# Patient Record
Sex: MALE | Race: WHITE | NOT HISPANIC OR LATINO | Employment: UNEMPLOYED | URBAN - METROPOLITAN AREA
[De-identification: names, ages, dates, MRNs, and addresses within clinical notes are randomized per-mention and may not be internally consistent; named-entity substitution may affect disease eponyms.]

---

## 2018-07-03 ENCOUNTER — OFFICE VISIT (OUTPATIENT)
Dept: FAMILY MEDICINE | Facility: CLINIC | Age: 58
End: 2018-07-03
Payer: COMMERCIAL

## 2018-07-03 VITALS
HEIGHT: 68 IN | DIASTOLIC BLOOD PRESSURE: 76 MMHG | SYSTOLIC BLOOD PRESSURE: 128 MMHG | BODY MASS INDEX: 25.31 KG/M2 | OXYGEN SATURATION: 100 % | RESPIRATION RATE: 20 BRPM | TEMPERATURE: 98.4 F | WEIGHT: 167 LBS | HEART RATE: 88 BPM

## 2018-07-03 DIAGNOSIS — Z00.01 ENCOUNTER FOR ROUTINE ADULT MEDICAL EXAM WITH ABNORMAL FINDINGS: Primary | ICD-10-CM

## 2018-07-03 DIAGNOSIS — E11.59 TYPE 2 DIABETES MELLITUS WITH OTHER CIRCULATORY COMPLICATION, WITH LONG-TERM CURRENT USE OF INSULIN (H): ICD-10-CM

## 2018-07-03 DIAGNOSIS — Z11.4 SCREENING FOR HIV (HUMAN IMMUNODEFICIENCY VIRUS): ICD-10-CM

## 2018-07-03 DIAGNOSIS — F33.2 SEVERE EPISODE OF RECURRENT MAJOR DEPRESSIVE DISORDER, WITHOUT PSYCHOTIC FEATURES (H): ICD-10-CM

## 2018-07-03 DIAGNOSIS — Z79.4 TYPE 2 DIABETES MELLITUS WITH OTHER CIRCULATORY COMPLICATION, WITH LONG-TERM CURRENT USE OF INSULIN (H): ICD-10-CM

## 2018-07-03 DIAGNOSIS — Z11.59 NEED FOR HEPATITIS C SCREENING TEST: ICD-10-CM

## 2018-07-03 DIAGNOSIS — R07.81 RIB PAIN ON LEFT SIDE: ICD-10-CM

## 2018-07-03 DIAGNOSIS — Z12.11 SCREEN FOR COLON CANCER: ICD-10-CM

## 2018-07-03 DIAGNOSIS — I25.10 CORONARY ARTERY DISEASE INVOLVING NATIVE CORONARY ARTERY OF NATIVE HEART WITHOUT ANGINA PECTORIS: ICD-10-CM

## 2018-07-03 DIAGNOSIS — Z13.89 SCREENING FOR DIABETIC PERIPHERAL NEUROPATHY: ICD-10-CM

## 2018-07-03 LAB
ALBUMIN SERPL-MCNC: 3.7 G/DL (ref 3.4–5)
ALP SERPL-CCNC: 89 U/L (ref 40–150)
ALT SERPL W P-5'-P-CCNC: 35 U/L (ref 0–70)
ANION GAP SERPL CALCULATED.3IONS-SCNC: 10 MMOL/L (ref 3–14)
AST SERPL W P-5'-P-CCNC: 14 U/L (ref 0–45)
BILIRUB DIRECT SERPL-MCNC: 0.1 MG/DL (ref 0–0.2)
BILIRUB SERPL-MCNC: 0.2 MG/DL (ref 0.2–1.3)
BUN SERPL-MCNC: 17 MG/DL (ref 7–30)
CALCIUM SERPL-MCNC: 9.4 MG/DL (ref 8.5–10.1)
CHLORIDE SERPL-SCNC: 105 MMOL/L (ref 94–109)
CHOLEST SERPL-MCNC: 146 MG/DL
CO2 SERPL-SCNC: 24 MMOL/L (ref 20–32)
CREAT SERPL-MCNC: 0.77 MG/DL (ref 0.66–1.25)
CREAT UR-MCNC: 95 MG/DL
GFR SERPL CREATININE-BSD FRML MDRD: >90 ML/MIN/1.7M2
GLUCOSE SERPL-MCNC: 139 MG/DL (ref 70–99)
HBA1C MFR BLD: 12.5 % (ref 0–5.6)
HDLC SERPL-MCNC: 35 MG/DL
LDLC SERPL CALC-MCNC: 82 MG/DL
MICROALBUMIN UR-MCNC: 45 MG/L
MICROALBUMIN/CREAT UR: 47.42 MG/G CR (ref 0–17)
NONHDLC SERPL-MCNC: 111 MG/DL
POTASSIUM SERPL-SCNC: 3.8 MMOL/L (ref 3.4–5.3)
PROT SERPL-MCNC: 7.5 G/DL (ref 6.8–8.8)
PSA SERPL-ACNC: 0.46 UG/L (ref 0–4)
SODIUM SERPL-SCNC: 139 MMOL/L (ref 133–144)
TRIGL SERPL-MCNC: 145 MG/DL
TSH SERPL DL<=0.005 MIU/L-ACNC: 0.66 MU/L (ref 0.4–4)

## 2018-07-03 PROCEDURE — 80048 BASIC METABOLIC PNL TOTAL CA: CPT | Performed by: FAMILY MEDICINE

## 2018-07-03 PROCEDURE — 99000 SPECIMEN HANDLING OFFICE-LAB: CPT | Performed by: FAMILY MEDICINE

## 2018-07-03 PROCEDURE — 83036 HEMOGLOBIN GLYCOSYLATED A1C: CPT | Performed by: FAMILY MEDICINE

## 2018-07-03 PROCEDURE — 84443 ASSAY THYROID STIM HORMONE: CPT | Performed by: FAMILY MEDICINE

## 2018-07-03 PROCEDURE — 86803 HEPATITIS C AB TEST: CPT | Performed by: FAMILY MEDICINE

## 2018-07-03 PROCEDURE — 80061 LIPID PANEL: CPT | Performed by: FAMILY MEDICINE

## 2018-07-03 PROCEDURE — 80076 HEPATIC FUNCTION PANEL: CPT | Performed by: FAMILY MEDICINE

## 2018-07-03 PROCEDURE — 82043 UR ALBUMIN QUANTITATIVE: CPT | Performed by: FAMILY MEDICINE

## 2018-07-03 PROCEDURE — 99386 PREV VISIT NEW AGE 40-64: CPT | Performed by: FAMILY MEDICINE

## 2018-07-03 PROCEDURE — G0103 PSA SCREENING: HCPCS | Performed by: FAMILY MEDICINE

## 2018-07-03 PROCEDURE — 99214 OFFICE O/P EST MOD 30 MIN: CPT | Mod: 25 | Performed by: FAMILY MEDICINE

## 2018-07-03 PROCEDURE — 87389 HIV-1 AG W/HIV-1&-2 AB AG IA: CPT | Performed by: FAMILY MEDICINE

## 2018-07-03 PROCEDURE — 36415 COLL VENOUS BLD VENIPUNCTURE: CPT | Performed by: FAMILY MEDICINE

## 2018-07-03 PROCEDURE — 99207 C FOOT EXAM  NO CHARGE: CPT | Mod: 25 | Performed by: FAMILY MEDICINE

## 2018-07-03 PROCEDURE — 80175 DRUG SCREEN QUAN LAMOTRIGINE: CPT | Mod: 90 | Performed by: FAMILY MEDICINE

## 2018-07-03 RX ORDER — QUETIAPINE FUMARATE 300 MG/1
300 TABLET, FILM COATED ORAL AT BEDTIME
Qty: 60 TABLET | Refills: 0 | Status: ON HOLD | OUTPATIENT
Start: 2018-07-03 | End: 2020-02-29

## 2018-07-03 RX ORDER — LAMOTRIGINE 100 MG/1
100 TABLET ORAL DAILY
COMMUNITY
End: 2018-07-03

## 2018-07-03 RX ORDER — CANDESARTAN CILEXETIL AND HYDROCHLOROTHIAZIDE 16; 12.5 MG/1; MG/1
TABLET ORAL
COMMUNITY
End: 2018-07-05

## 2018-07-03 RX ORDER — LAMOTRIGINE 100 MG/1
200 TABLET ORAL 2 TIMES DAILY
Qty: 180 TABLET | Refills: 0 | Status: ON HOLD | OUTPATIENT
Start: 2018-07-03 | End: 2020-02-29

## 2018-07-03 RX ORDER — CLOMIPRAMINE HYDROCHLORIDE 50 MG/1
50 CAPSULE ORAL 4 TIMES DAILY
Qty: 180 CAPSULE | Refills: 0 | Status: ON HOLD | OUTPATIENT
Start: 2018-07-03 | End: 2020-02-29

## 2018-07-03 RX ORDER — FLUOXETINE 40 MG/1
40 CAPSULE ORAL DAILY
Qty: 90 CAPSULE | Refills: 1 | Status: ON HOLD | OUTPATIENT
Start: 2018-07-03 | End: 2020-02-29

## 2018-07-03 RX ORDER — ISOSORBIDE DINITRATE 40 MG/1
40 TABLET ORAL 2 TIMES DAILY
Qty: 180 TABLET | Refills: 1 | COMMUNITY
Start: 2018-07-03 | End: 2018-07-05

## 2018-07-03 RX ORDER — BUSPIRONE HYDROCHLORIDE 10 MG/1
10 TABLET ORAL 3 TIMES DAILY
Qty: 90 TABLET | Refills: 1 | Status: SHIPPED | OUTPATIENT
Start: 2018-07-03 | End: 2020-02-29

## 2018-07-03 RX ORDER — NAPROXEN 500 MG/1
500 TABLET ORAL 2 TIMES DAILY WITH MEALS
Qty: 60 TABLET | Refills: 1 | Status: ON HOLD | OUTPATIENT
Start: 2018-07-03 | End: 2020-02-29

## 2018-07-03 RX ORDER — ROSUVASTATIN CALCIUM 20 MG/1
20 TABLET, COATED ORAL DAILY
COMMUNITY
End: 2018-07-05

## 2018-07-03 NOTE — PROGRESS NOTES
SUBJECTIVE:   CC: Huseyin Pettit is an 58 year old male who presents for preventative health visit.     Healthy Habits:    Do you get at least three servings of calcium containing foods daily (dairy, green leafy vegetables, etc.)? yes    Amount of exercise or daily activities, outside of work: n/a    Problems taking medications regularly No    Medication side effects: No    Have you had an eye exam in the past two years? no    Do you see a dentist twice per year? yes    Do you have sleep apnea, excessive snoring or daytime drowsiness?no           Today's PHQ-2 Score:   PHQ-2 ( 1999 Pf[employer]) 7/3/2018   Q1: Little interest or pleasure in doing things 0   Q2: Feeling down, depressed or hopeless 0   PHQ-2 Score 0       Abuse: Current or Past(Physical, Sexual or Emotional)- No  Do you feel safe in your environment - Yes    Social History   Substance Use Topics     Smoking status: Current Every Day Smoker     Packs/day: 0.50     Years: 25.00     Smokeless tobacco: Never Used      Comment: trying to adela     Alcohol use No      If you drink alcohol do you typically have >3 drinks per day or >7 drinks per week? Not Applicable                      Last PSA: No results found for: PSA    Reviewed orders with patient. Reviewed health maintenance and updated orders accordingly - Yes  Labs reviewed in EPIC  BP Readings from Last 3 Encounters:   07/03/18 128/76   02/18/15 108/70    Wt Readings from Last 3 Encounters:   07/03/18 167 lb (75.8 kg)   02/18/15 176 lb 1.6 oz (79.9 kg)                  Patient Active Problem List   Diagnosis     Hyperlipidemia LDL goal <100     Benign essential hypertension     Severe episode of recurrent major depressive disorder, without psychotic features (H)     Coronary artery disease involving native coronary artery of native heart without angina pectoris     Type 2 diabetes mellitus with other circulatory complication, with long-term current use of insulin (H)     Past Surgical History:   Procedure  Laterality Date     CHOLECYSTECTOMY       GALLBLADDER SURGERY  2011       Social History   Substance Use Topics     Smoking status: Current Every Day Smoker     Packs/day: 0.50     Years: 25.00     Smokeless tobacco: Never Used      Comment: trying to adela     Alcohol use No     History reviewed. No pertinent family history.      Current Outpatient Prescriptions   Medication Sig Dispense Refill     aspirin 81 MG tablet Take by mouth daily       busPIRone (BUSPAR) 10 MG tablet Take 1 tablet (10 mg) by mouth 3 times daily 90 tablet 1     busPIRone (BUSPAR) 10 MG tablet Take 1 tablet (10 mg) by mouth 4 times daily (Need PHQ9 for further refills) 120 tablet 0     candesartan cilexetil-HCTZ (ATACAND HCT) 16-12.5 MG TABS        clomiPRAMINE (ANAFRANIL) 50 MG capsule Take 1 capsule (50 mg) by mouth 4 times daily 180 capsule 0     clomiPRAMINE (ANAFRANIL) 50 MG capsule Take 1 capsule (50 mg) by mouth 5 times daily 450 capsule 0     clopidogrel (PLAVIX) 75 MG tablet Take 1 tablet (75 mg) by mouth daily 90 tablet 1     FLUoxetine (PROZAC) 20 MG capsule Take 20 mg by mouth daily       FLUoxetine (PROZAC) 40 MG capsule Take 1 capsule (40 mg) by mouth daily 90 capsule 1     insulin aspart prot & aspart (NOVOLOG MIX 70/30 PEN) injection 28 units in AM, and 38 units in PM 9 mL 3     isosorbide Dinitrate (ISORDIL) 40 MG TABS Take 1 tablet (40 mg) by mouth 2 times daily 180 tablet 1     lamoTRIgine (LAMICTAL) 100 MG tablet Take 2 tablets (200 mg) by mouth 2 times daily 180 tablet 0     metFORMIN (GLUCOPHAGE) 850 MG tablet Take 1 tablet (850 mg) by mouth 2 times daily (with meals) (Need A1c for further refills) 60 tablet 0     naproxen (NAPROSYN) 500 MG tablet Take 1 tablet (500 mg) by mouth 2 times daily (with meals) 60 tablet 1     QUEtiapine (SEROQUEL XR) 300 MG 24 hr tablet Take 1 tablet (300 mg) by mouth 2 times daily 180 tablet 0     QUEtiapine (SEROQUEL) 300 MG tablet Take 1 tablet (300 mg) by mouth At Bedtime 60 tablet 0      rosuvastatin (CRESTOR) 20 MG tablet Take 20 mg by mouth daily       [DISCONTINUED] ClomiPRAMINE HCl (ANAFRANIL PO) Take 500 mg by mouth 4 times daily       [DISCONTINUED] CLOPIDOGREL BISULFATE PO Take 75 mg by mouth 2 times daily       [DISCONTINUED] lamoTRIgine (LAMICTAL) 100 MG tablet Take 100 mg by mouth daily       [DISCONTINUED] metFORMIN (GLUCOPHAGE) 850 MG tablet Take 1 tablet (850 mg) by mouth 2 times daily (with meals) (Need A1c for further refills) 60 tablet 0     [DISCONTINUED] QUEtiapine Fumarate (SEROQUEL XR PO) Take 300 mg by mouth       No Known Allergies    Reviewed and updated as needed this visit by clinical staff  Tobacco  Allergies  Meds  Med Hx  Surg Hx  Fam Hx  Soc Hx        Reviewed and updated as needed this visit by Provider        Past Medical History:   Diagnosis Date     CAD (coronary artery disease) 2/18/2015    S/p 3 stents in 2011 In [place]     Coronary artery disease      Coronary artery disease involving native coronary artery of native heart without angina pectoris 7/3/2018    S/p 3 stents put in [place] in 2011.     Depressive disorder      Diabetes (H)      Hypertension      Severe episode of recurrent major depressive disorder, without psychotic features (H) 7/3/2018     Type 2 diabetes mellitus with other circulatory complication, with long-term current use of insulin (H) 7/3/2018      Past Surgical History:   Procedure Laterality Date     CHOLECYSTECTOMY       GALLBLADDER SURGERY  2011       BP Readings from Last 2 Encounters:   07/03/18 128/76   02/18/15 108/70     Hemoglobin A1C (%)   Date Value   07/03/2018 12.5 (H)     LDL Cholesterol Calculated (mg/dL)   Date Value   07/03/2018 82       Diabetes Management Resources  Vascular Disease Follow-up:  Coronary Artery Disease (CAD)      Chest pain or pressure, left side neck or arm pain: check below.    Shortness of breath/increased sweats/nausea with exertion: No    Pain in calves walking 1-2 blocks: No    Worsened or new  "symptoms since last visit: No    Nitroglycerin use: no    Daily aspirin use: No, pt is on plavix.      Amount of exercise or physical activity: none.    Problems taking medications regularly: No    Medication side effects: none    Diet: regular (no restrictions)      Chest Pain      Onset: 6 months ago.    Description (location/character/radiation/duration): pain and tenderness on the Lt side of the chest/rib area, sever.     Intensity:  severe    Accompanying signs and symptoms:        Shortness of breath: no        Sweating: no        Nausea/vomitting: no        Palpitations: no        Other (fevers/chills/cough/heartburn/lightheadedness): tenderness to touch, pain is constant, worse with movement.    History (similar episodes/previous evaluation): yes, multiple tests done in [place] that showed mostly fracture /healing of the ribs in that area (bone scan)    Precipitating or alleviating factors:       Worse with exertion: no        Worse with breathing: YES       Related to eating: no        Better with burping: no     Therapies tried and outcome: None         ROS:  CONSTITUTIONAL: NEGATIVE for fever, chills, change in weight  ENT: NEGATIVE for ear, mouth and throat problems  RESP: NEGATIVE for significant cough or SOB  CV: as above.  GI: NEGATIVE for nausea, abdominal pain, heartburn, or change in bowel habits  MUSCULOSKELETAL:rib pain on the Lt side.  NEURO: NEGATIVE for weakness, dizziness or paresthesias  PSYCHIATRIC: NEGATIVE for changes in mood or affect    OBJECTIVE:   /76 (BP Location: Right arm, Patient Position: Chair, Cuff Size: Adult Regular)  Pulse 88  Temp 98.4  F (36.9  C) (Oral)  Resp 20  Ht 5' 8\" (1.727 m)  Wt 167 lb (75.8 kg)  SpO2 100%  BMI 25.39 kg/m2  EXAM:  GENERAL: healthy, alert and no distress  EYES: Eyes grossly normal to inspection, PERRL and conjunctivae and sclerae normal  HENT: ear canals and TM's normal, nose and mouth without ulcers or lesions  NECK: no adenopathy, no " asymmetry, masses, or scars and thyroid normal to palpation  RESP: lungs clear to auscultation - no rales, rhonchi or wheezes  RESP: tenderness on the Lt low rib, sever.  CV: regular rate and rhythm, normal S1 S2, no S3 or S4, no murmur, click or rub, no peripheral edema and peripheral pulses strong  ABDOMEN: soft, nontender, no hepatosplenomegaly, no masses and bowel sounds normal  MS: no gross musculoskeletal defects noted, no edema  SKIN: no suspicious lesions or rashes  NEURO: Normal strength and tone, mentation intact and speech normal  PSYCH: mentation appears normal, affect normal/bright  Diabetic foot exam: normal DP and PT pulses, no trophic changes or ulcerative lesions and normal sensory exam    Diagnostic Test Results:  Results for orders placed or performed in visit on 07/03/18 (from the past 24 hour(s))   Lipid panel reflex to direct LDL Fasting   Result Value Ref Range    Cholesterol 146 <200 mg/dL    Triglycerides 145 <150 mg/dL    HDL Cholesterol 35 (L) >39 mg/dL    LDL Cholesterol Calculated 82 <100 mg/dL    Non HDL Cholesterol 111 <130 mg/dL   HEMOGLOBIN A1C   Result Value Ref Range    Hemoglobin A1C 12.5 (H) 0 - 5.6 %   TSH WITH FREE T4 REFLEX   Result Value Ref Range    TSH 0.66 0.40 - 4.00 mU/L   Basic metabolic panel   Result Value Ref Range    Sodium 139 133 - 144 mmol/L    Potassium 3.8 3.4 - 5.3 mmol/L    Chloride 105 94 - 109 mmol/L    Carbon Dioxide 24 20 - 32 mmol/L    Anion Gap 10 3 - 14 mmol/L    Glucose 139 (H) 70 - 99 mg/dL    Urea Nitrogen 17 7 - 30 mg/dL    Creatinine 0.77 0.66 - 1.25 mg/dL    GFR Estimate >90 >60 mL/min/1.7m2    GFR Estimate If Black >90 >60 mL/min/1.7m2    Calcium 9.4 8.5 - 10.1 mg/dL   Hepatic panel   Result Value Ref Range    Bilirubin Direct 0.1 0.0 - 0.2 mg/dL    Bilirubin Total 0.2 0.2 - 1.3 mg/dL    Albumin 3.7 3.4 - 5.0 g/dL    Protein Total 7.5 6.8 - 8.8 g/dL    Alkaline Phosphatase 89 40 - 150 U/L    ALT 35 0 - 70 U/L    AST 14 0 - 45 U/L   Albumin  Random Urine Quantitative with Creat Ratio   Result Value Ref Range    Creatinine Urine 95 mg/dL    Albumin Urine mg/L 45 mg/L    Albumin Urine mg/g Cr 47.42 (H) 0 - 17 mg/g Cr       ASSESSMENT/PLAN:   1. Encounter for routine adult medical exam with abnormal findings    - Hepatitis C Screen Reflex to HCV RNA Quant and Genotype  - Prostate spec antigen screen    2. Screen for colon cancer  Refer to colonoscopy.     3. Need for hepatitis C screening test  Hep C     4. Screening for HIV (human immunodeficiency virus)    - HIV Screening    5. Screening for diabetic peripheral neuropathy    - FOOT EXAM  NO CHARGE [11346.868]    6. Type 2 diabetes mellitus with other circulatory complication, with long-term current use of insulin (H)  Not controlled at all, at this time, will continue on medicine and return in 1 week to discuss all his treatment for DM.   - Lipid panel reflex to direct LDL Fasting  - HEMOGLOBIN A1C  - Albumin Random Urine Quantitative with Creat Ratio  - TSH WITH FREE T4 REFLEX  - GASTROENTEROLOGY ADULT REF PROCEDURE ONLY  - metFORMIN (GLUCOPHAGE) 850 MG tablet; Take 1 tablet (850 mg) by mouth 2 times daily (with meals) (Need A1c for further refills)  Dispense: 60 tablet; Refill: 0  - Basic metabolic panel  - insulin aspart prot & aspart (NOVOLOG MIX 70/30 PEN) injection; 28 units in AM, and 38 units in PM  Dispense: 9 mL; Refill: 3  - Hepatic panel  - Lamotrigine Level    7. Severe episode of recurrent major depressive disorder, without psychotic features (H)  Pt is on many high dose medications, but his symptoms are for the most part controlled.   Given the severity of the disesae, and high dose of medications, will refer to psychitraty  - lamoTRIgine (LAMICTAL) 100 MG tablet; Take 2 tablets (200 mg) by mouth 2 times daily  Dispense: 180 tablet; Refill: 0  - clomiPRAMINE (ANAFRANIL) 50 MG capsule; Take 1 capsule (50 mg) by mouth 4 times daily  Dispense: 180 capsule; Refill: 0  - busPIRone (BUSPAR) 10  "MG tablet; Take 1 tablet (10 mg) by mouth 3 times daily  Dispense: 90 tablet; Refill: 1  - FLUoxetine (PROZAC) 40 MG capsule; Take 1 capsule (40 mg) by mouth daily  Dispense: 90 capsule; Refill: 1  - QUEtiapine (SEROQUEL) 300 MG tablet; Take 1 tablet (300 mg) by mouth At Bedtime  Dispense: 60 tablet; Refill: 0  - MENTAL HEALTH REFERRAL  - Adult; Psychiatry and Medication Management; Psychiatry; Other: Not Listed - Enter Referral Details in Scheduling Comments Below; Patient call to schedule    8. Rib pain on left side  Unsure of etiology, I suspect trauma, (pt is unaware of any, but could have happened under the influenze of high dose medications ) as above.  Check below CT chest   - CT Chest w/o Contrast; Future  - naproxen (NAPROSYN) 500 MG tablet; Take 1 tablet (500 mg) by mouth 2 times daily (with meals)  Dispense: 60 tablet; Refill: 1    9. Coronary artery disease involving native coronary artery of native heart without angina pectoris  Continue on Plavix, and statin drugs.      COUNSELING:  Reviewed preventive health counseling, as reflected in patient instructions       Regular exercise       Healthy diet/nutrition    BP Readings from Last 1 Encounters:   07/03/18 128/76     Estimated body mass index is 25.39 kg/(m^2) as calculated from the following:    Height as of this encounter: 5' 8\" (1.727 m).    Weight as of this encounter: 167 lb (75.8 kg).           reports that he has been smoking.  He has a 12.50 pack-year smoking history. He has never used smokeless tobacco.      Counseling Resources:  ATP IV Guidelines  Pooled Cohorts Equation Calculator  FRAX Risk Assessment  ICSI Preventive Guidelines  Dietary Guidelines for Americans, 2010  USDA's MyPlate  ASA Prophylaxis  Lung CA Screening    Hunter Carlton MD  [facility]  "

## 2018-07-03 NOTE — LETTER
July 9, 2018      Huseyin Pettit  [address] [address]        Dear ,    We are writing to inform you of your test results.    Labs are showing very high blood sugar, please make sure you start on taking your medications regularly, and I will discuss the plan with you during your next visit.  The rest of your labs are showing slight protein in the urine, otherwise, they are all normal.    Resulted Orders   Lipid panel reflex to direct LDL Fasting   Result Value Ref Range    Cholesterol 146 <200 mg/dL    Triglycerides 145 <150 mg/dL      Comment:      Fasting specimen    HDL Cholesterol 35 (L) >39 mg/dL    LDL Cholesterol Calculated 82 <100 mg/dL      Comment:      Desirable:       <100 mg/dl    Non HDL Cholesterol 111 <130 mg/dL   Hepatitis C Screen Reflex to HCV RNA Quant and Genotype   Result Value Ref Range    Hepatitis C Antibody Nonreactive NR^Nonreactive      Comment:      Assay performance characteristics have not been established for newborns,   infants, and children     HIV Screening   Result Value Ref Range    HIV Antigen Antibody Combo Nonreactive NR^Nonreactive          Comment:      HIV-1 p24 Ag & HIV-1/HIV-2 Ab Not Detected   HEMOGLOBIN A1C   Result Value Ref Range    Hemoglobin A1C 12.5 (H) 0 - 5.6 %      Comment:      Normal <5.7% Prediabetes 5.7-6.4%  Diabetes 6.5% or higher - adopted from ADA   consensus guidelines.  Results confirmed by repeat test     Albumin Random Urine Quantitative with Creat Ratio   Result Value Ref Range    Creatinine Urine 95 mg/dL    Albumin Urine mg/L 45 mg/L    Albumin Urine mg/g Cr 47.42 (H) 0 - 17 mg/g Cr   TSH WITH FREE T4 REFLEX   Result Value Ref Range    TSH 0.66 0.40 - 4.00 mU/L   Basic metabolic panel   Result Value Ref Range    Sodium 139 133 - 144 mmol/L    Potassium 3.8 3.4 - 5.3 mmol/L    Chloride 105 94 - 109 mmol/L    Carbon Dioxide 24 20 - 32 mmol/L    Anion Gap 10 3 - 14 mmol/L    Glucose 139 (H) 70 - 99 mg/dL      Comment:       Fasting specimen    Urea Nitrogen 17 7 - 30 mg/dL    Creatinine 0.77 0.66 - 1.25 mg/dL    GFR Estimate >90 >60 mL/min/1.7m2      Comment:      Non  GFR Calc    GFR Estimate If Black >90 >60 mL/min/1.7m2      Comment:       GFR Calc    Calcium 9.4 8.5 - 10.1 mg/dL   Prostate spec antigen screen   Result Value Ref Range    PSA 0.46 0 - 4 ug/L      Comment:      Assay Method:  Chemiluminescence using Siemens Vista analyzer   Hepatic panel   Result Value Ref Range    Bilirubin Direct 0.1 0.0 - 0.2 mg/dL    Bilirubin Total 0.2 0.2 - 1.3 mg/dL    Albumin 3.7 3.4 - 5.0 g/dL    Protein Total 7.5 6.8 - 8.8 g/dL    Alkaline Phosphatase 89 40 - 150 U/L    ALT 35 0 - 70 U/L    AST 14 0 - 45 U/L   Lamotrigine Level   Result Value Ref Range    Lamotrigine Level 3.7 2.5 - 15.0 ug/mL      Comment:      (Note)  INTERPRETIVE INFORMATION:  Lamotrigine  Therapeutic Range:  2.5-15.0 ug/mL             Toxic:  Not well established  Pharmacokinetics varies widely, particularly with   co-medications and/or compromised renal function.  Adverse   effects may include dizziness, somnolence, nausea and   vomiting.  Performed by [employer],  [address] [place] [id number] [phone]  [email or web address], Maximiliano Rider MD, Lab. Director         If you have any questions or concerns, please call the clinic at the number listed above.       Sincerely,        Hunter Carlton MD/AL

## 2018-07-03 NOTE — MR AVS SNAPSHOT
After Visit Summary   7/3/2018    Huseyin Pettit    MRN: [id number]           Patient Information     Date Of Birth          1960        Visit Information        Provider Department      7/3/2018 8:00 AM Hunter Carlton MD [facility]        Today's Diagnoses     Encounter for routine adult medical exam with abnormal findings    -  1    Screen for colon cancer        Need for hepatitis C screening test        Screening for HIV (human immunodeficiency virus)        Screening for diabetic peripheral neuropathy        Type 2 diabetes mellitus with other circulatory complication, with long-term current use of insulin (H)        Severe episode of recurrent major depressive disorder, without psychotic features (H)        Rib pain on left side          Care Instructions      Preventive Health Recommendations  Male Ages 50 - 64    Yearly exam:             See your health care provider every year in order to  o   Review health changes.   o   Discuss preventive care.    o   Review your medicines if your doctor has prescribed any.     Have a cholesterol test every 5 years, or more frequently if you are at risk for high cholesterol/heart disease.     Have a diabetes test (fasting glucose) every three years. If you are at risk for diabetes, you should have this test more often.     Have a colonoscopy at age 50, or have a yearly FIT test (stool test). These exams will check for colon cancer.      Talk with your health care provider about whether or not a prostate cancer screening test (PSA) is right for you.    You should be tested each year for STDs (sexually transmitted diseases), if you re at risk.     Shots: Get a flu shot each year. Get a tetanus shot every 10 years.     Nutrition:    Eat at least 5 servings of fruits and vegetables daily.     Eat whole-grain bread, whole-wheat pasta and brown rice instead of white grains and rice.     Get adequate Calcium and Vitamin D.      Lifestyle    Exercise for at least 150 minutes a week (30 minutes a day, 5 days a week). This will help you control your weight and prevent disease.     Limit alcohol to one drink per day.     No smoking.     Wear sunscreen to prevent skin cancer.     See your dentist every six months for an exam and cleaning.     See your eye doctor every 1 to 2 years.            Follow-ups after your visit        Additional Services     GASTROENTEROLOGY ADULT REF PROCEDURE ONLY       Last Lab Result: Creatinine (mg/dL)       Date                     Value                 02/18/2015               0.74             ----------  There is no height or weight on file to calculate BMI.     Needed:  No  Language:  English    Patient will be contacted to schedule procedure.     Please be aware that coverage of these services is subject to the terms and limitations of your health insurance plan.  Call member services at your health plan with any benefit or coverage questions.  Any procedures must be performed at a [place] facility OR coordinated by your clinic's referral office.    Please bring the following with you to your appointment:    (1) Any X-Rays, CTs or MRIs which have been performed.  Contact the facility where they were done to arrange for  prior to your scheduled appointment.    (2) List of current medications   (3) This referral request   (4) Any documents/labs given to you for this referral            MENTAL HEALTH REFERRAL  - Adult; Psychiatry and Medication Management; Psychiatry; Other: Not Listed - Enter Referral Details in Scheduling Comments Below; Patient call to schedule       All scheduling is subject to the client's specific insurance plan & benefits, provider/location availability, and provider clinical specialities.  Please arrive 15 minutes early for your first appointment and bring your completed paperwork.    Please be aware that coverage of these services is subject to the terms and  "limitations of your health insurance plan.  Call member services at your health plan with any benefit or coverage questions.                            Follow-up notes from your care team     Return in about 1 week (around 7/10/2018).      Future tests that were ordered for you today     Open Future Orders        Priority Expected Expires Ordered    CT Chest w/o Contrast Routine  7/3/2019 7/3/2018            Who to contact     If you have questions or need follow up information about today's clinic visit or your schedule please contact [facility] directly at [phone].  Normal or non-critical lab and imaging results will be communicated to you by MyChart, letter or phone within 4 business days after the clinic has received the results. If you do not hear from us within 7 days, please contact the clinic through MyChart or phone. If you have a critical or abnormal lab result, we will notify you by phone as soon as possible.  Submit refill requests through [employer] or call your pharmacy and they will forward the refill request to us. Please allow 3 business days for your refill to be completed.          Additional Information About Your Visit        Care EveryWhere ID     This is your Care EveryWhere ID. This could be used by other organizations to access your [place] medical records  [id number]        Your Vitals Were     Pulse Temperature Respirations Height Pulse Oximetry BMI (Body Mass Index)    88 98.4  F (36.9  C) (Oral) 20 5' 8\" (1.727 m) 100% 25.39 kg/m2       Blood Pressure from Last 3 Encounters:   07/03/18 128/76   02/18/15 108/70    Weight from Last 3 Encounters:   07/03/18 167 lb (75.8 kg)   02/18/15 176 lb 1.6 oz (79.9 kg)              We Performed the Following     Albumin Random Urine Quantitative with Creat Ratio     Basic metabolic panel     DEPRESSION ACTION PLAN (DAP)     FOOT EXAM  NO CHARGE [42303.114]     GASTROENTEROLOGY ADULT REF PROCEDURE ONLY     HEMOGLOBIN A1C     " Hepatitis C Screen Reflex to HCV RNA Quant and Genotype     HIV Screening     Lipid panel reflex to direct LDL Fasting     MENTAL HEALTH REFERRAL  - Adult; Psychiatry and Medication Management; Psychiatry; Other: Not Listed - Enter Referral Details in Scheduling Comments Below; Patient call to schedule     Prostate spec antigen screen     TSH WITH FREE T4 REFLEX          Today's Medication Changes          These changes are accurate as of 7/3/18  8:49 AM.  If you have any questions, ask your nurse or doctor.               Start taking these medicines.        Dose/Directions    insulin aspart prot & aspart injection   Commonly known as:  NovoLOG MIX 70/30 PEN   Used for:  Type 2 diabetes mellitus with other circulatory complication, with long-term current use of insulin (H)   Started by:  Hunter Carlton MD        28 units in AM, and 38 units in PM   Quantity:  9 mL   Refills:  3       naproxen 500 MG tablet   Commonly known as:  NAPROSYN   Used for:  Rib pain on left side   Started by:  Hunter Carlton MD        Dose:  500 mg   Take 1 tablet (500 mg) by mouth 2 times daily (with meals)   Quantity:  60 tablet   Refills:  1         These medicines have changed or have updated prescriptions.        Dose/Directions    * busPIRone 10 MG tablet   Commonly known as:  BUSPAR   This may have changed:  Another medication with the same name was added. Make sure you understand how and when to take each.   Used for:  Depression with anxiety   Changed by:  Hunter Carlton MD        Dose:  10 mg   Take 1 tablet (10 mg) by mouth 4 times daily (Need PHQ9 for further refills)   Quantity:  120 tablet   Refills:  0       * busPIRone 10 MG tablet   Commonly known as:  BUSPAR   This may have changed:  You were already taking a medication with the same name, and this prescription was added. Make sure you understand how and when to take each.   Used for:  Severe episode of recurrent major depressive disorder, without psychotic features (H)   Changed  by:  Hunter Carlton MD        Dose:  10 mg   Take 1 tablet (10 mg) by mouth 3 times daily   Quantity:  90 tablet   Refills:  1       * clomiPRAMINE 50 MG capsule   Commonly known as:  ANAFRANIL   This may have changed:  Another medication with the same name was added. Make sure you understand how and when to take each.   Used for:  Depression with anxiety   Changed by:  Hunter Carlton MD        Dose:  50 mg   Take 1 capsule (50 mg) by mouth 5 times daily   Quantity:  450 capsule   Refills:  0       * clomiPRAMINE 50 MG capsule   Commonly known as:  ANAFRANIL   This may have changed:  You were already taking a medication with the same name, and this prescription was added. Make sure you understand how and when to take each.   Used for:  Severe episode of recurrent major depressive disorder, without psychotic features (H)   Changed by:  Hunter Carlton MD        Dose:  50 mg   Take 1 capsule (50 mg) by mouth 4 times daily   Quantity:  180 capsule   Refills:  0       lamoTRIgine 100 MG tablet   Commonly known as:  LAMICTAL   This may have changed:    - how much to take  - when to take this   Used for:  Severe episode of recurrent major depressive disorder, without psychotic features (H)   Changed by:  Hunter Carlton MD        Dose:  200 mg   Take 2 tablets (200 mg) by mouth 2 times daily   Quantity:  180 tablet   Refills:  0       * PROZAC 20 MG capsule   This may have changed:  Another medication with the same name was added. Make sure you understand how and when to take each.   Generic drug:  FLUoxetine   Changed by:  Hunter Carlton MD        Dose:  20 mg   Take 20 mg by mouth daily   Refills:  0       * FLUoxetine 40 MG capsule   Commonly known as:  PROzac   This may have changed:  You were already taking a medication with the same name, and this prescription was added. Make sure you understand how and when to take each.   Used for:  Severe episode of recurrent major depressive disorder, without psychotic features (H)   Changed by:   Hunter Carlton MD        Dose:  40 mg   Take 1 capsule (40 mg) by mouth daily   Quantity:  90 capsule   Refills:  1       * QUEtiapine 300 MG 24 hr tablet   Commonly known as:  SEROQUEL XR   This may have changed:  Another medication with the same name was added. Make sure you understand how and when to take each.   Used for:  Depression with anxiety   Changed by:  Hunter Carlton MD        Dose:  300 mg   Take 1 tablet (300 mg) by mouth 2 times daily   Quantity:  180 tablet   Refills:  0       * QUEtiapine 300 MG tablet   Commonly known as:  SEROquel   This may have changed:  You were already taking a medication with the same name, and this prescription was added. Make sure you understand how and when to take each.   Used for:  Severe episode of recurrent major depressive disorder, without psychotic features (H)   Changed by:  Hunter Carlton MD        Dose:  300 mg   Take 1 tablet (300 mg) by mouth At Bedtime   Quantity:  60 tablet   Refills:  0       * Notice:  This list has 8 medication(s) that are the same as other medications prescribed for you. Read the directions carefully, and ask your doctor or other care provider to review them with you.         Where to get your medicines      These medications were sent to [facility] [address]  [id number] [place] [address]     Phone:  [phone]     busPIRone 10 MG tablet    clomiPRAMINE 50 MG capsule    FLUoxetine 40 MG capsule    insulin aspart prot & aspart injection    lamoTRIgine 100 MG tablet    metFORMIN 850 MG tablet    naproxen 500 MG tablet    QUEtiapine 300 MG tablet                Primary Care Provider Office Phone # Fax #    Hunter Carlton [phone] [address] [facility] [address]        Equal Access to Services     JOANNE MUJICA : Connie Fonseca, tarik cooper, kurt rojas. So [facility] [phone].    ATENCIÓN:  Si habla diya, tiene a borrero disposición servicios gratuitos de asistencia lingüística. Keith wilson [phone].    We comply with applicable federal civil rights laws and [place] laws. We do not discriminate on the basis of race, color, national origin, age, disability, sex, sexual orientation, or gender identity.            Thank you!     Thank you for choosing [facility]  for your care. Our goal is always to provide you with excellent care. Hearing back from our patients is one way we can continue to improve our services. Please take a few minutes to complete the written survey that you may receive in the mail after your visit with us. Thank you!             Your Updated Medication List - Protect others around you: Learn how to safely use, store and throw away your medicines at www.disposemymeds.org.          This list is accurate as of 7/3/18  8:49 AM.  Always use your most recent med list.                   Brand Name Dispense Instructions for use Diagnosis    aspirin 81 MG tablet      Take by mouth daily        ATACAND HCT 16-12.5 MG Tabs   Generic drug:  candesartan cilexetil-HCTZ           * busPIRone 10 MG tablet    BUSPAR    120 tablet    Take 1 tablet (10 mg) by mouth 4 times daily (Need PHQ9 for further refills)    Depression with anxiety       * busPIRone 10 MG tablet    BUSPAR    90 tablet    Take 1 tablet (10 mg) by mouth 3 times daily    Severe episode of recurrent major depressive disorder, without psychotic features (H)       * clomiPRAMINE 50 MG capsule    ANAFRANIL    450 capsule    Take 1 capsule (50 mg) by mouth 5 times daily    Depression with anxiety       * clomiPRAMINE 50 MG capsule    ANAFRANIL    180 capsule    Take 1 capsule (50 mg) by mouth 4 times daily    Severe episode of recurrent major depressive disorder, without psychotic features (H)       clopidogrel 75 MG tablet    PLAVIX    90 tablet    Take 1 tablet (75 mg) by mouth daily    CAD (coronary artery disease)        CRESTOR 20 MG tablet   Generic drug:  rosuvastatin      Take 20 mg by mouth daily        insulin aspart prot & aspart injection    NovoLOG MIX 70/30 PEN    9 mL    28 units in AM, and 38 units in PM    Type 2 diabetes mellitus with other circulatory complication, with long-term current use of insulin (H)       isosorbide Dinitrate 40 MG Tabs    ISORDIL    180 tablet    Take 1 tablet (40 mg) by mouth 2 times daily        lamoTRIgine 100 MG tablet    LAMICTAL    180 tablet    Take 2 tablets (200 mg) by mouth 2 times daily    Severe episode of recurrent major depressive disorder, without psychotic features (H)       metFORMIN 850 MG tablet    GLUCOPHAGE    60 tablet    Take 1 tablet (850 mg) by mouth 2 times daily (with meals) (Need A1c for further refills)    Type 2 diabetes mellitus with other circulatory complication, with long-term current use of insulin (H)       naproxen 500 MG tablet    NAPROSYN    60 tablet    Take 1 tablet (500 mg) by mouth 2 times daily (with meals)    Rib pain on left side       * PROZAC 20 MG capsule   Generic drug:  FLUoxetine      Take 20 mg by mouth daily        * FLUoxetine 40 MG capsule    PROzac    90 capsule    Take 1 capsule (40 mg) by mouth daily    Severe episode of recurrent major depressive disorder, without psychotic features (H)       * QUEtiapine 300 MG 24 hr tablet    SEROQUEL XR    180 tablet    Take 1 tablet (300 mg) by mouth 2 times daily    Depression with anxiety       * QUEtiapine 300 MG tablet    SEROquel    60 tablet    Take 1 tablet (300 mg) by mouth At Bedtime    Severe episode of recurrent major depressive disorder, without psychotic features (H)       * Notice:  This list has 8 medication(s) that are the same as other medications prescribed for you. Read the directions carefully, and ask your doctor or other care provider to review them with you.

## 2018-07-04 LAB — LAMOTRIGINE SERPL-MCNC: 3.7 UG/ML (ref 2.5–15)

## 2018-07-05 ENCOUNTER — TELEPHONE (OUTPATIENT)
Dept: FAMILY MEDICINE | Facility: CLINIC | Age: 58
End: 2018-07-05

## 2018-07-05 DIAGNOSIS — I25.10 CORONARY ARTERY DISEASE INVOLVING NATIVE CORONARY ARTERY OF NATIVE HEART WITHOUT ANGINA PECTORIS: ICD-10-CM

## 2018-07-05 LAB
HCV AB SERPL QL IA: NONREACTIVE
HIV 1+2 AB+HIV1 P24 AG SERPL QL IA: NONREACTIVE

## 2018-07-05 NOTE — TELEPHONE ENCOUNTER
"Patient is out of these medications and needs refills on them. They are just \"reported\" or \"historical\" in his chart.    Isosorbide dinatrate 40mg  crestor 20mg  plavix 75mg  Candesartan-cilexatine 16-12.5      Please send refills if approved ASAP.    Thanks,  Judith Meng, [facility]  [phone]    "

## 2018-07-05 NOTE — TELEPHONE ENCOUNTER
Can we check with the patient and make sure these are the right dosages, then we can approve these meds.  Hunter Carlton MD  [facility]  [phone]

## 2018-07-06 ENCOUNTER — HOSPITAL ENCOUNTER (OUTPATIENT)
Dept: CT IMAGING | Facility: CLINIC | Age: 58
Discharge: HOME | End: 2018-07-06
Attending: FAMILY MEDICINE | Admitting: FAMILY MEDICINE
Payer: COMMERCIAL

## 2018-07-06 DIAGNOSIS — R07.81 RIB PAIN ON LEFT SIDE: ICD-10-CM

## 2018-07-06 PROCEDURE — 71250 CT THORAX DX C-: CPT

## 2018-07-09 ENCOUNTER — TELEPHONE (OUTPATIENT)
Dept: PEDIATRICS | Facility: CLINIC | Age: 58
End: 2018-07-09

## 2018-07-09 RX ORDER — ISOSORBIDE DINITRATE 40 MG/1
40 TABLET ORAL 2 TIMES DAILY
Qty: 180 TABLET | Refills: 3 | Status: SHIPPED | OUTPATIENT
Start: 2018-07-09 | End: 2020-03-10

## 2018-07-09 RX ORDER — CANDESARTAN CILEXETIL AND HYDROCHLOROTHIAZIDE 16; 12.5 MG/1; MG/1
1 TABLET ORAL DAILY
Qty: 90 TABLET | Refills: 1 | Status: ON HOLD | OUTPATIENT
Start: 2018-07-09 | End: 2020-02-29

## 2018-07-09 RX ORDER — ROSUVASTATIN CALCIUM 20 MG/1
20 TABLET, COATED ORAL DAILY
Qty: 90 TABLET | Refills: 3 | Status: SHIPPED | OUTPATIENT
Start: 2018-07-09 | End: 2020-03-10

## 2018-07-09 RX ORDER — CLOPIDOGREL BISULFATE 75 MG/1
75 TABLET ORAL DAILY
Qty: 90 TABLET | Refills: 3 | Status: SHIPPED | OUTPATIENT
Start: 2018-07-09 | End: 2020-03-10

## 2018-07-09 NOTE — TELEPHONE ENCOUNTER
"Pt calls.  Asks if he needs surgery for ribs fracture. Informed. No.  He said if there is no surgery for rib fx would doctor give me some kind of medication? Informed there is no medication to speed healing of ribs, time only.     Reviewed meds.  Confirmed.  T'd up.   He also needs Rx for \"CONCOR\" I do not find this in Epic.    He does not have Rx bottle to bring to tomorrow's appointment  Will discuss.   Naproxen makes him constipated, will discuss tomorrow  Wants to review PSA results, will discuss tomorrow.     Dr. Carlton, I had great difficulty understanding Huseyin. While he speaks English, he has difficulty with spelling of meds and understanding me when I am spelling   Is  indicated for future calls?    I was unable to complete a full sentence without his interruption.   Kyle Irby, RN    "

## 2018-07-09 NOTE — TELEPHONE ENCOUNTER
I signed them. But I don't know about Concor, he may have to bring the medicine with him.  Hunter Carlton MD  [facility]  [phone]  r

## 2018-07-09 NOTE — TELEPHONE ENCOUNTER
I speak Yor[place] same language he does, but maybe it is better to get  for other things like nursing and labs..  Hunter Carlton MD  [facility]  [phone]

## 2018-07-10 ENCOUNTER — OFFICE VISIT (OUTPATIENT)
Dept: FAMILY MEDICINE | Facility: CLINIC | Age: 58
End: 2018-07-10
Payer: COMMERCIAL

## 2018-07-10 VITALS
WEIGHT: 169 LBS | HEIGHT: 68 IN | HEART RATE: 103 BPM | BODY MASS INDEX: 25.61 KG/M2 | SYSTOLIC BLOOD PRESSURE: 131 MMHG | DIASTOLIC BLOOD PRESSURE: 78 MMHG | TEMPERATURE: 98 F | OXYGEN SATURATION: 100 % | RESPIRATION RATE: 20 BRPM

## 2018-07-10 DIAGNOSIS — Z79.4 TYPE 2 DIABETES MELLITUS WITH OTHER CIRCULATORY COMPLICATION, WITH LONG-TERM CURRENT USE OF INSULIN (H): ICD-10-CM

## 2018-07-10 DIAGNOSIS — E11.59 TYPE 2 DIABETES MELLITUS WITH OTHER CIRCULATORY COMPLICATION, WITH LONG-TERM CURRENT USE OF INSULIN (H): ICD-10-CM

## 2018-07-10 DIAGNOSIS — K64.4 EXTERNAL HEMORRHOIDS: Primary | ICD-10-CM

## 2018-07-10 DIAGNOSIS — S22.42XD CLOSED FRACTURE OF MULTIPLE RIBS OF LEFT SIDE WITH ROUTINE HEALING: ICD-10-CM

## 2018-07-10 PROCEDURE — 99214 OFFICE O/P EST MOD 30 MIN: CPT | Performed by: FAMILY MEDICINE

## 2018-07-10 NOTE — MR AVS SNAPSHOT
"              After Visit Summary   7/10/2018    Huseyin Pettit    MRN: [id number]           Patient Information     Date Of Birth          1960        Visit Information        Provider Department      7/10/2018 8:30 AM Hunter Carlton MD [facility]        Today's Diagnoses     External hemorrhoids    -  1    Closed fracture of multiple ribs of left side with routine healing        Type 2 diabetes mellitus with other circulatory complication, with long-term current use of insulin (H)           Follow-ups after your visit        Who to contact     If you have questions or need follow up information about today's clinic visit or your schedule please contact [facility] directly at [phone].  Normal or non-critical lab and imaging results will be communicated to you by MyChart, letter or phone within 4 business days after the clinic has received the results. If you do not hear from us within 7 days, please contact the clinic through MyChart or phone. If you have a critical or abnormal lab result, we will notify you by phone as soon as possible.  Submit refill requests through [employer] or call your pharmacy and they will forward the refill request to us. Please allow 3 business days for your refill to be completed.          Additional Information About Your Visit        Care EveryWhere ID     This is your Care EveryWhere ID. This could be used by other organizations to access your [facility] medical records  [id number]        Your Vitals Were     Pulse Temperature Respirations Height Pulse Oximetry BMI (Body Mass Index)    103 98  F (36.7  C) (Oral) 20 5' 8\" (1.727 m) 100% 25.7 kg/m2       Blood Pressure from Last 3 Encounters:   07/10/18 131/78   07/03/18 128/76   02/18/15 108/70    Weight from Last 3 Encounters:   07/10/18 169 lb (76.7 kg)   07/03/18 167 lb (75.8 kg)   02/18/15 176 lb 1.6 oz (79.9 kg)              Today, you had the following     No orders found for display       "   Today's Medication Changes          These changes are accurate as of 7/10/18 10:32 AM.  If you have any questions, ask your nurse or doctor.               Start taking these medicines.        Dose/Directions    hydrocortisone 2.5 % cream   Commonly known as:  ANUSOL-HC   Used for:  External hemorrhoids   Started by:  Hunter Carlton MD        Place rectally 2 times daily as needed for hemorrhoids   Quantity:  90 g   Refills:  3            Where to get your medicines      [place] [facility] [place] [address]  [address] [facility] [address]     Phone:  [phone]     hydrocortisone 2.5 % cream                Primary Care Provider Office Phone # Fax #    Hunter Carlton [phone] [address] [facility] [address]        Equal Access to Services     [facility] : Hadii aad ku hadashmariah Somaeve, waaxda luqadaha, qaybta kaalmada charlesyaanastasia, kurt woo . So [facility] [phone].    ATENCIÓN: Si habla español, tiene a borrero disposición servicios gratuitos de asistencia lingüística. [facility] [phone].    We comply with applicable federal civil rights laws and [place] laws. We do not discriminate on the basis of race, color, national origin, age, disability, sex, sexual orientation, or gender identity.            Thank you!     Thank you for choosing [facility]  for your care. Our goal is always to provide you with excellent care. Hearing back from our patients is one way we can continue to improve our services. Please take a few minutes to complete the written survey that you may receive in the mail after your visit with us. Thank you!             Your Updated Medication List - Protect others around you: Learn how to safely use, store and throw away your medicines at www.disposemymeds.org.          This list is accurate as of 7/10/18 10:32 AM.  Always use your most recent med list.                    Brand Name Dispense Instructions for use Diagnosis    aspirin 81 MG tablet      Take by mouth daily        busPIRone 10 MG tablet    BUSPAR    90 tablet    Take 1 tablet (10 mg) by mouth 3 times daily    Severe episode of recurrent major depressive disorder, without psychotic features (H)       candesartan cilexetil-HCTZ 16-12.5 MG Tabs    ATACAND HCT    90 tablet    Take 1 tablet by mouth daily    Coronary artery disease involving native coronary artery of native heart without angina pectoris       clomiPRAMINE 50 MG capsule    ANAFRANIL    180 capsule    Take 1 capsule (50 mg) by mouth 4 times daily    Severe episode of recurrent major depressive disorder, without psychotic features (H)       clopidogrel 75 MG tablet    PLAVIX    90 tablet    Take 1 tablet (75 mg) by mouth daily    Coronary artery disease involving native coronary artery of native heart without angina pectoris       FLUoxetine 40 MG capsule    PROzac    90 capsule    Take 1 capsule (40 mg) by mouth daily    Severe episode of recurrent major depressive disorder, without psychotic features (H)       hydrocortisone 2.5 % cream    ANUSOL-HC    90 g    Place rectally 2 times daily as needed for hemorrhoids    External hemorrhoids       insulin aspart prot & aspart injection    NovoLOG MIX 70/30 PEN    9 mL    28 units in AM, and 38 units in PM    Type 2 diabetes mellitus with other circulatory complication, with long-term current use of insulin (H)       isosorbide Dinitrate 40 MG Tabs    ISORDIL    180 tablet    Take 1 tablet (40 mg) by mouth 2 times daily    Coronary artery disease involving native coronary artery of native heart without angina pectoris       lamoTRIgine 100 MG tablet    LAMICTAL    180 tablet    Take 2 tablets (200 mg) by mouth 2 times daily    Severe episode of recurrent major depressive disorder, without psychotic features (H)       metFORMIN 850 MG tablet    GLUCOPHAGE    60 tablet    Take 1 tablet (850 mg) by  mouth 2 times daily (with meals) (Need A1c for further refills)    Type 2 diabetes mellitus with other circulatory complication, with long-term current use of insulin (H)       naproxen 500 MG tablet    NAPROSYN    60 tablet    Take 1 tablet (500 mg) by mouth 2 times daily (with meals)    Rib pain on left side       QUEtiapine 300 MG tablet    SEROquel    60 tablet    Take 1 tablet (300 mg) by mouth At Bedtime    Severe episode of recurrent major depressive disorder, without psychotic features (H)       rosuvastatin 20 MG tablet    CRESTOR    90 tablet    Take 1 tablet (20 mg) by mouth daily    Coronary artery disease involving native coronary artery of native heart without angina pectoris

## 2018-07-10 NOTE — PROGRESS NOTES
SUBJECTIVE:   Huseyin Pettit is a 58 year old male who presents to clinic today for the following health issues:      CHEST PAIN     Onset: this is a f/u appt    Description:   Location:  left side  Character: gnawing  Radiation: n/a  Duration: intermittent     Intensity: moderate    Progression of Symptoms:  improving    Accompanying Signs & Symptoms:  Shortness of breath: no  Sweating: no  Nausea/vomiting: no  Lightheadedness: no  Palpitations: no  Fever/Chills: no  Cough: YES  Heartburn: no    History:   Family history of heart disease no  Tobacco use: YES    Precipitating factors:   Worse with exertion: YES  Worse with deep breaths :  YES  Related to food: no    Alleviating factors:  Tried advil, naprosyn but it caused him side effects       Therapies Tried and outcome: naprosyn.      Diabetes Follow-up      Patient is checking blood sugars: not at all    Diabetic concerns: blood sugar frequently over 200     Symptoms of hypoglycemia (low blood sugar): none     Paresthesias (numbness or burning in feet) or sores: No     Date of last diabetic eye exam: done in [place].    BP Readings from Last 2 Encounters:   07/10/18 131/78   07/03/18 128/76     Hemoglobin A1C (%)   Date Value   07/03/2018 12.5 (H)     LDL Cholesterol Calculated (mg/dL)   Date Value   07/03/2018 82       Diabetes Management Resources    Problem list and histories reviewed & adjusted, as indicated.  Additional history: as documented    Patient Active Problem List   Diagnosis     Hyperlipidemia LDL goal <100     Benign essential hypertension     Severe episode of recurrent major depressive disorder, without psychotic features (H)     Coronary artery disease involving native coronary artery of native heart without angina pectoris     Type 2 diabetes mellitus with other circulatory complication, with long-term current use of insulin (H)     Closed fracture of multiple ribs of left side with routine healing     Past Surgical History:   Procedure  Laterality Date     CHOLECYSTECTOMY       GALLBLADDER SURGERY  2011       Social History   Substance Use Topics     Smoking status: Current Every Day Smoker     Packs/day: 0.50     Years: 25.00     Smokeless tobacco: Never Used      Comment: trying to adela     Alcohol use No     History reviewed. No pertinent family history.      Current Outpatient Prescriptions   Medication Sig Dispense Refill     aspirin 81 MG tablet Take by mouth daily       busPIRone (BUSPAR) 10 MG tablet Take 1 tablet (10 mg) by mouth 3 times daily 90 tablet 1     candesartan cilexetil-HCTZ (ATACAND HCT) 16-12.5 MG TABS Take 1 tablet by mouth daily 90 tablet 1     clomiPRAMINE (ANAFRANIL) 50 MG capsule Take 1 capsule (50 mg) by mouth 4 times daily 180 capsule 0     clopidogrel (PLAVIX) 75 MG tablet Take 1 tablet (75 mg) by mouth daily 90 tablet 3     FLUoxetine (PROZAC) 40 MG capsule Take 1 capsule (40 mg) by mouth daily 90 capsule 1     hydrocortisone (ANUSOL-HC) 2.5 % cream Place rectally 2 times daily as needed for hemorrhoids 90 g 3     insulin aspart prot & aspart (NOVOLOG MIX 70/30 PEN) injection 28 units in AM, and 38 units in PM 9 mL 3     isosorbide Dinitrate (ISORDIL) 40 MG TABS Take 1 tablet (40 mg) by mouth 2 times daily 180 tablet 3     lamoTRIgine (LAMICTAL) 100 MG tablet Take 2 tablets (200 mg) by mouth 2 times daily 180 tablet 0     metFORMIN (GLUCOPHAGE) 850 MG tablet Take 1 tablet (850 mg) by mouth 2 times daily (with meals) (Need A1c for further refills) 60 tablet 0     naproxen (NAPROSYN) 500 MG tablet Take 1 tablet (500 mg) by mouth 2 times daily (with meals) 60 tablet 1     QUEtiapine (SEROQUEL) 300 MG tablet Take 1 tablet (300 mg) by mouth At Bedtime 60 tablet 0     rosuvastatin (CRESTOR) 20 MG tablet Take 1 tablet (20 mg) by mouth daily 90 tablet 3     No Known Allergies    Reviewed and updated as needed this visit by clinical staff  Tobacco  Allergies  Meds  Med Hx  Surg Hx  Fam Hx  Soc Hx      Reviewed and updated  "as needed this visit by Provider         ROS:  CONSTITUTIONAL: NEGATIVE for fever, chills, change in weight  NEURO: NEGATIVE for weakness, dizziness or paresthesias    OBJECTIVE:     /78 (BP Location: Right arm, Patient Position: Chair, Cuff Size: Adult Regular)  Pulse 103  Temp 98  F (36.7  C) (Oral)  Resp 20  Ht 5' 8\" (1.727 m)  Wt 169 lb (76.7 kg)  SpO2 100%  BMI 25.7 kg/m2  Body mass index is 25.7 kg/(m^2).  GENERAL: healthy, alert and no distress  PSYCH: mentation appears normal and anxious    Diagnostic Test Results:  Results for orders placed or performed during the hospital encounter of 07/06/18   CT Chest w/o Contrast    Narrative    CT CHEST WITHOUT CONTRAST7/6/2018 2:11 PM    HISTORY: ; Rib pain on left side    TECHNIQUE: Axial images from thoracic inlet to diaphragm. Without IV  contrast Radiation dose for this scan was reduced using automated  exposure control, adjustment of the mA and/or kV according to patient  size, or iterative reconstruction technique.    COMPARISON: None.    FINDINGS:   CHEST: There are fractures involving the anterolateral left fifth  sixth seventh and eighth ribs which appear subacute but nondisplaced.  No evidence for pleural effusion. No pneumothorax. Mild discoid  atelectasis anterior bilateral lower lobes and mild opacity in the  lingula near the fractures which could represent evolving pulmonary  contusion or atelectasis.     Dense coronary artery calcification unless there has been coronary  stent or stents. No mediastinal, hilar or axillary adenopathy.  Cholecystectomy clips. A portion of the hepatic flexure and proximal  transverse colon is anterior to the liver.      Impression    IMPRESSION: Multiple subacute appearing left fifth-eighth rib  fractures with very mild opacity in the left midlung which could be  evolving pulmonary contusion or possibly atelectasis. Mild discoid  lower lobe atelectasis. No pneumothorax or pleural effusion.    KRISTY MCKINNEY, " MD       ASSESSMENT/PLAN:             1. Closed fracture of multiple ribs of left side with routine healing  Pt requested vicodin or tylenol #3, he was very insistent and begged many times for me to prescribe few pills, I strongly suspect drug seeking behavior.  I did not give any narcotics, recommended tylenol for his pain as needed.     2. External hemorrhoids  Pt is requesting a cream for his hemorrhoids.  - hydrocortisone (ANUSOL-HC) 2.5 % cream; Place rectally 2 times daily as needed for hemorrhoids  Dispense: 90 g; Refill: 3    3. Type 2 diabetes mellitus with other circulatory complication, with long-term current use of insulin (H)  Not controlled, pt has not been consistent in taking his insulin. Pt said that he will return back in 6 months as he is leaving to [place], [place] home country.          Hunter Carlton MD  [facility]

## 2018-07-11 ASSESSMENT — PATIENT HEALTH QUESTIONNAIRE - PHQ9: SUM OF ALL RESPONSES TO PHQ QUESTIONS 1-9: 4

## 2018-08-15 ENCOUNTER — TELEPHONE (OUTPATIENT)
Dept: FAMILY MEDICINE | Facility: CLINIC | Age: 58
End: 2018-08-15

## 2018-08-15 NOTE — TELEPHONE ENCOUNTER
Panel Management Review      Patient has the following on his problem list:     Diabetes    ASA: Passed    Last A1C  Lab Results   Component Value Date    A1C 12.5 07/03/2018     A1C tested: FAILED    Last LDL:    Lab Results   Component Value Date    CHOL 146 07/03/2018     Lab Results   Component Value Date    HDL 35 07/03/2018     Lab Results   Component Value Date    LDL 82 07/03/2018     Lab Results   Component Value Date    TRIG 145 07/03/2018     No results found for: CHOLHDLRATIO  Lab Results   Component Value Date    NHDL 111 07/03/2018       Is the patient on a Statin? YES             Is the patient on Aspirin? YES    Medications     HMG CoA Reductase Inhibitors    rosuvastatin (CRESTOR) 20 MG tablet    Salicylates    aspirin 81 MG tablet          Last three blood pressure readings:  BP Readings from Last 3 Encounters:   07/10/18 131/78   07/03/18 128/76   02/18/15 108/70       Date of last diabetes office visit: 7/10/2018     Tobacco History:     History   Smoking Status     Current Every Day Smoker     Packs/day: 0.50     Years: 25.00   Smokeless Tobacco     Never Used     Comment: trying to adela         Hypertension   Last three blood pressure readings:  BP Readings from Last 3 Encounters:   07/10/18 131/78   07/03/18 128/76   02/18/15 108/70     Blood pressure: Passed    HTN Guidelines:  Age 18-59 BP range:  Less than 140/90  Age 60-85 with Diabetes:  Less than 140/90  Age 60-85 without Diabetes:  less than 150/90      Composite cancer screening  Chart review shows that this patient is due/due soon for the following Colonoscopy  Summary:    Patient is due/failing the following:   COLONOSCOPY    Action needed:   Patient needs referral/order: colonoscopy    Type of outreach:    routed to panel pool for outreach    Questions for provider review:    None                                                                                                                                    Stephanie Buckner  CMA       Chart routed to Care Team .

## 2018-08-15 NOTE — LETTER
[address] [address]  [phone]  September 4, 2018    Huseyin Pettit  [address] [address]    Dear Huseyin,    I care about your health and have reviewed your health plan. I have reviewed your medical conditions, medication list, and lab results and am making recommendations based on this review, to better manage your health.    You are in particular need of attention regarding:  -Colon Cancer Screening    I am recommending that you:  -schedule a COLONOSCOPY to look for colon cancer (due every 10 years or 5 years in higher risk situations.)   Colon cancer is now the second leading cause of death in the [place] for both men and women and there are over 130,000 new cases and 50,000 deaths per year from colon cancer.  Colonoscopies can prevent 90-95% of these deaths.  Problem lesions can be removed before they ever become cancer.  This test is not only looking for cancer, but also getting rid of precancerious lesions.  If you do not wish to do a colonoscopy or cannot afford to do one, at this time, there is another option. It is called a FIT test or Fecal Immunochemical Occult Blood Test (take home stool sample kit).  It does not replace the colonoscopy for colorectal cancer screening, but it can detect hidden bleeding in the lower colon.  It does need to be repeated every year and if a positive result is obtained, you would be referred for a colonoscopy.  If you have completed either one of these tests at another facility, please have the records sent to our clinic so that we can best coordinate your care.        Please call us at [phone] (or use [employer]) to address the above recommendations.     Thank you for trusting [facility] and we appreciate the opportunity to serve you.  We look forward to supporting your healthcare needs in the future.    Healthy Regards,    Hunter Carlton MD

## 2018-09-04 NOTE — TELEPHONE ENCOUNTER
Type of outreach:  Phone, left message for patient to call back.  and Sent letter.     Alejandrina Matthews CMA on 9/4/2018 at 11:43 AM

## 2018-12-03 ENCOUNTER — TELEPHONE (OUTPATIENT)
Dept: FAMILY MEDICINE | Facility: CLINIC | Age: 58
End: 2018-12-03

## 2018-12-03 NOTE — LETTER
[address] [address]  [phone]  December 26, 2018    Huseyin Pettit  [address] [address]    Dear Huseyin,    I care about your health and have reviewed your health plan. I have reviewed your medical conditions, medication list, and lab results and am making recommendations based on this review, to better manage your health.    You are in particular need of attention regarding:  -Diabetes  -Colon Cancer Screening    I am recommending that you:  {recommendations:-schedule a FOLLOWUP OFFICE APPOINTMENT with me.  I will recheck your: A1c test.  -schedule a COLONOSCOPY to look for colon cancer (due every 10 years or 5 years in higher risk situations.)   Colon cancer is now the second leading cause of death in the [place] for both men and women and there are over 130,000 new cases and 50,000 deaths per year from colon cancer.  Colonoscopies can prevent 90-95% of these deaths.  Problem lesions can be removed before they ever become cancer.  This test is not only looking for cancer, but also getting rid of precancerious lesions.  If you do not wish to do a colonoscopy or cannot afford to do one, at this time, there is another option. It is called a FIT test or Fecal Immunochemical Occult Blood Test (take home stool sample kit).  It does not replace the colonoscopy for colorectal cancer screening, but it can detect hidden bleeding in the lower colon.  It does need to be repeated every year and if a positive result is obtained, you would be referred for a colonoscopy.  If you have completed either one of these tests at another facility, please have the records sent to our clinic so that we can best coordinate your care.    Here is a list of Health Maintenance topics that are due now or due soon:  Health Maintenance Due   Topic Date Due     EYE EXAM Q1 YEAR  02/18/1961     DTAP/TDAP/TD IMMUNIZATION (1 - Tdap)  02/18/1986     COLON CANCER SCREEN (SYSTEM ASSIGNED)  02/18/2011     ZOSTER IMMUNIZATION (1 of 2) 02/18/2011     ADVANCE DIRECTIVE PLANNING Q5 YRS  02/18/2016     INFLUENZA VACCINE (1) 09/01/2018     A1C Q6 MO  01/03/2019     PHQ-9 Q6 MONTHS  01/10/2019       Please call us at [phone] (or use [employer]) to address the above recommendations.     Thank you for trusting [facility] and we appreciate the opportunity to serve you.  We look forward to supporting your healthcare needs in the future.    Healthy Regards,    Hunter Carlton MD/naya

## 2018-12-03 NOTE — TELEPHONE ENCOUNTER
Panel Management Review      Patient has the following on his problem list:     Diabetes    ASA: Passed    Last A1C  Lab Results   Component Value Date    A1C 12.5 07/03/2018     A1C tested: FAILED    Last LDL:    Lab Results   Component Value Date    CHOL 146 07/03/2018     Lab Results   Component Value Date    HDL 35 07/03/2018     Lab Results   Component Value Date    LDL 82 07/03/2018     Lab Results   Component Value Date    TRIG 145 07/03/2018     No results found for: CHOLHDLRATIO  Lab Results   Component Value Date    NHDL 111 07/03/2018       Is the patient on a Statin? YES             Is the patient on Aspirin? YES    Medications     HMG CoA Reductase Inhibitors    rosuvastatin (CRESTOR) 20 MG tablet    Salicylates    aspirin 81 MG tablet          Last three blood pressure readings:  BP Readings from Last 3 Encounters:   07/10/18 131/78   07/03/18 128/76   02/18/15 108/70       Date of last diabetes office visit: 7/3/2018     Tobacco History:     History   Smoking Status     Current Every Day Smoker     Packs/day: 0.50     Years: 25.00   Smokeless Tobacco     Never Used     Comment: trying to adela         Hypertension   Last three blood pressure readings:  BP Readings from Last 3 Encounters:   07/10/18 131/78   07/03/18 128/76   02/18/15 108/70     Blood pressure: Passed    HTN Guidelines:  Age 18-59 BP range:  Less than 140/90  Age 60-85 with Diabetes:  Less than 140/90  Age 60-85 without Diabetes:  less than 150/90      Composite cancer screening  Chart review shows that this patient is due/due soon for the following Colonoscopy  Summary:    Patient is due/failing the following:   A1C and COLONOSCOPY    Action needed:   Patient needs office visit for diabetes around 1/3/2019 and schedule colonoscopy.    Type of outreach:    routed to panel pool for outreach    Questions for provider review:    None                                                                                                                                     Stephanie Buckner       Chart routed to Care Team .

## 2019-02-02 ENCOUNTER — TRANSFERRED RECORDS (OUTPATIENT)
Dept: HEALTH INFORMATION MANAGEMENT | Facility: CLINIC | Age: 59
End: 2019-02-02

## 2019-03-11 ENCOUNTER — TELEPHONE (OUTPATIENT)
Dept: FAMILY MEDICINE | Facility: CLINIC | Age: 59
End: 2019-03-11

## 2019-03-11 NOTE — LETTER
[facility]  [id number] [place] [id number]  [phone]  March 26, 2019    Huseyin Pettit  [address] [address]    Dear Huseyin,    I care about your health and have reviewed your health plan. I have reviewed your medical conditions, medication list, and lab results and am making recommendations based on this review, to better manage your health.    You are in particular need of attention regarding:  -Colon Cancer Screening  -A1C    I am recommending that you:  {recommendations:-schedule a COLONOSCOPY to look for colon cancer (due every 10 years or 5 years in higher risk situations.)   Colon cancer is now the second leading cause of death in the [place] for both men and women and there are over 130,000 new cases and 50,000 deaths per year from colon cancer.  Colonoscopies can prevent 90-95% of these deaths.  Problem lesions can be removed before they ever become cancer.  This test is not only looking for cancer, but also getting rid of precancerious lesions.  If you do not wish to do a colonoscopy or cannot afford to do one, at this time, there is another option. It is called a FIT test or Fecal Immunochemical Occult Blood Test (take home stool sample kit).  It does not replace the colonoscopy for colorectal cancer screening, but it can detect hidden bleeding in the lower colon.  It does need to be repeated every year and if a positive result is obtained, you would be referred for a colonoscopy.  If you have completed either one of these tests at another facility, please have the records sent to our clinic so that we can best coordinate your care.    Here is a list of Health Maintenance topics that are due now or due soon:  Health Maintenance Due   Topic Date Due     PREVENTIVE CARE VISIT  1960     EYE EXAM Q1 YEAR  02/18/1962     DTAP/TDAP/TD IMMUNIZATION (1 - Tdap) 02/18/1986     COLON CANCER SCREEN (SYSTEM ASSIGNED)   02/18/2011     ZOSTER IMMUNIZATION (1 of 2) 02/18/2011     ADVANCE DIRECTIVE PLANNING Q5 YRS  02/18/2016     INFLUENZA VACCINE (1) 09/01/2018     A1C Q6 MO  01/03/2019     PHQ-9 Q6 MONTHS  01/10/2019       Please call us at [phone] (or use [employer]) to address the above recommendations.     Thank you for trusting [facility] and we appreciate the opportunity to serve you.  We look forward to supporting your healthcare needs in the future.    Healthy Regards,    Hunter Carlton MD ss

## 2019-03-11 NOTE — TELEPHONE ENCOUNTER
Panel Management Review      Patient has the following on his problem list:   Diabetes    Date of last diabetes office visit: 7/10/2018     Tobacco History:     History   Smoking Status     Current Every Day Smoker     Packs/day: 0.50     Years: 25.00   Smokeless Tobacco     Never Used     Comment: trying to adela           Composite cancer screening  Chart review shows that this patient is due/due soon for the following Colonoscopy  Summary:    Patient is due/failing the following:   A1C and COLONOSCOPY    Action needed:   Patient needs office visit for diabetes and colon cancer screening.    Type of outreach:    routed to panel pool    Questions for provider review:    None                                                                                                                                    BOB Moore       Chart routed to Care Team .

## 2020-02-29 ENCOUNTER — APPOINTMENT (OUTPATIENT)
Dept: GENERAL RADIOLOGY | Facility: CLINIC | Age: 60
End: 2020-02-29
Attending: EMERGENCY MEDICINE
Payer: COMMERCIAL

## 2020-02-29 ENCOUNTER — APPOINTMENT (OUTPATIENT)
Dept: CARDIOLOGY | Facility: CLINIC | Age: 60
End: 2020-02-29
Attending: INTERNAL MEDICINE
Payer: COMMERCIAL

## 2020-02-29 ENCOUNTER — HOSPITAL ENCOUNTER (INPATIENT)
Facility: CLINIC | Age: 60
LOS: 1 days | Discharge: ANOTHER HOSPITAL | End: 2020-03-01
Attending: EMERGENCY MEDICINE | Admitting: INTERNAL MEDICINE
Payer: COMMERCIAL

## 2020-02-29 DIAGNOSIS — L08.9 TOE INFECTION: ICD-10-CM

## 2020-02-29 DIAGNOSIS — R07.9 CHEST PAIN, UNSPECIFIED TYPE: ICD-10-CM

## 2020-02-29 LAB
ALBUMIN UR-MCNC: NEGATIVE MG/DL
ANION GAP SERPL CALCULATED.3IONS-SCNC: 5 MMOL/L (ref 3–14)
APPEARANCE UR: CLEAR
BASOPHILS # BLD AUTO: 0.1 10E9/L (ref 0–0.2)
BASOPHILS NFR BLD AUTO: 0.6 %
BILIRUB UR QL STRIP: NEGATIVE
BUN SERPL-MCNC: 24 MG/DL (ref 7–30)
CALCIUM SERPL-MCNC: 9.1 MG/DL (ref 8.5–10.1)
CHLORIDE SERPL-SCNC: 104 MMOL/L (ref 94–109)
CO2 SERPL-SCNC: 26 MMOL/L (ref 20–32)
COLOR UR AUTO: NORMAL
CREAT SERPL-MCNC: 0.94 MG/DL (ref 0.66–1.25)
DIFFERENTIAL METHOD BLD: ABNORMAL
EOSINOPHIL # BLD AUTO: 0.2 10E9/L (ref 0–0.7)
EOSINOPHIL NFR BLD AUTO: 2.2 %
ERYTHROCYTE [DISTWIDTH] IN BLOOD BY AUTOMATED COUNT: 13.6 % (ref 10–15)
GFR SERPL CREATININE-BSD FRML MDRD: 89 ML/MIN/{1.73_M2}
GLUCOSE BLDC GLUCOMTR-MCNC: 203 MG/DL (ref 70–99)
GLUCOSE BLDC GLUCOMTR-MCNC: 209 MG/DL (ref 70–99)
GLUCOSE BLDC GLUCOMTR-MCNC: 249 MG/DL (ref 70–99)
GLUCOSE SERPL-MCNC: 244 MG/DL (ref 70–99)
GLUCOSE UR STRIP-MCNC: NEGATIVE MG/DL
HCT VFR BLD AUTO: 42.9 % (ref 40–53)
HGB BLD-MCNC: 13.4 G/DL (ref 13.3–17.7)
HGB UR QL STRIP: NEGATIVE
IMM GRANULOCYTES # BLD: 0.1 10E9/L (ref 0–0.4)
IMM GRANULOCYTES NFR BLD: 0.9 %
INTERPRETATION ECG - MUSE: NORMAL
KETONES BLD-SCNC: 0 MMOL/L (ref 0–0.6)
KETONES UR STRIP-MCNC: NEGATIVE MG/DL
LACTATE BLD-SCNC: 1.9 MMOL/L (ref 0.7–2)
LEUKOCYTE ESTERASE UR QL STRIP: NEGATIVE
LYMPHOCYTES # BLD AUTO: 4.1 10E9/L (ref 0.8–5.3)
LYMPHOCYTES NFR BLD AUTO: 36.8 %
MCH RBC QN AUTO: 29.5 PG (ref 26.5–33)
MCHC RBC AUTO-ENTMCNC: 31.2 G/DL (ref 31.5–36.5)
MCV RBC AUTO: 94 FL (ref 78–100)
MONOCYTES # BLD AUTO: 0.7 10E9/L (ref 0–1.3)
MONOCYTES NFR BLD AUTO: 6.3 %
NEUTROPHILS # BLD AUTO: 5.9 10E9/L (ref 1.6–8.3)
NEUTROPHILS NFR BLD AUTO: 53.2 %
NITRATE UR QL: NEGATIVE
NRBC # BLD AUTO: 0 10*3/UL
NRBC BLD AUTO-RTO: 0 /100
PH UR STRIP: 7 PH (ref 5–7)
PLATELET # BLD AUTO: 341 10E9/L (ref 150–450)
POTASSIUM SERPL-SCNC: 4.1 MMOL/L (ref 3.4–5.3)
RBC # BLD AUTO: 4.55 10E12/L (ref 4.4–5.9)
RBC #/AREA URNS AUTO: 2 /HPF (ref 0–2)
SODIUM SERPL-SCNC: 135 MMOL/L (ref 133–144)
SOURCE: NORMAL
SP GR UR STRIP: 1.01 (ref 1–1.03)
TROPONIN I SERPL-MCNC: <0.015 UG/L (ref 0–0.04)
TROPONIN I SERPL-MCNC: <0.015 UG/L (ref 0–0.04)
UROBILINOGEN UR STRIP-MCNC: NORMAL MG/DL (ref 0–2)
WBC # BLD AUTO: 11 10E9/L (ref 4–11)
WBC #/AREA URNS AUTO: 1 /HPF (ref 0–5)

## 2020-02-29 PROCEDURE — 87086 URINE CULTURE/COLONY COUNT: CPT | Performed by: EMERGENCY MEDICINE

## 2020-02-29 PROCEDURE — 80048 BASIC METABOLIC PNL TOTAL CA: CPT | Performed by: EMERGENCY MEDICINE

## 2020-02-29 PROCEDURE — 25000132 ZZH RX MED GY IP 250 OP 250 PS 637: Performed by: INTERNAL MEDICINE

## 2020-02-29 PROCEDURE — 71046 X-RAY EXAM CHEST 2 VIEWS: CPT

## 2020-02-29 PROCEDURE — 93005 ELECTROCARDIOGRAM TRACING: CPT

## 2020-02-29 PROCEDURE — 36415 COLL VENOUS BLD VENIPUNCTURE: CPT | Performed by: INTERNAL MEDICINE

## 2020-02-29 PROCEDURE — 84484 ASSAY OF TROPONIN QUANT: CPT | Performed by: EMERGENCY MEDICINE

## 2020-02-29 PROCEDURE — 82010 KETONE BODYS QUAN: CPT | Performed by: EMERGENCY MEDICINE

## 2020-02-29 PROCEDURE — 84484 ASSAY OF TROPONIN QUANT: CPT | Performed by: INTERNAL MEDICINE

## 2020-02-29 PROCEDURE — 85025 COMPLETE CBC W/AUTO DIFF WBC: CPT | Performed by: EMERGENCY MEDICINE

## 2020-02-29 PROCEDURE — 12000000 ZZH R&B MED SURG/OB

## 2020-02-29 PROCEDURE — 25000131 ZZH RX MED GY IP 250 OP 636 PS 637: Performed by: INTERNAL MEDICINE

## 2020-02-29 PROCEDURE — 81001 URINALYSIS AUTO W/SCOPE: CPT | Performed by: EMERGENCY MEDICINE

## 2020-02-29 PROCEDURE — 73630 X-RAY EXAM OF FOOT: CPT | Mod: LT

## 2020-02-29 PROCEDURE — 99223 1ST HOSP IP/OBS HIGH 75: CPT | Mod: AI | Performed by: INTERNAL MEDICINE

## 2020-02-29 PROCEDURE — 93306 TTE W/DOPPLER COMPLETE: CPT

## 2020-02-29 PROCEDURE — 25000132 ZZH RX MED GY IP 250 OP 250 PS 637: Performed by: EMERGENCY MEDICINE

## 2020-02-29 PROCEDURE — 73630 X-RAY EXAM OF FOOT: CPT | Mod: RT

## 2020-02-29 PROCEDURE — 94640 AIRWAY INHALATION TREATMENT: CPT

## 2020-02-29 PROCEDURE — 25000125 ZZHC RX 250: Performed by: EMERGENCY MEDICINE

## 2020-02-29 PROCEDURE — 25000128 H RX IP 250 OP 636: Performed by: EMERGENCY MEDICINE

## 2020-02-29 PROCEDURE — 83605 ASSAY OF LACTIC ACID: CPT | Performed by: EMERGENCY MEDICINE

## 2020-02-29 PROCEDURE — 93306 TTE W/DOPPLER COMPLETE: CPT | Mod: 26 | Performed by: INTERNAL MEDICINE

## 2020-02-29 PROCEDURE — 00000146 ZZHCL STATISTIC GLUCOSE BY METER IP

## 2020-02-29 PROCEDURE — 99285 EMERGENCY DEPT VISIT HI MDM: CPT | Mod: 25

## 2020-02-29 RX ORDER — POTASSIUM CHLORIDE 1.5 G/1.58G
20-40 POWDER, FOR SOLUTION ORAL
Status: DISCONTINUED | OUTPATIENT
Start: 2020-02-29 | End: 2020-03-01 | Stop reason: HOSPADM

## 2020-02-29 RX ORDER — AMLODIPINE BESYLATE 10 MG/1
10 TABLET ORAL DAILY
Status: DISCONTINUED | OUTPATIENT
Start: 2020-03-01 | End: 2020-03-01 | Stop reason: HOSPADM

## 2020-02-29 RX ORDER — BUSPIRONE HYDROCHLORIDE 10 MG/1
10 TABLET ORAL 4 TIMES DAILY
COMMUNITY
End: 2020-03-10

## 2020-02-29 RX ORDER — CLOMIPRAMINE HYDROCHLORIDE 50 MG/1
100 CAPSULE ORAL 2 TIMES DAILY
COMMUNITY
End: 2020-03-10

## 2020-02-29 RX ORDER — AMOXICILLIN 250 MG
2 CAPSULE ORAL 2 TIMES DAILY PRN
Status: DISCONTINUED | OUTPATIENT
Start: 2020-02-29 | End: 2020-03-01 | Stop reason: HOSPADM

## 2020-02-29 RX ORDER — AMLODIPINE AND VALSARTAN 10; 160 MG/1; MG/1
1 TABLET ORAL DAILY
Status: DISCONTINUED | OUTPATIENT
Start: 2020-03-01 | End: 2020-02-29

## 2020-02-29 RX ORDER — AMLODIPINE AND VALSARTAN 10; 160 MG/1; MG/1
1 TABLET ORAL DAILY
COMMUNITY
End: 2020-03-10

## 2020-02-29 RX ORDER — NITROGLYCERIN 0.4 MG/1
0.4 TABLET SUBLINGUAL EVERY 5 MIN PRN
Status: DISCONTINUED | OUTPATIENT
Start: 2020-02-29 | End: 2020-03-01 | Stop reason: HOSPADM

## 2020-02-29 RX ORDER — LIDOCAINE 40 MG/G
CREAM TOPICAL
Status: DISCONTINUED | OUTPATIENT
Start: 2020-02-29 | End: 2020-03-01 | Stop reason: HOSPADM

## 2020-02-29 RX ORDER — POTASSIUM CHLORIDE 29.8 MG/ML
20 INJECTION INTRAVENOUS
Status: DISCONTINUED | OUTPATIENT
Start: 2020-02-29 | End: 2020-03-01 | Stop reason: HOSPADM

## 2020-02-29 RX ORDER — LAMOTRIGINE 100 MG/1
200 TABLET ORAL 2 TIMES DAILY
Status: DISCONTINUED | OUTPATIENT
Start: 2020-02-29 | End: 2020-02-29

## 2020-02-29 RX ORDER — ROSUVASTATIN CALCIUM 20 MG/1
20 TABLET, COATED ORAL DAILY
Status: DISCONTINUED | OUTPATIENT
Start: 2020-02-29 | End: 2020-03-01 | Stop reason: HOSPADM

## 2020-02-29 RX ORDER — DIVALPROEX SODIUM 500 MG/1
500 TABLET, DELAYED RELEASE ORAL 3 TIMES DAILY
Status: ON HOLD | COMMUNITY
End: 2020-02-29

## 2020-02-29 RX ORDER — AMOXICILLIN 250 MG
1 CAPSULE ORAL 2 TIMES DAILY PRN
Status: DISCONTINUED | OUTPATIENT
Start: 2020-02-29 | End: 2020-03-01 | Stop reason: HOSPADM

## 2020-02-29 RX ORDER — IPRATROPIUM BROMIDE AND ALBUTEROL SULFATE 2.5; .5 MG/3ML; MG/3ML
3 SOLUTION RESPIRATORY (INHALATION)
Status: DISCONTINUED | OUTPATIENT
Start: 2020-02-29 | End: 2020-03-01 | Stop reason: HOSPADM

## 2020-02-29 RX ORDER — CLOPIDOGREL BISULFATE 75 MG/1
75 TABLET ORAL DAILY
Status: DISCONTINUED | OUTPATIENT
Start: 2020-03-01 | End: 2020-03-01

## 2020-02-29 RX ORDER — NICOTINE POLACRILEX 4 MG
15-30 LOZENGE BUCCAL
Status: DISCONTINUED | OUTPATIENT
Start: 2020-02-29 | End: 2020-03-01 | Stop reason: HOSPADM

## 2020-02-29 RX ORDER — HYDROCHLOROTHIAZIDE 12.5 MG/1
12.5 TABLET ORAL DAILY
Status: DISCONTINUED | OUTPATIENT
Start: 2020-03-01 | End: 2020-03-01 | Stop reason: HOSPADM

## 2020-02-29 RX ORDER — DEXTROSE MONOHYDRATE 25 G/50ML
25-50 INJECTION, SOLUTION INTRAVENOUS
Status: DISCONTINUED | OUTPATIENT
Start: 2020-02-29 | End: 2020-03-01 | Stop reason: HOSPADM

## 2020-02-29 RX ORDER — ASPIRIN 81 MG/1
81 TABLET ORAL DAILY
Status: DISCONTINUED | OUTPATIENT
Start: 2020-03-01 | End: 2020-03-01

## 2020-02-29 RX ORDER — ONDANSETRON 2 MG/ML
4 INJECTION INTRAMUSCULAR; INTRAVENOUS EVERY 6 HOURS PRN
Status: DISCONTINUED | OUTPATIENT
Start: 2020-02-29 | End: 2020-03-01 | Stop reason: HOSPADM

## 2020-02-29 RX ORDER — LORAZEPAM 2 MG/ML
INJECTION INTRAMUSCULAR
Status: DISPENSED
Start: 2020-02-29 | End: 2020-03-01

## 2020-02-29 RX ORDER — ISOSORBIDE DINITRATE 20 MG/1
40 TABLET ORAL 2 TIMES DAILY
Status: DISCONTINUED | OUTPATIENT
Start: 2020-02-29 | End: 2020-03-01

## 2020-02-29 RX ORDER — GLIMEPIRIDE 1 MG/1
2 TABLET ORAL
Status: DISCONTINUED | OUTPATIENT
Start: 2020-03-01 | End: 2020-03-01 | Stop reason: HOSPADM

## 2020-02-29 RX ORDER — BISOPROLOL FUMARATE 5 MG/1
5 TABLET, FILM COATED ORAL DAILY
COMMUNITY
End: 2020-03-10

## 2020-02-29 RX ORDER — ACETAMINOPHEN 325 MG/1
650 TABLET ORAL EVERY 4 HOURS PRN
Status: DISCONTINUED | OUTPATIENT
Start: 2020-02-29 | End: 2020-03-01 | Stop reason: HOSPADM

## 2020-02-29 RX ORDER — GLIMEPIRIDE 2 MG/1
2 TABLET ORAL
COMMUNITY
End: 2020-03-10

## 2020-02-29 RX ORDER — HYDROMORPHONE HYDROCHLORIDE 1 MG/ML
0.2 INJECTION, SOLUTION INTRAMUSCULAR; INTRAVENOUS; SUBCUTANEOUS ONCE
Status: COMPLETED | OUTPATIENT
Start: 2020-02-29 | End: 2020-02-29

## 2020-02-29 RX ORDER — POTASSIUM CHLORIDE 7.45 MG/ML
10 INJECTION INTRAVENOUS
Status: DISCONTINUED | OUTPATIENT
Start: 2020-02-29 | End: 2020-03-01 | Stop reason: HOSPADM

## 2020-02-29 RX ORDER — POTASSIUM CL/LIDO/0.9 % NACL 10MEQ/0.1L
10 INTRAVENOUS SOLUTION, PIGGYBACK (ML) INTRAVENOUS
Status: DISCONTINUED | OUTPATIENT
Start: 2020-02-29 | End: 2020-03-01 | Stop reason: HOSPADM

## 2020-02-29 RX ORDER — DIVALPROEX SODIUM 500 MG/1
1000 TABLET, DELAYED RELEASE ORAL 2 TIMES DAILY
COMMUNITY
End: 2020-03-10

## 2020-02-29 RX ORDER — NALOXONE HYDROCHLORIDE 0.4 MG/ML
.1-.4 INJECTION, SOLUTION INTRAMUSCULAR; INTRAVENOUS; SUBCUTANEOUS
Status: DISCONTINUED | OUTPATIENT
Start: 2020-02-29 | End: 2020-03-01 | Stop reason: HOSPADM

## 2020-02-29 RX ORDER — HYDROCHLOROTHIAZIDE 12.5 MG/1
12.5 TABLET ORAL DAILY
Status: ON HOLD | COMMUNITY
End: 2020-03-04

## 2020-02-29 RX ORDER — ONDANSETRON 4 MG/1
4 TABLET, ORALLY DISINTEGRATING ORAL EVERY 6 HOURS PRN
Status: DISCONTINUED | OUTPATIENT
Start: 2020-02-29 | End: 2020-03-01 | Stop reason: HOSPADM

## 2020-02-29 RX ORDER — HYDROMORPHONE HYDROCHLORIDE 1 MG/ML
0.2 INJECTION, SOLUTION INTRAMUSCULAR; INTRAVENOUS; SUBCUTANEOUS
Status: DISCONTINUED | OUTPATIENT
Start: 2020-02-29 | End: 2020-03-01 | Stop reason: HOSPADM

## 2020-02-29 RX ORDER — CANDESARTAN CILEXETIL AND HYDROCHLOROTHIAZIDE 16; 12.5 MG/1; MG/1
1 TABLET ORAL DAILY
Status: DISCONTINUED | OUTPATIENT
Start: 2020-02-29 | End: 2020-02-29

## 2020-02-29 RX ORDER — DIVALPROEX SODIUM 500 MG/1
1000 TABLET, DELAYED RELEASE ORAL 2 TIMES DAILY
Status: DISCONTINUED | OUTPATIENT
Start: 2020-02-29 | End: 2020-03-01 | Stop reason: HOSPADM

## 2020-02-29 RX ORDER — QUETIAPINE FUMARATE 200 MG/1
600 TABLET, FILM COATED ORAL AT BEDTIME
Status: DISCONTINUED | OUTPATIENT
Start: 2020-02-29 | End: 2020-03-01 | Stop reason: HOSPADM

## 2020-02-29 RX ORDER — BUSPIRONE HYDROCHLORIDE 10 MG/1
10 TABLET ORAL 4 TIMES DAILY
Status: DISCONTINUED | OUTPATIENT
Start: 2020-02-29 | End: 2020-03-01 | Stop reason: HOSPADM

## 2020-02-29 RX ORDER — OXYCODONE HYDROCHLORIDE 5 MG/1
5 TABLET ORAL
Status: DISCONTINUED | OUTPATIENT
Start: 2020-02-29 | End: 2020-03-01 | Stop reason: HOSPADM

## 2020-02-29 RX ORDER — POTASSIUM CHLORIDE 1500 MG/1
20-40 TABLET, EXTENDED RELEASE ORAL
Status: DISCONTINUED | OUTPATIENT
Start: 2020-02-29 | End: 2020-03-01 | Stop reason: HOSPADM

## 2020-02-29 RX ORDER — QUETIAPINE FUMARATE 300 MG/1
600 TABLET, FILM COATED ORAL AT BEDTIME
COMMUNITY
End: 2020-03-10

## 2020-02-29 RX ORDER — ASPIRIN 81 MG/1
243 TABLET, CHEWABLE ORAL ONCE
Status: COMPLETED | OUTPATIENT
Start: 2020-02-29 | End: 2020-02-29

## 2020-02-29 RX ORDER — VALSARTAN 160 MG/1
160 TABLET ORAL DAILY
Status: DISCONTINUED | OUTPATIENT
Start: 2020-03-01 | End: 2020-03-01 | Stop reason: HOSPADM

## 2020-02-29 RX ADMIN — ROSUVASTATIN CALCIUM 20 MG: 20 TABLET, FILM COATED ORAL at 17:06

## 2020-02-29 RX ADMIN — ASPIRIN 81 MG 243 MG: 81 TABLET ORAL at 10:33

## 2020-02-29 RX ADMIN — METFORMIN HYDROCHLORIDE 850 MG: 850 TABLET, FILM COATED ORAL at 18:30

## 2020-02-29 RX ADMIN — BUSPIRONE HYDROCHLORIDE 10 MG: 10 TABLET ORAL at 22:09

## 2020-02-29 RX ADMIN — OXYCODONE HYDROCHLORIDE 5 MG: 5 TABLET ORAL at 14:41

## 2020-02-29 RX ADMIN — INSULIN ASPART 3 UNITS: 100 INJECTION, SOLUTION INTRAVENOUS; SUBCUTANEOUS at 18:43

## 2020-02-29 RX ADMIN — OXYCODONE HYDROCHLORIDE 5 MG: 5 TABLET ORAL at 18:29

## 2020-02-29 RX ADMIN — DIVALPROEX SODIUM 1000 MG: 500 TABLET, DELAYED RELEASE ORAL at 23:07

## 2020-02-29 RX ADMIN — BUSPIRONE HYDROCHLORIDE 10 MG: 10 TABLET ORAL at 18:29

## 2020-02-29 RX ADMIN — IPRATROPIUM BROMIDE AND ALBUTEROL SULFATE 3 ML: .5; 3 SOLUTION RESPIRATORY (INHALATION) at 10:33

## 2020-02-29 RX ADMIN — HYDROMORPHONE HYDROCHLORIDE 0.2 MG: 1 INJECTION, SOLUTION INTRAMUSCULAR; INTRAVENOUS; SUBCUTANEOUS at 11:39

## 2020-02-29 RX ADMIN — ISOSORBIDE DINITRATE 40 MG: 20 TABLET ORAL at 22:09

## 2020-02-29 RX ADMIN — ACETAMINOPHEN 650 MG: 325 TABLET, FILM COATED ORAL at 18:29

## 2020-02-29 RX ADMIN — NITROGLYCERIN 0.4 MG: 0.4 TABLET SUBLINGUAL at 11:08

## 2020-02-29 RX ADMIN — QUETIAPINE FUMARATE 600 MG: 200 TABLET ORAL at 23:07

## 2020-02-29 ASSESSMENT — ACTIVITIES OF DAILY LIVING (ADL)
ADLS_ACUITY_SCORE: 12
ADLS_ACUITY_SCORE: 10

## 2020-02-29 NOTE — ED NOTES
Pt states toe pain improved after dilaudid, requesting water, MD advised and wanted pt to be NPO until podiatry assessed, this was relayed onto pt who verbalized understanding.

## 2020-02-29 NOTE — ED NOTES
Pt back from x-ray, still c/o chest pain, states that neb did help with SOB. Lung sounds much clearer, no expiratory wheezing. Pt requested to use restroom, able to walk independently, tolerated well.

## 2020-02-29 NOTE — PLAN OF CARE
Settled into room.  Denies chest pain.  Breathing undistressed. Rt toe painful.  No drainage.  Black and blistered.  Podiatry to consult  Tele SR

## 2020-02-29 NOTE — ED NOTES
[facility]  ED Nurse Handoff Report    Huseyin Pettit is a 60 year old male   ED Chief complaint: Chest Pain and Toe Pain  . ED Diagnosis:   Final diagnoses:   Chest pain, unspecified type   Toe infection     Allergies: No Known Allergies    Code Status: Full Code  Activity level - Baseline/Home:  Independent. Activity Level - Current:   Stand by Assist. Lift room needed: No. Bariatric: No   Needed: No   Isolation: No. Infection: Not Applicable.     Vital Signs:   Vitals:    02/29/20 1030 02/29/20 1045 02/29/20 1115 02/29/20 1130   BP:  111/70 97/62 99/63   Pulse:  78 82 80   Resp:       Temp:       TempSrc:       SpO2: 99% 100% 98% 97%       Cardiac Rhythm:  ,      Pain level:    Patient confused: No. Patient Falls Risk: No.   Elimination Status: Has voided   Patient Report - Initial Complaint: Pt presents with chest pain and toe pain. Focused Assessment: Pt presents with chest pain that has been ongoing intermittently for the past 2 weeks with associated SOB and dizziness. Pt also c/o left great toe pain that has been ongoing for the past few weeks as well. Pt has a hx of 3x stents 3 years ago. Pt SOB improved after neb, hx of a smoking. 1x sublingual nitroglycerin helped slightly with chest pain as well. Pt's biggest complaint is from his left great toe which appears necrotic and ulcerated, pt has a hx of diabetes, he attempted to get in with PCP but was unable to get in for a few weeks. Pt is afebrile and very pleasant.   Tests Performed: labs, x-ray. Abnormal Results:   Labs Ordered and Resulted from Time of ED Arrival Up to the Time of Departure from the ED   CBC WITH PLATELETS DIFFERENTIAL - Abnormal; Notable for the following components:       Result Value    MCHC 31.2 (*)     All other components within normal limits   BASIC METABOLIC PANEL - Abnormal; Notable for the following components:    Glucose 244 (*)     All other components within normal limits   TROPONIN I   ROUTINE UA WITH  MICROSCOPIC   LACTIC ACID WHOLE BLOOD   KETONE BETA-HYDROXYBUTYRATE QUANTITATIVE   URINE CULTURE AEROBIC BACTERIAL     XR Chest 2 Views   Preliminary Result   IMPRESSION: No acute cardiopulmonary disease.      XR Foot Right G/E 3 Views    (Results Pending)       Treatments provided: neb, ASA, nitroglycerin, dilaudid  Family Comments: Brother at bedside, supporting  OBS brochure/video discussed/provided to patient:  N/A  ED Medications:   Medications   ipratropium - albuterol 0.5 mg/2.5 mg/3 mL (DUONEB) neb solution 3 mL (3 mLs Nebulization Given 2/29/20 1033)   nitroGLYcerin (NITROSTAT) sublingual tablet 0.4 mg (0.4 mg Sublingual Given 2/29/20 1108)   aspirin (ASA) chewable tablet 243 mg (243 mg Oral Given 2/29/20 1033)   HYDROmorphone (PF) (DILAUDID) injection 0.2 mg (0.2 mg Intravenous Given 2/29/20 1139)     Drips infusing:  No  For the majority of the shift, the patient's behavior Green. Interventions performed were .    Sepsis treatment initiated: No       ED Nurse Name/Phone Number: Kim Veloz RN,   11:44 AM    RECEIVING UNIT ED HANDOFF REVIEW    Above ED Nurse Handoff Report was reviewed: Yes  Reviewed by: Stefanie Davila RN on February 29, 2020 at 12:20 PM

## 2020-02-29 NOTE — ED TRIAGE NOTES
Pt presents with chest pain with associated SOB that has been intermittent for the past week, pt also c/o right great toe pain which has been ongoing for a few weeks as well, attempted to call PCP but unable to get for a few weeks. Pt alert, oriented x3 aBCs intact    Past hx of 3x stents 3 years ago

## 2020-02-29 NOTE — PHARMACY-ADMISSION MEDICATION HISTORY
Admission medication history interview status for this patient is complete. See [facility] admission navigator for allergy information, prior to admission medications and immunization status.     Medication history interview source(s):Patient + family  Medication history resources (including written lists, pill bottles, clinic record): Patient's med list  Primary pharmacy:[facility]     Changes made to PTA medication list:  Added: bisoprolol, exforge, depakote, glimepiride, HCTZ  Deleted: hydrocortisone, lamictal,naproxen, candesartan-HCTZ  Changed: dosing on seroquel, prozac, clomipramine, buspar dosing     Actions taken by pharmacist (provider contacted, etc): photocopied med list     Additional medication history information: Patient is familiar with medications, but takes them differently from the photocopied med lists (does his QID dosing BID). This med list reflects how he actually takes it, and the photocopied med list is for reference. His dosing came to light after obtaining the first med list and asking more questions    Medication reconciliation/reorder completed by provider prior to medication history?  Yes     Do you take OTC medications (eg tylenol, ibuprofen, fish oil, eye/ear drops, etc)? Yes     For patients on insulin therapy: Y (Y/N)  Sliding scale Novolog: no  Do you have a baseline novolog pre-meal dose:  28  units with breakfast, 38 units with dinner   Do you eat three meals a day: no, he eats 1-2 meals/day, but mostly 1. Says he eats a lot of sweets  How many times do you check your blood glucose per day:  Once a month  How many episodes of hypoglycemia do you have per week: always high (checks it once a month)  How many missed doses do you have per week: He says that he's good about taking his medications and he never misses a dose, but on the second visit, the patient said he hasn't taken his insulin in 1 week.   Do you have a Continuous glucose monitor (CGM) : no  (remind pt that  not approved for hospital use)  Any specific barriers to therapy? The routine, patient said it was a lot of medications       Prior to Admission medications    Medication Sig Last Dose Taking? Auth Provider   amLODIPine-valsartan (EXFORGE)  MG tablet Take 1 tablet by mouth daily 2/29/2020 at am Yes Unknown, Entered By History   aspirin 81 MG tablet Take 81 mg by mouth daily  2/29/2020 at am Yes Reported, Patient   bisoprolol (ZEBETA) 5 MG tablet Take 5 mg by mouth daily 2/28/2020 at am Yes Unknown, Entered By History   busPIRone (BUSPAR) 10 MG tablet Take 10 mg by mouth 4 times daily 2/29/2020 at Unknown time Yes Unknown, Entered By History   clomiPRAMINE (ANAFRANIL) 50 MG capsule Take 100 mg by mouth 2 times daily 2/29/2020 at 1x Yes Unknown, Entered By History   clopidogrel (PLAVIX) 75 MG tablet Take 1 tablet (75 mg) by mouth daily 2/29/2020 at am Yes Hunter Carlton MD   divalproex sodium delayed-release (DEPAKOTE) 500 MG DR tablet Take 1,000 mg by mouth 2 times daily  2/29/2020 at am Yes Unknown, Entered By History   FLUoxetine (PROZAC) 20 MG capsule Take 20 mg by mouth daily 2/29/2020 at am Yes Unknown, Entered By History   glimepiride (AMARYL) 2 MG tablet Take 2 mg by mouth every morning (before breakfast) 2/26/2020 at Unknown time Yes Unknown, Entered By History   hydrochlorothiazide (HYDRODIURIL) 12.5 MG tablet Take 12.5 mg by mouth daily 2/29/2020 at am Yes Unknown, Entered By History   insulin aspart prot & aspart (NOVOLOG MIX 70/30 PEN) injection 28 units in AM, and 38 units in PM 2/22/2020 at am Yes Hunter Carlton MD   isosorbide Dinitrate (ISORDIL) 40 MG TABS Take 1 tablet (40 mg) by mouth 2 times daily 2/29/2020 at 1x Yes Hunter Carlton MD   metFORMIN (GLUCOPHAGE) 850 MG tablet Take 1 tablet (850 mg) by mouth 2 times daily (with meals) (Need A1c for further refills) 2/28/2020 at am Yes Hunter Carlton MD   QUEtiapine (SEROQUEL) 300 MG tablet Take 600 mg by mouth At Bedtime 2/25/2020 at pm Yes Unknown, Entered  By History   rosuvastatin (CRESTOR) 20 MG tablet Take 1 tablet (20 mg) by mouth daily 2/27/2020 at pm Yes Hunter Carlton MD

## 2020-02-29 NOTE — H&P
[facility]  Hospitalist Admission Note  Name: Huseyin Pettit    MRN: [id number]  YOB: 1960    Age: 60 year old  Date of admission: 2/29/2020  Primary care provider: Hunter Carlton            Assessment and Plan:   Huseyin Pettit is a 60 year old male with a history of insulin requiring diabetes mellitus, CAD, prior stenting, hypertension, depression, dyslipidemia, active smoker, noncompliance who presented with increasing first digit right foot pain and intermittent chest pain.     1.  Right foot pain, gangrenous appearing first digit  2.  Diabetic foot  3.  Insulin requiring diabetes mellitus uncontrolled, history of noncompliance with medications and dietary restrictions  4.  Noncompliance with routine, regular clinic follow-up  5.  Active smoker, smokes 1 pack/day  6.  CAD with prior stenting back in [place], reportedly still on Plavix  7.  Intermittent chest pain  8.  History longstanding depression  9.  Hypertension  10.  Dyslipidemia    Admit as inpatient.  Podiatry evaluation requested.  Foot x-ray done earlier with pending results.  Will await from podiatry service if MRI will be pursued.  No signs and symptoms, physical findings suggestive of cellulitis.  Reiterated to her patient importance of diabetes control, follow-up care and compliance with medications, dietary restrictions.  Resumption of patient's basal and prandial insulin coverage once reconciled  Cardiac telemetry monitoring for intermittent chest pain with known history of CAD, uncontrolled diabetes and hypertension  Serial troponin I levels will be trended.  EKG done in the emergency room was reassuring.  Continue on aspirin, statins.  Check echocardiogram.  If ruled out then further risk stratification can likely be pursued as outpatient as well.  Extensive smoking cessation counseling provided  Resumption of his antidepressants once reconciled.    Code status: Full code  Admit to inpatient  Prophylaxis:  Mechanical  Disposition: Hopeful for home discharge but likely will be needing at least 2 inpatient hospitalization days.          Chief Complaint:   Increasing toe pain, intermittent chest pain       Source of Information:   Patient with good reliability  Patient's family at bedside with fair to good reliability  Discussion with ED physician  Review of E chart records         History of Present Illness:   Huseyin Pettit is a 60 year old male, active smoker, known prior history of CAD with previous stenting back in [place] in 2011, reportedly still on Plavix, hypertension, insulin requiring diabetes mellitus, unfortunately with longstanding history of noncompliance, depression, who is been having issues with increasing toe pain on the right foot first digit at least for the past 2 weeks with worsening gangrenous appearance, pain, but denies any discharge no recent injury.  He endorses no accompanying fevers, chills, nausea, vomiting, abdominal pain, diarrhea nor bleeding tendencies.  However he also mention intermittent bouts of chest discomfort which she described as epigastric, midsternal in location with burning and occasional pressure-like symptoms with maximum intensity of 6-7 out of 10 and not accompanied by any nausea, vomiting and he does not think it is related to physical activity or exercises.    Unfortunately he is still an active smoker who smokes more than 1 pack/day,?  Issues with compliance given last insulin use was more than 2 weeks ago, he does not follow-up regularly with this clinic doctors here but gets his refills of his medication from a physician sibling that being sent here from the country of [place].    Upon initial evaluation in the emergency room he has stable hemodynamics, afebrile, no leukocytosis, obvious diabetic foot appearance with gangrenous appearing first digit right foot.  EKG was reassuring, negative troponin I levels given his longstanding history of CAD, uncontrolled diabetes,  worsening foot lesion his case was referred to us for further management care hence this hospitalization.              Past Medical History:     Past Medical History:   Diagnosis Date     CAD (coronary artery disease) 2/18/2015    S/p 3 stents in 2011 In [place]     Closed fracture of multiple ribs of left side with routine healing 7/10/2018     Coronary artery disease      Coronary artery disease involving native coronary artery of native heart without angina pectoris 7/3/2018    S/p 3 stents put in [place] in 2011.     Depressive disorder      Diabetes (H)      Hypertension      Severe episode of recurrent major depressive disorder, without psychotic features (H) 7/3/2018     Type 2 diabetes mellitus with other circulatory complication, with long-term current use of insulin (H) 7/3/2018             Past Surgical History:     Past Surgical History:   Procedure Laterality Date     CHOLECYSTECTOMY       GALLBLADDER SURGERY  2011             Social History:     Social History     Tobacco Use     Smoking status: Current Every Day Smoker     Packs/day: 0.50     Years: 25.00     Pack years: 12.50     Smokeless tobacco: Never Used     Tobacco comment: trying to adela   Substance Use Topics     Alcohol use: No             Family History:   Family history was fully reviewed and non-contributory in this case.         Allergies:   No Known Allergies          Medications:     Prior to Admission medications    Medication Sig Last Dose Taking? Auth Provider   aspirin 81 MG tablet Take by mouth daily   Reported, Patient   busPIRone (BUSPAR) 10 MG tablet Take 1 tablet (10 mg) by mouth 3 times daily   Hunter Carlton MD   candesartan cilexetil-HCTZ (ATACAND HCT) 16-12.5 MG TABS Take 1 tablet by mouth daily   Hunter Carlton MD   clomiPRAMINE (ANAFRANIL) 50 MG capsule Take 1 capsule (50 mg) by mouth 4 times daily   Hunter Carlton MD   clopidogrel (PLAVIX) 75 MG tablet Take 1 tablet (75 mg) by mouth daily   Hunter Carlton MD   FLUoxetine (PROZAC) 40  MG capsule Take 1 capsule (40 mg) by mouth daily   Hunter Carlton MD   hydrocortisone (ANUSOL-HC) 2.5 % cream Place rectally 2 times daily as needed for hemorrhoids   Hunter Carlton MD   insulin aspart prot & aspart (NOVOLOG MIX 70/30 PEN) injection 28 units in AM, and 38 units in PM   Hunter Carlton MD   isosorbide Dinitrate (ISORDIL) 40 MG TABS Take 1 tablet (40 mg) by mouth 2 times daily   Hunter Carlton MD   lamoTRIgine (LAMICTAL) 100 MG tablet Take 2 tablets (200 mg) by mouth 2 times daily   Hunter Carlton MD   metFORMIN (GLUCOPHAGE) 850 MG tablet Take 1 tablet (850 mg) by mouth 2 times daily (with meals) (Need A1c for further refills)   Hunter Carlton MD   naproxen (NAPROSYN) 500 MG tablet Take 1 tablet (500 mg) by mouth 2 times daily (with meals)   Hunter Carlton MD   QUEtiapine (SEROQUEL) 300 MG tablet Take 1 tablet (300 mg) by mouth At Bedtime   Hunter Carlton MD   rosuvastatin (CRESTOR) 20 MG tablet Take 1 tablet (20 mg) by mouth daily   Hunter Carlton MD             Review of Systems:   A Comprehensive greater than 10 system review of systems was carried out.  Pertinent positives and negatives are noted above.  Otherwise negative for contributory information.           Physical Exam:   Blood pressure 99/63, pulse 79, temperature 97.5  F (36.4  C), temperature source Oral, resp. rate 16, SpO2 97 %.  Wt Readings from Last 1 Encounters:   07/10/18 76.7 kg (169 lb)     Exam:  GENERAL: No apparent distress. Awake, alert, and fully oriented.  HEENT: Normocephalic, atraumatic. Extraocular movements intact.  CARDIOVASCULAR: Regular rate and rhythm without murmurs or rubs. No JVD  PULMONARY: Clear to auscultation, no wheezes, crackles  ABDOMINAL: Soft, non-tender, non-distended. Bowel sounds normoactive. No hepatosplenomegaly.  EXTREMITIES: No cyanosis or clubbing. No edema.  Gangrenous appearing first digit right toe, no discharge  NEUROLOGICAL: CN 2-12 grossly intact, awake and alert x3, spontaneous and coherent speech. no focal  neurological deficits.  DERMATOLOGICAL: No rash, ulcer, ecchymoses, jaundice.  Psych: not agitation, not combative, pleasant mood           Data:   EKG: Normal sinus rhythm    Imaging:  Recent Results (from the past 48 hour(s))   XR Chest 2 Views    Narrative    CHEST TWO VIEWS    2/29/2020 10:56 AM     HISTORY: Chest pain    COMPARISON: None.      Impression    IMPRESSION: No acute cardiopulmonary disease.    ENEDELIA MUSE MD   Pending results of foot x-ray    Labs:  No results for input(s): CULT in the last 168 hours.  No results for input(s): NTBNPI, NTBNP in the last 168 hours.  Recent Labs   Lab 02/29/20  1029   WBC 11.0   HGB 13.4   HCT 42.9   MCV 94        No results for input(s): SED, CRP in the last 168 hours.  Recent Labs   Lab 02/29/20  1029   *     No results for input(s): INR in the last 168 hours.  Recent Labs   Lab 02/29/20  1104   COLOR Light Yellow   APPEARANCE Clear   URINEGLC Negative   URINEBILI Negative   URINEKETONE Negative   SG 1.013   UBLD Negative   URINEPH 7.0   PROTEIN Negative   NITRITE Negative   LEUKEST Negative   RBCU 2   WBCU 1

## 2020-02-29 NOTE — ED PROVIDER NOTES
"  History     Chief Complaint:  Chest Pain and Toe Pain      HPI   Huseyin Pettit is a 60 year old male who is a daily smoker with a history of CAD s/p cardiac stents x3, hyperlipidemia, hypertension, and diabetes type 2 who presents to the emergency department for evaluation of chest pain and toe pain. The patient reports that for the last week he's had intermittent, central chest pain that occasionally radiates to his left arm, with accompanied shortness of breath, nausea, diaphoresis, and dizziness. Symptoms worsen with exertion, though this has been mildly present for the last several months. This morning, he had sharp, shooting, heavy, chest pressure/pain that was more severe than usual, about 8.5-9/10 at rest, with shortness of breath and nausea which prompted his evaluation. He took one baby Aspirin this morning. He states this feels similar to when he had his stents placed. Additionally, for the last two weeks he's had a worsening necrotic wound to his right great toe with accompanied numbness. He does not check his blood sugar regularly, and has not taken his diabetic medication \"properly\" for the last 10 days. No vomiting, weight change, leg pain, hx of blood clots, illicit drug or alcohol use.    Cardiac Risk Factors   Sex: Male   Tobacco: Positive  Hypertension: Positive  Diabetes: Positive  Hyperlipidemia: Positive  Family History: Negative    PE/DVT Risk Factors   Personal History: Negative  Recent Travel: Negative   Recent Surgery/Hospitalization: Negative  Tobacco: Positive  Family History: Negative  Hormone Use: Negative   Cancer: Negative  Trauma: Negative      Allergies:  No Known Drug Allergies    Medications:    Aspirin 81 mg  Buspar  Atacand   Anafranil  Plavix  Prozac  Hydrocortisone cream  Novolog  Isordil  Lamictal  Metformin  Naprosyn  Seroquel  Crestor    Past Medical History:    Depression  CAD  Diabetes type 2  Hyperlipidemia  Hypertension      Past Surgical History:  "   Cholecystectomy  Cardiac stents x3    Family History:    History reviewed. No pertinent family history.    Social History:  Tobacco Use: Daily, 0.5 ppd  Alcohol Use: No  PCP: Hunter Carlton  Marital Status:  Single      Review of Systems   All other systems reviewed and are negative.      Physical Exam     Patient Vitals for the past 24 hrs:   BP Temp Temp src Pulse Heart Rate Resp SpO2   02/29/20 1600 100/63 96  F (35.6  C) Oral -- 71 18 97 %   02/29/20 1244 107/65 95.6  F (35.3  C) Oral -- 68 20 97 %   02/29/20 1215 96/75 -- -- 74 73 -- 98 %   02/29/20 1200 96/66 -- -- 75 75 -- 99 %   02/29/20 1145 -- -- -- 79 76 -- 97 %   02/29/20 1130 99/63 -- -- 80 80 -- 97 %   02/29/20 1115 97/62 -- -- 82 81 -- 98 %   02/29/20 1045 111/70 -- -- 78 78 -- 100 %   02/29/20 1030 -- -- -- -- 78 -- 99 %   02/29/20 1015 128/82 -- -- -- 79 -- --   02/29/20 1009 128/82 97.5  F (36.4  C) Oral 71 71 16 100 %       Physical Exam  General: Resting on the bed.  Head: No obvious trauma to head.  Ears, Nose, Throat:  External ears normal.  Nose normal.    Eyes:  Conjunctivae clear.  Pupils are equal, round, and reactive.   Neck: Normal range of motion.  Neck supple.   CV: Regular rate and rhythm.  No murmurs.   2+ radial pulses   Respiratory: Effort normal and breath sounds normal.  No wheezing or crackles.   Gastrointestinal: Soft.  No distension. There is no tenderness.    Musculoskeletal: Non tender non edematous calves  Neuro: Alert. Moving all extremities appropriately.  Normal speech.    Skin: Skin is warm and dry.  Necrotic great toe on the left distal medial aspect of the toe, sensation diminished.      Emergency Department Course   ECG:  @ 1010  Indication: Chest pain  Vent. Rate 70 bpm. PA interval 178 ms. QRS duration 90 ms. QT/QTc 414/447 ms. P-R-T axis 44 24 43.   Normal sinus rhythm. Normal ECG.    Read @ 1010 by Dr. Shanks.    Imaging:  Echocardiogram Complete   Final Result      XR Chest 2 Views   Final Result   IMPRESSION: No  acute cardiopulmonary disease.      ENEDELIA MUSE MD      XR Foot Right G/E 3 Views    (Results Pending)       Radiographic findings were communicated with the patient who voiced understanding of the findings.    Laboratory:  CBC: WBC: 11.0, HGB: 13.4, PLT: 341  BMP: Glucose 244 (H), o/w WNL (Creatinine: 0.94)    UA: Clear, light yellow urine, otherwise WNL  Urine culture: In process    1029 Troponin I: <0.015    Ketone beta-hydroxybutyrate quantitative: 0.0    1046 Lactic acid whole blood: 1.9    Interventions:  1033 Albuterol-Ipratropium inhalation solution, 2.5 mg-0.5 mg/3 ml; 3 mL, inhalation  1033 Aspirin 243 mg tablet PO  1108 Nitrostat 0.4 mg sublingual  1139 Dilaudid 0.2 mg IV    Emergency Department Course:  1012 Nursing notes and vitals reviewed. I performed an exam of the patient as documented above.     EKG was done, interpretation as above.    IV inserted. Medicine administered as documented above. Blood drawn. This was sent to the lab for further testing, results above.    The patient provided a urine sample here in the emergency department. This was sent for laboratory testing, findings above.     The patient was sent for a chest XR and foot XR while in the emergency department, findings above.     1120 I rechecked the patient and discussed the results of his workup thus far.     1152  I consulted with Dr. Wayne of the hospitalist services. They are in agreement to accept the patient for admission.    Findings and plan explained to the Patient who consents to admission. Discussed the patient with Dr. Wayne, who will admit the patient to a cardiac tele bed for further monitoring, evaluation, and treatment.    Impression & Plan    Medical Decision Makin-year-old male with history of CAD, diabetes presents with toe pain and chest pain.  Vital signs reassuring.  Broad differential was pursued including but not limited to acute coronary syndrome, arrhythmia, electrolyte, metabolic, renal  dysfunction, DKA, severe sepsis or septic shock, gangrene, PE, dissection, etc.  Overall patient's well-appearing nontoxic.  CBC shows no leukocytosis or anemia.  BMP shows hyperglycemia with glucose of 244 but no evidence of DKA.  No other acute electrolyte metabolic or renal abnormalities noted.  Ketones negative.  Lactate normal, not suggestive of severe sepsis or septic shock.  Toe looks quite gangrenous although it appears to be dry gangrene.  There is a necrotic area on the distal medial aspect.  X-ray shows pending.  No obvious gas or fracture noted.  Patient does need wound care as this is likely a diabetic ulcer requiring wound debridement and operative management.  Chest x-ray shows no acute pneumonia, pneumothorax or effusion.  EKG shows sinus rhythm no evidence of acute ST-T wave change.  No evidence of ischemia.  Troponin is negative.  Patient's symptoms are concerning for ACS with exertional dyspnea that is turned into resting dyspnea.  With negative troponin, I still think the patient requires admission for ACS rule out and wound cares.  Discussed with hospitalist who graciously accepted.    Diagnosis:    ICD-10-CM    1. Chest pain, unspecified type R07.9 UA with Microscopic     Urine Culture     Troponin I     Glucose by meter     Glucose by meter     Glucose by meter     Glucose by meter   2. Toe infection L08.9        Disposition:  Admitted to Dr. Tone Cash Disclosure:  I, Andrew Neal, am serving as a scribe on 2/29/2020 at 10:12 AM to personally document services performed by Yuli Dunbar MD based on my observations and the provider's statements to me.     Andrew Neal  2/29/2020   [facility] EMERGENCY DEPARTMENT       Yuli Shanks MD  02/29/20 1800

## 2020-03-01 ENCOUNTER — APPOINTMENT (OUTPATIENT)
Dept: MRI IMAGING | Facility: CLINIC | Age: 60
End: 2020-03-01
Attending: PODIATRIST
Payer: COMMERCIAL

## 2020-03-01 ENCOUNTER — APPOINTMENT (OUTPATIENT)
Dept: ULTRASOUND IMAGING | Facility: CLINIC | Age: 60
End: 2020-03-01
Attending: PODIATRIST
Payer: COMMERCIAL

## 2020-03-01 ENCOUNTER — HOSPITAL ENCOUNTER (INPATIENT)
Facility: CLINIC | Age: 60
LOS: 3 days | Discharge: HOME | End: 2020-03-04
Attending: INTERNAL MEDICINE | Admitting: HOSPITALIST
Payer: COMMERCIAL

## 2020-03-01 VITALS
HEART RATE: 74 BPM | OXYGEN SATURATION: 95 % | TEMPERATURE: 95.9 F | SYSTOLIC BLOOD PRESSURE: 77 MMHG | HEIGHT: 70 IN | WEIGHT: 168 LBS | DIASTOLIC BLOOD PRESSURE: 47 MMHG | BODY MASS INDEX: 24.05 KG/M2 | RESPIRATION RATE: 18 BRPM

## 2020-03-01 DIAGNOSIS — M86.9 TOE OSTEOMYELITIS, RIGHT (H): ICD-10-CM

## 2020-03-01 DIAGNOSIS — I20.0 UNSTABLE ANGINA (H): Primary | ICD-10-CM

## 2020-03-01 DIAGNOSIS — G89.18 ACUTE POST-OPERATIVE PAIN: ICD-10-CM

## 2020-03-01 DIAGNOSIS — I20.0 UNSTABLE ANGINA (H): ICD-10-CM

## 2020-03-01 DIAGNOSIS — I25.10 CORONARY ARTERY DISEASE INVOLVING NATIVE CORONARY ARTERY OF NATIVE HEART WITHOUT ANGINA PECTORIS: ICD-10-CM

## 2020-03-01 LAB
BACTERIA SPEC CULT: NO GROWTH
CRP SERPL-MCNC: 8.2 MG/L (ref 0–8)
ERYTHROCYTE [SEDIMENTATION RATE] IN BLOOD BY WESTERGREN METHOD: 27 MM/H (ref 0–20)
GLUCOSE BLDC GLUCOMTR-MCNC: 116 MG/DL (ref 70–99)
GLUCOSE BLDC GLUCOMTR-MCNC: 190 MG/DL (ref 70–99)
GLUCOSE BLDC GLUCOMTR-MCNC: 202 MG/DL (ref 70–99)
HBA1C MFR BLD: 7.2 % (ref 0–5.6)
Lab: NORMAL
SPECIMEN SOURCE: NORMAL
TROPONIN I SERPL-MCNC: <0.015 UG/L (ref 0–0.04)

## 2020-03-01 PROCEDURE — 99223 1ST HOSP IP/OBS HIGH 75: CPT | Mod: AI | Performed by: HOSPITALIST

## 2020-03-01 PROCEDURE — 84484 ASSAY OF TROPONIN QUANT: CPT | Performed by: INTERNAL MEDICINE

## 2020-03-01 PROCEDURE — 36415 COLL VENOUS BLD VENIPUNCTURE: CPT | Performed by: INTERNAL MEDICINE

## 2020-03-01 PROCEDURE — 25500064 ZZH RX 255 OP 636: Performed by: INTERNAL MEDICINE

## 2020-03-01 PROCEDURE — 83036 HEMOGLOBIN GLYCOSYLATED A1C: CPT | Performed by: PODIATRIST

## 2020-03-01 PROCEDURE — 00000146 ZZHCL STATISTIC GLUCOSE BY METER IP

## 2020-03-01 PROCEDURE — A9585 GADOBUTROL INJECTION: HCPCS | Performed by: INTERNAL MEDICINE

## 2020-03-01 PROCEDURE — 25000132 ZZH RX MED GY IP 250 OP 250 PS 637: Performed by: INTERNAL MEDICINE

## 2020-03-01 PROCEDURE — 99253 IP/OBS CNSLTJ NEW/EST LOW 45: CPT | Performed by: PODIATRIST

## 2020-03-01 PROCEDURE — 25800030 ZZH RX IP 258 OP 636: Performed by: INTERNAL MEDICINE

## 2020-03-01 PROCEDURE — 93922 UPR/L XTREMITY ART 2 LEVELS: CPT

## 2020-03-01 PROCEDURE — 99222 1ST HOSP IP/OBS MODERATE 55: CPT | Mod: 25

## 2020-03-01 PROCEDURE — 84484 ASSAY OF TROPONIN QUANT: CPT

## 2020-03-01 PROCEDURE — 99239 HOSP IP/OBS DSCHRG MGMT >30: CPT | Performed by: INTERNAL MEDICINE

## 2020-03-01 PROCEDURE — 85652 RBC SED RATE AUTOMATED: CPT | Performed by: PODIATRIST

## 2020-03-01 PROCEDURE — 12000000 ZZH R&B MED SURG/OB

## 2020-03-01 PROCEDURE — 93005 ELECTROCARDIOGRAM TRACING: CPT

## 2020-03-01 PROCEDURE — 25000132 ZZH RX MED GY IP 250 OP 250 PS 637: Performed by: HOSPITALIST

## 2020-03-01 PROCEDURE — 36415 COLL VENOUS BLD VENIPUNCTURE: CPT | Performed by: PODIATRIST

## 2020-03-01 PROCEDURE — 25800030 ZZH RX IP 258 OP 636: Performed by: HOSPITALIST

## 2020-03-01 PROCEDURE — 86140 C-REACTIVE PROTEIN: CPT | Performed by: PODIATRIST

## 2020-03-01 PROCEDURE — 36415 COLL VENOUS BLD VENIPUNCTURE: CPT

## 2020-03-01 PROCEDURE — 73720 MRI LWR EXTREMITY W/O&W/DYE: CPT | Mod: RT

## 2020-03-01 PROCEDURE — 25000128 H RX IP 250 OP 636: Performed by: HOSPITALIST

## 2020-03-01 RX ORDER — ACETAMINOPHEN 325 MG/1
650 TABLET ORAL EVERY 4 HOURS PRN
Status: DISCONTINUED | OUTPATIENT
Start: 2020-03-01 | End: 2020-03-02

## 2020-03-01 RX ORDER — OXYCODONE AND ACETAMINOPHEN 5; 325 MG/1; MG/1
1-2 TABLET ORAL EVERY 4 HOURS PRN
Status: DISCONTINUED | OUTPATIENT
Start: 2020-03-01 | End: 2020-03-04

## 2020-03-01 RX ORDER — BISOPROLOL FUMARATE 5 MG/1
5 TABLET, FILM COATED ORAL DAILY
Status: DISCONTINUED | OUTPATIENT
Start: 2020-03-02 | End: 2020-03-04 | Stop reason: HOSPADM

## 2020-03-01 RX ORDER — AMLODIPINE BESYLATE 10 MG/1
10 TABLET ORAL DAILY
Status: DISCONTINUED | OUTPATIENT
Start: 2020-03-02 | End: 2020-03-03

## 2020-03-01 RX ORDER — CLOMIPRAMINE HYDROCHLORIDE 50 MG/1
100 CAPSULE ORAL 2 TIMES DAILY
Status: DISCONTINUED | OUTPATIENT
Start: 2020-03-01 | End: 2020-03-04 | Stop reason: HOSPADM

## 2020-03-01 RX ORDER — AMLODIPINE AND VALSARTAN 10; 160 MG/1; MG/1
1 TABLET ORAL DAILY
Status: DISCONTINUED | OUTPATIENT
Start: 2020-03-02 | End: 2020-03-01

## 2020-03-01 RX ORDER — NICOTINE POLACRILEX 4 MG
15-30 LOZENGE BUCCAL
Status: DISCONTINUED | OUTPATIENT
Start: 2020-03-01 | End: 2020-03-04 | Stop reason: HOSPADM

## 2020-03-01 RX ORDER — ONDANSETRON 2 MG/ML
4 INJECTION INTRAMUSCULAR; INTRAVENOUS EVERY 6 HOURS PRN
Status: DISCONTINUED | OUTPATIENT
Start: 2020-03-01 | End: 2020-03-04 | Stop reason: HOSPADM

## 2020-03-01 RX ORDER — GADOBUTROL 604.72 MG/ML
7.5 INJECTION INTRAVENOUS ONCE
Status: COMPLETED | OUTPATIENT
Start: 2020-03-01 | End: 2020-03-01

## 2020-03-01 RX ORDER — PROCHLORPERAZINE MALEATE 5 MG
10 TABLET ORAL EVERY 6 HOURS PRN
Status: DISCONTINUED | OUTPATIENT
Start: 2020-03-01 | End: 2020-03-04 | Stop reason: HOSPADM

## 2020-03-01 RX ORDER — ASPIRIN 81 MG/1
81 TABLET, CHEWABLE ORAL DAILY
Status: DISCONTINUED | OUTPATIENT
Start: 2020-03-02 | End: 2020-03-04 | Stop reason: HOSPADM

## 2020-03-01 RX ORDER — DIVALPROEX SODIUM 500 MG/1
1000 TABLET, DELAYED RELEASE ORAL 2 TIMES DAILY
Status: DISCONTINUED | OUTPATIENT
Start: 2020-03-01 | End: 2020-03-04 | Stop reason: HOSPADM

## 2020-03-01 RX ORDER — DEXTROSE MONOHYDRATE 25 G/50ML
25-50 INJECTION, SOLUTION INTRAVENOUS
Status: DISCONTINUED | OUTPATIENT
Start: 2020-03-01 | End: 2020-03-04 | Stop reason: HOSPADM

## 2020-03-01 RX ORDER — NALOXONE HYDROCHLORIDE 0.4 MG/ML
.1-.4 INJECTION, SOLUTION INTRAMUSCULAR; INTRAVENOUS; SUBCUTANEOUS
Status: DISCONTINUED | OUTPATIENT
Start: 2020-03-01 | End: 2020-03-04 | Stop reason: HOSPADM

## 2020-03-01 RX ORDER — LIDOCAINE 40 MG/G
CREAM TOPICAL
Status: DISCONTINUED | OUTPATIENT
Start: 2020-03-01 | End: 2020-03-04 | Stop reason: HOSPADM

## 2020-03-01 RX ORDER — ROSUVASTATIN CALCIUM 20 MG/1
20 TABLET, COATED ORAL DAILY
Status: DISCONTINUED | OUTPATIENT
Start: 2020-03-02 | End: 2020-03-04 | Stop reason: HOSPADM

## 2020-03-01 RX ORDER — AMOXICILLIN 250 MG
2 CAPSULE ORAL 2 TIMES DAILY PRN
Status: DISCONTINUED | OUTPATIENT
Start: 2020-03-01 | End: 2020-03-04 | Stop reason: HOSPADM

## 2020-03-01 RX ORDER — SODIUM CHLORIDE 9 MG/ML
INJECTION, SOLUTION INTRAVENOUS CONTINUOUS
Status: DISCONTINUED | OUTPATIENT
Start: 2020-03-02 | End: 2020-03-04

## 2020-03-01 RX ORDER — ACETAMINOPHEN 650 MG/1
650 SUPPOSITORY RECTAL EVERY 4 HOURS PRN
Status: DISCONTINUED | OUTPATIENT
Start: 2020-03-01 | End: 2020-03-04 | Stop reason: HOSPADM

## 2020-03-01 RX ORDER — NICOTINE 21 MG/24HR
1 PATCH, TRANSDERMAL 24 HOURS TRANSDERMAL DAILY
Status: DISCONTINUED | OUTPATIENT
Start: 2020-03-01 | End: 2020-03-04 | Stop reason: HOSPADM

## 2020-03-01 RX ORDER — QUETIAPINE FUMARATE 300 MG/1
600 TABLET, FILM COATED ORAL AT BEDTIME
Status: DISCONTINUED | OUTPATIENT
Start: 2020-03-01 | End: 2020-03-04 | Stop reason: HOSPADM

## 2020-03-01 RX ORDER — VALSARTAN 160 MG/1
160 TABLET ORAL DAILY
Status: DISCONTINUED | OUTPATIENT
Start: 2020-03-02 | End: 2020-03-04 | Stop reason: HOSPADM

## 2020-03-01 RX ORDER — PIPERACILLIN SODIUM, TAZOBACTAM SODIUM 3; .375 G/15ML; G/15ML
3.38 INJECTION, POWDER, LYOPHILIZED, FOR SOLUTION INTRAVENOUS EVERY 6 HOURS
Status: DISCONTINUED | OUTPATIENT
Start: 2020-03-01 | End: 2020-03-04 | Stop reason: HOSPADM

## 2020-03-01 RX ORDER — BUSPIRONE HYDROCHLORIDE 10 MG/1
10 TABLET ORAL 4 TIMES DAILY
Status: DISCONTINUED | OUTPATIENT
Start: 2020-03-01 | End: 2020-03-04 | Stop reason: HOSPADM

## 2020-03-01 RX ORDER — HYDROCHLOROTHIAZIDE 12.5 MG/1
12.5 TABLET ORAL DAILY
Status: DISCONTINUED | OUTPATIENT
Start: 2020-03-02 | End: 2020-03-03

## 2020-03-01 RX ORDER — LIDOCAINE 4 G/G
1 PATCH TOPICAL
Status: DISCONTINUED | OUTPATIENT
Start: 2020-03-01 | End: 2020-03-01 | Stop reason: HOSPADM

## 2020-03-01 RX ORDER — PROCHLORPERAZINE 25 MG
25 SUPPOSITORY, RECTAL RECTAL EVERY 12 HOURS PRN
Status: DISCONTINUED | OUTPATIENT
Start: 2020-03-01 | End: 2020-03-04 | Stop reason: HOSPADM

## 2020-03-01 RX ORDER — ONDANSETRON 4 MG/1
4 TABLET, ORALLY DISINTEGRATING ORAL EVERY 6 HOURS PRN
Status: DISCONTINUED | OUTPATIENT
Start: 2020-03-01 | End: 2020-03-04 | Stop reason: HOSPADM

## 2020-03-01 RX ORDER — AMOXICILLIN 250 MG
1 CAPSULE ORAL 2 TIMES DAILY PRN
Status: DISCONTINUED | OUTPATIENT
Start: 2020-03-01 | End: 2020-03-04 | Stop reason: HOSPADM

## 2020-03-01 RX ORDER — POLYETHYLENE GLYCOL 3350 17 G/17G
17 POWDER, FOR SOLUTION ORAL DAILY PRN
Status: DISCONTINUED | OUTPATIENT
Start: 2020-03-01 | End: 2020-03-04 | Stop reason: HOSPADM

## 2020-03-01 RX ORDER — BISACODYL 10 MG
10 SUPPOSITORY, RECTAL RECTAL DAILY PRN
Status: DISCONTINUED | OUTPATIENT
Start: 2020-03-01 | End: 2020-03-04 | Stop reason: HOSPADM

## 2020-03-01 RX ORDER — ISOSORBIDE DINITRATE 20 MG/1
40 TABLET ORAL 2 TIMES DAILY
Status: DISCONTINUED | OUTPATIENT
Start: 2020-03-01 | End: 2020-03-04 | Stop reason: HOSPADM

## 2020-03-01 RX ADMIN — AMLODIPINE BESYLATE 10 MG: 10 TABLET ORAL at 08:33

## 2020-03-01 RX ADMIN — GLIMEPIRIDE 2 MG: 1 TABLET ORAL at 08:30

## 2020-03-01 RX ADMIN — DIVALPROEX SODIUM 1000 MG: 500 TABLET, DELAYED RELEASE ORAL at 08:31

## 2020-03-01 RX ADMIN — ACETAMINOPHEN 650 MG: 325 TABLET, FILM COATED ORAL at 02:00

## 2020-03-01 RX ADMIN — SODIUM CHLORIDE: 9 INJECTION, SOLUTION INTRAVENOUS at 21:46

## 2020-03-01 RX ADMIN — OXYCODONE HYDROCHLORIDE 5 MG: 5 TABLET ORAL at 08:29

## 2020-03-01 RX ADMIN — FLUOXETINE 20 MG: 20 CAPSULE ORAL at 08:32

## 2020-03-01 RX ADMIN — Medication 12.5 MG: at 08:33

## 2020-03-01 RX ADMIN — PIPERACILLIN AND TAZOBACTAM 3.38 G: 3; .375 INJECTION, POWDER, FOR SOLUTION INTRAVENOUS at 15:38

## 2020-03-01 RX ADMIN — VALSARTAN 160 MG: 160 TABLET, FILM COATED ORAL at 08:33

## 2020-03-01 RX ADMIN — QUETIAPINE 600 MG: 300 TABLET, FILM COATED ORAL at 21:45

## 2020-03-01 RX ADMIN — BUSPIRONE HYDROCHLORIDE 10 MG: 10 TABLET ORAL at 21:45

## 2020-03-01 RX ADMIN — METFORMIN HYDROCHLORIDE 850 MG: 850 TABLET, FILM COATED ORAL at 08:31

## 2020-03-01 RX ADMIN — OXYCODONE HYDROCHLORIDE AND ACETAMINOPHEN 2 TABLET: 5; 325 TABLET ORAL at 22:04

## 2020-03-01 RX ADMIN — OXYCODONE HYDROCHLORIDE 5 MG: 5 TABLET ORAL at 02:00

## 2020-03-01 RX ADMIN — PIPERACILLIN AND TAZOBACTAM 3.38 G: 3; .375 INJECTION, POWDER, FOR SOLUTION INTRAVENOUS at 21:45

## 2020-03-01 RX ADMIN — ISOSORBIDE DINITRATE 40 MG: 20 TABLET ORAL at 08:31

## 2020-03-01 RX ADMIN — INSULIN ASPART 3 UNITS: 100 INJECTION, SOLUTION INTRAVENOUS; SUBCUTANEOUS at 09:58

## 2020-03-01 RX ADMIN — ONDANSETRON 4 MG: 2 INJECTION INTRAMUSCULAR; INTRAVENOUS at 16:10

## 2020-03-01 RX ADMIN — OXYCODONE HYDROCHLORIDE AND ACETAMINOPHEN 2 TABLET: 5; 325 TABLET ORAL at 15:21

## 2020-03-01 RX ADMIN — BUSPIRONE HYDROCHLORIDE 10 MG: 10 TABLET ORAL at 18:18

## 2020-03-01 RX ADMIN — ISOSORBIDE DINITRATE 40 MG: 20 TABLET ORAL at 21:45

## 2020-03-01 RX ADMIN — DIVALPROEX SODIUM 1000 MG: 500 TABLET, DELAYED RELEASE ORAL at 21:45

## 2020-03-01 RX ADMIN — NICOTINE 1 PATCH: 21 PATCH, EXTENDED RELEASE TRANSDERMAL at 16:05

## 2020-03-01 RX ADMIN — ROSUVASTATIN CALCIUM 20 MG: 20 TABLET, FILM COATED ORAL at 08:31

## 2020-03-01 RX ADMIN — SODIUM CHLORIDE 1000 ML: 9 INJECTION, SOLUTION INTRAVENOUS at 22:03

## 2020-03-01 RX ADMIN — GADOBUTROL 7.5 ML: 604.72 INJECTION INTRAVENOUS at 08:48

## 2020-03-01 RX ADMIN — CLOMIPRAMINE HYDROCHLORIDE 100 MG: 50 CAPSULE ORAL at 23:48

## 2020-03-01 RX ADMIN — BUSPIRONE HYDROCHLORIDE 10 MG: 10 TABLET ORAL at 08:31

## 2020-03-01 ASSESSMENT — ACTIVITIES OF DAILY LIVING (ADL)
ADLS_ACUITY_SCORE: 12
FALL_HISTORY_WITHIN_LAST_SIX_MONTHS: NO
COGNITION: 0 - NO COGNITION ISSUES REPORTED
TOILETING: 0-->INDEPENDENT
ADLS_ACUITY_SCORE: 12
DRESS: 0-->INDEPENDENT
BATHING: 0-->INDEPENDENT
PRIOR_FUNCTIONAL_LEVEL_COMMENT: INDEPENDENT
ADLS_ACUITY_SCORE: 12
AMBULATION: 0-->INDEPENDENT
ADLS_ACUITY_SCORE: 12
SWALLOWING: 0-->SWALLOWS FOODS/LIQUIDS WITHOUT DIFFICULTY
RETIRED_EATING: 0-->INDEPENDENT
ADLS_ACUITY_SCORE: 12
RETIRED_COMMUNICATION: 0-->UNDERSTANDS/COMMUNICATES WITHOUT DIFFICULTY
TRANSFERRING: 0-->INDEPENDENT
ADLS_ACUITY_SCORE: 12

## 2020-03-01 ASSESSMENT — MIFFLIN-ST. JEOR: SCORE: 1583.29

## 2020-03-01 NOTE — PLAN OF CARE
Discharged from [facility] and transported via /Replaced by [facility] to [facility] for vascular studies and treatment.  Tele dced for transfer.  SL left in place.  Family has belongings

## 2020-03-01 NOTE — PROGRESS NOTES
"This writer spoke with Dr. Wayne, order received to hold ASA 81m and plavix 75mg tablets pending possible surgery. No lidocaine patch placed secondary to patients states  \"I do not use at home and do not need\".   "

## 2020-03-01 NOTE — H&P
[facility]    History and Physical - Hospitalist Service       Date of Admission:  3/1/2020    Assessment & Plan   Huseyin Pettit is a 60 year old male admitted on 3/1/2020.  Past history of CAD s/p PCI, HTN, hyperlipidemia, DM II with neuropathy, tobacco dependence and medication non-compliance who presented to [facility] with right great toe gangrene and osteomyelitis, transferred to [facility] for Vascular Surgery evaluation prior to amputation.    Right great toe gangrene and osteomyelitis  Presents with 2 weeks progressive toe pain with gangrenous changes.  MRI right foot 3/1 consistent with osteomyelitis of distal 1st phalanx with questionable extension to base.  CRP and sed rate mildly elevated, afebrile without leukocytosis.  - start zosyn  - Podiatry, Vascular Surgery and ID consults  - prn tylenol and oxycodone  - NPO midnight    Chest pain with concern for unstable angina  CAD s/p PCI most recently in 2016  HTN  Reports undergoing PCI in [place] in 2011 and 2016 with total of 3 stents placed, details unknown.  Reporting 2 weeks increasing typical chest pain symptoms.  Outside EKG without ischemic changes, serial trop negative.  TTE 2/29 showing normal EF without WMA.    - telemetry  - fasting lipid panel  - cardiology consult  - hold on heparin at this time as currently asymptomatic  - continue PTA aspirin, bisoprolol, amlodipine, valsartan, hydrochlorothiazide, Imdur, rosuvastatin  - hold Plavix as may require surgical intervention    DM II on long term insulin  A1C 7.2%.  Managed on Novolog 70/30 with 28 units qAM and 38 units qPM, metformin, glimepiride.  - reduce Novolog to 19 units this evening and hold AM insulin as will be NPO  - prandial insulin 1 unit/10g carb, high dose ssi  - hold metformin and glimepiride    Depression  - continue PTA buspirone, fluoxetine, Depakote, clomipramine, Seroquel    Tobacco dependence  Smokes 1ppd.    - nicotine patch  - encourage cessation     Diet:  Moderate Consistent CHO Diet  NPO per Anesthesia Guidelines for Procedure/Surgery Except for: Meds, Ice Chips    DVT Prophylaxis: Pneumatic Compression Devices  Rangel Catheter: not present  Code Status: Full Code      Disposition Plan   Expected discharge: 4 - 7 days, recommended to prior living arrangement once work-up and any surgical intervention complete.  Entered: Dilip Rodriguez MD 03/01/2020, 2:33 PM     The patient's care was discussed with the Bedside Nurse, Patient and Patient's Family.    Dilip Rodriguez MD  [facility]    ______________________________________________________________________    Chief Complaint   Right great toe pain    History is obtained from the patient and chart review    History of Present Illness   Huseyin Pettit is a 60 year old male who presented to [facility] with 2 weeks progressive right great toe pain with gangrenous changes.  He was evaluated by Podiatry who recommended transfer to [facility] for Vascular Surgery evaluation prior to any amputation.  He reports no recent purulent drainage from the toe, denies fever/chills.  He does report experiencing intermittent sharp substernal chest pain over the past 2 months.  Episodes have been associated with diaphoresis, dyspnea, nausea and lightheadedness and have been increasing in frequency and severity.  He reports onset of symptoms with activity and improve with rest, however, has also recently had symptoms at rest.  He reports symptoms are similar to those he experienced prior to his previous stenting.  He has noted recent decrease in exertional capacity as well.  Denies any lower extremity edema or orthopnea.  All of his cardiac procedures were performed in [place] and he last saw a cardiologist about 1 year ago while visiting [place].  He reports he frequently misses medication doses and does not monitor his blood sugar on a daily basis.  Remainder of review of systems is negative.     Review of Systems    The 10 point  Review of Systems is negative other than noted in the HPI or here.     Past Medical History    CAD s/p PCI in  and 2016 while in [place]  HTN  Hyperlipidemia  DM II with neuropathy  Depression  Tobacco dependence    Past Surgical History   Cholecystectomy  PCI    Social History   Has a 40 pack-year history of tobacco abuse and currently smokes 1ppd.  Denies alcohol use.  Resides with his family.    Family History   Denies any significant family history.     Prior to Admission Medications   Prior to Admission Medications   Prescriptions Last Dose Informant Patient Reported? Taking?   FLUoxetine (PROZAC) 20 MG capsule   Yes Yes   Sig: Take 20 mg by mouth daily   QUEtiapine (SEROQUEL) 300 MG tablet   Yes Yes   Sig: Take 600 mg by mouth At Bedtime   amLODIPine-valsartan (EXFORGE)  MG tablet   Yes Yes   Sig: Take 1 tablet by mouth daily   aspirin 81 MG tablet   Yes Yes   Sig: Take 81 mg by mouth daily    bisoprolol (ZEBETA) 5 MG tablet   Yes Yes   Sig: Take 5 mg by mouth daily   busPIRone (BUSPAR) 10 MG tablet   Yes Yes   Sig: Take 10 mg by mouth 4 times daily   clomiPRAMINE (ANAFRANIL) 50 MG capsule   Yes Yes   Sig: Take 100 mg by mouth 2 times daily   clopidogrel (PLAVIX) 75 MG tablet   No Yes   Sig: Take 1 tablet (75 mg) by mouth daily   divalproex sodium delayed-release (DEPAKOTE) 500 MG DR tablet   Yes Yes   Sig: Take 1,000 mg by mouth 2 times daily    glimepiride (AMARYL) 2 MG tablet   Yes Yes   Sig: Take 2 mg by mouth every morning (before breakfast)   hydrochlorothiazide (HYDRODIURIL) 12.5 MG tablet   Yes Yes   Sig: Take 12.5 mg by mouth daily   insulin aspart prot & aspart (NOVOLOG MIX 70/30 PEN) injection   No Yes   Si units in AM, and 38 units in PM   isosorbide Dinitrate (ISORDIL) 40 MG TABS   No Yes   Sig: Take 1 tablet (40 mg) by mouth 2 times daily   metFORMIN (GLUCOPHAGE) 850 MG tablet   No Yes   Sig: Take 1 tablet (850 mg) by mouth 2 times daily (with meals) (Need A1c for further refills)    rosuvastatin (CRESTOR) 20 MG tablet   No Yes   Sig: Take 1 tablet (20 mg) by mouth daily      Facility-Administered Medications: None     Allergies   No Known Allergies    Physical Exam   Vital Signs: Temp: 98.1  F (36.7  C) Temp src: Oral BP: (!) 73/50   Heart Rate: 94 Resp: 18 SpO2: 95 % O2 Device: None (Room air)    Weight: 0 lbs 0 oz    General Appearance: well nourished male in NAD  Eyes: PERRL, sclera anicteric  HEENT: mucous membranes moist, no neck LAD  Respiratory: lungs CTAB, no wheezes or crackles, no tachypnea  Cardiovascular: RRR, normal s1/s2 without murmur  GI: abdomen soft, normal bowel sounds, nontender, nondistended  Lymph/Hematologic: no peripheral edema  Skin: right great toe gangrenous  Musculoskeletal: extremities warm  Neurologic: alert and appropriate, cranial nerves grossly intact  Psychiatric: normal affect    Data   Data reviewed today: I reviewed all medications, new labs and imaging results over the last 24 hours. I personally reviewed no images or EKG's today.    Recent Labs   Lab 03/01/20  0011 02/29/20  1739 02/29/20  1029   WBC  --   --  11.0   HGB  --   --  13.4   MCV  --   --  94   PLT  --   --  341   NA  --   --  135   POTASSIUM  --   --  4.1   CHLORIDE  --   --  104   CO2  --   --  26   BUN  --   --  24   CR  --   --  0.94   ANIONGAP  --   --  5   MYRON  --   --  9.1   GLC  --   --  244*   TROPI <0.015 <0.015 <0.015     Recent Results (from the past 24 hour(s))   US REYMUNDO Doppler No Exercise    Narrative    PRELIMINARY REPORT    US REYMUNDO NO EXERCISE, 1-2 LEVELS, BILAT   3/1/2020 2:16 AM     INDICATION: Foot wound. Nonhealing ulcer.    COMPARISON: None    FINDINGS: Blood flow to both feet with biphasic and monophasic waveforms. ABIs of 1.30 on the right and 1.26 on the left.    Final report to follow.    Preliminary Interpretation Dictated By: Dennis Navarro MD  Date: 3/1/2020   MR Foot Right w/o & w Contrast    Narrative    MR right foot without and with contrast 3/1/2020 9:45  AM    History: assess for bone infection great toe    Techniques: Multiplanar multisequence imaging of the right foot was  obtained before and after administration of intravenous contrast.    Comparison: Radiographs 2/29/2020    Findings:    Motion partially compromising assessment.    Bones    Extensive edema-like marrow signal intensity and associated  enhancement of the entire first distal phalanx. There is apparent soft  tissue defect at the tip of the great toe with underlying T1  hypointensity involving the first distal phalangeal tuft. Overall  findings most consistent with osteomyelitis with wider area of  reactive osteitis especially if clinically confirmed soft tissue  defect is present in this area. No associated drainable fluid  collection.    Mild T1 hypointensity extends all way down to the base of first distal  phalanx, possible early osteomyelitis extension.    There is also small focal area of subchondral edema at the dorsal  aspect of the first proximal phalangeal head without associated joint  effusion, presumably degenerative given lack of finding to suggest  associated septic arthritis extension.    Joints and periarticular soft tissue    Joint effusion: Physiologic amount of joint fluid are present.    Plantar plates: Intersesamoidal ligament and sesamoidal phalangeal  ligaments of the first metatarsophalangeal joints are grossly intact.  Plantar plates of the second through fifth toe at metatarsophalangeal  joints are grossly intact.    Intermetatarsal spaces: No interdigital neuroma. No intermetatarsal  bursitis.    Ligaments and Tendons    Lisfranc interosseous ligament: The visualized portion is intact.    Tendons: The visualized courses of flexor and extensor tendons are  intact.     Muscles    Muscle edema involving the abductor hallucis, nonspecific may be  related to muscle strain.     ANCILLARY FINDINGS    Diffuse soft tissue swelling, edema and enhancement of the great  toe,  consistent with cellulitis.    Areas of plantar soft tissue but effacement particularly at the fifth  metatarsophalangeal joint and fourth metatarsophalangeal joint, likely  pressure callus.      Impression    Impression:  Motion compromising assessment.  1. Assuming clinically confirmed open/deep soft tissue defect presence  at the tip of great toe, osteomyelitis of first distal phalangeal tuft  until proven otherwise with wider area of reactive osteitis involving  entire distal phalanx. Possible early osteomyelitis extension to the  base cannot be excluded.   2 Small focal area of subchondral edema at the dorsal aspect of the  first proximal phalangeal head without associated joint effusion,  presumably degenerative given lack of finding to suggest associated  septic arthritis extension.  3. Great toe cellulitis without drainable fluid collection.    LIOR MCCARTNEY

## 2020-03-01 NOTE — CONSULTS
PATIENT HISTORY:  Huseyin Pettit is a 60 year old male who was admitted for chest pain and right great toe pain. .      I was asked to see Huseyin Pettit  by  for right great toe ulcer.    Patient was seen at bedside. Notes that the toe started to become black a few weeks ago. Pain is 8/10. Denies specific injury. Is diabetic. No previous issues with his feet that he notes.     Review of Systems:  Patient denies fever, chills, rash, wound, stiffness, limping, numbness, weakness, heart burn, blood in stool, chest pain with activity, calf pain when walking, shortness of breath with activity, chronic cough, easy bleeding/bruising, swelling of ankles, excessive thirst, fatigue, depression, anxiety.  Patient admits to wound.     PAST MEDICAL HISTORY:   Past Medical History:   Diagnosis Date     CAD (coronary artery disease) 2/18/2015    S/p 3 stents in 2011 In [place]     Closed fracture of multiple ribs of left side with routine healing 7/10/2018     Coronary artery disease      Coronary artery disease involving native coronary artery of native heart without angina pectoris 7/3/2018    S/p 3 stents put in [place] in 2011.     Depressive disorder      Diabetes (H)      Hypertension      Severe episode of recurrent major depressive disorder, without psychotic features (H) 7/3/2018     Type 2 diabetes mellitus with other circulatory complication, with long-term current use of insulin (H) 7/3/2018        PAST SURGICAL HISTORY:   Past Surgical History:   Procedure Laterality Date     CHOLECYSTECTOMY       GALLBLADDER SURGERY  2011        MEDICATIONS:   Current Facility-Administered Medications:      acetaminophen (TYLENOL) tablet 650 mg, 650 mg, Oral, Q4H PRN, Sly Wayne MD, 650 mg at 03/01/20 0200     amLODIPine (NORVASC) tablet 10 mg, 10 mg, Oral, Daily **AND** valsartan (DIOVAN) tablet 160 mg, 160 mg, Oral, Daily, Sly Wayne MD     aspirin EC tablet 81 mg, 81 mg, Oral, Daily, Sly Wayne  MD Nestor     busPIRone (BUSPAR) tablet 10 mg, 10 mg, Oral, 4x Daily, Sly Wayne MD, 10 mg at 02/29/20 2209     clopidogrel (PLAVIX) tablet 75 mg, 75 mg, Oral, Daily, Sly Wayne MD     glucose gel 15-30 g, 15-30 g, Oral, Q15 Min PRN **OR** dextrose 50 % injection 25-50 mL, 25-50 mL, Intravenous, Q15 Min PRN **OR** glucagon injection 1 mg, 1 mg, Subcutaneous, Q15 Min PRN, Sly Wayne MD     divalproex sodium delayed-release (DEPAKOTE) DR tablet 1,000 mg, 1,000 mg, Oral, BID, Sly Wayne MD, 1,000 mg at 02/29/20 2307     FLUoxetine (PROzac) capsule 20 mg, 20 mg, Oral, Daily, Sly Wayne MD     FLUoxetine (PROzac) capsule 20 mg, 20 mg, Oral, Daily, Sly Wayne MD     glimepiride (AMARYL) tablet 2 mg, 2 mg, Oral, QAM AC, Sly Wayne MD     hydrochlorothiazide half-tab 12.5 mg, 12.5 mg, Oral, Daily, Sly Wayne MD     HYDROmorphone (PF) (DILAUDID) injection 0.2 mg, 0.2 mg, Intravenous, Q2H PRN, Sly Wayne MD     insulin aspart (NovoLOG) injection (RAPID ACTING), 1-10 Units, Subcutaneous, TID AC, Sly Wayne MD, 3 Units at 02/29/20 1843     insulin aspart (NovoLOG) injection (RAPID ACTING), 1-7 Units, Subcutaneous, At Bedtime, Sly Wayne MD, 2 Units at 02/29/20 2209     ipratropium - albuterol 0.5 mg/2.5 mg/3 mL (DUONEB) neb solution 3 mL, 3 mL, Nebulization, Q15 Min PRN, Yuli Shanks MD, 3 mL at 02/29/20 1033     isosorbide dinitrate (ISORDIL) tablet 40 mg, 40 mg, Oral, BID, Sly Wayne MD, 40 mg at 02/29/20 2209     lidocaine (LMX4) cream, , Topical, Q1H PRN, Sly Wayne MD     lidocaine 1 % 0.1-1 mL, 0.1-1 mL, Other, Q1H PRN, Sly Wayne MD     melatonin tablet 1 mg, 1 mg, Oral, At Bedtime PRN, Sly Wayne MD     metFORMIN (GLUCOPHAGE) tablet 850 mg, 850 mg, Oral, BID w/meals, Sly Wayne MD, 850 mg at 02/29/20 1830     naloxone (NARCAN) injection  0.1-0.4 mg, 0.1-0.4 mg, Intravenous, Q2 Min PRN, Sly Wayne MD     nitroGLYcerin (NITROSTAT) sublingual tablet 0.4 mg, 0.4 mg, Sublingual, Q5 Min PRN, Yuli Shanks MD, 0.4 mg at 02/29/20 1108     ondansetron (ZOFRAN-ODT) ODT tab 4 mg, 4 mg, Oral, Q6H PRN **OR** ondansetron (ZOFRAN) injection 4 mg, 4 mg, Intravenous, Q6H PRN, Sly Wayne MD     oxyCODONE (ROXICODONE) tablet 5 mg, 5 mg, Oral, Q3H PRN, Sly Wayne MD, 5 mg at 03/01/20 0200     potassium chloride (KLOR-CON) Packet 20-40 mEq, 20-40 mEq, Oral or Feeding Tube, Q2H PRN, Sly Wayne MD     potassium chloride 10 mEq in 100 mL intermittent infusion with 10 mg lidocaine, 10 mEq, Intravenous, Q1H PRN, Sly Wayne MD     potassium chloride 10 mEq in 100 mL sterile water intermittent infusion (premix), 10 mEq, Intravenous, Q1H PRN, Sly Wayne MD     potassium chloride 20 mEq in 50 mL intermittent infusion, 20 mEq, Intravenous, Q1H PRN, Sly Wayne MD     potassium chloride ER (KLOR-CON M) CR tablet 20-40 mEq, 20-40 mEq, Oral, Q2H PRN, Sly Wayne MD     QUEtiapine (SEROquel) tablet 600 mg, 600 mg, Oral, At Bedtime, Sly Wayne MD, 600 mg at 02/29/20 2307     rosuvastatin (CRESTOR) tablet 20 mg, 20 mg, Oral, Daily, Sly Wayne MD, 20 mg at 02/29/20 1706     senna-docusate (SENOKOT-S/PERICOLACE) 8.6-50 MG per tablet 1 tablet, 1 tablet, Oral, BID PRN **OR** senna-docusate (SENOKOT-S/PERICOLACE) 8.6-50 MG per tablet 2 tablet, 2 tablet, Oral, BID PRN, Sly Wayne MD     sodium chloride (PF) 0.9% PF flush 3 mL, 3 mL, Intracatheter, q1 min prn, Sly Wayne MD     sodium chloride (PF) 0.9% PF flush 3 mL, 3 mL, Intracatheter, Q8H, Sly Wayne MD, 3 mL at 03/01/20 0200     ALLERGIES:  No Known Allergies     SOCIAL HISTORY:   Social History     Socioeconomic History     Marital status: Single     Spouse name: Not on file     Number of  children: Not on file     Years of education: Not on file     Highest education level: Not on file   Occupational History     Not on file   Social Needs     Financial resource strain: Not on file     Food insecurity:     Worry: Not on file     Inability: Not on file     Transportation needs:     Medical: Not on file     Non-medical: Not on file   Tobacco Use     Smoking status: Current Every Day Smoker     Packs/day: 0.50     Years: 25.00     Pack years: 12.50     Smokeless tobacco: Never Used     Tobacco comment: trying to adela   Substance and Sexual Activity     Alcohol use: No     Drug use: No     Sexual activity: Never   Lifestyle     Physical activity:     Days per week: Not on file     Minutes per session: Not on file     Stress: Not on file   Relationships     Social connections:     Talks on phone: Not on file     Gets together: Not on file     Attends [employer] service: Not on file     Active member of club or organization: Not on file     Attends meetings of clubs or organizations: Not on file     Relationship status: Not on file     Intimate partner violence:     Fear of current or ex partner: Not on file     Emotionally abused: Not on file     Physically abused: Not on file     Forced sexual activity: Not on file   Other Topics Concern     Parent/sibling w/ CABG, MI or angioplasty before 65F 55M? Not Asked   Social History Narrative     Not on file        FAMILY HISTORY: No family history on file.     EXAM:Vitals: /61 (BP Location: Left arm)   Pulse 74   Temp 97.6  F (36.4  C) (Oral)   Resp 18   SpO2 96%   BMI= There is no height or weight on file to calculate BMI.    LABS:    WBC   Date Value Ref Range Status   02/29/2020 11.0 4.0 - 11.0 10e9/L Final     HA1C: pending    C-reactive protein: 8.5   ESR: pending    General appearance: Patient is alert and fully cooperative with history & exam.  No sign of distress is noted during the visit.      Psychiatric: Affect is pleasant & appropriate.   Patient appears motivated to improve health.       Respiratory: Breathing is regular & unlabored while sitting.      HEENT: Hearing is intact to spoken word.  Speech is clear.  No gross evidence of visual impairment that would impact ambulation.       Dermatologic: full thickness black dry eschar to the right great 2/3rd's of the toe.  No streaking redness, purulent drainage or acute signs of infection.     Vascular: PT pulses palpable bilaterally but DP pulses non palpable.  No significant edema or varicosities noted.  CFT's delayed to right foot.      Neurologic: Lower extremity sensation is intact to light touch.  No evidence of weakness or contracture in the lower extremities.  No evidence of neuropathy.       Musculoskeletal: Patient is ambulatory without assistive device or brace.  No gross ankle deformity noted.  No foot or ankle joint effusion is noted.      IMAGING: right foot xray - personally reviewed images - There may well be some ulceration of the great toe. On the lateral view, there appears to be dorsal soft tissue gas. No focal bone destruction.    REYMUNDO: Blood flow to both feet with biphasic and monophasic waveforms. ABIs of 1.30 on the right and 1.26 on the left.     ASSESSMENT: 60 yr old diabetic male with PAD, dry gangrene to right great toe.      PLAN:  Reviewed patient's chart in epic.  -Reviewed xrays and REYMUNDO's with patient.   -Discussed that given the dead tissue to the right great toe, he will eventually need an amputation.  I am concerned about his bloodflow and healing potential though given non palpable DP pulses.   -recommend transfer over to [facility] for vascular surgery workup/angiogram to assess healing potential.   -Once cleared, then we will proceed with surgery for toe amputation.   -Patient is in agreement with plan.   -spoke with Dr. Wayne about transfer to [facility].   -ordered lidocaine patches for pain to right foot.   -Patient requested cardiology consult and passed this  along to the nurse.   -Keep right foot and dressing dry at this time.   -will continue to follow.    Appreciate the consult!.    Cathy Brooks DPM, Podiatry/Foot and Ankle Surgery    7:46 AM

## 2020-03-01 NOTE — DISCHARGE SUMMARY
[facility]  Discharge Summary  Name: Huseyin Pettit    MRN: [id number]  YOB: 1960    Age: 60 year old  Date of Discharge:  3/1/2020  Date of Admission: 2/29/2020  Primary Care Provider: Hunter Carlton  Discharge Physician:  Sly Wayne MD  Discharging Service:  Hospitalist      Discharge Diagnosis:  Nonhealing ulcer, dry gangrene right first toe  Uncontrolled insulin requiring diabetes mellitus  History of noncompliance with follow-up care, medications and dietary restrictions  CAD with prior stenting back in the [place] in 2011 maintained on aspirin and Plavix since then  Hypertension  Active smoker smokes 1 pack/day at least  Dyslipidemia     Other Diagnosis:  Past Medical History:   Diagnosis Date     CAD (coronary artery disease) 2/18/2015    S/p 3 stents in 2011 In [place]     Closed fracture of multiple ribs of left side with routine healing 7/10/2018     Coronary artery disease      Coronary artery disease involving native coronary artery of native heart without angina pectoris 7/3/2018    S/p 3 stents put in [place] in 2011.     Depressive disorder      Diabetes (H)      Hypertension      Severe episode of recurrent major depressive disorder, without psychotic features (H) 7/3/2018     Type 2 diabetes mellitus with other circulatory complication, with long-term current use of insulin (H) 7/3/2018          Discharge Disposition:  Transfer to [facility] for vascular evaluation prior to possible amputation of the first digit with distal dry gangrene    Allergies:  No Known Allergies     Discharge Medications:   Current Discharge Medication List      CONTINUE these medications which have NOT CHANGED    Details   amLODIPine-valsartan (EXFORGE)  MG tablet Take 1 tablet by mouth daily      aspirin 81 MG tablet Take 81 mg by mouth daily       bisoprolol (ZEBETA) 5 MG tablet Take 5 mg by mouth daily      busPIRone (BUSPAR) 10 MG tablet Take 10 mg by  mouth 4 times daily      clomiPRAMINE (ANAFRANIL) 50 MG capsule Take 100 mg by mouth 2 times daily      clopidogrel (PLAVIX) 75 MG tablet Take 1 tablet (75 mg) by mouth daily  Qty: 90 tablet, Refills: 3    Associated Diagnoses: Coronary artery disease involving native coronary artery of native heart without angina pectoris      divalproex sodium delayed-release (DEPAKOTE) 500 MG DR tablet Take 1,000 mg by mouth 2 times daily       FLUoxetine (PROZAC) 20 MG capsule Take 20 mg by mouth daily      glimepiride (AMARYL) 2 MG tablet Take 2 mg by mouth every morning (before breakfast)      hydrochlorothiazide (HYDRODIURIL) 12.5 MG tablet Take 12.5 mg by mouth daily      insulin aspart prot & aspart (NOVOLOG MIX 70/30 PEN) injection 28 units in AM, and 38 units in PM  Qty: 9 mL, Refills: 3    Associated Diagnoses: Type 2 diabetes mellitus with other circulatory complication, with long-term current use of insulin (H)      isosorbide Dinitrate (ISORDIL) 40 MG TABS Take 1 tablet (40 mg) by mouth 2 times daily  Qty: 180 tablet, Refills: 3    Associated Diagnoses: Coronary artery disease involving native coronary artery of native heart without angina pectoris      metFORMIN (GLUCOPHAGE) 850 MG tablet Take 1 tablet (850 mg) by mouth 2 times daily (with meals) (Need A1c for further refills)  Qty: 60 tablet, Refills: 0    Associated Diagnoses: Type 2 diabetes mellitus with other circulatory complication, with long-term current use of insulin (H)      QUEtiapine (SEROQUEL) 300 MG tablet Take 600 mg by mouth At Bedtime      rosuvastatin (CRESTOR) 20 MG tablet Take 1 tablet (20 mg) by mouth daily  Qty: 90 tablet, Refills: 3    Associated Diagnoses: Coronary artery disease involving native coronary artery of native heart without angina pectoris              Condition on Discharge:  Discharge condition: Stable   Discharge vitals: Blood pressure 105/62, pulse 74, temperature 96.9  F (36.1  C), temperature source Oral, resp. rate 16,  "height 1.778 m (5' 10\"), SpO2 94 %.   Code status on discharge: Full Code     History of Present Illness:  See detailed admission note for full details.        Significant Physical Exam Findings Day of Discharge:  HEENT; Atraumatic, normocephalic, pinkish conjuctiva, pupils bilateral reactive   Skin: warm and moist, no rashes    Lungs: equal chest expansion, clear to auscultation, no wheezes, no stridor, no crackles,   Heart: normal rate, normal rhythm, no rubs or gallops.   Abdomen: normal bowel sounds, no tenderness, no peritoneal signs, no guarding  Extremities: no deformities, no edema, nonhealing ulcer, non-warm to touch, no discharge, dry gangrenous appearing first digit right foot  Neuro; follow commands, alert and oriented x3, spontaneous speech, coherent, moves all extremities spontaneously  Psych; no hallucination, euthymic mood, not agitated        Procedures other than Imaging:  None     Imaging:  Recent Results (from the past 48 hour(s))   XR Chest 2 Views    Narrative    CHEST TWO VIEWS    2020 10:56 AM     HISTORY: Chest pain    COMPARISON: None.      Impression    IMPRESSION: No acute cardiopulmonary disease.    ENEDELIA MUSE MD   XR Foot Right G/E 3 Views    Narrative    FOOT LEFT THREE OR MORE VIEWS   2020 10:56 AM     HISTORY: Necrotic foot. Worsening necrotic wound to his right great  toe with accompanied numbness.      Impression    IMPRESSION: There may well be some ulceration of the great toe. On the  lateral view, there appears to be dorsal soft tissue gas. No focal  bone destruction.    NEGRO RICE MD   Echocardiogram Complete    Narrative    [id number]  [id number]  [id number]  [id number]^ROCHELLE^AL^[facility]  Echocardiography Laboratory  [address] [place] [address]        Name: MORALES SHERMAN  MRN: [id number]  : 1960  Study Date: 2020 01:18 PM  Age: 60 yrs  Gender: Male  Patient Location: [facility]  Reason For Study: Chest " Pain  Ordering Physician: ISAIAH BYRD  Performed By: Mariola Lockhart RDCS     BSA: 1.9 m2  Height: 68 in  Weight: 169 lb  BP: 107/65 mmHg  _____________________________________________________________________________  __        Procedure  Complete Portable Echo Adult.  _____________________________________________________________________________  __        Interpretation Summary     No regional wall motion abnormalities noted.  The pericardium appears normal.  Normal size aorta  The left ventricle is normal in structure, function and size.  The visual ejection fraction is estimated at 55-60%.  The right ventricle is normal in structure, function and size.  Doppler interrogation does not demonstrate significant stenosis or  insufficieny involving cardiac valves.     No old studies avaiavblle for comparison.  _____________________________________________________________________________  __        Left Ventricle  The left ventricle is normal in structure, function and size. The visual  ejection fraction is estimated at 55-60%. Left ventricular diastolic function  is normal. No regional wall motion abnormalities noted. There is no thrombus  seen in the left ventricle.     Right Ventricle  The right ventricle is normal in structure, function and size. There is no  mass or thrombus in the right ventricle.     Atria  Normal left atrial size. Right atrial size is normal. There is no atrial shunt  seen. The left atrial appendage is not well visualized.        Mitral Valve  The mitral valve leaflets appear normal. There is no evidence of stenosis,  fluttering, or prolapse. There is no mitral regurgitation noted. There is no  mitral valve stenosis.     Tricuspid Valve  Normal tricuspid valve. The right ventricular systolic pressure is  approximated at 12.2 mmHg plus the right atrial pressure. Right ventricle  systolic pressure estimate normal. There is mild (1+) tricuspid regurgitation.  There is no tricuspid stenosis.      Aortic Valve  The aortic valve is trileaflet. No aortic regurgitation is present. No aortic  stenosis is present.     Pulmonic Valve  Normal pulmonic valve. There is no pulmonic valvular regurgitation. There is  no pulmonic valvular stenosis.     Vessels  Normal size aorta. Normal size ascending aorta. The inferior vena cava is  normal. The pulmonary artery is normal size.     Pericardium  The pericardium appears normal. There is no pleural effusion.        Rhythm  Sinus rhythm was noted.  _____________________________________________________________________________  __     MMode/2D Measurements & Calculations  IVSd: 1.3 cm  LVIDd: 4.3 cm  LVIDs: 2.7 cm  LVPWd: 1.1 cm  FS: 36.5 %  LV mass(C)d: 176.6 grams  LV mass(C)dI: 92.8 grams/m2  Ao root diam: 3.7 cm  LA dimension: 3.9 cm  asc Aorta Diam: 3.7 cm  LA/Ao: 1.1  LVOT diam: 2.3 cm  LVOT area: 4.3 cm2     EF(MOD-bp): 54.8 %  LA Volume (BP): 42.0 ml     LA Volume Index (BP): 22.1 ml/m2  RWT: 0.49        Doppler Measurements & Calculations  MV E max watson: 69.2 cm/sec  MV A max watson: 70.6 cm/sec  MV E/A: 0.98  MV dec time: 0.22 sec  Ao V2 max: 104.7 cm/sec  Ao max P.0 mmHg  TR max watson: 174.4 cm/sec  TR max P.2 mmHg  E/E' av.9  Lateral E/e': 7.6  Medial E/e': 8.1              _____________________________________________________________________________  __        Report approved by: Dr. Thaddeus Dunlap 2020 02:31 PM      US REYMUNDO Doppler No Exercise    Narrative    PRELIMINARY REPORT    US REYMUNDO NO EXERCISE, 1-2 LEVELS, BILAT   3/1/2020 2:16 AM     INDICATION: Foot wound. Nonhealing ulcer.    COMPARISON: None    FINDINGS: Blood flow to both feet with biphasic and monophasic waveforms. ABIs of 1.30 on the right and 1.26 on the left.    Final report to follow.    Preliminary Interpretation Dictated By: Dennis Navarro MD  Date: 3/1/2020      Pending MRI of the right foot    Consultations:  Consultation during this admission received from podiatry  service.     Recent Lab Results:  Recent Labs   Lab 02/29/20  1029   WBC 11.0   HGB 13.4   HCT 42.9   MCV 94        Recent Labs   Lab 02/29/20  1104   CULT PENDING     Recent Labs   Lab 02/29/20  1029      POTASSIUM 4.1   CHLORIDE 104   CO2 26   ANIONGAP 5   *   BUN 24   CR 0.94   GFRESTIMATED 89   GFRESTBLACK >90   MYRON 9.1     Recent Labs   Lab 03/01/20  0822 03/01/20  0157 02/29/20  2139 02/29/20  1727 02/29/20  1306 02/29/20  1029   GLC  --   --   --   --   --  244*   * 190* 249* 203* 209*  --      Recent Labs   Lab 02/29/20  1029   LACT 1.9     Recent Labs   Lab 03/01/20  0011 02/29/20  1739 02/29/20  1029   TROPI <0.015 <0.015 <0.015     Recent Labs   Lab 02/29/20  1104   COLOR Light Yellow   APPEARANCE Clear   URINEGLC Negative   URINEBILI Negative   URINEKETONE Negative   SG 1.013   UBLD Negative   URINEPH 7.0   PROTEIN Negative   NITRITE Negative   LEUKEST Negative   RBCU 2   WBCU 1          Pending Results:    Unresulted Labs Ordered in the Past 30 Days of this Admission     Date and Time Order Name Status Description    2/29/2020 1022 Urine Culture Preliminary            Discharge Instructions and Follow-Up:   Discharge diet: Orders Placed This Encounter      Moderate Consistent CHO Diet     Discharge activity: Activity as tolerated   Discharge follow-up:  Transfer to [facility]   Outpatient therapy: None    Other instructions: None      Hospital Course:  Continuing care today.  No significant reported events overnight.  Highly appreciate input from podiatry service with recommendations in place.  Contemplating for right first toe/digit amputation but needs to have a formal vascular surgery evaluation to assess regarding healing potential.  He was ruled out for ACS as earlier he mentioned about intermittent episodes of chest discomfort.  EKG was reassuring.  Serial troponin I were negative.  I held his aspirin and Plavix for now until gets to be seen by surgical  services.  Stable hemodynamics, remained afebrile, normal CBC, normal electrolytes and kidney function.  Plans for [facility] his peripheral vascular surgery evaluation.  Case was presented to hospitalist service and was graciously accepted for transfer.  Patient is amenable/agreeable for this diagnostics and planned intervention.     Total time spent in face to face contact with the patient and coordinating discharge was:  > 30 Minutes.

## 2020-03-01 NOTE — CONSULTS
[facility]    Cardiology Consultation     Date of Admission:  3/1/2020    Assessment & Plan   Huseyin Pettit is a 60 year old male who was admitted on 3/1/2020. I was asked to see the patient for Chest pain.    Chest pain  Coronary artery disease with history of PCI x3  Right great toe gangrene with osteomyelitis  HTN  DMII  Tobacco use    -Certainly the patient's chest pain is concerning, and he has many risk factors. It does sound like typical angina, though was not relieved with nitroglycerin and troponins have thus far been negative.  What makes the situation a little more difficult is his need for possible surgery for his gangrenous toe with osteomyelitis.  This will require coordination between us and vascular surgery.  Currently the patient's Plavix is being held because of need for surgery.  However if the patient needs to go for coronary angiogram and recieves stents he would need to be on dual antiplatelet surgery therapy uninterrupted.  -Because of his chest pain today, we will repeat troponins again ECG now. If Troponin elevation would give full dose asa and start heparin gtt. Otherwise can continue to monitor.   - Bps soft, continue Imudur and bisoprolol as tolerated.   -Keep Patient NPO after MN for possible cath tomorrow if ok with vascular surgery. Alternatively we could do a dobutamine echocardiogram for risk stratification first. Will ensure troponins today remain negative.        Julian Khan MD    Code Status    Full Code    Primary Care Physician   Hunter Carlton    History of Present Illness   Huseyin Pettit is a 60 year old male who presents with gangrene of the right great toe. He has also been found to have anginal symptoms and therefore cardiology was consulted.     The patient has a past medical history of CAD status post PCI in [place] in 2011 and 2016 with reported 3 stents placed, his anatomy is unknown.  He also has a past medical history of hypertension, type 2 diabetes,  depression and is an active smoker.    The patient reports that over the last 3 weeks he had had worsening of his chest pain syndrome.  He notes this is a substernal chest pain/pressure with some radiation to the right side is and is associated with shortness of breath.  It comes on with exertion and relieved with rest.  Of note he got nitroglycerin for it yesterday and it did not relieve the pain.  That same pain is now coming intermittently with no activity.  Troponins yesterday were negative.  The patient had an echocardiogram which showed no regional wall motion abnormalities, EF of 55 to 60%. ECG yesterday showed NSR without any ST changes.     Today he reports his typical chest pain. Of note it did come after eating, however this is not always the case. He is satting well on Room air. HR normal, BP has been soft currently 90s/60s.      Past Medical History   I have reviewed this patient's medical history and updated it with pertinent information if needed.   Past Medical History:   Diagnosis Date     CAD (coronary artery disease) 2/18/2015    S/p 3 stents in 2011 In [place]     Closed fracture of multiple ribs of left side with routine healing 7/10/2018     Coronary artery disease      Coronary artery disease involving native coronary artery of native heart without angina pectoris 7/3/2018    S/p 3 stents put in [place] in 2011.     Depressive disorder      Diabetes (H)      Hypertension      Severe episode of recurrent major depressive disorder, without psychotic features (H) 7/3/2018     Type 2 diabetes mellitus with other circulatory complication, with long-term current use of insulin (H) 7/3/2018       Past Surgical History   I have reviewed this patient's surgical history and updated it with pertinent information if needed.  Past Surgical History:   Procedure Laterality Date     CHOLECYSTECTOMY       GALLBLADDER SURGERY  2011       Prior to Admission Medications   Prior to Admission Medications    Prescriptions Last Dose Informant Patient Reported? Taking?   FLUoxetine (PROZAC) 20 MG capsule   Yes Yes   Sig: Take 20 mg by mouth daily   QUEtiapine (SEROQUEL) 300 MG tablet   Yes Yes   Sig: Take 600 mg by mouth At Bedtime   amLODIPine-valsartan (EXFORGE)  MG tablet   Yes Yes   Sig: Take 1 tablet by mouth daily   aspirin 81 MG tablet   Yes Yes   Sig: Take 81 mg by mouth daily    bisoprolol (ZEBETA) 5 MG tablet   Yes Yes   Sig: Take 5 mg by mouth daily   busPIRone (BUSPAR) 10 MG tablet   Yes Yes   Sig: Take 10 mg by mouth 4 times daily   clomiPRAMINE (ANAFRANIL) 50 MG capsule   Yes Yes   Sig: Take 100 mg by mouth 2 times daily   clopidogrel (PLAVIX) 75 MG tablet   No Yes   Sig: Take 1 tablet (75 mg) by mouth daily   divalproex sodium delayed-release (DEPAKOTE) 500 MG DR tablet   Yes Yes   Sig: Take 1,000 mg by mouth 2 times daily    glimepiride (AMARYL) 2 MG tablet   Yes Yes   Sig: Take 2 mg by mouth every morning (before breakfast)   hydrochlorothiazide (HYDRODIURIL) 12.5 MG tablet   Yes Yes   Sig: Take 12.5 mg by mouth daily   insulin aspart prot & aspart (NOVOLOG MIX 70/30 PEN) injection   No Yes   Si units in AM, and 38 units in PM   isosorbide Dinitrate (ISORDIL) 40 MG TABS   No Yes   Sig: Take 1 tablet (40 mg) by mouth 2 times daily   metFORMIN (GLUCOPHAGE) 850 MG tablet   No Yes   Sig: Take 1 tablet (850 mg) by mouth 2 times daily (with meals) (Need A1c for further refills)   rosuvastatin (CRESTOR) 20 MG tablet   No Yes   Sig: Take 1 tablet (20 mg) by mouth daily      Facility-Administered Medications: None     Allergies   No Known Allergies    Social History   I have reviewed this patient's social history and updated it with pertinent information if needed. Huseyin Pettit  reports that he has been smoking. He has a 12.50 pack-year smoking history. He has never used smokeless tobacco. He reports that he does not drink alcohol or use drugs.    Family History   I have reviewed this patient's family  history and updated it with pertinent information if needed.   No family history on file.    Review of Systems   The 10 point Review of Systems is negative other than noted in the HPI.    Physical Exam   Temp: 98.1  F (36.7  C) Temp src: Oral BP: (!) 86/56   Heart Rate: 94 Resp: 18 SpO2: 95 % O2 Device: None (Room air)    Vital Signs with Ranges  Temp:  [95.9  F (35.5  C)-99.2  F (37.3  C)] 98.1  F (36.7  C)  Heart Rate:  [71-98] 94  Resp:  [16-18] 18  BP: ()/(47-65) 86/56  SpO2:  [94 %-97 %] 95 %  0 lbs 0 oz    Constitutional: Awake, alert, cooperative, no apparent distress.  Eyes: Conjunctiva and pupils examined and normal.  HEENT: Moist mucous membranes, normal dentition.  Respiratory: Clear to auscultation bilaterally, no crackles or wheezing.  Cardiovascular: Regular rate and rhythm, normal S1 and S2, and no murmur noted.  GI: Soft, non-distended, non-tender, normal bowel sounds.  Lymph/Hematologic: No anterior cervical or supraclavicular adenopathy.  Skin: Ganganous right big toe, mild swelling around it. No significant edema.   Musculoskeletal: No joint swelling, erythema or tenderness.  Neurologic: Cranial nerves 2-12 intact, normal strength and sensation.  Psychiatric: Alert, oriented to person, place and time, no obvious anxiety or depression.     Data   Results for orders placed or performed during the hospital encounter of 02/29/20 (from the past 24 hour(s))   Glucose by meter   Result Value Ref Range    Glucose 203 (H) 70 - 99 mg/dL   Troponin I   Result Value Ref Range    Troponin I ES <0.015 0.000 - 0.045 ug/L   Glucose by meter   Result Value Ref Range    Glucose 249 (H) 70 - 99 mg/dL   Troponin I   Result Value Ref Range    Troponin I ES <0.015 0.000 - 0.045 ug/L   Glucose by meter   Result Value Ref Range    Glucose 190 (H) 70 - 99 mg/dL   US REYMUNDO Doppler No Exercise    Narrative    PRELIMINARY REPORT    US REYMUNDO NO EXERCISE, 1-2 LEVELS, BILAT   3/1/2020 2:16 AM     INDICATION: Foot wound.  Nonhealing ulcer.    COMPARISON: None    FINDINGS: Blood flow to both feet with biphasic and monophasic waveforms. ABIs of 1.30 on the right and 1.26 on the left.    Final report to follow.    Preliminary Interpretation Dictated By: Dennis Navarro MD  Date: 3/1/2020   Hemoglobin A1c   Result Value Ref Range    Hemoglobin A1C 7.2 (H) 0 - 5.6 %   Erythrocyte sedimentation rate auto   Result Value Ref Range    Sed Rate 27 (H) 0 - 20 mm/h   CRP inflammation   Result Value Ref Range    CRP Inflammation 8.2 (H) 0.0 - 8.0 mg/L   Glucose by meter   Result Value Ref Range    Glucose 202 (H) 70 - 99 mg/dL   MR Foot Right w/o & w Contrast    Narrative    MR right foot without and with contrast 3/1/2020 9:45 AM    History: assess for bone infection great toe    Techniques: Multiplanar multisequence imaging of the right foot was  obtained before and after administration of intravenous contrast.    Comparison: Radiographs 2/29/2020    Findings:    Motion partially compromising assessment.    Bones    Extensive edema-like marrow signal intensity and associated  enhancement of the entire first distal phalanx. There is apparent soft  tissue defect at the tip of the great toe with underlying T1  hypointensity involving the first distal phalangeal tuft. Overall  findings most consistent with osteomyelitis with wider area of  reactive osteitis especially if clinically confirmed soft tissue  defect is present in this area. No associated drainable fluid  collection.    Mild T1 hypointensity extends all way down to the base of first distal  phalanx, possible early osteomyelitis extension.    There is also small focal area of subchondral edema at the dorsal  aspect of the first proximal phalangeal head without associated joint  effusion, presumably degenerative given lack of finding to suggest  associated septic arthritis extension.    Joints and periarticular soft tissue    Joint effusion: Physiologic amount of joint fluid are  present.    Plantar plates: Intersesamoidal ligament and sesamoidal phalangeal  ligaments of the first metatarsophalangeal joints are grossly intact.  Plantar plates of the second through fifth toe at metatarsophalangeal  joints are grossly intact.    Intermetatarsal spaces: No interdigital neuroma. No intermetatarsal  bursitis.    Ligaments and Tendons    Lisfranc interosseous ligament: The visualized portion is intact.    Tendons: The visualized courses of flexor and extensor tendons are  intact.     Muscles    Muscle edema involving the abductor hallucis, nonspecific may be  related to muscle strain.     ANCILLARY FINDINGS    Diffuse soft tissue swelling, edema and enhancement of the great toe,  consistent with cellulitis.    Areas of plantar soft tissue but effacement particularly at the fifth  metatarsophalangeal joint and fourth metatarsophalangeal joint, likely  pressure callus.      Impression    Impression:  Motion compromising assessment.  1. Assuming clinically confirmed open/deep soft tissue defect presence  at the tip of great toe, osteomyelitis of first distal phalangeal tuft  until proven otherwise with wider area of reactive osteitis involving  entire distal phalanx. Possible early osteomyelitis extension to the  base cannot be excluded.   2 Small focal area of subchondral edema at the dorsal aspect of the  first proximal phalangeal head without associated joint effusion,  presumably degenerative given lack of finding to suggest associated  septic arthritis extension.  3. Great toe cellulitis without drainable fluid collection.    LIOR MCCARTNEY

## 2020-03-01 NOTE — PLAN OF CARE
A&OX4. LS expiratory wheezing. Oxygen 95% on room air. Mod carb diet. Up independent in room. Right great big toe to the  Right leg is necrotic and black, no drainage noted. Oxycodone and tylenol for pain control. Tele SR. NPO after Mid night. Plan is Podiatry consult. Possible surgical procedure to the right great big toe.   and 249. Continue POC.

## 2020-03-01 NOTE — PLAN OF CARE
Pt arrived via wheelchair from [facility]. Pt settled in , VSS, B/P soft. RA. Rates pain at a 5 on a 0/10 pain scale. Pt ordered lunch and family at bedside. Call light at hand.

## 2020-03-01 NOTE — PLAN OF CARE
A&Ox4, up ad aman  LDA: PIV SL  Vitals: stable, RA  Pain: R great toe, PRN oxy & tylenol    Tele: SR  Skin: R great toe black, N&T to feet  REYMUNDO doppler compelted  Plan: NPO for podiatry consult, MRI later this am  Will continue to monitor

## 2020-03-01 NOTE — PROGRESS NOTES
"This writer was notified by Donis ZELAYA pt's BP is 77/47. This writer assessed patient. Pt stated \"my blood pressure has been this low since they started me on my medication last year.\" Pt is aymptomatic.   "

## 2020-03-02 ENCOUNTER — APPOINTMENT (OUTPATIENT)
Dept: ULTRASOUND IMAGING | Facility: CLINIC | Age: 60
End: 2020-03-02
Attending: INTERNAL MEDICINE
Payer: COMMERCIAL

## 2020-03-02 PROBLEM — I20.0 UNSTABLE ANGINA (H): Status: ACTIVE | Noted: 2020-03-01

## 2020-03-02 LAB
ANION GAP SERPL CALCULATED.3IONS-SCNC: 5 MMOL/L (ref 3–14)
BUN SERPL-MCNC: 25 MG/DL (ref 7–30)
CALCIUM SERPL-MCNC: 8.5 MG/DL (ref 8.5–10.1)
CHLORIDE SERPL-SCNC: 113 MMOL/L (ref 94–109)
CHOLEST SERPL-MCNC: 123 MG/DL
CO2 SERPL-SCNC: 23 MMOL/L (ref 20–32)
CREAT SERPL-MCNC: 0.96 MG/DL (ref 0.66–1.25)
ERYTHROCYTE [DISTWIDTH] IN BLOOD BY AUTOMATED COUNT: 14.3 % (ref 10–15)
GFR SERPL CREATININE-BSD FRML MDRD: 86 ML/MIN/{1.73_M2}
GLUCOSE BLDC GLUCOMTR-MCNC: 104 MG/DL (ref 70–99)
GLUCOSE BLDC GLUCOMTR-MCNC: 126 MG/DL (ref 70–99)
GLUCOSE BLDC GLUCOMTR-MCNC: 164 MG/DL (ref 70–99)
GLUCOSE BLDC GLUCOMTR-MCNC: 197 MG/DL (ref 70–99)
GLUCOSE BLDC GLUCOMTR-MCNC: 223 MG/DL (ref 70–99)
GLUCOSE BLDC GLUCOMTR-MCNC: 67 MG/DL (ref 70–99)
GLUCOSE BLDC GLUCOMTR-MCNC: 99 MG/DL (ref 70–99)
GLUCOSE SERPL-MCNC: 144 MG/DL (ref 70–99)
HCT VFR BLD AUTO: 37.1 % (ref 40–53)
HDLC SERPL-MCNC: 25 MG/DL
HGB BLD-MCNC: 12 G/DL (ref 13.3–17.7)
LACTATE BLD-SCNC: 1.2 MMOL/L (ref 0.7–2)
LDLC SERPL CALC-MCNC: 58 MG/DL
MCH RBC QN AUTO: 29.3 PG (ref 26.5–33)
MCHC RBC AUTO-ENTMCNC: 32.3 G/DL (ref 31.5–36.5)
MCV RBC AUTO: 91 FL (ref 78–100)
NONHDLC SERPL-MCNC: 98 MG/DL
PLATELET # BLD AUTO: 249 10E9/L (ref 150–450)
POTASSIUM SERPL-SCNC: 4.3 MMOL/L (ref 3.4–5.3)
RBC # BLD AUTO: 4.09 10E12/L (ref 4.4–5.9)
SODIUM SERPL-SCNC: 141 MMOL/L (ref 133–144)
TRIGL SERPL-MCNC: 202 MG/DL
TROPONIN I SERPL-MCNC: <0.015 UG/L (ref 0–0.04)
WBC # BLD AUTO: 10.1 10E9/L (ref 4–11)

## 2020-03-02 PROCEDURE — 80061 LIPID PANEL: CPT | Performed by: HOSPITALIST

## 2020-03-02 PROCEDURE — 21000001 ZZH R&B HEART CARE

## 2020-03-02 PROCEDURE — 25000132 ZZH RX MED GY IP 250 OP 250 PS 637: Performed by: HOSPITALIST

## 2020-03-02 PROCEDURE — 25000125 ZZHC RX 250: Performed by: INTERNAL MEDICINE

## 2020-03-02 PROCEDURE — 99233 SBSQ HOSP IP/OBS HIGH 50: CPT | Mod: 25 | Performed by: INTERNAL MEDICINE

## 2020-03-02 PROCEDURE — 25000128 H RX IP 250 OP 636: Performed by: HOSPITALIST

## 2020-03-02 PROCEDURE — 99233 SBSQ HOSP IP/OBS HIGH 50: CPT | Performed by: HOSPITALIST

## 2020-03-02 PROCEDURE — 84484 ASSAY OF TROPONIN QUANT: CPT

## 2020-03-02 PROCEDURE — 99153 MOD SED SAME PHYS/QHP EA: CPT | Performed by: INTERNAL MEDICINE

## 2020-03-02 PROCEDURE — 25000128 H RX IP 250 OP 636: Performed by: INTERNAL MEDICINE

## 2020-03-02 PROCEDURE — 99152 MOD SED SAME PHYS/QHP 5/>YRS: CPT | Performed by: INTERNAL MEDICINE

## 2020-03-02 PROCEDURE — 80048 BASIC METABOLIC PNL TOTAL CA: CPT | Performed by: HOSPITALIST

## 2020-03-02 PROCEDURE — C1887 CATHETER, GUIDING: HCPCS | Performed by: INTERNAL MEDICINE

## 2020-03-02 PROCEDURE — 83605 ASSAY OF LACTIC ACID: CPT | Performed by: HOSPITALIST

## 2020-03-02 PROCEDURE — 93454 CORONARY ARTERY ANGIO S&I: CPT | Performed by: INTERNAL MEDICINE

## 2020-03-02 PROCEDURE — 93922 UPR/L XTREMITY ART 2 LEVELS: CPT

## 2020-03-02 PROCEDURE — C1769 GUIDE WIRE: HCPCS | Performed by: INTERNAL MEDICINE

## 2020-03-02 PROCEDURE — 99222 1ST HOSP IP/OBS MODERATE 55: CPT | Performed by: SURGERY

## 2020-03-02 PROCEDURE — 99232 SBSQ HOSP IP/OBS MODERATE 35: CPT | Mod: 57 | Performed by: PODIATRIST

## 2020-03-02 PROCEDURE — B2111ZZ FLUOROSCOPY OF MULTIPLE CORONARY ARTERIES USING LOW OSMOLAR CONTRAST: ICD-10-PCS | Performed by: INTERNAL MEDICINE

## 2020-03-02 PROCEDURE — 25800030 ZZH RX IP 258 OP 636: Performed by: HOSPITALIST

## 2020-03-02 PROCEDURE — 25000125 ZZHC RX 250: Performed by: HOSPITALIST

## 2020-03-02 PROCEDURE — 85027 COMPLETE CBC AUTOMATED: CPT | Performed by: HOSPITALIST

## 2020-03-02 PROCEDURE — 27210794 ZZH OR GENERAL SUPPLY STERILE: Performed by: INTERNAL MEDICINE

## 2020-03-02 PROCEDURE — 36415 COLL VENOUS BLD VENIPUNCTURE: CPT | Performed by: HOSPITALIST

## 2020-03-02 PROCEDURE — C1894 INTRO/SHEATH, NON-LASER: HCPCS | Performed by: INTERNAL MEDICINE

## 2020-03-02 PROCEDURE — 36415 COLL VENOUS BLD VENIPUNCTURE: CPT

## 2020-03-02 PROCEDURE — 00000146 ZZHCL STATISTIC GLUCOSE BY METER IP

## 2020-03-02 RX ORDER — DOBUTAMINE HYDROCHLORIDE 200 MG/100ML
2-20 INJECTION INTRAVENOUS CONTINUOUS PRN
Status: DISCONTINUED | OUTPATIENT
Start: 2020-03-02 | End: 2020-03-02 | Stop reason: HOSPADM

## 2020-03-02 RX ORDER — EPTIFIBATIDE 2 MG/ML
180 INJECTION, SOLUTION INTRAVENOUS EVERY 10 MIN PRN
Status: DISCONTINUED | OUTPATIENT
Start: 2020-03-02 | End: 2020-03-02 | Stop reason: HOSPADM

## 2020-03-02 RX ORDER — FENTANYL CITRATE 50 UG/ML
INJECTION, SOLUTION INTRAMUSCULAR; INTRAVENOUS
Status: DISCONTINUED | OUTPATIENT
Start: 2020-03-02 | End: 2020-03-02 | Stop reason: HOSPADM

## 2020-03-02 RX ORDER — NITROGLYCERIN 20 MG/100ML
.07-2 INJECTION INTRAVENOUS CONTINUOUS PRN
Status: DISCONTINUED | OUTPATIENT
Start: 2020-03-02 | End: 2020-03-02 | Stop reason: HOSPADM

## 2020-03-02 RX ORDER — EPTIFIBATIDE 2 MG/ML
2 INJECTION, SOLUTION INTRAVENOUS CONTINUOUS PRN
Status: DISCONTINUED | OUTPATIENT
Start: 2020-03-02 | End: 2020-03-02 | Stop reason: HOSPADM

## 2020-03-02 RX ORDER — DOPAMINE HYDROCHLORIDE 160 MG/100ML
2-20 INJECTION, SOLUTION INTRAVENOUS CONTINUOUS PRN
Status: DISCONTINUED | OUTPATIENT
Start: 2020-03-02 | End: 2020-03-02 | Stop reason: HOSPADM

## 2020-03-02 RX ORDER — NITROGLYCERIN 5 MG/ML
VIAL (ML) INTRAVENOUS
Status: DISCONTINUED | OUTPATIENT
Start: 2020-03-02 | End: 2020-03-02 | Stop reason: HOSPADM

## 2020-03-02 RX ORDER — SODIUM CHLORIDE 9 MG/ML
INJECTION, SOLUTION INTRAVENOUS CONTINUOUS
Status: DISCONTINUED | OUTPATIENT
Start: 2020-03-02 | End: 2020-03-02 | Stop reason: CLARIF

## 2020-03-02 RX ORDER — IOPAMIDOL 755 MG/ML
INJECTION, SOLUTION INTRAVASCULAR
Status: DISCONTINUED | OUTPATIENT
Start: 2020-03-02 | End: 2020-03-02 | Stop reason: HOSPADM

## 2020-03-02 RX ORDER — LORAZEPAM 0.5 MG/1
0.5 TABLET ORAL
Status: DISCONTINUED | OUTPATIENT
Start: 2020-03-02 | End: 2020-03-02

## 2020-03-02 RX ORDER — NALOXONE HYDROCHLORIDE 0.4 MG/ML
.1-.4 INJECTION, SOLUTION INTRAMUSCULAR; INTRAVENOUS; SUBCUTANEOUS
Status: DISCONTINUED | OUTPATIENT
Start: 2020-03-02 | End: 2020-03-02

## 2020-03-02 RX ORDER — ARGATROBAN 1 MG/ML
350 INJECTION, SOLUTION INTRAVENOUS
Status: DISCONTINUED | OUTPATIENT
Start: 2020-03-02 | End: 2020-03-02 | Stop reason: HOSPADM

## 2020-03-02 RX ORDER — LORAZEPAM 2 MG/ML
0.5 INJECTION INTRAMUSCULAR
Status: DISCONTINUED | OUTPATIENT
Start: 2020-03-02 | End: 2020-03-02

## 2020-03-02 RX ORDER — LIDOCAINE 40 MG/G
CREAM TOPICAL
Status: DISCONTINUED | OUTPATIENT
Start: 2020-03-02 | End: 2020-03-02

## 2020-03-02 RX ORDER — POTASSIUM CHLORIDE 1500 MG/1
20 TABLET, EXTENDED RELEASE ORAL
Status: DISCONTINUED | OUTPATIENT
Start: 2020-03-02 | End: 2020-03-02

## 2020-03-02 RX ORDER — ARGATROBAN 1 MG/ML
150 INJECTION, SOLUTION INTRAVENOUS
Status: DISCONTINUED | OUTPATIENT
Start: 2020-03-02 | End: 2020-03-02 | Stop reason: HOSPADM

## 2020-03-02 RX ORDER — VERAPAMIL HYDROCHLORIDE 2.5 MG/ML
INJECTION, SOLUTION INTRAVENOUS
Status: DISCONTINUED | OUTPATIENT
Start: 2020-03-02 | End: 2020-03-02 | Stop reason: HOSPADM

## 2020-03-02 RX ORDER — HEPARIN SODIUM 10000 [USP'U]/100ML
100-1000 INJECTION, SOLUTION INTRAVENOUS CONTINUOUS PRN
Status: DISCONTINUED | OUTPATIENT
Start: 2020-03-02 | End: 2020-03-02 | Stop reason: HOSPADM

## 2020-03-02 RX ORDER — FLUMAZENIL 0.1 MG/ML
0.2 INJECTION, SOLUTION INTRAVENOUS
Status: ACTIVE | OUTPATIENT
Start: 2020-03-02 | End: 2020-03-03

## 2020-03-02 RX ORDER — FENTANYL CITRATE 50 UG/ML
25-50 INJECTION, SOLUTION INTRAMUSCULAR; INTRAVENOUS
Status: ACTIVE | OUTPATIENT
Start: 2020-03-02 | End: 2020-03-03

## 2020-03-02 RX ORDER — HEPARIN SODIUM 1000 [USP'U]/ML
INJECTION, SOLUTION INTRAVENOUS; SUBCUTANEOUS
Status: DISCONTINUED | OUTPATIENT
Start: 2020-03-02 | End: 2020-03-02 | Stop reason: HOSPADM

## 2020-03-02 RX ORDER — ATROPINE SULFATE 0.1 MG/ML
0.5 INJECTION INTRAVENOUS EVERY 5 MIN PRN
Status: ACTIVE | OUTPATIENT
Start: 2020-03-02 | End: 2020-03-03

## 2020-03-02 RX ORDER — ACETAMINOPHEN 325 MG/1
650 TABLET ORAL EVERY 4 HOURS PRN
Status: DISCONTINUED | OUTPATIENT
Start: 2020-03-02 | End: 2020-03-03

## 2020-03-02 RX ORDER — NALOXONE HYDROCHLORIDE 0.4 MG/ML
.2-.4 INJECTION, SOLUTION INTRAMUSCULAR; INTRAVENOUS; SUBCUTANEOUS
Status: DISCONTINUED | OUTPATIENT
Start: 2020-03-02 | End: 2020-03-02

## 2020-03-02 RX ADMIN — CLOMIPRAMINE HYDROCHLORIDE 100 MG: 50 CAPSULE ORAL at 08:27

## 2020-03-02 RX ADMIN — ISOSORBIDE DINITRATE 40 MG: 20 TABLET ORAL at 08:26

## 2020-03-02 RX ADMIN — ISOSORBIDE DINITRATE 40 MG: 20 TABLET ORAL at 16:02

## 2020-03-02 RX ADMIN — PIPERACILLIN AND TAZOBACTAM 3.38 G: 3; .375 INJECTION, POWDER, FOR SOLUTION INTRAVENOUS at 09:38

## 2020-03-02 RX ADMIN — PIPERACILLIN AND TAZOBACTAM 3.38 G: 3; .375 INJECTION, POWDER, FOR SOLUTION INTRAVENOUS at 03:16

## 2020-03-02 RX ADMIN — DIVALPROEX SODIUM 1000 MG: 500 TABLET, DELAYED RELEASE ORAL at 20:21

## 2020-03-02 RX ADMIN — CLOMIPRAMINE HYDROCHLORIDE 100 MG: 50 CAPSULE ORAL at 20:21

## 2020-03-02 RX ADMIN — BUSPIRONE HYDROCHLORIDE 10 MG: 10 TABLET ORAL at 16:02

## 2020-03-02 RX ADMIN — FLUOXETINE 20 MG: 20 CAPSULE ORAL at 08:27

## 2020-03-02 RX ADMIN — OXYCODONE HYDROCHLORIDE AND ACETAMINOPHEN 2 TABLET: 5; 325 TABLET ORAL at 08:27

## 2020-03-02 RX ADMIN — DIVALPROEX SODIUM 1000 MG: 500 TABLET, DELAYED RELEASE ORAL at 08:26

## 2020-03-02 RX ADMIN — Medication 1 MG: at 03:24

## 2020-03-02 RX ADMIN — ASPIRIN 81 MG 81 MG: 81 TABLET ORAL at 08:27

## 2020-03-02 RX ADMIN — BUSPIRONE HYDROCHLORIDE 10 MG: 10 TABLET ORAL at 20:21

## 2020-03-02 RX ADMIN — PIPERACILLIN AND TAZOBACTAM 3.38 G: 3; .375 INJECTION, POWDER, FOR SOLUTION INTRAVENOUS at 16:01

## 2020-03-02 RX ADMIN — LIDOCAINE HYDROCHLORIDE 30 ML: 20 SOLUTION ORAL; TOPICAL at 17:22

## 2020-03-02 RX ADMIN — BUSPIRONE HYDROCHLORIDE 10 MG: 10 TABLET ORAL at 08:27

## 2020-03-02 RX ADMIN — PIPERACILLIN AND TAZOBACTAM 3.38 G: 3; .375 INJECTION, POWDER, FOR SOLUTION INTRAVENOUS at 21:48

## 2020-03-02 RX ADMIN — ROSUVASTATIN CALCIUM 20 MG: 20 TABLET, FILM COATED ORAL at 08:26

## 2020-03-02 RX ADMIN — NICOTINE 1 PATCH: 21 PATCH, EXTENDED RELEASE TRANSDERMAL at 08:26

## 2020-03-02 RX ADMIN — QUETIAPINE 600 MG: 300 TABLET, FILM COATED ORAL at 20:25

## 2020-03-02 RX ADMIN — SODIUM CHLORIDE: 9 INJECTION, SOLUTION INTRAVENOUS at 18:47

## 2020-03-02 RX ADMIN — Medication 1 MG: at 20:21

## 2020-03-02 ASSESSMENT — ACTIVITIES OF DAILY LIVING (ADL)
ADLS_ACUITY_SCORE: 12

## 2020-03-02 NOTE — CONSULTS
[facility]    Infectious Disease Consultation     Date of Admission:  3/1/2020  Date of Consult (When I saw the patient): 03/02/20    Assessment & Plan   Huseyin Pettit is a 60 year old male who was admitted on 3/1/2020.     Impression:  1. 60 y.o male with diabetes.   2. Neuropathy.   3. Tobacco dependence.   4. Admitted for [facility] for right great toe gangrene and osteo requiring vascular and podiatry work up.   5. Currently on zosyn.     Recommendations:   1. Continue on zosyn   2. Will follow up on the podiatry and vascular work up.       Jagruti Danielle MD    Reason for Consult   Reason for consult: I was asked to evaluate this patient for right great toe osteo     Primary Care Physician   Hunter Carlton    Chief Complaint   Right great toe wound     History is obtained from the patient and medical records    History of Present Illness   Huseyin Pettit is a 60 year old male with  CAD s/p PCI, HTN, hyperlipidemia, DM II with neuropathy, tobacco dependence and medication non-compliance who presented to [facility] with right great toe gangrene and osteomyelitis, transferred to [facility] evaluation prior to amputation.    Past Medical History   I have reviewed this patient's medical history and updated it with pertinent information if needed.   Past Medical History:   Diagnosis Date     CAD (coronary artery disease) 2/18/2015    S/p 3 stents in 2011 In [place]     Closed fracture of multiple ribs of left side with routine healing 7/10/2018     Coronary artery disease      Coronary artery disease involving native coronary artery of native heart without angina pectoris 7/3/2018    S/p 3 stents put in [place] in 2011.     Depressive disorder      Diabetes (H)      Hypertension      Severe episode of recurrent major depressive disorder, without psychotic features (H) 7/3/2018     Type 2 diabetes mellitus with other circulatory complication, with long-term current use of insulin (H)  7/3/2018       Past Surgical History   I have reviewed this patient's surgical history and updated it with pertinent information if needed.  Past Surgical History:   Procedure Laterality Date     CHOLECYSTECTOMY       GALLBLADDER SURGERY         Prior to Admission Medications   Prior to Admission Medications   Prescriptions Last Dose Informant Patient Reported? Taking?   FLUoxetine (PROZAC) 20 MG capsule   Yes Yes   Sig: Take 20 mg by mouth daily   QUEtiapine (SEROQUEL) 300 MG tablet   Yes Yes   Sig: Take 600 mg by mouth At Bedtime   amLODIPine-valsartan (EXFORGE)  MG tablet   Yes Yes   Sig: Take 1 tablet by mouth daily   aspirin 81 MG tablet   Yes Yes   Sig: Take 81 mg by mouth daily    bisoprolol (ZEBETA) 5 MG tablet   Yes Yes   Sig: Take 5 mg by mouth daily   busPIRone (BUSPAR) 10 MG tablet   Yes Yes   Sig: Take 10 mg by mouth 4 times daily   clomiPRAMINE (ANAFRANIL) 50 MG capsule   Yes Yes   Sig: Take 100 mg by mouth 2 times daily   clopidogrel (PLAVIX) 75 MG tablet   No Yes   Sig: Take 1 tablet (75 mg) by mouth daily   divalproex sodium delayed-release (DEPAKOTE) 500 MG DR tablet   Yes Yes   Sig: Take 1,000 mg by mouth 2 times daily    glimepiride (AMARYL) 2 MG tablet   Yes Yes   Sig: Take 2 mg by mouth every morning (before breakfast)   hydrochlorothiazide (HYDRODIURIL) 12.5 MG tablet   Yes Yes   Sig: Take 12.5 mg by mouth daily   insulin aspart prot & aspart (NOVOLOG MIX 70/30 PEN) injection   No Yes   Si units in AM, and 38 units in PM   isosorbide Dinitrate (ISORDIL) 40 MG TABS   No Yes   Sig: Take 1 tablet (40 mg) by mouth 2 times daily   metFORMIN (GLUCOPHAGE) 850 MG tablet   No Yes   Sig: Take 1 tablet (850 mg) by mouth 2 times daily (with meals) (Need A1c for further refills)   rosuvastatin (CRESTOR) 20 MG tablet   No Yes   Sig: Take 1 tablet (20 mg) by mouth daily      Facility-Administered Medications: None     Allergies   No Known Allergies    Immunization History     There is no  immunization history on file for this patient.    Social History   I have reviewed this patient's social history and updated it with pertinent information if needed. Huseyin Pettit  reports that he has been smoking. He has a 12.50 pack-year smoking history. He has never used smokeless tobacco. He reports that he does not drink alcohol or use drugs.    Family History   I have reviewed this patient's family history and updated it with pertinent information if needed.   No family history on file.    Review of Systems   The 10 point Review of Systems is negative other than noted in the HPI or here.     Physical Exam   Temp: 99.1  F (37.3  C) Temp src: Oral BP: 109/68 Pulse: 98 Heart Rate: 91 Resp: 16 SpO2: 90 % O2 Device: None (Room air)    Vital Signs with Ranges  Temp:  [95.9  F (35.5  C)-99.1  F (37.3  C)] 99.1  F (37.3  C)  Pulse:  [98] 98  Heart Rate:  [] 91  Resp:  [16-18] 16  BP: ()/(46-68) 109/68  SpO2:  [90 %-95 %] 90 %  171 lbs 15.34 oz  Body mass index is 24.67 kg/m .    GENERAL APPEARANCE:  alert and no distress  EYES: Eyes grossly normal to inspection, PERRL and conjunctivae and sclerae normal  HENT: ear canals and TM's normal and nose and mouth without ulcers or lesions  NECK: no adenopathy, no asymmetry, masses, or scars and thyroid normal to palpation  RESP: lungs clear to auscultation - no rales, rhonchi or wheezes  CV: regular rates and rhythm, normal S1 S2, no S3 or S4 and no murmur, click or rub  LYMPHATICS: normal ant/post cervical and supraclavicular nodes  ABDOMEN: soft, nontender, without hepatosplenomegaly or masses and bowel sounds normal  MS: right great toe dry gangrene   SKIN: no suspicious lesions or rashes      Data   Lab Results   Component Value Date    WBC 10.1 03/02/2020    HGB 12.0 (L) 03/02/2020    HCT 37.1 (L) 03/02/2020     03/02/2020     03/02/2020    POTASSIUM 4.3 03/02/2020    CHLORIDE 113 (H) 03/02/2020    CO2 23 03/02/2020    BUN 25 03/02/2020    CR 0.96  03/02/2020     (H) 03/02/2020    SED 27 (H) 03/01/2020    TROPI <0.015 03/02/2020    AST 14 07/03/2018    ALT 35 07/03/2018    ALKPHOS 89 07/03/2018    BILITOTAL 0.2 07/03/2018     Recent Labs   Lab 02/29/20  1104   CULT No growth     Recent Labs   Lab Test 02/29/20  1104   CULT No growth       Amount of time performed on this consult: 45 minutes. This includes face to face assessment and care coordination with the primary team.

## 2020-03-02 NOTE — CONSULTS
"VASCULAR SURGERY HOSPITAL PATIENT CONSULTATION NOTE  Consulted by:Dilip Rodriguez MD of the hospitalist service  Reason for consultation: right great toe osteomyelitis, assess for PAD prior to amputation    HPI:  Huseyin Pettit is a 60 year old year old male who has a PMH significant for CAD s/p PCI, HTN, hyperlipidemia, DM II with neuropathy, tobacco dependence and medication non-compliance who presented to [facility] with right great toe gangrene and osteomyelitis, transferred to [facility] for Vascular Surgery evaluation prior to amputation. Pt presented to [facility] 2/29/2020 with 2 weeks progressive right great toe pain with gangrenous changes. MRI right foot 3/1 consistent with osteomyelitis of distal 1st phalanx with questionable extension to base.  CRP and sed rate mildly elevated, afebrile without leukocytosis. Podiatry recommend that \"given the dead tissue to the right great toe, he will eventually need an amputation.\" Vascular surgery is consulted for lower extremity vascular optimization prior to podiatry's toe amputation.    Today, pt is found resting comfortably in bed. Pt confirms the above history. Pt reports that he has been having progressively worsening right great toe pain for weeks and he now finds it intolerable. Pt would like a toe amputation as soon as possible. Pt also reports 2 weeks increasing typical chest pain symptoms with worse chest pain in the last 2 days. The chest pain is intermittent, though more frequent lately. Pt rates his current chest pain 5/10 and says it's unchanged for the past 2 days. He usually takes Aspirin and Plavix daily, though his Plavix has been held. Pt has diabetes and says that he does not control it well. Pt reports that he doesn't check his blood sugars daily, but when he does check them, they're usually over 200.     Review Of Systems:   General: Denies F/C  Respiratory: Denies SOB  Cardio: Confirms CP as above  Gastrointestinal: Denies N/V  Genitourinary: Denies " recent change in urination  Musculoskeletal: See HPI  Neurologic: Denies HA  Psychiatric: Denies confusion  Hematology/immunology: no unexpected bruising  Eyes: no acute changes in vision  ENT: Denies trauma    PAST MEDICAL HISTORY:  Past Medical History:   Diagnosis Date     CAD (coronary artery disease) 2/18/2015    S/p 3 stents in 2011 In [place]     Closed fracture of multiple ribs of left side with routine healing 7/10/2018     Coronary artery disease      Coronary artery disease involving native coronary artery of native heart without angina pectoris 7/3/2018    S/p 3 stents put in [place] in 2011.     Depressive disorder      Diabetes (H)      Hypertension      Severe episode of recurrent major depressive disorder, without psychotic features (H) 7/3/2018     Type 2 diabetes mellitus with other circulatory complication, with long-term current use of insulin (H) 7/3/2018     PAST SURGICAL HISTORY:  Past Surgical History:   Procedure Laterality Date     CHOLECYSTECTOMY       GALLBLADDER SURGERY  2011     FAMILY HISTORY:  No family history on file.    SOCIAL HISTORY:   Social History     Tobacco Use     Smoking status: Current Every Day Smoker     Packs/day: 0.50     Years: 25.00     Pack years: 12.50     Smokeless tobacco: Never Used     Tobacco comment: trying to adela   Substance Use Topics     Alcohol use: No     HOME MEDICATIONS:  Prior to Admission medications    Medication Sig Start Date End Date Taking? Authorizing Provider   amLODIPine-valsartan (EXFORGE)  MG tablet Take 1 tablet by mouth daily   Yes Unknown, Entered By History   aspirin 81 MG tablet Take 81 mg by mouth daily    Yes Reported, Patient   bisoprolol (ZEBETA) 5 MG tablet Take 5 mg by mouth daily   Yes Unknown, Entered By History   busPIRone (BUSPAR) 10 MG tablet Take 10 mg by mouth 4 times daily   Yes Unknown, Entered By History   clomiPRAMINE (ANAFRANIL) 50 MG capsule Take 100 mg by mouth 2 times daily   Yes Unknown, Entered By History    clopidogrel (PLAVIX) 75 MG tablet Take 1 tablet (75 mg) by mouth daily 7/9/18  Yes Hunter Carlton MD   divalproex sodium delayed-release (DEPAKOTE) 500 MG DR tablet Take 1,000 mg by mouth 2 times daily    Yes Unknown, Entered By History   FLUoxetine (PROZAC) 20 MG capsule Take 20 mg by mouth daily   Yes Unknown, Entered By History   glimepiride (AMARYL) 2 MG tablet Take 2 mg by mouth every morning (before breakfast)   Yes Unknown, Entered By History   hydrochlorothiazide (HYDRODIURIL) 12.5 MG tablet Take 12.5 mg by mouth daily   Yes Unknown, Entered By History   insulin aspart prot & aspart (NOVOLOG MIX 70/30 PEN) injection 28 units in AM, and 38 units in PM 7/3/18  Yes Hunter Carlton MD   isosorbide Dinitrate (ISORDIL) 40 MG TABS Take 1 tablet (40 mg) by mouth 2 times daily 7/9/18  Yes Hunter Carlton MD   metFORMIN (GLUCOPHAGE) 850 MG tablet Take 1 tablet (850 mg) by mouth 2 times daily (with meals) (Need A1c for further refills) 7/3/18  Yes Hunter Carlton MD   QUEtiapine (SEROQUEL) 300 MG tablet Take 600 mg by mouth At Bedtime   Yes Unknown, Entered By History   rosuvastatin (CRESTOR) 20 MG tablet Take 1 tablet (20 mg) by mouth daily 7/9/18  Yes Hunter Carlton MD       VITAL SIGNS:  BP 93/52 (BP Location: Right arm)   Pulse 98   Temp 98.2  F (36.8  C) (Oral)   Resp 16   Wt 78 kg (171 lb 15.3 oz)   SpO2 93%   BMI 24.67 kg/m      Intake/Output Summary (Last 24 hours) at 3/2/2020 0738  Last data filed at 3/2/2020 0600  Gross per 24 hour   Intake 823.33 ml   Output --   Net 823.33 ml       Labs:  ROUTINE IP LABS (Last four results)  BMP  Recent Labs   Lab 02/29/20  1029      POTASSIUM 4.1   CHLORIDE 104   MYRON 9.1   CO2 26   BUN 24   CR 0.94   *     CBC  Recent Labs   Lab 02/29/20  1029   WBC 11.0   RBC 4.55   HGB 13.4   HCT 42.9   MCV 94   MCH 29.5   MCHC 31.2*   RDW 13.6        INRNo lab results found in last 7 days.    PHYSICAL EXAM:  Constitutional: healthy, alert, no acute distress and cooperative    Cardiovascular: RRR  Respiratory: breathing unlabored without secondary muscle use  Psychiatric: mentation appears normal and affect normal/bright  Neck: no asymmetry  GI/Abdomen:  abdomen soft, non-tender. No masses, no CVAT  MSK: able to move all extremities without new weakness or ataxia  Extremities: no open lesions, extremities warm and well perfused. Right great toe black and gangrenous from base of first toe, no foul smell or discharge  Hematology: no bruising on visible skin  Vascular: Bilateral femoral and popliteal pulses 2+. Left DP and PT pulses palpable, right PT palpable, right DP pulse intermittently palpable    Right toe:      Right toe:          IMAGING:  US REYMUNDO NO EXERCISE, 1-2 LEVELS, BILAT   3/1/2020 2:16 AM      INDICATION: Foot wound. Nonhealing ulcer.     COMPARISON: None     FINDINGS: Blood flow to both feet with biphasic and monophasic waveforms. ABIs of 1.30 on the right and 1.26 on the left.         Patient Active Problem List   Diagnosis     Hyperlipidemia LDL goal <100     Benign essential hypertension     Severe episode of recurrent major depressive disorder, without psychotic features (H)     Coronary artery disease involving native coronary artery of native heart without angina pectoris     Type 2 diabetes mellitus with other circulatory complication, with long-term current use of insulin (H)     Closed fracture of multiple ribs of left side with routine healing     Uncontrolled diabetes mellitus (H)     Toe osteomyelitis, right (H)       ASSESSMENT:   60 year old year old male who has a PMH significant for CAD s/p PCI, HTN, hyperlipidemia, DM II with neuropathy, tobacco dependence and medication non-compliance. Pt presents with new right toe gangrene and vascular surgery is consulted for lower extremity vascular optimization prior to podiatry's toe amputation.       PLAN:  -Continue cares per hospitalist, podiatry (note plan for right first toe amputation soon)  -Discussed  importance of good diabetic control for wound healing  -Discussed importance of tobacco cessation  -Preliminary ABIs reviewed, we are adding on toe pressures  -Further recommendations to follow pending results    Addendum: ABIs and toe pressures indicate lower extremity vascularity should be adequate for healing. No intervention recommended from vascular surgery. Continue cares per hospitalist, podiatry, and cardiology.  - Vascular surgery will sign off, please call if any questions      Discussed pt history, exam, assessment and plan with Dr. Rodriguez of the vascular surgery service, who is in agreement with the above.    Melia Tucker PA-C   Division of Vascular Surgery   Pager: [phone]    VASCULAR SURGERY ATTENDING STAFF NOTE:   I have seen and examined the patient myself.  I have reviewed the chart and I have reviewed the relevant imaging.  I agree with the documentation by our physician assistant, Ms. Tucker.  This is a very pleasant 60-year-old diabetic male with generalized atherosclerosis compounded by poor compliance and ongoing tobacco abuse comes in with gangrene of the right great toe.  Noninvasive imaging suggests that he had good perfusion to the forefoot and he should be able to heal a right great toe amputation.  At present I do not think that invasive imaging and intervention with an arteriogram would be of any benefit.  Good diabetes control and cessation of tobacco products is cornerstone to prevention of progression of disease to further critical limb threatening ischemia.    Jered Rodriguez M.D.

## 2020-03-02 NOTE — PROGRESS NOTES
Pt states chest pain after completing lunch tray, EKG completed NSR, Trops to be drawn at 1800 x 3 every 4 hours. Pt B/P continues to be soft. Right first toe gangrene, BENJI. Pt will be NPO after midnight for possible procedure in the a.m. Nicotine path applied to right arm. Call light at hand will continue to monitor.

## 2020-03-02 NOTE — CONSULTS
Podiatry / Foot and Ankle Surgery Progress Note    March 2, 2020    Subject: Patient was seen at bedside.  Notes he is doing well.     Objective:  Vitals: /72 (BP Location: Left arm)   Pulse 94   Temp 97.3  F (36.3  C) (Axillary)   Resp 14   Wt 78 kg (171 lb 15.3 oz)   SpO2 97%   BMI 24.67 kg/m    BMI= Body mass index is 24.67 kg/m .     WBC   Date Value Ref Range Status   03/02/2020 10.1 4.0 - 11.0 10e9/L Final     HA1C: 7.2 (3/2020)     C-reactive protein: 8.5   ESR: 27     General appearance: Patient is alert and fully cooperative with history & exam.  No sign of distress is noted during the visit.       Dermatologic: full thickness black dry eschar to the right great 2/3rd's of the toe.  No streaking redness, purulent drainage or acute signs of infection.      Vascular: PT pulses palpable bilaterally but DP pulses non palpable.  No significant edema or varicosities noted.  CFT's delayed to right foot.      Neurologic: Lower extremity sensation is intact to light touch.  No evidence of weakness or contracture in the lower extremities.  No evidence of neuropathy.       Musculoskeletal: Patient is ambulatory without assistive device or brace.  No gross ankle deformity noted.  No foot or ankle joint effusion is noted.       IMAGING: right foot xray - personally reviewed images - There may well be some ulceration of the great toe. On the lateral view, there appears to be dorsal soft tissue gas. No focal bone destruction.     REYMUNDO: Blood flow to both feet with biphasic and monophasic waveforms. ABIs of 1.30 on the right and 1.26 on the left    MRI right foot - Assuming clinically confirmed open/deep soft tissue defect presence at the tip of great toe, osteomyelitis of first distal phalangeal tuft  until proven otherwise with wider area of reactive osteitis involving  entire distal phalanx. Possible early osteomyelitis extension to the  base cannot be excluded.   2 Small focal area of subchondral edema at  the dorsal aspect of the  first proximal phalangeal head without associated joint effusion,  presumably degenerative given lack of finding to suggest associated  septic arthritis extension.  3. Great toe cellulitis without drainable fluid collection.    ASSESSMENT: 60 yr old diabetic male with PAD, dry gangrene to right great toe, osteomyelitis.      PLAN:  Reviewed patient's chart in epic.  -Reviewed xrays and REYMUNDO's with patient.   -Discussed that given the dead tissue to the right great toe, he will eventually need an amputation.  I am concerned about his bloodflow and healing potential though given non palpable DP pulses.   - Cleared by vascular for toe amputation.  -Keep right foot and dressing dry at this time.   -Recommend proceeding with right great toe amputation at this time.  -Patient in agreement with plan.   -surgery for right great toe amputation for tomorrow at 5pm with Dr. Barnes.   -NPO after 9am tomorrow. Orders placed.     Cathy Brooks DPM, Podiatry/Foot and Ankle Surgery  4:37 PM

## 2020-03-02 NOTE — PLAN OF CARE
A&O x4. VS hypotensive on RA. Tele NSR. CMS intact, R big toe black/green. Lungs diminished w/ wheezes. BS+, BM-, flatus+. NPO. Denies N/V. BGs 114/104. Voiding adequately. Percocet for pain. Up SBA. Went to cath lab then transferred to Heart center.

## 2020-03-02 NOTE — PROGRESS NOTES
[facility]    Medicine Progress Note - Hospitalist Service       Date of Admission:  3/1/2020  Assessment & Plan   Huseyin Pettit is a 60 year old male admitted on 3/1/2020.  Past history of CAD s/p PCI, HTN, hyperlipidemia, DM II with neuropathy, tobacco dependence and medication non-compliance who presented to [facility] with right great toe gangrene and osteomyelitis, transferred to [facility] for Vascular Surgery evaluation prior to amputation.    Right great toe gangrene and osteomyelitis  Presents with 2 weeks progressive toe pain with gangrenous changes.  MRI right foot 3/1 consistent with osteomyelitis of distal 1st phalanx with questionable extension to base.  CRP and sed rate mildly elevated, afebrile without leukocytosis.  - continue zosyn  - discussed with Vasc Surg, Podiatry and Cardiology; both Vasc and Pod are ok with dual antiplatelet therapy for any needed intervention so will proceed to angiogram today  - Podiatry and ID consults pending  - prn tylenol and oxycodone  ADDENDUM:  Vasc Surg recommends no intervention prior to amputation, signed off.    Chest pain with concern for unstable angina  CAD s/p PCI most recently in 2016  HTN  Reports undergoing PCI in [place] in 2011 and 2016 with total of 3 stents placed, details unknown.  Reporting 2 weeks increasing typical chest pain symptoms.  Outside EKG without ischemic changes, serial trop negative.  TTE 2/29 showing normal EF without WMA.  LDL 58.  - pressures soft overnight and received IVF bolus; he reports he chronically runs 80-90's systolic on his current regimen  - continue PTA aspirin, rosuvastatin, bisoprolol, Imdur, valsartan, hydrochlorothiazide, amlodipine  - hold Plavix; NPO for angiogram as above  ADDENDUM:  No intervention performed on angiogram.  Etiology of pain remains unclear, will trial GI cocktail.     DM II on long term insulin  A1C 7.2%.  Managed on Novolog 70/30 with 28 units qAM and 38 units qPM, metformin,  glimepiride.  - holding Novlog 70/30 insulin as NPO; will resume as appropriate once diet advanced  - prandial insulin 1 unit/10g carb, high dose ssi  - hold metformin and glimepiride  ADDENDUM:  Blood sugars in low 100's today as NPO overnight.  Will hold evening Novolog at this time and continue with prandial+sliding scale as may well be NPO midnight again for toe amputation tomorrow pending Podiatry recs.    Depression  - continue PTA buspirone, fluoxetine, Depakote, clomipramine, Seroquel    Tobacco dependence  Smokes 1ppd.    - nicotine patch  - encourage cessation     Diet: NPO per Anesthesia Guidelines for Procedure/Surgery Except for: Meds    DVT Prophylaxis: Pneumatic Compression Devices  Rangel Catheter: not present  Code Status: Full Code      Disposition Plan   Expected discharge: 4 - 7 days, recommended to prior living arrangement once work-up and any procedures complete.  Entered: Dilip Rodriguez MD 03/02/2020, 9:09 AM       The patient's care was discussed with the Bedside Nurse and Patient.    Dilip Rodriguez MD  Hospitalist Service  [facility]    ______________________________________________________________________    Interval History   Reports ongoing intermittent chest pains, no responsive to any analgesics, is open to trying GI cocktail.  No fever/chills.  No lightheadedness, reports his blood pressure chronically runs in 80-90's systolic at home.     Data reviewed today: I reviewed all medications, new labs and imaging results over the last 24 hours. I personally reviewed no images or EKG's today.    Physical Exam   Vital Signs: Temp: 99.1  F (37.3  C) Temp src: Oral BP: 109/68 Pulse: 98 Heart Rate: 91 Resp: 16 SpO2: 90 % O2 Device: None (Room air)    Weight: 171 lbs 15.34 oz  General Appearance: Well nourished male in NAD  Respiratory: lungs CTAB, no wheezes or crackles, no tachypnea  Cardiovascular: RRR, normal s1/s2 without murmur  GI: abdomen soft, normal bowel sounds  Skin:  right great toe gangrenous  Other: Alert and appropriate, cranial nerves grossly intact     Data   Recent Labs   Lab 03/02/20  0741 03/02/20  0037 03/01/20  2146 03/01/20  1742  02/29/20  1029   WBC 10.1  --   --   --   --  11.0   HGB 12.0*  --   --   --   --  13.4   MCV 91  --   --   --   --  94     --   --   --   --  341     --   --   --   --  135   POTASSIUM 4.3  --   --   --   --  4.1   CHLORIDE 113*  --   --   --   --  104   CO2 23  --   --   --   --  26   BUN 25  --   --   --   --  24   CR 0.96  --   --   --   --  0.94   ANIONGAP 5  --   --   --   --  5   MYRON 8.5  --   --   --   --  9.1   *  --   --   --   --  244*   TROPI  --  <0.015 <0.015 <0.015   < > <0.015    < > = values in this interval not displayed.

## 2020-03-02 NOTE — PROGRESS NOTES
Inpatient Cardiology Consultation Progress Note:    Huseyin Pettit MRN#: [id number]   YOB: 1960 Age: 60 year old     Date of Admission: 3/1/2020  Consult indication: chest pain         Assessment and Plan:     # Chest pain. Clinical presentation not classically consistent with angina, since he describes primarily a fatigue and dyspnea with exertion, though he does also describe a sharp chest pain. This chest pain is not relieved by NTG. Has known CAD s/p PCI x3 in [place] in 2011, osteomyelitis, current 1ppd smoker. He also states that this fatigue and chest pain have become more severe over the past few weeks.   # Right great toe gangrene with osteomyelitis   # HTN  # DM2  # Current smoking 1ppd    - TTE 2/29/2020 shows normal biventricular function   - continue ASA 81mg daily  - continue amldopine 10mg daily, valsartan 160mg daily   - continue hydrochlorothiazide 12.5mg daily   - continue isordil 40mg BID  - continue rosuvastatin 20mg daily   - discussed case with primary MD Dr. Rodriguez, he is in contact with Podiatry and Vascular surgery-- plans for surgical intervention will not be affected by DAPT, and so we will plan for coronary angiography +/- PCI    Thank you for allowing our team to participate in the care of Huseyin Pettit.  Please do not hesitate to page me with any questions or concerns.     Ervin Parrish MD  Cardiology  Pager:  [phone]  Text Page   March 2, 2020         Interval Events:     - no acute events overnight  - Pt denies any chest pain, chest discomfort, dyspnea  - no dizziness/lightheadedness  - no bleeding events  - interval labs and studies reviewed   - interval progress notes reviewed  - Pt updated on clinical course, plan for the day         Past Medical History:   I have reviewed this patient's past medical history  Past Medical History:   Diagnosis Date     CAD (coronary artery disease) 2/18/2015    S/p 3 stents in 2011 In [place]     Closed fracture of multiple ribs of  left side with routine healing 7/10/2018     Coronary artery disease      Coronary artery disease involving native coronary artery of native heart without angina pectoris 7/3/2018    S/p 3 stents put in [place] in 2011.     Depressive disorder      Diabetes (H)      Hypertension      Severe episode of recurrent major depressive disorder, without psychotic features (H) 7/3/2018     Type 2 diabetes mellitus with other circulatory complication, with long-term current use of insulin (H) 7/3/2018             Medications reviewed:   Prior to admission medications:  Prior to Admission medications    Medication Sig Start Date End Date Taking? Authorizing Provider   amLODIPine-valsartan (EXFORGE)  MG tablet Take 1 tablet by mouth daily   Yes Unknown, Entered By History   aspirin 81 MG tablet Take 81 mg by mouth daily    Yes Reported, Patient   bisoprolol (ZEBETA) 5 MG tablet Take 5 mg by mouth daily   Yes Unknown, Entered By History   busPIRone (BUSPAR) 10 MG tablet Take 10 mg by mouth 4 times daily   Yes Unknown, Entered By History   clomiPRAMINE (ANAFRANIL) 50 MG capsule Take 100 mg by mouth 2 times daily   Yes Unknown, Entered By History   clopidogrel (PLAVIX) 75 MG tablet Take 1 tablet (75 mg) by mouth daily 7/9/18  Yes Hunter Carlton MD   divalproex sodium delayed-release (DEPAKOTE) 500 MG DR tablet Take 1,000 mg by mouth 2 times daily    Yes Unknown, Entered By History   FLUoxetine (PROZAC) 20 MG capsule Take 20 mg by mouth daily   Yes Unknown, Entered By History   glimepiride (AMARYL) 2 MG tablet Take 2 mg by mouth every morning (before breakfast)   Yes Unknown, Entered By History   hydrochlorothiazide (HYDRODIURIL) 12.5 MG tablet Take 12.5 mg by mouth daily   Yes Unknown, Entered By History   insulin aspart prot & aspart (NOVOLOG MIX 70/30 PEN) injection 28 units in AM, and 38 units in PM 7/3/18  Yes Hunter Carlton MD   isosorbide Dinitrate (ISORDIL) 40 MG TABS Take 1 tablet (40 mg) by mouth 2 times daily 7/9/18  Yes  Brandt, Amer, MD   metFORMIN (GLUCOPHAGE) 850 MG tablet Take 1 tablet (850 mg) by mouth 2 times daily (with meals) (Need A1c for further refills) 7/3/18  Yes Hunter Carlton MD   QUEtiapine (SEROQUEL) 300 MG tablet Take 600 mg by mouth At Bedtime   Yes Unknown, Entered By History   rosuvastatin (CRESTOR) 20 MG tablet Take 1 tablet (20 mg) by mouth daily 7/9/18  Yes Hunter Carlton MD        Current medications:  Current Facility-Administered Medications Ordered in Epic   Medication Dose Route Frequency Last Rate Last Dose     acetaminophen (TYLENOL) Suppository 650 mg  650 mg Rectal Q4H PRN         acetaminophen (TYLENOL) tablet 650 mg  650 mg Oral Q4H PRN         amLODIPine (NORVASC) tablet 10 mg  10 mg Oral Daily        Or     valsartan (DIOVAN) tablet 160 mg  160 mg Oral Daily         aspirin (ASA) chewable tablet 81 mg  81 mg Oral Daily   81 mg at 03/02/20 0827     bisacodyl (DULCOLAX) Suppository 10 mg  10 mg Rectal Daily PRN         bisoprolol (ZEBETA) tablet 5 mg  5 mg Oral Daily         busPIRone (BUSPAR) tablet 10 mg  10 mg Oral 4x Daily   10 mg at 03/02/20 0827     clomiPRAMINE (ANAFRANIL) capsule 100 mg  100 mg Oral BID   100 mg at 03/02/20 0827     glucose gel 15-30 g  15-30 g Oral Q15 Min PRN        Or     dextrose 50 % injection 25-50 mL  25-50 mL Intravenous Q15 Min PRN        Or     glucagon injection 1 mg  1 mg Subcutaneous Q15 Min PRN         divalproex sodium delayed-release (DEPAKOTE) DR tablet 1,000 mg  1,000 mg Oral BID   1,000 mg at 03/02/20 0826     FLUoxetine (PROzac) capsule 20 mg  20 mg Oral Daily   20 mg at 03/02/20 0827     hydrochlorothiazide half-tab 12.5 mg  12.5 mg Oral Daily         insulin aspart (NovoLOG) injection (RAPID ACTING)   Subcutaneous TID w/meals         insulin aspart (NovoLOG) injection (RAPID ACTING)  1-10 Units Subcutaneous TID AC         insulin aspart (NovoLOG) injection (RAPID ACTING)  1-7 Units Subcutaneous At Bedtime         insulin aspart prot & aspart (NovoLOG MIX 70/30  PEN) injection 19 Units  19 Units Subcutaneous Daily with supper         isosorbide dinitrate (ISORDIL) tablet 40 mg  40 mg Oral BID   40 mg at 03/02/20 0826     lidocaine (LMX4) cream   Topical Q1H PRN         lidocaine 1 % 0.1-1 mL  0.1-1 mL Other Q1H PRN         melatonin tablet 1 mg  1 mg Oral At Bedtime PRN   1 mg at 03/02/20 0324     naloxone (NARCAN) injection 0.1-0.4 mg  0.1-0.4 mg Intravenous Q2 Min PRN         nicotine (NICODERM CQ) 21 MG/24HR 24 hr patch 1 patch  1 patch Transdermal Daily   1 patch at 03/02/20 0826     nicotine Patch in Place   Transdermal Q8H         ondansetron (ZOFRAN-ODT) ODT tab 4 mg  4 mg Oral Q6H PRN        Or     ondansetron (ZOFRAN) injection 4 mg  4 mg Intravenous Q6H PRN   4 mg at 03/01/20 1610     oxyCODONE-acetaminophen (PERCOCET) 5-325 MG per tablet 1-2 tablet  1-2 tablet Oral Q4H PRN   2 tablet at 03/02/20 0827     piperacillin-tazobactam (ZOSYN) 3.375 g vial to attach to  mL bag  3.375 g Intravenous Q6H 100 mL/hr at 03/02/20 0316 3.375 g at 03/02/20 0938     polyethylene glycol (MIRALAX) Packet 17 g  17 g Oral Daily PRN         prochlorperazine (COMPAZINE) injection 10 mg  10 mg Intravenous Q6H PRN        Or     prochlorperazine (COMPAZINE) tablet 10 mg  10 mg Oral Q6H PRN        Or     prochlorperazine (COMPAZINE) Suppository 25 mg  25 mg Rectal Q12H PRN         QUEtiapine (SEROquel) tablet 600 mg  600 mg Oral At Bedtime   600 mg at 03/01/20 2145     rosuvastatin (CRESTOR) tablet 20 mg  20 mg Oral Daily   20 mg at 03/02/20 0826     senna-docusate (SENOKOT-S/PERICOLACE) 8.6-50 MG per tablet 1 tablet  1 tablet Oral BID PRN        Or     senna-docusate (SENOKOT-S/PERICOLACE) 8.6-50 MG per tablet 2 tablet  2 tablet Oral BID PRN         sodium chloride (PF) 0.9% PF flush 3 mL  3 mL Intracatheter q1 min prn         sodium chloride (PF) 0.9% PF flush 3 mL  3 mL Intracatheter Q8H   3 mL at 03/01/20 1606     sodium chloride 0.9% infusion   Intravenous Continuous 100 mL/hr at  20       No current [facility]-ordered outpatient medications on file.             Physical Exam:   Vital signs were reviewed:  Temperatures:  Current - Temp: 99.1  F (37.3  C); Max - Temp  Av.9  F (36.6  C)  Min: 95.9  F (35.5  C)  Max: 99.1  F (37.3  C)  Respiration range: Resp  Av.1  Min: 16  Max: 18  Pulse range: Pulse  Av  Min: 98  Max: 98  Blood pressure range: Systolic (24hrs), Av , Min:73 , Max:109   ; Diastolic (24hrs), Av, Min:46, Max:68    Pulse oximetry range: SpO2  Av %  Min: 90 %  Max: 95 %    Intake/Output Summary (Last 24 hours) at 3/2/2020 0941  Last data filed at 3/2/2020 0600  Gross per 24 hour   Intake 823.33 ml   Output --   Net 823.33 ml     171 lbs 15.34 oz  Body mass index is 24.67 kg/m .   Body surface area is 1.96 meters squared.     Constitutional: appears stated age, in no apparent distress, appears to be well nourished  Eyes: sclera anicteric, conjunctiva normal, no lesions on eyelids or lashes  ENT: normocephalic, without obvious abnormality, atraumatic, external ears without lesions  Pulmonary: clear to auscultation bilaterally  Cardiovascular: JVP normal, regular rate, regular rhythm, normal S1 and S2, no murmur appreciated, no lower extremity edema  Gastrointestinal: abdominal exam benign, non-tender, no rigidity, no guarding  Neurologic: awake, alert, face symmetrical, moves all extremities  Skin: Right great toe with dry gangrene black eschar   Psychiatric: affect is normal, answers questions appropriately, oriented to self and place         Selected laboratory tests:   Laboratory test results personally reviewed:   CMP  Recent Labs   Lab 20  0741 20  1029    135   POTASSIUM 4.3 4.1   CHLORIDE 113* 104   CO2 23 26   ANIONGAP 5 5   * 244*   BUN 25 24   CR 0.96 0.94   GFRESTIMATED 86 89   GFRESTBLACK >90 >90   MYRON 8.5 9.1     CBC  Recent Labs   Lab 20  0741 20  1029   WBC 10.1 11.0   RBC 4.09* 4.55   HGB 12.0* 13.4    HCT 37.1* 42.9   MCV 91 94   MCH 29.3 29.5   MCHC 32.3 31.2*   RDW 14.3 13.6    341     INRNo lab results found in last 7 days.  Lab Results   Component Value Date    TROPI <0.015 2020    TROPI <0.015 2020    TROPI <0.015 2020     Recent Labs   Lab Test 20  0741 18  0906   CHOL 123 146   HDL 25* 35*   LDL 58 82   TRIG 202* 145     Lab Results   Component Value Date    A1C 7.2 2020    A1C 12.5 2018     TSH   Date Value Ref Range Status   2018 0.66 0.40 - 4.00 mU/L Final            Selected Imaging and Additional Data:   Additional data personally reviewed:  Recent Results (from the past 4320 hour(s))   Echocardiogram Complete    Narrative    [id number]  [id number]  [id number]  [id number]^ROCHELLE^AL^[facility]  Echocardiography Laboratory  [address] [place] [address]        Name: MORALES SHERMAN  MRN: [id number]  : 1960  Study Date: 2020 01:18 PM  Age: 60 yrs  Gender: Male  Patient Location: [facility]  Reason For Study: Chest Pain  Ordering Physician: ISAIAH BYRD  Performed By: Mariola Lockhart RDCS     BSA: 1.9 m2  Height: 68 in  Weight: 169 lb  BP: 107/65 mmHg  _____________________________________________________________________________  __        Procedure  Complete Portable Echo Adult.  _____________________________________________________________________________  __        Interpretation Summary     No regional wall motion abnormalities noted.  The pericardium appears normal.  Normal size aorta  The left ventricle is normal in structure, function and size.  The visual ejection fraction is estimated at 55-60%.  The right ventricle is normal in structure, function and size.  Doppler interrogation does not demonstrate significant stenosis or  insufficieny involving cardiac valves.     No old studies avaiavblle for comparison.  _____________________________________________________________________________  __         Left Ventricle  The left ventricle is normal in structure, function and size. The visual  ejection fraction is estimated at 55-60%. Left ventricular diastolic function  is normal. No regional wall motion abnormalities noted. There is no thrombus  seen in the left ventricle.     Right Ventricle  The right ventricle is normal in structure, function and size. There is no  mass or thrombus in the right ventricle.     Atria  Normal left atrial size. Right atrial size is normal. There is no atrial shunt  seen. The left atrial appendage is not well visualized.        Mitral Valve  The mitral valve leaflets appear normal. There is no evidence of stenosis,  fluttering, or prolapse. There is no mitral regurgitation noted. There is no  mitral valve stenosis.     Tricuspid Valve  Normal tricuspid valve. The right ventricular systolic pressure is  approximated at 12.2 mmHg plus the right atrial pressure. Right ventricle  systolic pressure estimate normal. There is mild (1+) tricuspid regurgitation.  There is no tricuspid stenosis.     Aortic Valve  The aortic valve is trileaflet. No aortic regurgitation is present. No aortic  stenosis is present.     Pulmonic Valve  Normal pulmonic valve. There is no pulmonic valvular regurgitation. There is  no pulmonic valvular stenosis.     Vessels  Normal size aorta. Normal size ascending aorta. The inferior vena cava is  normal. The pulmonary artery is normal size.     Pericardium  The pericardium appears normal. There is no pleural effusion.        Rhythm  Sinus rhythm was noted.  _____________________________________________________________________________  __     MMode/2D Measurements & Calculations  IVSd: 1.3 cm  LVIDd: 4.3 cm  LVIDs: 2.7 cm  LVPWd: 1.1 cm  FS: 36.5 %  LV mass(C)d: 176.6 grams  LV mass(C)dI: 92.8 grams/m2  Ao root diam: 3.7 cm  LA dimension: 3.9 cm  asc Aorta Diam: 3.7 cm  LA/Ao: 1.1  LVOT diam: 2.3 cm  LVOT area: 4.3 cm2     EF(MOD-bp): 54.8 %  LA Volume (BP): 42.0  ml     LA Volume Index (BP): 22.1 ml/m2  RWT: 0.49        Doppler Measurements & Calculations  MV E max watson: 69.2 cm/sec  MV A max watson: 70.6 cm/sec  MV E/A: 0.98  MV dec time: 0.22 sec  Ao V2 max: 104.7 cm/sec  Ao max P.0 mmHg  TR max watson: 174.4 cm/sec  TR max P.2 mmHg  E/E' av.9  Lateral E/e': 7.6  Medial E/e': 8.1              _____________________________________________________________________________  __        Report approved by: Dr. Thaddeus Dunlap 2020 02:31 PM

## 2020-03-02 NOTE — PLAN OF CARE
A/Ox4, pt awake the majority of the night. VSS on ra ex hypotensive & tachy. LS: expiratory wheezes. BS: active. Trops x3 q4h. Soft pressures.C/O chest pain, MD notified, bolus given. Tele: NSR. Right first toe gangrene, BENJI. NPO at 0500. CMS intact. B. Tolerating a mod cho diet. Pain controlled with Percocet. Nicotine patch in place. Plan is possible surgery in the am. Will continue to monitor.

## 2020-03-02 NOTE — PROGRESS NOTES
Notified by nursing about soft blood pressures, and off-and-on sharp chest pains, patient had chest pain since morning and all his troponins are negative, echo does not show any wall motion abnormalities, his chest pain appears to be atypical, he has an active infection considering that with the low blood pressure I would bolus him 1 L normal saline over 2 hours, requested nursing to notify in case he develops any shortness of breath.

## 2020-03-02 NOTE — PROCEDURES
[facility]    Procedure: *Cath without PCI  Date/Time: 3/2/2020 1:24 PM  Performed by: Thaddeus Sun MD  Authorized by: Thaddeus Sun MD     UNIVERSAL PROTOCOL   Site Marked: Yes  Prior Images Obtained and Reviewed:  Yes  Required items: Required blood products, implants, devices and special equipment available    Patient identity confirmed:  Verbally with patient  Patient was reevaluated immediately before administering moderate or deep sedation or anesthesia  Confirmation Checklist:  Patient's identity using two indicators  Time out: Immediately prior to the procedure a time out was called    Universal Protocol: the Joint Commission Universal Protocol was followed    Preparation: Patient was prepped and draped in usual sterile fashion           ANESTHESIA    Local Anesthetic: Lidocaine 1% without epinephrine      SEDATION    Patient Sedated: Yes    Sedation:  Diazepam and fentanyl  Vital signs: Vital signs monitored during sedation    PROCEDURE   Length of time physician/provider present for 1:1 monitoring during sedation: 20      Procedure  1) CAG  Approach RTR  Complications none  Findings  RCA dominant  Normal  LMCA normal  LAD proximal vessel stented, widely patent mild generalized atheromatous change no focal stenosis  CX widely patent stent, mild atheromatous changes.     Assess No indication for revascularization    Recommendation  Aggressive life style intervention  Guideline directed medical therapy including optimal blood pressure control, antiplatelets, high potencystatin    Corinne

## 2020-03-02 NOTE — CONSULTS
"BRIEF NUTRITION ASSESSMENT      REASON FOR ASSESSMENT:  Huseyin Pettit is a 60 year old male seen by Registered Dietitian for Admission Nutrition Risk Screen for new/uncontrolled diabetes    NUTRITION HISTORY:  Visited with pt this morning  \"I am so hungry!\"  Pt very concerned about toe surgery and asking when it will be today - wanting it done sooner rather than later  Notes that he doesn't follow a \"diet\" - \"I just watch what I eat\"  Avoids sweets  Eats pasta, but no rice or bread  Likes fruit, veggies, meat  Drinks water or juice    CURRENT DIET AND INTAKE:  Diet:  NPO              Pt was on Moderate CHO yesterday - javier po well    ANTHROPOMETRICS:  Height: 5'10\"  Weight:(3/2) 78 kg /  171 lbs 15.34 oz  Body mass index is 24.67 kg/m .   Weight Status: Normal BMI  IBW:  75.4 kg  %IBW: 103%  Weight History:   Wt Readings from Last 10 Encounters:   03/02/20 78 kg (171 lb 15.3 oz)   03/01/20 76.2 kg (168 lb)   07/10/18 76.7 kg (169 lb)   07/03/18 75.8 kg (167 lb)   02/18/15 79.9 kg (176 lb 1.6 oz)         LABS:  7/3/18: HgbA1C 12.5  3/1/20: HgbA1C 7.2    MALNUTRITION:  Visual Nutrition Focused Physical Assessment (NFPA) completed. Patient does not meet two of the following criteria necessary for diagnosing malnutrition.       NUTRITION INTERVENTION:  Nutrition Diagnosis:  No nutrition diagnosis at this time.    Implementation:  Nutrition Education ---> Reviewed Moderate CHO diet order.  Would recommend outpt DM management/counseling     FOLLOW UP/MONITORING:   Will re-evaluate in 7 - 10 days, or sooner, if re-consulted.          "

## 2020-03-02 NOTE — PRE-PROCEDURE
GENERAL PRE-PROCEDURE:   Procedure:  CAG , LHC, LV possible PCI  Date/Time:  3/2/2020 12:52 PM    Verbal consent obtained?: Yes    Written consent obtained?: Yes    Risks and benefits: Risks, benefits and alternatives were discussed    Consent given by:  Patient  Patient states understanding of procedure being performed: Yes    Patient's understanding of procedure matches consent: Yes    Procedure consent matches procedure scheduled: Yes    Expected level of sedation:  Moderate  Appropriately NPO:  Yes  ASA Class:  Class 3- Severe systemic disease, definite functional limitations  Mallampati  :  Grade 2- soft palate, base of uvula, tonsillar pillars, and portion of posterior pharyngeal wall visible  Lungs:  Lungs clear with good breath sounds bilaterally  Heart:  Normal heart sounds and rate  History & Physical reviewed:  History and physical reviewed and no updates needed  I have examined the patient, reviewed the history, medications and pre procedural tests. Atypical chest pain but known CAD sp old PCI and severe peripheral vascular disease.  I have explained to the patient the risks of death, MI, stroke, hematoma, possible urgent bypass surgery for failed PCI, use of stents, thienopyridine agents, possible peripheral vascular complications, arrhythmia, the use of FFR in clinical decision-making and alternative of medical therapy alone in regards to left heart catheterization, left ventriculography, coronary angiography, and possible percutaneous coronary intervention. The patient voiced understanding and wishes to proceed. The patient has a good right radial pulse, normal ulnar pulse and a normal Leroy's sign.

## 2020-03-03 ENCOUNTER — APPOINTMENT (OUTPATIENT)
Dept: GENERAL RADIOLOGY | Facility: CLINIC | Age: 60
End: 2020-03-03
Attending: PODIATRIST
Payer: COMMERCIAL

## 2020-03-03 ENCOUNTER — ANESTHESIA EVENT (OUTPATIENT)
Dept: SURGERY | Facility: CLINIC | Age: 60
End: 2020-03-03
Payer: COMMERCIAL

## 2020-03-03 ENCOUNTER — ANESTHESIA (OUTPATIENT)
Dept: SURGERY | Facility: CLINIC | Age: 60
End: 2020-03-03
Payer: COMMERCIAL

## 2020-03-03 LAB
ERYTHROCYTE [DISTWIDTH] IN BLOOD BY AUTOMATED COUNT: 13.8 % (ref 10–15)
GLUCOSE BLDC GLUCOMTR-MCNC: 114 MG/DL (ref 70–99)
GLUCOSE BLDC GLUCOMTR-MCNC: 115 MG/DL (ref 70–99)
GLUCOSE BLDC GLUCOMTR-MCNC: 164 MG/DL (ref 70–99)
GLUCOSE BLDC GLUCOMTR-MCNC: 165 MG/DL (ref 70–99)
GLUCOSE BLDC GLUCOMTR-MCNC: 171 MG/DL (ref 70–99)
GLUCOSE BLDC GLUCOMTR-MCNC: 213 MG/DL (ref 70–99)
GRAM STN SPEC: ABNORMAL
HCT VFR BLD AUTO: 38.9 % (ref 40–53)
HGB BLD-MCNC: 13 G/DL (ref 13.3–17.7)
INTERPRETATION ECG - MUSE: NORMAL
Lab: ABNORMAL
MCH RBC QN AUTO: 29.9 PG (ref 26.5–33)
MCHC RBC AUTO-ENTMCNC: 33.4 G/DL (ref 31.5–36.5)
MCV RBC AUTO: 89 FL (ref 78–100)
PLATELET # BLD AUTO: 245 10E9/L (ref 150–450)
RBC # BLD AUTO: 4.35 10E12/L (ref 4.4–5.9)
SPECIMEN SOURCE: ABNORMAL
WBC # BLD AUTO: 11.2 10E9/L (ref 4–11)

## 2020-03-03 PROCEDURE — 25800030 ZZH RX IP 258 OP 636: Performed by: PODIATRIST

## 2020-03-03 PROCEDURE — 87205 SMEAR GRAM STAIN: CPT | Performed by: PODIATRIST

## 2020-03-03 PROCEDURE — 71000012 ZZH RECOVERY PHASE 1 LEVEL 1 FIRST HR: Performed by: PODIATRIST

## 2020-03-03 PROCEDURE — 87077 CULTURE AEROBIC IDENTIFY: CPT | Performed by: PODIATRIST

## 2020-03-03 PROCEDURE — 36000052 ZZH SURGERY LEVEL 2 EA 15 ADDTL MIN: Performed by: PODIATRIST

## 2020-03-03 PROCEDURE — 37000009 ZZH ANESTHESIA TECHNICAL FEE, EACH ADDTL 15 MIN: Performed by: PODIATRIST

## 2020-03-03 PROCEDURE — 28825 PARTIAL AMPUTATION OF TOE: CPT | Mod: T5 | Performed by: PODIATRIST

## 2020-03-03 PROCEDURE — 25000132 ZZH RX MED GY IP 250 OP 250 PS 637: Performed by: HOSPITALIST

## 2020-03-03 PROCEDURE — 87070 CULTURE OTHR SPECIMN AEROBIC: CPT | Performed by: PODIATRIST

## 2020-03-03 PROCEDURE — 88300 SURGICAL PATH GROSS: CPT | Mod: 26 | Performed by: PODIATRIST

## 2020-03-03 PROCEDURE — 27210794 ZZH OR GENERAL SUPPLY STERILE: Performed by: PODIATRIST

## 2020-03-03 PROCEDURE — 25000125 ZZHC RX 250: Performed by: PODIATRIST

## 2020-03-03 PROCEDURE — 37000008 ZZH ANESTHESIA TECHNICAL FEE, 1ST 30 MIN: Performed by: PODIATRIST

## 2020-03-03 PROCEDURE — 25000132 ZZH RX MED GY IP 250 OP 250 PS 637: Performed by: INTERNAL MEDICINE

## 2020-03-03 PROCEDURE — 99232 SBSQ HOSP IP/OBS MODERATE 35: CPT | Performed by: HOSPITALIST

## 2020-03-03 PROCEDURE — 40000170 ZZH STATISTIC PRE-PROCEDURE ASSESSMENT II: Performed by: PODIATRIST

## 2020-03-03 PROCEDURE — 87186 SC STD MICRODIL/AGAR DIL: CPT | Performed by: PODIATRIST

## 2020-03-03 PROCEDURE — 88300 SURGICAL PATH GROSS: CPT | Performed by: PODIATRIST

## 2020-03-03 PROCEDURE — 87075 CULTR BACTERIA EXCEPT BLOOD: CPT | Performed by: PODIATRIST

## 2020-03-03 PROCEDURE — 36000054 ZZH SURGERY LEVEL 2 W FLUORO 1ST 30 MIN: Performed by: PODIATRIST

## 2020-03-03 PROCEDURE — 25000132 ZZH RX MED GY IP 250 OP 250 PS 637: Performed by: PODIATRIST

## 2020-03-03 PROCEDURE — 25800030 ZZH RX IP 258 OP 636: Performed by: NURSE ANESTHETIST, CERTIFIED REGISTERED

## 2020-03-03 PROCEDURE — 25800030 ZZH RX IP 258 OP 636: Performed by: HOSPITALIST

## 2020-03-03 PROCEDURE — 25000128 H RX IP 250 OP 636: Performed by: NURSE ANESTHETIST, CERTIFIED REGISTERED

## 2020-03-03 PROCEDURE — 25800030 ZZH RX IP 258 OP 636: Performed by: ANESTHESIOLOGY

## 2020-03-03 PROCEDURE — 25000131 ZZH RX MED GY IP 250 OP 636 PS 637: Performed by: HOSPITALIST

## 2020-03-03 PROCEDURE — 36415 COLL VENOUS BLD VENIPUNCTURE: CPT | Performed by: HOSPITALIST

## 2020-03-03 PROCEDURE — 27110028 ZZH OR GENERAL SUPPLY NON-STERILE: Performed by: PODIATRIST

## 2020-03-03 PROCEDURE — 85027 COMPLETE CBC AUTOMATED: CPT | Performed by: HOSPITALIST

## 2020-03-03 PROCEDURE — 12000000 ZZH R&B MED SURG/OB

## 2020-03-03 PROCEDURE — 25000128 H RX IP 250 OP 636: Performed by: HOSPITALIST

## 2020-03-03 PROCEDURE — 00000146 ZZHCL STATISTIC GLUCOSE BY METER IP

## 2020-03-03 PROCEDURE — 25000128 H RX IP 250 OP 636: Performed by: PODIATRIST

## 2020-03-03 PROCEDURE — 40000985 XR FOOT PORT RT 2 VW: Mod: RT

## 2020-03-03 PROCEDURE — 0Y6P0Z1 DETACHMENT AT RIGHT 1ST TOE, HIGH, OPEN APPROACH: ICD-10-PCS | Performed by: PODIATRIST

## 2020-03-03 RX ORDER — ACETAMINOPHEN 325 MG/1
975 TABLET ORAL EVERY 8 HOURS
Status: DISCONTINUED | OUTPATIENT
Start: 2020-03-03 | End: 2020-03-04 | Stop reason: HOSPADM

## 2020-03-03 RX ORDER — BUPIVACAINE HYDROCHLORIDE 5 MG/ML
INJECTION, SOLUTION EPIDURAL; INTRACAUDAL PRN
Status: DISCONTINUED | OUTPATIENT
Start: 2020-03-03 | End: 2020-03-03 | Stop reason: HOSPADM

## 2020-03-03 RX ORDER — HYDROMORPHONE HYDROCHLORIDE 1 MG/ML
.3-.5 INJECTION, SOLUTION INTRAMUSCULAR; INTRAVENOUS; SUBCUTANEOUS EVERY 5 MIN PRN
Status: CANCELLED | OUTPATIENT
Start: 2020-03-03

## 2020-03-03 RX ORDER — NALOXONE HYDROCHLORIDE 0.4 MG/ML
.1-.4 INJECTION, SOLUTION INTRAMUSCULAR; INTRAVENOUS; SUBCUTANEOUS
Status: CANCELLED | OUTPATIENT
Start: 2020-03-03 | End: 2020-03-04

## 2020-03-03 RX ORDER — SODIUM CHLORIDE, SODIUM LACTATE, POTASSIUM CHLORIDE, CALCIUM CHLORIDE 600; 310; 30; 20 MG/100ML; MG/100ML; MG/100ML; MG/100ML
INJECTION, SOLUTION INTRAVENOUS CONTINUOUS
Status: DISCONTINUED | OUTPATIENT
Start: 2020-03-03 | End: 2020-03-03 | Stop reason: HOSPADM

## 2020-03-03 RX ORDER — OXYCODONE HYDROCHLORIDE 5 MG/1
5-10 TABLET ORAL
Status: DISCONTINUED | OUTPATIENT
Start: 2020-03-03 | End: 2020-03-04 | Stop reason: HOSPADM

## 2020-03-03 RX ORDER — NALOXONE HYDROCHLORIDE 0.4 MG/ML
.1-.4 INJECTION, SOLUTION INTRAMUSCULAR; INTRAVENOUS; SUBCUTANEOUS
Status: CANCELLED | OUTPATIENT
Start: 2020-03-03

## 2020-03-03 RX ORDER — ONDANSETRON 4 MG/1
4 TABLET, ORALLY DISINTEGRATING ORAL EVERY 30 MIN PRN
Status: CANCELLED | OUTPATIENT
Start: 2020-03-03

## 2020-03-03 RX ORDER — SODIUM CHLORIDE, SODIUM LACTATE, POTASSIUM CHLORIDE, CALCIUM CHLORIDE 600; 310; 30; 20 MG/100ML; MG/100ML; MG/100ML; MG/100ML
INJECTION, SOLUTION INTRAVENOUS CONTINUOUS
Status: CANCELLED | OUTPATIENT
Start: 2020-03-03

## 2020-03-03 RX ORDER — ONDANSETRON 2 MG/ML
4 INJECTION INTRAMUSCULAR; INTRAVENOUS EVERY 30 MIN PRN
Status: CANCELLED | OUTPATIENT
Start: 2020-03-03

## 2020-03-03 RX ORDER — FENTANYL CITRATE 50 UG/ML
25-50 INJECTION, SOLUTION INTRAMUSCULAR; INTRAVENOUS
Status: CANCELLED | OUTPATIENT
Start: 2020-03-03

## 2020-03-03 RX ORDER — MAGNESIUM HYDROXIDE 1200 MG/15ML
LIQUID ORAL PRN
Status: DISCONTINUED | OUTPATIENT
Start: 2020-03-03 | End: 2020-03-03 | Stop reason: HOSPADM

## 2020-03-03 RX ORDER — FENTANYL CITRATE 50 UG/ML
INJECTION, SOLUTION INTRAMUSCULAR; INTRAVENOUS PRN
Status: DISCONTINUED | OUTPATIENT
Start: 2020-03-03 | End: 2020-03-03

## 2020-03-03 RX ORDER — PROPOFOL 10 MG/ML
INJECTION, EMULSION INTRAVENOUS CONTINUOUS PRN
Status: DISCONTINUED | OUTPATIENT
Start: 2020-03-03 | End: 2020-03-03

## 2020-03-03 RX ORDER — ACETAMINOPHEN 325 MG/1
650 TABLET ORAL EVERY 4 HOURS PRN
Status: DISCONTINUED | OUTPATIENT
Start: 2020-03-06 | End: 2020-03-04 | Stop reason: HOSPADM

## 2020-03-03 RX ORDER — ONDANSETRON 2 MG/ML
INJECTION INTRAMUSCULAR; INTRAVENOUS PRN
Status: DISCONTINUED | OUTPATIENT
Start: 2020-03-03 | End: 2020-03-03

## 2020-03-03 RX ORDER — LIDOCAINE 40 MG/G
CREAM TOPICAL
Status: CANCELLED | OUTPATIENT
Start: 2020-03-03

## 2020-03-03 RX ADMIN — BUSPIRONE HYDROCHLORIDE 10 MG: 10 TABLET ORAL at 22:23

## 2020-03-03 RX ADMIN — OXYCODONE HYDROCHLORIDE AND ACETAMINOPHEN 2 TABLET: 5; 325 TABLET ORAL at 17:28

## 2020-03-03 RX ADMIN — FENTANYL CITRATE 50 MCG: 50 INJECTION, SOLUTION INTRAMUSCULAR; INTRAVENOUS at 18:51

## 2020-03-03 RX ADMIN — INSULIN ASPART 3 UNITS: 100 INJECTION, SOLUTION INTRAVENOUS; SUBCUTANEOUS at 13:35

## 2020-03-03 RX ADMIN — VALSARTAN 160 MG: 160 TABLET, FILM COATED ORAL at 08:40

## 2020-03-03 RX ADMIN — BISOPROLOL FUMARATE 5 MG: 5 TABLET ORAL at 08:40

## 2020-03-03 RX ADMIN — SODIUM CHLORIDE: 9 INJECTION, SOLUTION INTRAVENOUS at 21:17

## 2020-03-03 RX ADMIN — FLUOXETINE 20 MG: 20 CAPSULE ORAL at 08:41

## 2020-03-03 RX ADMIN — ONDANSETRON 4 MG: 2 INJECTION INTRAMUSCULAR; INTRAVENOUS at 19:00

## 2020-03-03 RX ADMIN — ASPIRIN 81 MG 81 MG: 81 TABLET ORAL at 08:40

## 2020-03-03 RX ADMIN — PROPOFOL 200 MCG/KG/MIN: 10 INJECTION, EMULSION INTRAVENOUS at 18:51

## 2020-03-03 RX ADMIN — ACETAMINOPHEN 650 MG: 325 TABLET, FILM COATED ORAL at 04:04

## 2020-03-03 RX ADMIN — NICOTINE 1 PATCH: 21 PATCH, EXTENDED RELEASE TRANSDERMAL at 08:41

## 2020-03-03 RX ADMIN — OXYCODONE HYDROCHLORIDE AND ACETAMINOPHEN 2 TABLET: 5; 325 TABLET ORAL at 13:34

## 2020-03-03 RX ADMIN — PIPERACILLIN AND TAZOBACTAM 3.38 G: 3; .375 INJECTION, POWDER, FOR SOLUTION INTRAVENOUS at 15:42

## 2020-03-03 RX ADMIN — SODIUM CHLORIDE, POTASSIUM CHLORIDE, SODIUM LACTATE AND CALCIUM CHLORIDE: 600; 310; 30; 20 INJECTION, SOLUTION INTRAVENOUS at 18:49

## 2020-03-03 RX ADMIN — ISOSORBIDE DINITRATE 40 MG: 20 TABLET ORAL at 15:45

## 2020-03-03 RX ADMIN — PHENYLEPHRINE HYDROCHLORIDE 100 MCG: 10 INJECTION INTRAVENOUS at 19:19

## 2020-03-03 RX ADMIN — PIPERACILLIN AND TAZOBACTAM 3.38 G: 3; .375 INJECTION, POWDER, FOR SOLUTION INTRAVENOUS at 21:16

## 2020-03-03 RX ADMIN — MIDAZOLAM 2 MG: 1 INJECTION INTRAMUSCULAR; INTRAVENOUS at 18:49

## 2020-03-03 RX ADMIN — ISOSORBIDE DINITRATE 40 MG: 20 TABLET ORAL at 06:09

## 2020-03-03 RX ADMIN — ROSUVASTATIN CALCIUM 20 MG: 20 TABLET, FILM COATED ORAL at 08:40

## 2020-03-03 RX ADMIN — SODIUM CHLORIDE: 9 INJECTION, SOLUTION INTRAVENOUS at 17:29

## 2020-03-03 RX ADMIN — INSULIN ASPART 2 UNITS: 100 INJECTION, SOLUTION INTRAVENOUS; SUBCUTANEOUS at 10:13

## 2020-03-03 RX ADMIN — SODIUM CHLORIDE: 9 INJECTION, SOLUTION INTRAVENOUS at 06:10

## 2020-03-03 RX ADMIN — PIPERACILLIN AND TAZOBACTAM 3.38 G: 3; .375 INJECTION, POWDER, FOR SOLUTION INTRAVENOUS at 02:43

## 2020-03-03 RX ADMIN — OXYCODONE HYDROCHLORIDE AND ACETAMINOPHEN 2 TABLET: 5; 325 TABLET ORAL at 08:41

## 2020-03-03 RX ADMIN — DIVALPROEX SODIUM 1000 MG: 500 TABLET, DELAYED RELEASE ORAL at 22:23

## 2020-03-03 RX ADMIN — DIVALPROEX SODIUM 1000 MG: 500 TABLET, DELAYED RELEASE ORAL at 08:40

## 2020-03-03 RX ADMIN — CLOMIPRAMINE HYDROCHLORIDE 100 MG: 50 CAPSULE ORAL at 08:40

## 2020-03-03 RX ADMIN — BUSPIRONE HYDROCHLORIDE 10 MG: 10 TABLET ORAL at 13:42

## 2020-03-03 RX ADMIN — QUETIAPINE 600 MG: 300 TABLET, FILM COATED ORAL at 22:23

## 2020-03-03 RX ADMIN — BUSPIRONE HYDROCHLORIDE 10 MG: 10 TABLET ORAL at 08:41

## 2020-03-03 RX ADMIN — PIPERACILLIN AND TAZOBACTAM 3.38 G: 3; .375 INJECTION, POWDER, FOR SOLUTION INTRAVENOUS at 10:15

## 2020-03-03 ASSESSMENT — ACTIVITIES OF DAILY LIVING (ADL)
ADLS_ACUITY_SCORE: 12

## 2020-03-03 ASSESSMENT — COPD QUESTIONNAIRES: COPD: 0

## 2020-03-03 ASSESSMENT — LIFESTYLE VARIABLES: TOBACCO_USE: 1

## 2020-03-03 NOTE — PLAN OF CARE
Pt alert, oriented and able to initiate needs.  C/o headache that improved with use of tylenol.  No other complaints of discomfort. Vitals remain stable and pt is SR/ST.  Pt HR does increase increase to 120's with activity.  Otherwise, rate is 80's-110's.  Right necrotic toe open to air with no drainage noted.  Ambulates in room with SBA.  NS infusing as ordered.  Plan for right toe amputation today.

## 2020-03-03 NOTE — PROGRESS NOTES
[facility]    Infectious Disease Progress Note    Date of Service (when I saw the patient): 03/03/2020     Assessment & Plan   Huseyin Pettit is a 60 year old male who was admitted on 3/1/2020.     Impression:  1. 60 y.o male with diabetes.   2. Neuropathy.   3. Tobacco dependence.   4. Admitted for [facility] for right great toe gangrene and osteo requiring vascular and podiatry work up.   5. Currently on zosyn.      Recommendations:   1. Continue on zosyn   2. Noted plans for amputation today       Jagruti Danielle MD    Interval History   Afebrile   No new data in the micro yet   Noted plans for amputation today    Physical Exam   Temp: 98  F (36.7  C) Temp src: Oral BP: 96/61 Pulse: 92 Heart Rate: 110 Resp: 18 SpO2: 99 % O2 Device: None (Room air)    Vitals:    03/02/20 0600 03/03/20 0242   Weight: 78 kg (171 lb 15.3 oz) 78.3 kg (172 lb 9.6 oz)     Vital Signs with Ranges  Temp:  [97.3  F (36.3  C)-98.7  F (37.1  C)] 98  F (36.7  C)  Pulse:  [] 92  Heart Rate:  [] 110  Resp:  [12-20] 18  BP: ()/(60-80) 96/61  SpO2:  [97 %-99 %] 99 %    Constitutional: Awake, alert, cooperative, no apparent distress  Lungs: Clear to auscultation bilaterally, no crackles or wheezing  Cardiovascular: Regular rate and rhythm, normal S1 and S2, and no murmur noted  Abdomen: Normal bowel sounds, soft, non-distended, non-tender  Skin: No rashes, no cyanosis, no edema  Other:    Medications     sodium chloride 100 mL/hr at 03/03/20 0610       aspirin  81 mg Oral Daily     bisoprolol  5 mg Oral Daily     busPIRone  10 mg Oral 4x Daily     clomiPRAMINE  100 mg Oral BID     divalproex sodium delayed-release  1,000 mg Oral BID     FLUoxetine  20 mg Oral Daily     insulin aspart   Subcutaneous TID w/meals     insulin aspart  1-10 Units Subcutaneous TID AC     insulin aspart  1-7 Units Subcutaneous At Bedtime     isosorbide Dinitrate  40 mg Oral BID     nicotine  1 patch Transdermal Daily     nicotine    Transdermal Q8H     piperacillin-tazobactam  3.375 g Intravenous Q6H     QUEtiapine  600 mg Oral At Bedtime     rosuvastatin  20 mg Oral Daily     sodium chloride (PF)  3 mL Intracatheter Q8H     valsartan  160 mg Oral Daily       Data   All microbiology laboratory data reviewed.  Recent Labs   Lab Test 03/03/20  0558 03/02/20  0741 02/29/20  1029   WBC 11.2* 10.1 11.0   HGB 13.0* 12.0* 13.4   HCT 38.9* 37.1* 42.9   MCV 89 91 94    249 341     Recent Labs   Lab Test 03/02/20  0741 02/29/20  1029 07/03/18  0906   CR 0.96 0.94 0.77     Recent Labs   Lab Test 03/01/20  0710   SED 27*     Recent Labs   Lab Test 02/29/20  1104   CULT No growth       Attestation:  Total time on the floor involved in the patient's care: 35 minutes. Total time spent in counseling/care coordination: >50%

## 2020-03-03 NOTE — PLAN OF CARE
Pt here with toe amputation today. A&O. VSS. Tele afib rvr.   NPO for surgery- preop should be getting him around 3:30 or 4p.  Up indpt . pain meds given for toe.  Pt scoring green on the Aggression Stop Light Tool. Plan:  transfer to med floor after surgery.

## 2020-03-03 NOTE — PLAN OF CARE
VSS on RA. Tele- SR. Right great toe pain managed with PRN Percocet. NPO. Waiting to go down for surgery. Continue to monitor.

## 2020-03-03 NOTE — PROGRESS NOTES
SW  I: Consult acknowledged. Writer went to see patient, patient in surgery.   P: Will continue to follow.     ALEKS Fuller, [facility]  [phone]

## 2020-03-03 NOTE — PROGRESS NOTES
[facility]    Medicine Progress Note - Hospitalist Service       Date of Admission:  3/1/2020  Assessment & Plan   Huseyin Pettit is a 60 year old male admitted on 3/1/2020.  Past history of CAD s/p PCI, HTN, hyperlipidemia, DM II with neuropathy, tobacco dependence and medication non-compliance who presented to [facility] with right great toe gangrene and osteomyelitis, transferred to [facility] for Vascular Surgery evaluation prior to amputation.    Right great toe gangrene and osteomyelitis  Presents with 2 weeks progressive toe pain with gangrenous changes.  MRI right foot 3/1 consistent with osteomyelitis of distal 1st phalanx with questionable extension to base.  CRP and sed rate mildly elevated, afebrile without leukocytosis.  Vasc surg consulted, REYMUNDO and TBI's wnl, recommend no further evaluation and ok to proceed with surgery.  - continue zosyn, ID recs appreciated  - plan for toe amputation today per Podiatry  - prn tylenol and oxycodone    CAD s/p PCI most recently in 2016  HTN  Reports undergoing PCI in [place] in 2011 and 2016 with total of 3 stents placed, details unknown.  Reporting 2 weeks increasing typical chest pain symptoms.  Outside EKG without ischemic changes, serial trop negative.  TTE 2/29 showing normal EF without WMA.  LDL 58.  Coronary angiogram 3/2 showing mild generalized CAD with patent stents, no intervention performed.   - continue PTA aspirin, rosuvastatin, bisoprolol, Imdur, valsartan  - pressures soft this AM, will hold hydrochlorothiazide, amlodipine (he reports he chronically runs 80-90's systolic on his current regimen)  - holding Plavix prior to surgery, likely resume tomorrow if ok with Podiatry  - etiology of recent chest pain unclear; trial of GI cocktail yesterday but he is vague about effectiveness  - Cards recs appreciated    DM II on long term insulin  A1C 7.2%.  Managed on Novolog 70/30 with 28 units qAM and 38 units qPM, metformin, glimepiride.  - holding  Novlog 70/30 insulin as NPO; consider resumption tomorrow following surgery  - prandial insulin 1 unit/10g carb, high dose ssi  - hold metformin and glimepiride    Depression  - continue PTA buspirone, fluoxetine, Depakote, clomipramine, Seroquel    Tobacco dependence  Smokes 1ppd.    - nicotine patch  - encourage cessation     Diet: Moderate Consistent CHO Diet    DVT Prophylaxis: Pneumatic Compression Devices  Rangel Catheter: not present  Code Status: Full Code      Disposition Plan   Expected discharge: 2 - 3 days, recommended to prior living arrangement once toe amputation complete, cleared by Podiatry.  Entered: Dilip Rodriguez MD 03/03/2020, 7:59 AM       The patient's care was discussed with the Bedside Nurse and Patient.    Dilip Rodriguez MD  Hospitalist Service  [facility]    ______________________________________________________________________    Interval History   Reports increased toe pain this morning.  No further chest pain, no dyspnea, no fever/chills or other complaints.     Data reviewed today: I reviewed all medications, new labs and imaging results over the last 24 hours. I personally reviewed no images or EKG's today.    Physical Exam   Vital Signs: Temp: 98  F (36.7  C) Temp src: Oral BP: 96/61 Pulse: 92 Heart Rate: 110 Resp: 18 SpO2: 99 % O2 Device: None (Room air)    Weight: 172 lbs 9.6 oz     General Appearance: Well nourished male in NAD, sleeping in bed  Respiratory: lungs CTAB, no wheezes or crackles, no tachypnea  Cardiovascular: RRR, normal s1/s2 without murmur  GI: abdomen soft, normal bowel sounds, nontender, nondistended  Skin: right great toe gangrenous  Other: Sleeping, arouses appropriately to voice, cranial nerves grossly intact     Data   Recent Labs   Lab 03/03/20  0558 03/02/20  0741 03/02/20  0037 03/01/20  2146 03/01/20  1742  02/29/20  1029   WBC 11.2* 10.1  --   --   --   --  11.0   HGB 13.0* 12.0*  --   --   --   --  13.4   MCV 89 91  --   --   --   --  94     249  --   --   --   --  341   NA  --  141  --   --   --   --  135   POTASSIUM  --  4.3  --   --   --   --  4.1   CHLORIDE  --  113*  --   --   --   --  104   CO2  --  23  --   --   --   --  26   BUN  --  25  --   --   --   --  24   CR  --  0.96  --   --   --   --  0.94   ANIONGAP  --  5  --   --   --   --  5   MYRON  --  8.5  --   --   --   --  9.1   GLC  --  144*  --   --   --   --  244*   TROPI  --   --  <0.015 <0.015 <0.015   < > <0.015    < > = values in this interval not displayed.

## 2020-03-03 NOTE — PLAN OF CARE
Neuro:flat affect, difficult to interpret needs, vague complaints  CV/Rhythm:NCA today, R TR site, small hematoma post TR band removal under arm band wrap, resolved with manual pressure CDI  Resp/02:RA  GI/Diet:mod carb  :voiding  Skin/Incisions/Sites:R toe gangrene eschar  Pulses/CMS:+1 right pedal, cms intact  Edema:none  Activity/Falls Risk:fall risk, sba to br  Lines/Drains/IVs:piv, old right TR band site  Labs/BGM: and 223  Test/Procedures:OR 3/3 at 5pm, NPO at 9 aM 3/3  VS/Pain:stable, ST at times 105, c/o 1 episode of GI upset, Dr Rodriguez notified and gi cocktail administered, vague HA complaints, sleeping between cares, refused tylenol  DC Plan:post OR  Other:trop -x3, IV antibiotics, no intervention in cath lab 3/2

## 2020-03-04 ENCOUNTER — APPOINTMENT (OUTPATIENT)
Dept: PHYSICAL THERAPY | Facility: CLINIC | Age: 60
End: 2020-03-04
Attending: PODIATRIST
Payer: COMMERCIAL

## 2020-03-04 VITALS
WEIGHT: 167.8 LBS | BODY MASS INDEX: 24.08 KG/M2 | DIASTOLIC BLOOD PRESSURE: 64 MMHG | OXYGEN SATURATION: 99 % | TEMPERATURE: 97.3 F | HEART RATE: 90 BPM | RESPIRATION RATE: 16 BRPM | SYSTOLIC BLOOD PRESSURE: 113 MMHG

## 2020-03-04 LAB
ANION GAP SERPL CALCULATED.3IONS-SCNC: 4 MMOL/L (ref 3–14)
BUN SERPL-MCNC: 12 MG/DL (ref 7–30)
CALCIUM SERPL-MCNC: 8.7 MG/DL (ref 8.5–10.1)
CHLORIDE SERPL-SCNC: 110 MMOL/L (ref 94–109)
CO2 SERPL-SCNC: 25 MMOL/L (ref 20–32)
CREAT SERPL-MCNC: 0.78 MG/DL (ref 0.66–1.25)
ERYTHROCYTE [DISTWIDTH] IN BLOOD BY AUTOMATED COUNT: 14.1 % (ref 10–15)
GFR SERPL CREATININE-BSD FRML MDRD: >90 ML/MIN/{1.73_M2}
GLUCOSE BLDC GLUCOMTR-MCNC: 177 MG/DL (ref 70–99)
GLUCOSE BLDC GLUCOMTR-MCNC: 233 MG/DL (ref 70–99)
GLUCOSE BLDC GLUCOMTR-MCNC: 337 MG/DL (ref 70–99)
GLUCOSE SERPL-MCNC: 255 MG/DL (ref 70–99)
HCT VFR BLD AUTO: 35.9 % (ref 40–53)
HGB BLD-MCNC: 11.7 G/DL (ref 13.3–17.7)
MCH RBC QN AUTO: 29.5 PG (ref 26.5–33)
MCHC RBC AUTO-ENTMCNC: 32.6 G/DL (ref 31.5–36.5)
MCV RBC AUTO: 90 FL (ref 78–100)
PLATELET # BLD AUTO: 214 10E9/L (ref 150–450)
POTASSIUM SERPL-SCNC: 4.8 MMOL/L (ref 3.4–5.3)
RBC # BLD AUTO: 3.97 10E12/L (ref 4.4–5.9)
SODIUM SERPL-SCNC: 139 MMOL/L (ref 133–144)
WBC # BLD AUTO: 8.1 10E9/L (ref 4–11)

## 2020-03-04 PROCEDURE — 99238 HOSP IP/OBS DSCHRG MGMT 30/<: CPT | Performed by: INTERNAL MEDICINE

## 2020-03-04 PROCEDURE — 25000132 ZZH RX MED GY IP 250 OP 250 PS 637: Performed by: INTERNAL MEDICINE

## 2020-03-04 PROCEDURE — 25800030 ZZH RX IP 258 OP 636: Performed by: PODIATRIST

## 2020-03-04 PROCEDURE — 25000128 H RX IP 250 OP 636: Performed by: PODIATRIST

## 2020-03-04 PROCEDURE — 25000132 ZZH RX MED GY IP 250 OP 250 PS 637: Performed by: PODIATRIST

## 2020-03-04 PROCEDURE — 80048 BASIC METABOLIC PNL TOTAL CA: CPT | Performed by: PODIATRIST

## 2020-03-04 PROCEDURE — 97116 GAIT TRAINING THERAPY: CPT | Mod: GP

## 2020-03-04 PROCEDURE — 00000146 ZZHCL STATISTIC GLUCOSE BY METER IP

## 2020-03-04 PROCEDURE — 36415 COLL VENOUS BLD VENIPUNCTURE: CPT | Performed by: PODIATRIST

## 2020-03-04 PROCEDURE — 97161 PT EVAL LOW COMPLEX 20 MIN: CPT | Mod: GP

## 2020-03-04 PROCEDURE — 85027 COMPLETE CBC AUTOMATED: CPT | Performed by: PODIATRIST

## 2020-03-04 RX ORDER — FAMOTIDINE 10 MG
10 TABLET ORAL 2 TIMES DAILY
Status: DISCONTINUED | OUTPATIENT
Start: 2020-03-04 | End: 2020-03-04 | Stop reason: HOSPADM

## 2020-03-04 RX ORDER — HYDROCODONE BITARTRATE AND ACETAMINOPHEN 5; 325 MG/1; MG/1
1 TABLET ORAL EVERY 6 HOURS PRN
Qty: 18 TABLET | Refills: 0 | Status: SHIPPED | OUTPATIENT
Start: 2020-03-04 | End: 2020-03-10

## 2020-03-04 RX ADMIN — CLOMIPRAMINE HYDROCHLORIDE 100 MG: 50 CAPSULE ORAL at 08:37

## 2020-03-04 RX ADMIN — PIPERACILLIN AND TAZOBACTAM 3.38 G: 3; .375 INJECTION, POWDER, FOR SOLUTION INTRAVENOUS at 08:56

## 2020-03-04 RX ADMIN — OXYCODONE HYDROCHLORIDE 10 MG: 5 TABLET ORAL at 03:23

## 2020-03-04 RX ADMIN — FAMOTIDINE 10 MG: 10 TABLET, FILM COATED ORAL at 06:11

## 2020-03-04 RX ADMIN — DIVALPROEX SODIUM 1000 MG: 500 TABLET, DELAYED RELEASE ORAL at 08:39

## 2020-03-04 RX ADMIN — ISOSORBIDE DINITRATE 40 MG: 20 TABLET ORAL at 06:15

## 2020-03-04 RX ADMIN — INSULIN ASPART 4 UNITS: 100 INJECTION, SOLUTION INTRAVENOUS; SUBCUTANEOUS at 13:50

## 2020-03-04 RX ADMIN — ACETAMINOPHEN 975 MG: 325 TABLET, FILM COATED ORAL at 15:26

## 2020-03-04 RX ADMIN — PIPERACILLIN AND TAZOBACTAM 3.38 G: 3; .375 INJECTION, POWDER, FOR SOLUTION INTRAVENOUS at 03:23

## 2020-03-04 RX ADMIN — CLOMIPRAMINE HYDROCHLORIDE 100 MG: 50 CAPSULE ORAL at 00:31

## 2020-03-04 RX ADMIN — OXYCODONE HYDROCHLORIDE AND ACETAMINOPHEN 2 TABLET: 5; 325 TABLET ORAL at 06:14

## 2020-03-04 RX ADMIN — OXYCODONE HYDROCHLORIDE 10 MG: 5 TABLET ORAL at 08:55

## 2020-03-04 RX ADMIN — INSULIN ASPART 2 UNITS: 100 INJECTION, SOLUTION INTRAVENOUS; SUBCUTANEOUS at 08:44

## 2020-03-04 RX ADMIN — FLUOXETINE 20 MG: 20 CAPSULE ORAL at 08:40

## 2020-03-04 RX ADMIN — OXYCODONE HYDROCHLORIDE 10 MG: 5 TABLET ORAL at 13:13

## 2020-03-04 RX ADMIN — VALSARTAN 160 MG: 160 TABLET, FILM COATED ORAL at 08:37

## 2020-03-04 RX ADMIN — BUSPIRONE HYDROCHLORIDE 10 MG: 10 TABLET ORAL at 13:13

## 2020-03-04 RX ADMIN — ROSUVASTATIN CALCIUM 20 MG: 20 TABLET, FILM COATED ORAL at 08:40

## 2020-03-04 RX ADMIN — ISOSORBIDE DINITRATE 40 MG: 20 TABLET ORAL at 15:26

## 2020-03-04 RX ADMIN — NICOTINE 1 PATCH: 21 PATCH, EXTENDED RELEASE TRANSDERMAL at 08:33

## 2020-03-04 RX ADMIN — OXYCODONE HYDROCHLORIDE 10 MG: 5 TABLET ORAL at 00:30

## 2020-03-04 RX ADMIN — BUSPIRONE HYDROCHLORIDE 10 MG: 10 TABLET ORAL at 08:40

## 2020-03-04 RX ADMIN — ASPIRIN 81 MG 81 MG: 81 TABLET ORAL at 08:40

## 2020-03-04 RX ADMIN — BISOPROLOL FUMARATE 5 MG: 5 TABLET ORAL at 08:39

## 2020-03-04 RX ADMIN — SODIUM CHLORIDE: 9 INJECTION, SOLUTION INTRAVENOUS at 08:32

## 2020-03-04 ASSESSMENT — ACTIVITIES OF DAILY LIVING (ADL)
ADLS_ACUITY_SCORE: 13
ADLS_ACUITY_SCORE: 13
ADLS_ACUITY_SCORE: 12
ADLS_ACUITY_SCORE: 12
ADLS_ACUITY_SCORE: 13

## 2020-03-04 NOTE — PLAN OF CARE
Discharge    Patient discharged to home via self with self.     DATE & TIME: 03/04/2020 7-3 pm   Cognitive Concerns/ Orientation : A & O x4, pt very anxious and not always accepting of education.   BEHAVIOR & AGGRESSION TOOL COLOR: Yellow  CIWA SCORE: N/A  ABNL VS/O2: VSS on RA.  MOBILITY: IND w/ ortho shoe. PT recommending a walker at time of discharge, pt refused walker two times.   PAIN MANAGMENT: Oxycodone x2 and Scheduled tylenol.   DIET: Mod carb. Ate food from home for lunch.   BOWEL/BLADDER: No issues per pt, pt stated he had a BM yesterday.   ABNL LAB/BG: /233  DRAIN/DEVICES: PIV SL and removed per pt request.   TELEMETRY RHYTHM: NSR  SKIN: Dusky/pale. R foot dressing, CDI. Podiatry changed dressing today @ bedside.   TESTS/PROCEDURES: None scheduled.   D/C DAY/GOALS/PLACE: Today, awaiting discharge medications to arrive.   OTHER IMPORTANT INFO: Partial weight bearing to R heal w/ ortho shoe. Pt throughout the day became very anxious and adamant to discharge. Was not accepting explanation/edcation writer was attempting to explain. AVS discussed w/ pt two times, pt denies any questions.     Listed belongings gathered and returned to patient. Yes  Care Plan and Patient education resolved: Yes  Prescriptions if needed, hard copies sent with patient  NA  Home and hospital acquired medications returned to patient: NA  Medication Bin checked and emptied on discharge Yes  Follow up appointment made for patient: Yes, podiatry follow up made. Not PCP.

## 2020-03-04 NOTE — PLAN OF CARE
"Discharge Planner PT   Patient plan for discharge: Home today  Current status: PT orders received, eval & treat completed. Pt is 60 y.o. M POD #1 partial R great toe amputation. \"Partial weight bearing on heel in surgical shoe.\" Pt lives with brother in house with no steps to enter, 5 steps with B rails up to bedroom. Previously independent with all mobility.   Currently, pt received supine in bed, agreeable to PT. Edu on role of PT, POC, WB status & need to use AD in order to maintain partial WB on heel. Pt demos ind bed<>mobility, sit<>stand with S with cues for heel WB only. Ambulated 200' with FWW and S with cues for step-to pattern to avoid rolling through forefoot/toes& to use UE support on walker to decrease WB through heel. Pt verbalizes understanding & with good response to cues. Performed 1x4 steps with B rails & cues for sequencing. Pt supine in bed upon departure, LE elevated.    Barriers to return to prior living situation: none anticipated  Recommendations for discharge: Home with use of walker for mobility, assist to carry walker up/down stairs  Rationale for recommendations: Patient demos mobility appropriate for discharge home with use of walker for mobility to maintain WB status & assist to carry walker up/down stairs. PT goals met within session, no further IP PT needs.    Physical Therapy Discharge Summary    Reason for therapy discharge:    IP PT goals met     Progress towards therapy goal(s). See goals on Care Plan in [facility] electronic health record for goal details.  Goals met    Therapy recommendation(s):    Ambulation with walker with step-to pattern to maintain partial WB through heel only.            Entered by: Ligia Lafleur 03/04/2020 11:27 AM       "

## 2020-03-04 NOTE — BRIEF OP NOTE
[facility]    Brief Operative Note    Pre-operative diagnosis: Diabetic foot infection (H) [E11.628, L08.9]  Post-operative diagnosis sp partial R great toe amputation    Procedure: Procedure(s):  AMPUTATION RIGHT GREAT TOE.  Surgeon: Surgeon(s) and Role:     * Sergey Barnes DPM - Primary  Anesthesia: General   Estimated blood loss: Less than 10 ml  Drains: None  Specimens:   ID Type Source Tests Collected by Time Destination   1 : right great toe bone Bone Toe ANAEROBIC BACTERIAL CULTURE, GRAM STAIN, TISSUE CULTURE AEROBIC BACTERIAL Sergey Barnes DPM 3/3/2020  7:28 PM    A : right great toe  Tissue Toe SURGICAL PATHOLOGY EXAM Sergey Barnes DPM 3/3/2020  7:30 PM      Findings:   healthy bleeding tissue at base of wound without signs of infection at amp site..  Complications: None.  Implants: * No implants in log *     All bone infection resected, full closure, no further surgery planned.

## 2020-03-04 NOTE — ANESTHESIA POSTPROCEDURE EVALUATION
Patient: Huseyin Pettit    Procedure(s):  AMPUTATION RIGHT GREAT TOE.    Diagnosis:Diabetic foot infection (H) [E11.628, L08.9]  Diagnosis Additional Information: No value filed.    Anesthesia Type:  MAC    Note:  Anesthesia Post Evaluation    Patient location during evaluation: PACU  Patient participation: Able to fully participate in evaluation  Level of consciousness: awake and alert  Pain management: adequate  Airway patency: patent  Cardiovascular status: acceptable  Respiratory status: acceptable  Hydration status: acceptable  PONV: none     Anesthetic complications: None          Last vitals:  Vitals:    03/03/20 1345 03/03/20 1544 03/03/20 1939   BP: 133/72 122/72 96/74   Pulse:      Resp: 18 18 20   Temp:  36.9  C (98.5  F) 36.1  C (97  F)   SpO2: 97% 98% 98%         Electronically Signed By: Venu Morillo MD  March 3, 2020  8:13 PM

## 2020-03-04 NOTE — ANESTHESIA PREPROCEDURE EVALUATION
Anesthesia Pre-Procedure Evaluation    Patient: Huseyin Sherman   MRN: [id number] : 1960          Preoperative Diagnosis: Diabetic foot infection (H) [E11.628, L08.9]    Procedure(s):  AMPUTATION RIGHT GREAT TOE.    Past Medical History:   Diagnosis Date     CAD (coronary artery disease) 2015    S/p 3 stents in  In [place]     Closed fracture of multiple ribs of left side with routine healing 7/10/2018     Coronary artery disease      Coronary artery disease involving native coronary artery of native heart without angina pectoris 7/3/2018    S/p 3 stents put in [place] in .     Depressive disorder      Diabetes (H)      Hypertension      Severe episode of recurrent major depressive disorder, without psychotic features (H) 7/3/2018     Type 2 diabetes mellitus with other circulatory complication, with long-term current use of insulin (H) 7/3/2018     Past Surgical History:   Procedure Laterality Date     CHOLECYSTECTOMY       CV CORONARY ANGIOGRAM N/A 3/2/2020    Procedure: Coronary Angiogram;  Surgeon: Thaddeus Sun MD;  Location: [facility] CARDIAC CATH LAB     GALLBLADDER SURGERY         Anesthesia Evaluation     . Pt has had prior anesthetic. Type: General    No history of anesthetic complications          ROS/MED HX    ENT/Pulmonary:     (+)tobacco use, Current use , . .   (-) asthma, COPD, sleep apnea and recent URI   Neurologic:  - neg neurologic ROS     Cardiovascular: Comment:    Name: HUSEYIN SHERMAN  MRN: [id number]  : 1960  Study Date: 2020 01:18 PM  Age: 60 yrs  Gender: Male  Patient Location: [facility]  Reason For Study: Chest Pain  Ordering Physician: ISAIAH BYRD  Performed By: Mariola Lockhart RDCS     BSA: 1.9 m2  Height: 68 in  Weight: 169 lb  BP: 107/65 mmHg  _____________________________________________________________________________  __        Procedure  Complete Portable Echo  Adult.  _____________________________________________________________________________  __        Interpretation Summary     No regional wall motion abnormalities noted.  The pericardium appears normal.  Normal size aorta  The left ventricle is normal in structure, function and size.  The visual ejection fraction is estimated at 55-60%.  The right ventricle is normal in structure, function and size.  Doppler interrogation does not demonstrate significant stenosis or  insufficieny involving cardiac valves.     No old studies avaiavblle for comparison.  _____________________________________________________________________________  __        Left Ventricle  The left ventricle is normal in structure, function and size. The visual  ejection fraction is estimated at 55-60%. Left ventricular diastolic function  is normal. No regional wall motion abnormalities noted. There is no thrombus  seen in the left ventricle.     Right Ventricle  The right ventricle is normal in structure, function and size. There is no  mass or thrombus in the right ventricle.     Atria  Normal left atrial size. Right atrial size is normal. There is no atrial shunt  seen. The left atrial appendage is not well visualized.        Mitral Valve  The mitral valve leaflets appear normal. There is no evidence of stenosis,  fluttering, or prolapse. There is no mitral regurgitation noted. There is no  mitral valve stenosis.    (+) hypertension--CAD, angina--stent,2011  3 . : . . . :. .      (-) CHF   METS/Exercise Tolerance:     Hematologic:  - neg hematologic  ROS       Musculoskeletal:         GI/Hepatic:        (-) GERD and liver disease   Renal/Genitourinary:      (-) renal disease   Endo:     (+) type II DM .      Psychiatric:     (+) psychiatric history depression      Infectious Disease:         Malignancy:         Other:                          Physical Exam  Normal systems: cardiovascular and pulmonary    Airway   Mallampati: II  TM distance: >3  "FB  Neck ROM: full    Dental   (+) upper dentures and lower dentures    Cardiovascular       Pulmonary             Lab Results   Component Value Date    WBC 11.2 (H) 03/03/2020    HGB 13.0 (L) 03/03/2020    HCT 38.9 (L) 03/03/2020     03/03/2020    CRP 8.2 (H) 03/01/2020    SED 27 (H) 03/01/2020     03/02/2020    POTASSIUM 4.3 03/02/2020    CHLORIDE 113 (H) 03/02/2020    CO2 23 03/02/2020    BUN 25 03/02/2020    CR 0.96 03/02/2020     (H) 03/02/2020    MYRON 8.5 03/02/2020    ALBUMIN 3.7 07/03/2018    PROTTOTAL 7.5 07/03/2018    ALT 35 07/03/2018    AST 14 07/03/2018    ALKPHOS 89 07/03/2018    BILITOTAL 0.2 07/03/2018    LIPASE 306 02/18/2015    TSH 0.66 07/03/2018       Preop Vitals  BP Readings from Last 3 Encounters:   03/03/20 122/72   03/01/20 (!) 77/47   07/10/18 131/78    Pulse Readings from Last 3 Encounters:   03/02/20 92   02/29/20 74   07/10/18 103      Resp Readings from Last 3 Encounters:   03/03/20 18   03/01/20 18   07/10/18 20    SpO2 Readings from Last 3 Encounters:   03/03/20 98%   03/01/20 95%   07/10/18 100%      Temp Readings from Last 1 Encounters:   03/03/20 36.9  C (98.5  F) (Oral)    Ht Readings from Last 1 Encounters:   03/01/20 1.778 m (5' 10\")      Wt Readings from Last 1 Encounters:   03/03/20 78.3 kg (172 lb 9.6 oz)    Estimated body mass index is 24.77 kg/m  as calculated from the following:    Height as of an earlier encounter on 3/1/20: 1.778 m (5' 10\").    Weight as of this encounter: 78.3 kg (172 lb 9.6 oz).       Anesthesia Plan      History & Physical Review  History and physical reviewed and following examination; no interval change.    ASA Status:  2 .    NPO Status:  > 8 hours    Plan for MAC Reason for MAC:  Deep or markedly invasive procedure (G8)  PONV prophylaxis:  Ondansetron (or other 5HT-3)       Postoperative Care  Postoperative pain management:  Multi-modal analgesia and IV analgesics.      Consents  Anesthetic plan, risks, benefits and alternatives " discussed with:  Patient..                 Venu Morillo MD

## 2020-03-04 NOTE — PLAN OF CARE
DATE & TIME: 3/3/2020 [id number]                          Cognitive Concerns/ Orientation : A&Ox4   BEHAVIOR & AGGRESSION TOOL COLOR: Green  CIWA SCORE: n/a     ABNL VS/O2: VSS on RA  MOBILITY: SBA- partial weight bearing on R foot- steps on heel  PAIN MANAGMENT: denies pain- has PRN percocet available  DIET: Mod Carb  BOWEL/BLADDER: continent of B&B  ABNL LAB/BG:   DRAIN/DEVICES: PIV infusing NaCl @ 100mL/hr  TELEMETRY RHYTHM: NSR  SKIN: WDL ex wound from R great toe amputation this evening and bandage from R radial angio is CDI  TESTS/PROCEDURES: Toe amputation done today  D/C DAY/GOALS/PLACE: pending improvement  OTHER IMPORTANT INFO: Pt refused Capno in Post-Op. Had unstable angina yesterday in heart center- had a R radial angio- CDI.

## 2020-03-04 NOTE — OP NOTE
Procedure Date: 03/03/2020      SURGEON:  Sergey Barnes DPM      PREOPERATIVE DIAGNOSES:   1.  Diabetes mellitus with peripheral neuropathy.   2.  Peripheral arterial disease.   3.  Gangrene, right great toe with osteomyelitis of the distal phalanx.      POSTOPERATIVE DIAGNOSES:   1.  Diabetes mellitus with peripheral neuropathy.   2.  Peripheral arterial disease.   3.  Gangrene, right great toe with osteomyelitis of the distal phalanx.      PROCEDURE:  Partial right great toe amputation.      ANESTHESIA:  MAC with local.      HEMOSTASIS:  None.      ESTIMATED BLOOD LOSS:  Less than 10 mL      MATERIALS:  None.      INJECTABLES:  13 mL of 0.5% bupivacaine plain.      COMPLICATIONS:  None apparent.      INDICATIONS FOR PROCEDURE:  After obtaining written consent, the patient was transferred to the operating room and placed in the supine position on the operating room table.  IV sedation was initiated.  The foot was anesthetized with preoperative local.  It was then prepped and draped in normal aseptic fashion.  No tourniquet was utilized.  The patient, procedure and site were correctly identified by OR staff.      Attention was directed to the right great toe.  There was clear gangrene at the distal aspect of the toe.  A fishmouth incision was drawn out with apices medial and lateral and carried down to underlying bone at the level of the proximal phalanx.  The soft tissue was reflected off of the proximal phalanx in full thickness, and a through-and-through osteotomy was performed through the proximal phalanx at the distal aspect of the toe and handed off. Bone cultures were then obtained from the distal phalanx.  The patient had excellent bleeding tissue at the amputation site, without evidence of necrosis or deep infection.  Bleeding vessels were electrocauterized and hand-tied as necessary, and after adequate flushing of the surgical site, single layer closure was performed with 4-0 nylon with minimal  tension on the incision.      A dry sterile dressing was applied to the patient's right foot.  He appeared to tolerate the procedure and anesthesia well and was transferred to the PACU with vital signs stable and vascular status intact to the foot.  The patient will be readmitted to the floor for IV antibiotics and monitoring.         SEVERINO PALACIO DPM             D: 2020   T: 2020   MT: ELLYN      Name:     MORALES SHERMAN   MRN:      [id number]        Account:        [id number]   :      1960           Procedure Date: 2020      Document: [id number]

## 2020-03-04 NOTE — DISCHARGE SUMMARY
[facility]    Hospitalist Discharge Summary       Date of Admission:  3/1/2020  Date of Discharge:  3/4/2020  Discharging Provider: Dougie Pena MD      Discharge Diagnoses   Right great toe gangrene and osteomyelitis s/p resection 3/3/2020    Follow-ups Needed After Discharge   Follow-up Appointments     Follow-up and recommended labs and tests       Thank you for choosing [facility]!    Follow up with Dr Palacio at one of the clinic locations within 5-8   days of discharge.    DR. PALACIO'S CLINIC LOCATIONS:    MONDAY - [place]    [address] [address]   [phone]     TUESDAY - [place]  [address]   [place] [address] [phone]    THURSDAY AM - [place]   [address]   [place], [place] [address] [phone]       THURSDAY PM - [place]  [address]  [place] [address] [phone]         FRIDAY AM - [place]   [address] [place] [address] [phone]         Follow-up and recommended labs and tests       Follow up with primary care provider, Hunter Carlton, within 7 days for   hospital follow- up.  No follow up labs or test are needed.           Unresulted Labs Ordered in the Past 30 Days of this Admission     Date and Time Order Name Status Description    3/3/2020 1931 Surgical pathology exam In process     3/3/2020 1928 Anaerobic bacterial culture Preliminary     3/3/2020 1928 Bone Culture Aerobic Bacterial Preliminary       These results will be followed up by Podiatry clinic, infectious disease    Hospital Course   Huseyin Pettit is a 59 yo M with PMH of CAD s/p PCI, HTN, HLD, DM II with neuropathy, tobacco dependence and medication non-compliance who presented initially to [facility] with right great toe infection. MRI right foot 3/1 consistent with osteomyelitis of distal 1st phalanx with questionable extension to base. Transferred to [facility]  evaluation. CRP and sed rate mildly elevated, afebrile without leukocytosis. Vasc surg consulted, REYMUNDO and TBI's wnl, recommend no further evaluation and so patient underwent right great toe amputation on 3/3/2020. Patient on 3/4/2020 requested to discharge however cultures were growing GPCs and not ready to discharge. Patient requested to leave AMA, so instead he will be discharged with a prescription for Augmentin empirically, ID will follow up final cultures.     CAD s/p PCI most recently in 2016  HTN  Reports undergoing PCI in [place] in 2011 and 2016 with total of 3 stents placed, details unknown.  Reporting 2 weeks increasing typical chest pain symptoms.  Outside EKG without ischemic changes, serial trop negative.  TTE 2/29 showing normal EF without WMA.  LDL 58.  Coronary angiogram 3/2 showing mild generalized CAD with patent stents, no intervention performed.  - Follow up with PCP  - Hydrochlorothiazide held at discharge due to soft blood pressures     DM II on long term insulin  A1C 7.2%.  Managed on Novolog 70/30 with 28 units qAM and 38 units qPM, metformin, glimepiride.  - Resume home regimen at discharge    Tobacco dependence: Not interested in quitting. Smokes 1ppd.    Consultations This Hospital Stay   VASCULAR SURGERY IP CONSULT  PODIATRY IP CONSULT  CARDIOLOGY IP CONSULT  INFECTIOUS DISEASES IP CONSULT  PHARMACY IP CONSULT  PHARMACY IP CONSULT  SOCIAL WORK IP CONSULT  PHYSICAL THERAPY ADULT IP CONSULT  SMOKING CESSATION PROGRAM IP CONSULT    Code Status   Full Code    Time Spent on this Encounter   I, Dougie Pena, personally saw the patient today and spent approximately 25 minutes discharging this patient.       Dougie Pena MD  [facility]  ______________________________________________________________________    Physical Exam   Vital Signs: Temp: 97.3  F (36.3  C) Temp src: Oral BP: 113/64 Pulse: 90 Heart Rate: 90 Resp: 16 SpO2: 99 % O2 Device: None (Room air)    Weight:  167 lbs 12.8 oz    Constitutional: Male in NAD  Eyes: PERRL, nonicteric, normal ocular movements  HEENT: Normocephalic, atraumatic, oral mucosa moist  Respiratory: CTAB, no wheezing or crackles  Cardiovascular: RRR, normal S1/2, no m/r/g  GI: No organomegaly, normoactive bowel sounds, nontender, nondistended  Skin: No rashes  Musculoskeletal: Normal strength in UE and LE, moves all extremities  Neurologic: A&Ox3  Psychiatric: Anxious and mildly agitated, redirectable       Primary Care Physician   Hunter Carlton    Discharge Disposition   Discharged to home  Condition at discharge: Stable    Significant Results and Procedures   Most Recent 3 CBC's:  Recent Labs   Lab Test 03/04/20  0945 03/03/20  0558 03/02/20  0741   WBC 8.1 11.2* 10.1   HGB 11.7* 13.0* 12.0*   MCV 90 89 91    245 249     Most Recent 3 BMP's:  Recent Labs   Lab Test 03/04/20  0945 03/02/20  0741 02/29/20  1029    141 135   POTASSIUM 4.8 4.3 4.1   CHLORIDE 110* 113* 104   CO2 25 23 26   BUN 12 25 24   CR 0.78 0.96 0.94   ANIONGAP 4 5 5   MYRON 8.7 8.5 9.1   * 144* 244*     Most Recent 2 LFT's:  Recent Labs   Lab Test 07/03/18  0906 02/18/15  1123   AST 14 <3   ALT 35 14   ALKPHOS 89 81   BILITOTAL 0.2 0.2   ,   Results for orders placed or performed during the hospital encounter of 03/01/20   US Lower Extremity PPG    Narrative    ULTRASOUND LOWER EXTREMITY PPG 3/2/2020 11:50 AM    HISTORY:  60-year-old patient with peripheral arterial disease and  right great toe wound. REYMUNDO examination performed the day prior.  Request made for toe pressures.    FINDINGS: Toe pressure in the right second digit is 104 mmHg with  brachial pressure 109 mmHg for an index of 0.95. Absolute pressures in  the left first digit are 68 mmHg and in the second digit 97 mmHg.  Indices in the left first digit for toe-brachial index is 0.62 and  0.89 in the left second digit.    Digital waveforms demonstrate good amplitude and morphology in the  right second digit.  Overall amplitude and morphology are depressed in  the left first and second digits.      Impression    IMPRESSION:  Normal total-brachial index in the right second digit  with intact waveforms. Toe-brachial indices in the left first and  second digits are technically normal, though the first digit is  borderline. However, waveforms are mildly diminished.    ERWIN BOTELLO MD   XR Foot Port Right 2 Views    Narrative    EXAM: XR FOOT PORT RT 2 VW  LOCATION: [facility]  DATE/TIME: 3/3/2020 8:05 PM    INDICATION: Status post partial great toe amputation.  COMPARISON: 03/01/2020 MRI.      Impression    IMPRESSION: Partial amputation of the great toe at the level of the base of the proximal phalanx has been performed in the interval. Soft tissues are intact.   Cardiac Catheterization    Narrative    1.  There is mild generalized coronary atherosclerosis without significant   obstruction.  The stented segments in the proximal LAD and mid circumflex   remain widely patent with only minimal in-stent restenosis.  The left main   and dominant right coronary have no significant focal narrowing.       Discharge Orders      Follow-up and recommended labs and tests     Thank you for choosing [facility]!    Follow up with Dr Palacio at one of the clinic locations within 5-8 days of discharge.    DR. PALACIO'S CLINIC LOCATIONS:    MONDAY - [place]    [address] [place] [address] [phone]     TUESDAY - [place]  [address]   [place] [address] [phone]    THURSDAY AM - [place]   [address]   [place] [address] [phone]       THURSDAY PM - [facility]  [address]  [place] [address] [phone]         FRIDAY AM - [place]   [address] [place] [address] [phone]     Activity    1.  Perform the following activities every 2 hours x 5 minutes:  -ankle ROM/calf massaging bilateral lower  extremity.  If you are not comfortable moving the surgical ankle, you can wiggle the toes on that foot  -deep breathing/coughing exercises  -ambulation; keep in mind your weightbearing restrictions    2. Heel WB right lower extremity in surgical shoe    3.  Elevate surgical limb above hip level 23/24 hours per day for aggressive swelling control, pain control, and to help facilitate incision healing.  This is to be done until sutures are removed.  You can sleep with your leg flat    4.  Apply ice pack to surgical site and behind right knee every 2 hours x 20 minutes.  Do not apply ice pack directly to skin.     Wound care and dressings    Keep dressing clean, dry and intact until your first post-op appointment.  Call or follow up in clinic immediately with any concerns.     Reason for your hospital stay    You were hospitalized for a big toe infection, which was surgically removed on March 3, 2020.     Follow-up and recommended labs and tests     Follow up with primary care provider, Hunter Carlton, within 7 days for hospital follow- up.  No follow up labs or test are needed.     Activity    Your activity upon discharge: activity as tolerated     Discharge Instructions    Do not take your hydrochlorothiazide until directed by a doctor.     Full Code     Walker    DME Documentation:   Describe the reason for need to support medical necessity: Partial WB R LE through heel.     I, the undersigned, certify that the above prescribed supplies are medically necessary for this patient and is both reasonable and necessary in reference to accepted standards of medical and necessary in reference to accepted standards of medical practice in the treatment of this patient's condition and is not prescribed as a convenience.     Diet    Follow this diet upon discharge:      Moderate Consistent CHO Diet     Discharge Medications   Current Discharge Medication List      START taking these medications    Details   amoxicillin-clavulanate  (AUGMENTIN) 875-125 MG tablet Take 1 tablet by mouth 2 times daily for 7 days  Qty: 14 tablet, Refills: 0    Associated Diagnoses: Toe osteomyelitis, right (H)         CONTINUE these medications which have NOT CHANGED    Details   amLODIPine-valsartan (EXFORGE)  MG tablet Take 1 tablet by mouth daily      aspirin 81 MG tablet Take 81 mg by mouth daily       bisoprolol (ZEBETA) 5 MG tablet Take 5 mg by mouth daily      busPIRone (BUSPAR) 10 MG tablet Take 10 mg by mouth 4 times daily      clomiPRAMINE (ANAFRANIL) 50 MG capsule Take 100 mg by mouth 2 times daily      clopidogrel (PLAVIX) 75 MG tablet Take 1 tablet (75 mg) by mouth daily  Qty: 90 tablet, Refills: 3    Associated Diagnoses: Coronary artery disease involving native coronary artery of native heart without angina pectoris      divalproex sodium delayed-release (DEPAKOTE) 500 MG DR tablet Take 1,000 mg by mouth 2 times daily       FLUoxetine (PROZAC) 20 MG capsule Take 20 mg by mouth daily      glimepiride (AMARYL) 2 MG tablet Take 2 mg by mouth every morning (before breakfast)      insulin aspart prot & aspart (NOVOLOG MIX 70/30 PEN) injection 28 units in AM, and 38 units in PM  Qty: 9 mL, Refills: 3    Associated Diagnoses: Type 2 diabetes mellitus with other circulatory complication, with long-term current use of insulin (H)      isosorbide Dinitrate (ISORDIL) 40 MG TABS Take 1 tablet (40 mg) by mouth 2 times daily  Qty: 180 tablet, Refills: 3    Associated Diagnoses: Coronary artery disease involving native coronary artery of native heart without angina pectoris      metFORMIN (GLUCOPHAGE) 850 MG tablet Take 1 tablet (850 mg) by mouth 2 times daily (with meals) (Need A1c for further refills)  Qty: 60 tablet, Refills: 0    Associated Diagnoses: Type 2 diabetes mellitus with other circulatory complication, with long-term current use of insulin (H)      QUEtiapine (SEROQUEL) 300 MG tablet Take 600 mg by mouth At Bedtime      rosuvastatin (CRESTOR) 20  MG tablet Take 1 tablet (20 mg) by mouth daily  Qty: 90 tablet, Refills: 3    Associated Diagnoses: Coronary artery disease involving native coronary artery of native heart without angina pectoris         STOP taking these medications       hydrochlorothiazide (HYDRODIURIL) 12.5 MG tablet Comments:   Reason for Stopping:             Allergies   No Known Allergies

## 2020-03-04 NOTE — CONSULTS
Care Transition Initial Assessment - RN  Met with: Patient.  DATA   Principal Problem:    Unstable angina (H)  Active Problems:    Toe osteomyelitis, right (H)       Cognitive Status: alert and oriented.        Contact information and PCP information verified: Yes  Lives With: sibling(s)   Living Arrangements: house                 Insurance concerns: No Insurance issues identified  ASSESSMENT  Patient currently receives the following services:        Identified issues/concerns regarding health management:  Discussed discharge plans with patient. Patient stating he lives with his brother and family. They would be able to assist if needed as patient had toe amputation. Patient states he does not have any problem with ambulating. Refused the walker that was offered.  Patient has h/o ty2dm and managed  on Novolog 70/30 with 28 units qAM and 38 units qPM, metformin, glimepiride.Patient will resume his home regimen. States he is compliant with medications and diet. Patient is anxious an wanting to leave AMA  Follow up appointment completed.     PLAN  Financial costs for the patient include none .  Patient given options and choices for discharge yes .  Patient/family is agreeable to the plan?  Yes:   Patient anticipates discharging to home .        Patient anticipates needs for home equipment: Yes  Transportation/person available to transport on day of discharge  is  family and have been notified  Plan/Disposition: Home   Appointments:  Completed.    Care  (CTS) will continue to follow as needed.

## 2020-03-04 NOTE — PROGRESS NOTES
"[facility]    Huseyin is dressed and ready to discharge.  He asked for a pain \"shot\" before he goes and for Vicodin for post operative pain.     Exam:  B/P: 113/64, T: 97.3, P: 90, R: 16  Right foot:   Incision is well coapted   Suture intact   Light jamila-incisional erythema and some dry blood   No active bleeding.    Gram Stain Abnormal  03/03/2020  7:28    Moderate   Gram positive cocci   Previously reported as:   Gram positive cocci   CORRECTED ON:   03/03/20 @2344. .      Bone cultures pending    Assessment:  60 year old male with type 2 DM, peripheral neuropathy status post partial amputation of the right hallux for treatment of osteomyelitis of the distal phalanx.    POD #1; stable; all infection was likely surgical removed.    Plan:  Sterile re-dress of right foot.  I reviewed dressing cares, activity recommendations and clinic follow up with Huseyin.  Vicodin/ Norco is a reasonable request. Prescription provided.  Other foot relevant discharge orders placed by Dr. Barnes.     Podiatry will sign off.     Thaddeus Bynum DPM, FACFAS, MS    [facility]    "

## 2020-03-04 NOTE — ANESTHESIA CARE TRANSFER NOTE
Patient: Huseyin Pettit    Procedure(s):  AMPUTATION RIGHT GREAT TOE.    Diagnosis: Diabetic foot infection (H) [E11.628, L08.9]  Diagnosis Additional Information: No value filed.    Anesthesia Type:   MAC     Note:  Airway :Face Mask  Patient transferred to:PACU  Comments: At end of procedure, spontaneous respirations, patient alert to voice, able to follow commands. Oxygen via facemask at 8 liters per minute to PACU. Oxygen tubing connected to wall O2 in PACU, SpO2, NiBP, and EKG monitors and alarms on and functioning, Chiquita Hugger warmer connected to patient gown, report on patient's clinical status given to PACU RN, RN questions answered.  Handoff Report: Identifed the Patient, Identified the Reponsible Provider, Reviewed the pertinent medical history, Discussed the surgical course, Reviewed Intra-OP anesthesia mangement and issues during anesthesia, Set expectations for post-procedure period and Allowed opportunity for questions and acknowledgement of understanding      Vitals: (Last set prior to Anesthesia Care Transfer)    CRNA VITALS  3/3/2020 1906 - 3/3/2020 1941      3/3/2020             Resp Rate (set):  10                Electronically Signed By: MC Bradley CRNA  March 3, 2020  7:41 PM

## 2020-03-04 NOTE — PROGRESS NOTES
Pt transferred to  66 from PACU after R great toe amputation. PT A&Ox4, VSS on RA, ate complimentary meal. Pt states no pain and no nausea at this time. Partial weight bearing on R foot (heel only), used air mattress to transfer from cart to bed.

## 2020-03-04 NOTE — PLAN OF CARE
DATE & TIME: 3/3/20 - 03/04/20 Night shift                    Cognitive Concerns/ Orientation : A&Ox4   BEHAVIOR & AGGRESSION TOOL COLOR: Green  CIWA SCORE: n/a     ABNL VS/O2: VSS on RA  MOBILITY: SBA- partial weight bearing on R foot- steps on heel  PAIN MANAGMENT: Complains of moderate/severe pain of R toe, gave Oxycodone 10mg every 3 hours. Scheduled tylenol, refused. Offered ice, refused.   DIET: Mod Carb  BOWEL/BLADDER: continent of B&B  ABNL LAB/BG:   DRAIN/DEVICES: PIV infusing NS @ 100mL/hr  TELEMETRY RHYTHM: NSR  SKIN: WDL ex wound from R great toe amputation this evening and bandage from R radial angio is CDI  TESTS/PROCEDURES: Toe amputation done today  D/C DAY/GOALS/PLACE: Podiatry will come today to change dressing, patient PODx1.  OTHER IMPORTANT INFO: Had unstable angina previously in heart center- had a R radial angio- CDI. Complained of heartburn, MD ordered Pepcid.

## 2020-03-04 NOTE — PROGRESS NOTES
"   03/04/20 1107   Quick Adds   Type of Visit Initial PT Evaluation   Living Environment   Lives With sibling(s)   Living Arrangements house   Home Accessibility stairs within home   Number of Stairs, Within Home, Primary 5  (from living room to bedrooms)   Stair Railings, Within Home, Primary railings on both sides of stairs   Transportation Anticipated family or friend will provide   Living Environment Comment Bathroom includes walk in shower with seat, standard toilet   Self-Care   Usual Activity Tolerance good   Current Activity Tolerance moderate   Regular Exercise No   Equipment Currently Used at Home none   Functional Level Prior   Ambulation 0-->independent   Transferring 0-->independent   Toileting 0-->independent   Bathing 0-->independent   Fall history within last six months no   Which of the above functional risks had a recent onset or change? ambulation;transferring   General Information   Onset of Illness/Injury or Date of Surgery - Date 03/03/20  (admit 3/1, surgery 3/3)   Referring Physician Sergey Barnes DPM   Patient/Family Goals Statement to go home today   Pertinent History of Current Problem (include personal factors and/or comorbidities that impact the POC) Pt is 60 y.o. M POD #1 partial R great toe amputation.    Weight-Bearing Status - RLE partial weight-bearing (% in comments)  (\"Partial weight bearing on heel in surgical shoe.\" )   General Info Comments Activity: Ambulate with assist   Cognitive Status Examination   Orientation orientation to person, place and time   Level of Consciousness alert   Follows Commands and Answers Questions 100% of the time;able to follow multistep instructions   Personal Safety and Judgment intact   Pain Assessment   Patient Currently in Pain Yes, see Vital Sign flowsheet  (2-3/10 R foot)   Integumentary/Edema   Integumentary/Edema Comments R foot wrapped   Posture    Posture Forward head position;Protracted shoulders   Range of Motion (ROM)   ROM " "Comment ROM appears grossly WFL   Strength   Strength Comments Strength not formally assessed, appears WFL with bed mobility & transfers   Bed Mobility   Bed Mobility Comments IND supine<>sit   Transfer Skills   Transfer Comments Stood impulsively at EOB, demos tendency to bear weight through flat foot, steady   Gait   Gait Comments Pt states he has been ambulating without AD in room but there is no way to maintain partial WB restriction without AD; with PT, ambulated with FWW and S, occaisionally tending to roll through toes/ WB through forefoot/toes on R LE   Balance   Balance Comments Good balance with support of walker   Sensory Examination   Sensory Perception Comments reports baseline numbness/tingling B feet & hands/fingers   General Therapy Interventions   Planned Therapy Interventions gait training;transfer training;home program guidelines   Clinical Impression   Criteria for Skilled Therapeutic Intervention yes, treatment indicated   PT Diagnosis difficulty ambulating & maintaining WB precautions   Influenced by the following impairments WB restriction- partial WB through heel in surgical shoe   Functional limitations due to impairments decreased independence with transfers & ambulation & stairs   Clinical Presentation Stable/Uncomplicated   Clinical Presentation Rationale clinical judgement   Clinical Decision Making (Complexity) Low complexity   Therapy Frequency   (1 eval & treat)   Predicted Duration of Therapy Intervention (days/wks) 1 day   Anticipated Equipment Needs at Discharge front wheeled walker   Anticipated Discharge Disposition Home with Assist  (SBA for steps/assist to carry walker up/down steps)   Risk & Benefits of therapy have been explained Yes   Patient, Family & other staff in agreement with plan of care Yes   Clinical Impression Comments Anticipate pt will meet goals within session in order for discharge home with SBA for stairs.   [facility] AM-PAC TM \"6 Clicks\"   2016, " "Trustees of [facility], under license to [employer].  All rights reserved.   6 Clicks Short Forms Basic Mobility Inpatient Short Form   [facility] AM-PAC  \"6 Clicks\" V.2 Basic Mobility Inpatient Short Form   1. Turning from your back to your side while in a flat bed without using bedrails? 4 - None   2. Moving from lying on your back to sitting on the side of a flat bed without using bedrails? 4 - None   3. Moving to and from a bed to a chair (including a wheelchair)? 3 - A Little   4. Standing up from a chair using your arms (e.g., wheelchair, or bedside chair)? 3 - A Little   5. To walk in hospital room? 3 - A Little   6. Climbing 3-5 steps with a railing? 3 - A Little   Basic Mobility Raw Score (Score out of 24.Lower scores equate to lower levels of function) 20   Total Evaluation Time   Total Evaluation Time (Minutes) 8     "

## 2020-03-05 ENCOUNTER — TELEPHONE (OUTPATIENT)
Dept: FAMILY MEDICINE | Facility: CLINIC | Age: 60
End: 2020-03-05

## 2020-03-05 ENCOUNTER — PATIENT OUTREACH (OUTPATIENT)
Dept: CARE COORDINATION | Facility: CLINIC | Age: 60
End: 2020-03-05

## 2020-03-05 DIAGNOSIS — Z71.89 OTHER SPECIFIED COUNSELING: Primary | Chronic | ICD-10-CM

## 2020-03-05 LAB — COPATH REPORT: NORMAL

## 2020-03-05 ASSESSMENT — ACTIVITIES OF DAILY LIVING (ADL): DEPENDENT_IADLS:: TRANSPORTATION

## 2020-03-05 NOTE — PROGRESS NOTES
Clinic Care Coordination Contact  OUTREACH    Referral Information:  Referral Source: IP Report - 2 hospitalizations, 1 ED visits within the past 90 days, 52% risk of admission or ED visit score.     Primary Diagnosis: Other (include Comment box)    Chief Complaint   Patient presents with     Clinic Care Coordination - Initial     IP Report     Clinic Care Coordination - Post Hospital        Universal Utilization: Patient was hospitalized at [facility] from 2/29 and transferred to [facility] on 03/01 until 03/04 with a diagnosis of right great toe gangrene and osteomyelitis s/p resection.   Clinic Utilization  Difficulty keeping appointments:: No  Compliance Concerns: No  No-Show Concerns: No  Utilization    Last refreshed: 3/5/2020  8:37 AM:  Hospital Admissions 2           Last refreshed: 3/5/2020  8:37 AM:  ED Visits 0           Last refreshed: 3/5/2020  8:37 AM:  No Show Count (past year) 0              Current as of: 3/5/2020  8:37 AM              Clinical Concerns:  Current Medical Concerns:    Patient Active Problem List   Diagnosis     Hyperlipidemia LDL goal <100     Benign essential hypertension     Severe episode of recurrent major depressive disorder, without psychotic features (H)     Coronary artery disease involving native coronary artery of native heart without angina pectoris     Type 2 diabetes mellitus with other circulatory complication, with long-term current use of insulin (H)     Closed fracture of multiple ribs of left side with routine healing     Uncontrolled diabetes mellitus (H)     Toe osteomyelitis, right (H)     Unstable angina (H)     Patient's brother answers phone number for patient and reports patient is unavailable. He reports patient is doing great. Pain is improving. Looking at getting a handicap parking permit.   Current Behavioral Concerns: none noted.     Education Provided to patient: CC role, clinic after hours, urgent care, AVS, Upcoming  scheduled appointments reviewed.    Pain  Pain (GOAL):: Yes  Type: Acute (<3mo)  Location of chronic pain:: toe  Progression: Improving  Chronic pain severity:: 3  Alleviating Factors: Rest  Aggravating Factors: Activity  Health Maintenance Reviewed: Due/Overdue   Health Maintenance Topics with due status: Overdue       Topic Date Due    PREVENTIVE CARE VISIT 1961    ADVANCE CARE PLANNING 1961    EYE EXAM 1960    COLONOSCOPY 02/18/1971    DTAP/TDAP/TD IMMUNIZATION 02/18/1972    PNEUMOCOCCAL IMMUNIZATION 19-64 MEDIUM RISK 02/18/1980    ZOSTER IMMUNIZATION 02/18/2011    PHQ-9 01/10/2019    MICROALBUMIN 07/03/2019    DIABETIC FOOT EXAM 07/03/2019    INFLUENZA VACCINE 09/01/2019        Clinical Pathway: None    Medication Management:  Patient independently managing his own medications. Reports taking medications as prescribed. No concerns.     Functional Status:  Dependent ADLs:: Independent  Dependent IADLs:: Transportation  Bed or wheelchair confined:: No  Mobility Status: Independent  Fallen 2 or more times in the past year?: No  Any fall with injury in the past year?: No    Living Situation:  Current living arrangement:: I live in a private home with family  Type of residence:: [facility]    Lifestyle & Psychosocial Needs:        Diet:: Diabetic diet  Inadequate nutrition (GOAL):: No  Tube Feeding: No  Inadequate activity/exercise (GOAL):: No  Significant changes in sleep pattern (GOAL): No  Mental health DX:: Yes  Mental health DX how managed:: Medication  Mental health management concern (GOAL):: No  Informal Support system:: Family   Socioeconomic History     Marital status: Single     Spouse name: Not on file     Number of children: Not on file     Years of education: Not on file     Highest education level: Not on file     Tobacco Use     Smoking status: Current Every Day Smoker     Packs/day: 0.50     Years: 25.00     Pack years: 12.50     Smokeless tobacco: Never Used     Tobacco  comment: trying to adela   Substance and Sexual Activity     Alcohol use: No     Drug use: No     Sexual activity: Never      Resources and Interventions:  Current Resources:   Community Resources: None  Supplies used at home:: Wound Care Supplies, Diabetic Supplies  Equipment Currently Used at Home: glucometer  Advance Care Plan/Directive  Advanced Care Plans/Directives on file:: No  Advanced Care Plan/Directive Status: Declined Further Information    Patient/Caregiver understanding: Patient's brother verbalized understanding and denies any additional questions or concerns at this time. RNCC engaged in AIDET communications during encounter.     Future Appointments              In 4 days Sergey Barnes DPM [facility]    In 1 week Hunter Carlton MD [facility]          Plan: No further outreaches will be made at this time unless a new referral is made or a change in the pt's status occurs. Patient's brother was provided with this writer's contact information and encouraged to call with any questions or concerns.     Sonja Juan RN Care Coordinator  [facility]  Email: [email or web address]  Phone: [phone]

## 2020-03-06 NOTE — TELEPHONE ENCOUNTER
"ED / Discharge Outreach Protocol    3/1 - 3/4   You were hospitalized for a big toe infection, which was  surgically removed on March 3, 2020.  Discharged with Augmentin and Norco. Advised to stop hydrochlorothiazide until advised to restart by MD   F/u scheduled with podiatry on 3/9 and pcp appt 3-16     Patient Contact    Attempt # 1    Was call answered?  Yes.  \"May I please speak with <patient name>\"  Is patient available?   No. Left message with brother for patient to call nurse back.    Patient's brother stated that patient is doing well and his follow ups are scheduled. Brother has been called by podiatry and given specific number to return call if concerns arise    Estela Serrano Registered Nurse   [facility]     "
Please call patient for IP follow up.    Coty Velasquez    
See care coordination note.  Bethanie Werner RN    
[place]

## 2020-03-07 LAB
BACTERIA SPEC CULT: ABNORMAL
SPECIMEN SOURCE: ABNORMAL

## 2020-03-09 ENCOUNTER — OFFICE VISIT (OUTPATIENT)
Dept: PODIATRY | Facility: CLINIC | Age: 60
End: 2020-03-09
Payer: COMMERCIAL

## 2020-03-09 VITALS
DIASTOLIC BLOOD PRESSURE: 62 MMHG | WEIGHT: 167.8 LBS | BODY MASS INDEX: 24.02 KG/M2 | SYSTOLIC BLOOD PRESSURE: 100 MMHG | HEIGHT: 70 IN

## 2020-03-09 DIAGNOSIS — Z89.421 S/P AMPUTATION OF LESSER TOE, RIGHT (H): Primary | ICD-10-CM

## 2020-03-09 PROCEDURE — 99024 POSTOP FOLLOW-UP VISIT: CPT | Performed by: PODIATRIST

## 2020-03-09 RX ORDER — HYDROCODONE BITARTRATE AND ACETAMINOPHEN 5; 325 MG/1; MG/1
TABLET ORAL
Qty: 18 TABLET | Refills: 0 | Status: SHIPPED | OUTPATIENT
Start: 2020-03-09 | End: 2020-03-15

## 2020-03-09 ASSESSMENT — MIFFLIN-ST. JEOR: SCORE: 1582.39

## 2020-03-09 NOTE — PROGRESS NOTES
"Foot & Ankle Surgery  March 9, 2020    S:  Patient in today approx 6 days sp R hallux amp  For dry gangrene.  Pain levels improving but still elevated.  Requesting refill on antibiotic and pain med.  Was discharged on 1 week of Augmentin    /62   Ht 1.778 m (5' 10\")   Wt 76.1 kg (167 lb 12.8 oz)   BMI 24.08 kg/m        ROS - positive for CC.  Patient denies current nausea, vomiting, chills, fevers, belly pain, calf pain, chest pain or SOB.  Complete remainder of ROS is otherwise neg.    PE - sutures intact, skin margins well coapted without necrosis/gapping.  Minimal inflammation, no drainage, no cellulitis.  Skin shows no trophic, color or temperature changes otherwise.  No calf redness, swelling or pain noted otherwise.    A/P - 60 year old yo patient approx 6 days sp above procedure  -Rx for Norco  -foot bandage applied.  Ok to shower, but advised keeping bandage c/d/i.  -reviewed post-procedure instructions. Advised frequent ambulation but otherwise patient should be elevating/resting  -no indication for PO abx refill.      Follow up  -  1 week or sooner with acute issues    Body mass index is 24.08 kg/m .        Sergey Barnes DPM FACFAS FACFAOM  Podiatric Foot & Ankle Surgeon  [facility]  [phone]    "

## 2020-03-09 NOTE — LETTER
"    3/9/2020         RE: Huseyin Pettit  [address] [address]        Dear Colleague,    Thank you for referring your patient, Huseyin Pettit, to the [facility]. Please see a copy of my visit note below.    Foot & Ankle Surgery  March 9, 2020    S:  Patient in today approx 6 days sp R hallux amp  For dry gangrene.  Pain levels improving but still elevated.  Requesting refill on antibiotic and pain med.  Was discharged on 1 week of Augmentin    /62   Ht 1.778 m (5' 10\")   Wt 76.1 kg (167 lb 12.8 oz)   BMI 24.08 kg/m        ROS - positive for CC.  Patient denies current nausea, vomiting, chills, fevers, belly pain, calf pain, chest pain or SOB.  Complete remainder of ROS is otherwise neg.    PE - sutures intact, skin margins well coapted without necrosis/gapping.  Minimal inflammation, no drainage, no cellulitis.  Skin shows no trophic, color or temperature changes otherwise.  No calf redness, swelling or pain noted otherwise.    A/P - 60 year old yo patient approx 6 days sp above procedure  -Rx for Norco  -foot bandage applied.  Ok to shower, but advised keeping bandage c/d/i.  -reviewed post-procedure instructions. Advised frequent ambulation but otherwise patient should be elevating/resting  -no indication for PO abx refill.      Follow up  -  1 week or sooner with acute issues    Body mass index is 24.08 kg/m .        Sergey Barnes DPM FACFAS FACFAOM  Podiatric Foot & Ankle Surgeon  [facility]  [phone]      Again, thank you for allowing me to participate in the care of your patient.        Sincerely,        Sergey Barnes DPM, DPCADENCE    "

## 2020-03-10 ENCOUNTER — HOSPITAL ENCOUNTER (OUTPATIENT)
Facility: CLINIC | Age: 60
End: 2020-03-10
Attending: INTERNAL MEDICINE | Admitting: INTERNAL MEDICINE
Payer: COMMERCIAL

## 2020-03-10 ENCOUNTER — OFFICE VISIT (OUTPATIENT)
Dept: FAMILY MEDICINE | Facility: CLINIC | Age: 60
End: 2020-03-10
Payer: COMMERCIAL

## 2020-03-10 VITALS
TEMPERATURE: 97.5 F | WEIGHT: 170.8 LBS | SYSTOLIC BLOOD PRESSURE: 113 MMHG | HEIGHT: 70 IN | BODY MASS INDEX: 24.45 KG/M2 | OXYGEN SATURATION: 100 % | HEART RATE: 87 BPM | DIASTOLIC BLOOD PRESSURE: 75 MMHG | RESPIRATION RATE: 18 BRPM

## 2020-03-10 DIAGNOSIS — I10 BENIGN ESSENTIAL HYPERTENSION: ICD-10-CM

## 2020-03-10 DIAGNOSIS — D12.6 ADENOMATOUS POLYP OF COLON, UNSPECIFIED PART OF COLON: Primary | ICD-10-CM

## 2020-03-10 DIAGNOSIS — Z72.0 TOBACCO ABUSE: ICD-10-CM

## 2020-03-10 DIAGNOSIS — F41.1 GAD (GENERALIZED ANXIETY DISORDER): ICD-10-CM

## 2020-03-10 DIAGNOSIS — F33.2 SEVERE EPISODE OF RECURRENT MAJOR DEPRESSIVE DISORDER, WITHOUT PSYCHOTIC FEATURES (H): ICD-10-CM

## 2020-03-10 DIAGNOSIS — S98.111A AMPUTATION OF RIGHT GREAT TOE (H): ICD-10-CM

## 2020-03-10 DIAGNOSIS — Z79.4 TYPE 2 DIABETES MELLITUS WITH OTHER CIRCULATORY COMPLICATION, WITH LONG-TERM CURRENT USE OF INSULIN (H): ICD-10-CM

## 2020-03-10 DIAGNOSIS — E11.59 TYPE 2 DIABETES MELLITUS WITH OTHER CIRCULATORY COMPLICATION, WITH LONG-TERM CURRENT USE OF INSULIN (H): ICD-10-CM

## 2020-03-10 DIAGNOSIS — I25.10 CORONARY ARTERY DISEASE INVOLVING NATIVE CORONARY ARTERY OF NATIVE HEART WITHOUT ANGINA PECTORIS: ICD-10-CM

## 2020-03-10 DIAGNOSIS — Z87.891 PERSONAL HISTORY OF TOBACCO USE: ICD-10-CM

## 2020-03-10 PROBLEM — I20.0 UNSTABLE ANGINA (H): Status: RESOLVED | Noted: 2020-03-01 | Resolved: 2020-03-10

## 2020-03-10 LAB
BACTERIA SPEC CULT: NORMAL
CREAT UR-MCNC: 201 MG/DL
Lab: NORMAL
MICROALBUMIN UR-MCNC: 83 MG/L
MICROALBUMIN/CREAT UR: 41.24 MG/G CR (ref 0–17)
SPECIMEN SOURCE: NORMAL

## 2020-03-10 PROCEDURE — 90472 IMMUNIZATION ADMIN EACH ADD: CPT | Performed by: FAMILY MEDICINE

## 2020-03-10 PROCEDURE — 90471 IMMUNIZATION ADMIN: CPT | Performed by: FAMILY MEDICINE

## 2020-03-10 PROCEDURE — 90715 TDAP VACCINE 7 YRS/> IM: CPT | Performed by: FAMILY MEDICINE

## 2020-03-10 PROCEDURE — 90732 PPSV23 VACC 2 YRS+ SUBQ/IM: CPT | Performed by: FAMILY MEDICINE

## 2020-03-10 PROCEDURE — 99496 TRANSJ CARE MGMT HIGH F2F 7D: CPT | Mod: 25 | Performed by: FAMILY MEDICINE

## 2020-03-10 PROCEDURE — 99207 C FOOT EXAM  NO CHARGE: CPT | Mod: 25 | Performed by: FAMILY MEDICINE

## 2020-03-10 PROCEDURE — 82043 UR ALBUMIN QUANTITATIVE: CPT | Performed by: FAMILY MEDICINE

## 2020-03-10 RX ORDER — ROSUVASTATIN CALCIUM 20 MG/1
20 TABLET, COATED ORAL DAILY
Qty: 90 TABLET | Refills: 3 | Status: SHIPPED | OUTPATIENT
Start: 2020-03-10 | End: 2021-04-29

## 2020-03-10 RX ORDER — DIVALPROEX SODIUM 500 MG/1
1000 TABLET, DELAYED RELEASE ORAL 2 TIMES DAILY
Qty: 180 TABLET | Refills: 3 | Status: SHIPPED | OUTPATIENT
Start: 2020-03-10 | End: 2021-04-29

## 2020-03-10 RX ORDER — CLOMIPRAMINE HYDROCHLORIDE 50 MG/1
100 CAPSULE ORAL 2 TIMES DAILY
Qty: 180 CAPSULE | Refills: 3 | Status: ON HOLD | OUTPATIENT
Start: 2020-03-10 | End: 2021-03-18

## 2020-03-10 RX ORDER — AMLODIPINE AND VALSARTAN 10; 160 MG/1; MG/1
1 TABLET ORAL DAILY
Qty: 90 TABLET | Refills: 3 | Status: ON HOLD | OUTPATIENT
Start: 2020-03-10 | End: 2021-03-18

## 2020-03-10 RX ORDER — CLOPIDOGREL BISULFATE 75 MG/1
75 TABLET ORAL DAILY
Qty: 90 TABLET | Refills: 3 | Status: SHIPPED | OUTPATIENT
Start: 2020-03-10 | End: 2021-04-29

## 2020-03-10 RX ORDER — ISOSORBIDE DINITRATE 40 MG/1
40 TABLET ORAL 2 TIMES DAILY
Qty: 180 TABLET | Refills: 3 | Status: SHIPPED | OUTPATIENT
Start: 2020-03-10 | End: 2020-03-10

## 2020-03-10 RX ORDER — BUSPIRONE HYDROCHLORIDE 10 MG/1
10 TABLET ORAL 4 TIMES DAILY
Qty: 120 TABLET | Refills: 3 | Status: SHIPPED | OUTPATIENT
Start: 2020-03-10 | End: 2020-04-28

## 2020-03-10 RX ORDER — GLIMEPIRIDE 2 MG/1
2 TABLET ORAL
Qty: 90 TABLET | Refills: 3 | Status: ON HOLD | OUTPATIENT
Start: 2020-03-10 | End: 2021-03-18

## 2020-03-10 RX ORDER — QUETIAPINE FUMARATE 300 MG/1
600 TABLET, FILM COATED ORAL AT BEDTIME
Qty: 180 TABLET | Refills: 3 | Status: ON HOLD | OUTPATIENT
Start: 2020-03-10 | End: 2021-03-18

## 2020-03-10 RX ORDER — BISOPROLOL FUMARATE 5 MG/1
5 TABLET, FILM COATED ORAL DAILY
Qty: 90 TABLET | Refills: 3 | Status: SHIPPED | OUTPATIENT
Start: 2020-03-10 | End: 2021-04-15

## 2020-03-10 ASSESSMENT — MIFFLIN-ST. JEOR: SCORE: 1595.99

## 2020-03-10 NOTE — PROGRESS NOTES
Lung Cancer Screening Shared Decision Making Visit     Huseyin Pettit is eligible for lung cancer screening on the basis of the information provided in my signed lung cancer screening order.     I have discussed with patient the risks and benefits of screening for lung cancer with low-dose CT.     The risks include:  radiation exposure: one low dose chest CT has as much ionizing radiation as about 15 chest x-rays or 6 months of background radiation living in [place]    false positives: 96% of positive findings/nodules are NOT cancer, but some might still require additional diagnostic evaluation, including biopsy  over-diagnosis: some slow growing cancers that might never have been clinically significant will be detected and treated unnecessarily     The benefit of early detection of lung cancer is contingent upon adherence to annual screening or more frequent follow up if indicated.     Furthermore, reaping the benefits of screening requires Huseyin Pettit to be willing and physically able to undergo diagnostic procedures, if indicated. Although no specific guide is available for determining severity of comorbidities, it is reasonable to withhold screening in patients who have greater mortality risk from other diseases.     We did discuss that the only way to prevent lung cancer is to not smoke. Smoking cessation assistance was offered.    I did offer risk estimation using a calculator such as this one:    ShouldIScreen

## 2020-03-10 NOTE — LETTER
[facility]  [address] [place], [place], [address]  [phone]        March 11, 2020    Huseyin Pettit                                                                                                                                                       [address] [address]        Dear Huseyin,    Your urine test is still showing mild protein in the urine.     Sincerely,      Hunter Carlton MD      Results for orders placed or performed in visit on 03/10/20   Albumin Random Urine Quantitative with Creat Ratio     Status: Abnormal   Result Value Ref Range    Creatinine Urine 201 mg/dL    Albumin Urine mg/L 83 mg/L    Albumin Urine mg/g Cr 41.24 (H) 0 - 17 mg/g Cr

## 2020-03-10 NOTE — PROGRESS NOTES
Subjective     Huseyin Pettit is a 60 year old male who presents to clinic today for the following health issues:    HPI   Diabetes Follow-up      How often are you checking your blood sugar? Not at all    What concerns do you have today about your diabetes? None     Do you have any of these symptoms? (Select all that apply)  Numbness in feet toe amputation    Have you had a diabetic eye exam in the last 12 months? No                Hyperlipidemia Follow-Up      Are you regularly taking any medication or supplement to lower your cholesterol?   Yes- crestor    Are you having muscle aches or other side effects that you think could be caused by your cholesterol lowering medication?  No    Hypertension Follow-up      Do you check your blood pressure regularly outside of the clinic? No     Are you following a low salt diet? No    Are your blood pressures ever more than 140 on the top number (systolic) OR more   than 90 on the bottom number (diastolic), for example 140/90? No    BP Readings from Last 2 Encounters:   03/09/20 100/62   03/04/20 113/64     Hemoglobin A1C (%)   Date Value   03/01/2020 7.2 (H)   07/03/2018 12.5 (H)     LDL Cholesterol Calculated (mg/dL)   Date Value   03/02/2020 58   07/03/2018 82         How many servings of fruits and vegetables do you eat daily?  0-1    On average, how many sweetened beverages do you drink each day (Examples: soda, juice, sweet tea, etc.  Do NOT count diet or artificially sweetened beverages)?   1    How many days per week do you exercise enough to make your heart beat faster? 3 or less    How many minutes a day do you exercise enough to make your heart beat faster? 9 or less    How many days per week do you miss taking your medication? 0        Hospital Follow-up Visit:    Hospital/Nursing Home/IP Rehab Facility: [facility]  Date of Admission: 2/29/2020  Date of Discharge: 3/1/2020  Reason(s) for Admission: diabetic ulcer of the Rt big toe, s/p amputation.             Problems taking medications regularly:  None       Medication changes since discharge: None       Problems adhering to non-medication therapy:  None    Summary of hospitalization:  [facility] discharge summary reviewed  Diagnostic Tests/Treatments reviewed.  Follow up needed: with podiatry.  Other Healthcare Providers Involved in Patient s Care:         None  Update since discharge: improved. Pt pain has improved.  But he is worried about the above medical problems, his diabetes is under control, but he is concerned about his diabetic ulcer and if this will occur again.    Post Discharge Medication Reconciliation: discharge medications reconciled, continue medications without change.  Plan of care communicated with patient     Coding guidelines for this visit:  Type of Medical   Decision Making Face-to-Face Visit       within 7 Days of discharge Face-to-Face Visit        within 14 days of discharge   Moderate Complexity [id number]   High Complexity [id number]              Patient Active Problem List   Diagnosis     Hyperlipidemia LDL goal <100     Benign essential hypertension     Severe episode of recurrent major depressive disorder, without psychotic features (H)     Coronary artery disease involving native coronary artery of native heart without angina pectoris     Type 2 diabetes mellitus with other circulatory complication, with long-term current use of insulin (H)     Closed fracture of multiple ribs of left side with routine healing     Toe osteomyelitis, right (H)     Tobacco abuse     MILES (generalized anxiety disorder)     Adenomatous polyp of colon, unspecified part of colon     Past Surgical History:   Procedure Laterality Date     AMPUTATE TOE(S) Right 3/3/2020    Procedure: AMPUTATION RIGHT GREAT TOE.;  Surgeon: Sergey Barnes DPM;  Location: [facility] OR     CHOLECYSTECTOMY       CV CORONARY ANGIOGRAM N/A 3/2/2020    Procedure: Coronary Angiogram;  Surgeon: Thaddeus Sun MD;   Location:  [facility]ATH [facility]     GALLBLADDER SURGERY  2011       Social History     Tobacco Use     Smoking status: Current Every Day Smoker     Packs/day: 0.50     Years: 25.00     Pack years: 12.50     Smokeless tobacco: Never Used     Tobacco comment: trying to adela   Substance Use Topics     Alcohol use: No     History reviewed. No pertinent family history.      Current Outpatient Medications   Medication Sig Dispense Refill     amLODIPine-valsartan (EXFORGE)  MG tablet Take 1 tablet by mouth daily 90 tablet 3     amoxicillin-clavulanate (AUGMENTIN) 875-125 MG tablet Take 1 tablet by mouth 2 times daily for 7 days 14 tablet 0     aspirin 81 MG tablet Take 81 mg by mouth daily        bisoprolol (ZEBETA) 5 MG tablet Take 1 tablet (5 mg) by mouth daily 90 tablet 3     busPIRone (BUSPAR) 10 MG tablet Take 1 tablet (10 mg) by mouth 4 times daily 120 tablet 3     clomiPRAMINE (ANAFRANIL) 50 MG capsule Take 2 capsules (100 mg) by mouth 2 times daily 180 capsule 3     clopidogrel (PLAVIX) 75 MG tablet Take 1 tablet (75 mg) by mouth daily 90 tablet 3     divalproex sodium delayed-release (DEPAKOTE) 500 MG DR tablet Take 2 tablets (1,000 mg) by mouth 2 times daily 180 tablet 3     FLUoxetine (PROZAC) 20 MG capsule Take 1 capsule (20 mg) by mouth daily 90 capsule 3     glimepiride (AMARYL) 2 MG tablet Take 1 tablet (2 mg) by mouth every morning (before breakfast) 90 tablet 3     HYDROcodone-acetaminophen (NORCO) 5-325 MG tablet Take 1-2 tablets every 4-6 hours as needed for pain.  Do not take other tylenol products with this medication, as too much tylenol can be damaging to the liver. 18 tablet 0     insulin aspart prot & aspart (NOVOLOG MIX 70/30 PEN) (70-30) 100 UNIT/ML pen 28 units in AM, and 38 units in PM 9 mL 3     metFORMIN (GLUCOPHAGE) 850 MG tablet Take 1 tablet (850 mg) by mouth 2 times daily (with meals) (Need A1c for further refills) 180 tablet 3     QUEtiapine (SEROQUEL) 300 MG tablet Take 2  tablets (600 mg) by mouth At Bedtime 180 tablet 3     rosuvastatin (CRESTOR) 20 MG tablet Take 1 tablet (20 mg) by mouth daily 90 tablet 3     No Known Allergies    Reviewed and updated as needed this visit by Provider         Review of Systems   ROS COMP: CONSTITUTIONAL: NEGATIVE for fever, chills, change in weight  RESP: NEGATIVE for significant cough or SOB  CV: NEGATIVE for chest pain, palpitations or peripheral edema      Objective    There were no vitals taken for this visit.  There is no height or weight on file to calculate BMI.  Physical Exam   GENERAL: healthy, alert and no distress  NECK: no adenopathy, no asymmetry, masses, or scars and thyroid normal to palpation  RESP: lungs clear to auscultation - no rales, rhonchi or wheezes  CV: regular rate and rhythm, normal S1 S2, no S3 or S4, no murmur, click or rub, no peripheral edema an  ABDOMEN: soft, nontender, no hepatosplenomegaly, no masses and bowel sounds normal  MS: no gross musculoskeletal defects noted, no edema  Diabetic foot exam: (left foot only ) as the right foot is bandaged and in boot, no trophic changes or ulcerative lesions, normal sensory exam, DP reduced left and PT reduced left    Diagnostic Test Results:  Results for orders placed or performed in visit on 03/10/20 (from the past 24 hour(s))   Albumin Random Urine Quantitative with Creat Ratio   Result Value Ref Range    Creatinine Urine 201 mg/dL    Albumin Urine mg/L 83 mg/L    Albumin Urine mg/g Cr 41.24 (H) 0 - 17 mg/g Cr           Assessment & Plan     1. Severe episode of recurrent major depressive disorder, without psychotic features (H)  Pt has been struggling with sever depression and wishes to fill his medicine until his appointment with a psychiatrist, offered to schedule him an appointment, but pt would like to see his own psychiatrist. He will call if he wishes to to follow up with our psychiatry services.  - clomiPRAMINE (ANAFRANIL) 50 MG capsule; Take 2 capsules (100 mg)  by mouth 2 times daily  Dispense: 180 capsule; Refill: 3  - divalproex sodium delayed-release (DEPAKOTE) 500 MG DR tablet; Take 2 tablets (1,000 mg) by mouth 2 times daily  Dispense: 180 tablet; Refill: 3  - FLUoxetine (PROZAC) 20 MG capsule; Take 1 capsule (20 mg) by mouth daily  Dispense: 90 capsule; Refill: 3  - QUEtiapine (SEROQUEL) 300 MG tablet; Take 2 tablets (600 mg) by mouth At Bedtime  Dispense: 180 tablet; Refill: 3    2. Coronary artery disease involving native coronary artery of native heart without angina pectoris  Stable, angio gram showed patent stents with mild disease.  - bisoprolol (ZEBETA) 5 MG tablet; Take 1 tablet (5 mg) by mouth daily  Dispense: 90 tablet; Refill: 3  - clopidogrel (PLAVIX) 75 MG tablet; Take 1 tablet (75 mg) by mouth daily  Dispense: 90 tablet; Refill: 3  - rosuvastatin (CRESTOR) 20 MG tablet; Take 1 tablet (20 mg) by mouth daily  Dispense: 90 tablet; Refill: 3    3. Tobacco abuse  Strongly advised to cut and stop smoking, given his recent amputation and CAD.    4. MILES (generalized anxiety disorder)  Continue on   - busPIRone (BUSPAR) 10 MG tablet; Take 1 tablet (10 mg) by mouth 4 times daily  Dispense: 120 tablet; Refill: 3    5. Type 2 diabetes mellitus with other circulatory complication, with long-term current use of insulin (H)  Controlled, refer to ophthalmology, continue on same meds.   - Albumin Random Urine Quantitative with Creat Ratio  - OPHTHALMOLOGY ADULT REFERRAL  - FOOT EXAM  - glimepiride (AMARYL) 2 MG tablet; Take 1 tablet (2 mg) by mouth every morning (before breakfast)  Dispense: 90 tablet; Refill: 3  - insulin aspart prot & aspart (NOVOLOG MIX 70/30 PEN) (70-30) 100 UNIT/ML pen; 28 units in AM, and 38 units in PM  Dispense: 9 mL; Refill: 3  - metFORMIN (GLUCOPHAGE) 850 MG tablet; Take 1 tablet (850 mg) by mouth 2 times daily (with meals) (Need A1c for further refills)  Dispense: 180 tablet; Refill: 3    6. Adenomatous polyp of colon, unspecified part of  colon  Noticed that patient did have polyps with some atypia (according to report from Andrew, he had a colonoscopy and found to have multiple polyps with atypia)  - GASTROENTEROLOGY ADULT REF PROCEDURE ONLY    7. Benign essential hypertension  Controlled.   - amLODIPine-valsartan (EXFORGE)  MG tablet; Take 1 tablet by mouth daily  Dispense: 90 tablet; Refill: 3    8. Personal history of tobacco use  Will order CT chest for screening.  - Prof fee: Shared Decisionmaking for Lung Cancer Screening  - CT Chest Lung Cancer Scrn Low Dose wo; Future  - Okay for Smoking Cessation Study (PLUTO) to Contact Patient    9. Amputation of right great toe (H)  Following up with Podiatry, continue on care.       Tobacco Cessation:   reports that he has been smoking. He has a 12.50 pack-year smoking history. He has never used smokeless tobacco.  Tobacco Cessation Action Plan: Information offered: Patient not interested at this time            Return in about 3 weeks (around 3/31/2020).    Hunter Carlton MD  [facility]

## 2020-03-10 NOTE — PATIENT INSTRUCTIONS

## 2020-03-11 ENCOUNTER — TELEPHONE (OUTPATIENT)
Dept: FAMILY MEDICINE | Facility: CLINIC | Age: 60
End: 2020-03-11

## 2020-03-11 ENCOUNTER — HOSPITAL ENCOUNTER (OUTPATIENT)
Dept: CT IMAGING | Facility: CLINIC | Age: 60
Discharge: HOME | End: 2020-03-11
Attending: FAMILY MEDICINE | Admitting: FAMILY MEDICINE
Payer: COMMERCIAL

## 2020-03-11 DIAGNOSIS — I10 BENIGN ESSENTIAL HYPERTENSION: Primary | ICD-10-CM

## 2020-03-11 DIAGNOSIS — I25.10 CORONARY ARTERY DISEASE INVOLVING NATIVE CORONARY ARTERY OF NATIVE HEART WITHOUT ANGINA PECTORIS: ICD-10-CM

## 2020-03-11 DIAGNOSIS — Z87.891 PERSONAL HISTORY OF TOBACCO USE: ICD-10-CM

## 2020-03-11 PROBLEM — S98.111A AMPUTATION OF RIGHT GREAT TOE (H): Status: ACTIVE | Noted: 2020-03-11

## 2020-03-11 PROBLEM — N18.2 CKD (CHRONIC KIDNEY DISEASE) STAGE 2, GFR 60-89 ML/MIN: Status: ACTIVE | Noted: 2020-03-11

## 2020-03-11 PROCEDURE — G0297 LDCT FOR LUNG CA SCREEN: HCPCS

## 2020-03-11 RX ORDER — ISOSORBIDE MONONITRATE 30 MG/1
30 TABLET, EXTENDED RELEASE ORAL DAILY
Qty: 90 TABLET | Refills: 3 | Status: SHIPPED | OUTPATIENT
Start: 2020-03-11 | End: 2020-04-23

## 2020-03-11 NOTE — TELEPHONE ENCOUNTER
Pt has been getting an Isosorbide mailed to him from family in [place].  He doesn't have any left for me to compare tablets too.  He seemed ok with getting medication changed to something that is covered.  What ever you would like to do he said.  I tried regular Isosorbide Dinitrate 20mg tabs and they are covered if you wanna change to that.  Or you can try a PA for Isordil if you would like.    Thanks!  Poornima Arshad, Pharmacy [facility]  [phone]

## 2020-03-11 NOTE — TELEPHONE ENCOUNTER
Let's try Imudr 30 mg daily. And we can recheck his blood pressure during his next visit.  Hunter Carlton MD  [facility]  [phone]

## 2020-03-11 NOTE — TELEPHONE ENCOUNTER
Phone call to patient - discussed message below from Dr. Carlton - patient states understanding, no questions at this time     Estela Serrano, Registered Nurse   [facility]

## 2020-03-13 ENCOUNTER — TELEPHONE (OUTPATIENT)
Dept: FAMILY MEDICINE | Facility: CLINIC | Age: 60
End: 2020-03-13

## 2020-03-13 NOTE — TELEPHONE ENCOUNTER
RN PAL placed call to home number - which is patient's brothers number. Brother advised that patient is at home with sister in law, and asked RN PAL to call that number [phone], no  needed     Message left for patient to return call to this RN PAL - please transfer if available     Direct number left for this RN PAL on message for return call     Estela Serrano Registered Nurse   [facility] [phone]

## 2020-03-13 NOTE — TELEPHONE ENCOUNTER
Couple of things on Huseyin:  His CT Chest is clear, no lesions or malignancy, his heart scan showed calcification which is consistent with his hx of Coronary heart disease.  His Scan of the liver showed some changes, that I recommend doing liver test (blood test) during his follow up. And please limit or stop using alcohol completely.  Hunter Carlton MD  [facility]  [phone]

## 2020-03-13 NOTE — TELEPHONE ENCOUNTER
Phone call from patient -   Message reviewed from Dr. Carlton with patient   No questions/concerns at this time     Estela Serrano, Registered Nurse   [facility]

## 2020-03-15 ENCOUNTER — HOSPITAL ENCOUNTER (EMERGENCY)
Facility: CLINIC | Age: 60
Discharge: HOME | End: 2020-03-15
Attending: EMERGENCY MEDICINE | Admitting: EMERGENCY MEDICINE
Payer: COMMERCIAL

## 2020-03-15 VITALS
TEMPERATURE: 98 F | DIASTOLIC BLOOD PRESSURE: 81 MMHG | OXYGEN SATURATION: 100 % | HEART RATE: 112 BPM | RESPIRATION RATE: 18 BRPM | SYSTOLIC BLOOD PRESSURE: 129 MMHG

## 2020-03-15 DIAGNOSIS — Z89.421 S/P AMPUTATION OF LESSER TOE, RIGHT (H): ICD-10-CM

## 2020-03-15 DIAGNOSIS — G89.18 ACUTE POST-OPERATIVE PAIN: ICD-10-CM

## 2020-03-15 PROCEDURE — 25000132 ZZH RX MED GY IP 250 OP 250 PS 637: Performed by: EMERGENCY MEDICINE

## 2020-03-15 PROCEDURE — 99283 EMERGENCY DEPT VISIT LOW MDM: CPT

## 2020-03-15 RX ORDER — HYDROCODONE BITARTRATE AND ACETAMINOPHEN 5; 325 MG/1; MG/1
2 TABLET ORAL ONCE
Status: COMPLETED | OUTPATIENT
Start: 2020-03-15 | End: 2020-03-15

## 2020-03-15 RX ORDER — HYDROCODONE BITARTRATE AND ACETAMINOPHEN 5; 325 MG/1; MG/1
1 TABLET ORAL EVERY 6 HOURS PRN
Qty: 6 TABLET | Refills: 0 | Status: SHIPPED | OUTPATIENT
Start: 2020-03-15 | End: 2020-03-17

## 2020-03-15 RX ADMIN — HYDROCODONE BITARTRATE AND ACETAMINOPHEN 2 TABLET: 5; 325 TABLET ORAL at 10:16

## 2020-03-15 ASSESSMENT — ENCOUNTER SYMPTOMS
WOUND: 0
COLOR CHANGE: 0
ARTHRALGIAS: 1
FEVER: 0

## 2020-03-15 NOTE — ED PROVIDER NOTES
History     Chief Complaint:  Toe Pain and Wound Check    The history is provided by the patient.      Huseyin Pettit is a 60 year old male, with history of diabetes, hypertension, hyperlipidemia, osteomyelitis amongst others as noted below, currently on Plavix, who presents alone for evaluation of right foot pain in the setting of being 2 weeks (3/3) post-op for partial amputation of his right great toe secondary to infection and osteomyelitis. Patient reports he was admitted overnight, but the following day (3/4) was requesting to be discharged despite cultures growing GPCs. Patient requested to leave AMA and discharged with Augmentin and Norco. Patient reports that he ran out of his Norco 2 days ago and notes that he has had increasing pain since. He has since follow up with his podiatrist with a reassuring exam. Patient has a follow up appointment in two days, but states pain is too severe, thus was prompted to present.     Here, patient denies any fevers, changes in wound (including drainage) or new trauma/injury. Patient reports he completed his course of antibiotics.     Allergies:  No Known Drug Allergies     Medications:    Exforge  Aspirin 81 mg  Zebeta  Buspar  Clomipramine  Plavix  Depakote  Prozac  Amaryl  Novolog  Imdur  Metformin  Seroquel  Crestor    Past Medical History:    CAD  MILES  CKD, stage II  Toe osteomyelitis right  Depressive disorder  Diabetes  Hypertension    Past Surgical History:    Amputate toe(s) - right great toe  Cholecystectomy  Coronary angiogram    Family History:    History reviewed. No pertinent family history.       Social History:  The patient was unaccompanied to the ED.  Smoking Status: Yes - current every day smoker  Smokeless Tobacco: Never  Alcohol Use: No  Drug Use: No   Marital Status:  Single [1]     Review of Systems   Constitutional: Negative for fever.   Musculoskeletal: Positive for arthralgias.   Skin: Negative for color change and wound (no changes).   All other  systems reviewed and are negative.    Physical Exam     Patient Vitals for the past 24 hrs:   BP Temp Temp src Pulse Resp SpO2   03/15/20 1004 -- -- -- -- -- 100 %   03/15/20 1002 129/81 98  F (36.7  C) Oral 112 18 --       Physical Exam    Constitutional:  Pleasant, age appropriate.   EYES:   Conjunctiva normal.  NECK:    Supple, no meningismus.   CV:     Regular rate and rhythm     No murmurs, rubs or gallops.       2+ PT pulses bilateral.  PULM:    Clear to auscultation bilateral.       No respiratory distress.      No wheezing, rales or stridor.  ABD:    Soft, non-tender, non-distended.      No rebound or guarding.  MSK:     Right lower extremity : No gross deformity.       Post surgical wound consistent with recent great toe amputation       No wound dehiscence       No warmth, erythema or discharge       Mild tenderness to wound  LYMPH:   No cervical lymphadenopathy.  NEURO:   Alert.      Speech is clear     Good muscle tone  SKIN:    Warm, dry   PSYCH:    Mood is good and affect is appropriate.      Emergency Department Course   Interventions:  1016 Norco 5-325 mg per tablet 2 tablets PO    Emergency Department Course:  Past medical records, nursing notes, and vitals reviewed.    (1014)   I performed an exam of the patient as documented above. History obtained from patient. Discussed plan of care and patient will be discharged after above intervention.      Findings and plan explained to the Patient. Patient discharged home with instructions regarding supportive care, medications, and reasons to return. The importance of close follow-up was reviewed. The patient was prescribed Norco. I personally answered all related questions prior to discharge.     Impression & Plan     Medical Decision Makin-year-old male presented to the ED with postoperative pain after right great toe amputation for osteomyelitis.  Site looks well with no wound dehiscence or developing abscess/cellulitis.  This appears to be pain  control issue alone.  He was given oral analgesics in the ED.  He will be given a limited supply of 6 tablets until he can have discussion with his podiatrist tomorrow for further treatment of his pain.    Diagnosis:  1. (G89.18) Acute post-operative pain    Disposition:  Discharged to home.    Discharge Medications:    Current Discharge Medication List      CONTINUE these medications which have CHANGED    Details   HYDROcodone-acetaminophen (NORCO) 5-325 MG tablet Take 1 tablet by mouth every 6 hours as needed for severe pain  Qty: 6 tablet, Refills: 0    Associated Diagnoses: S/P amputation of lesser toe, right (H)             Scribe Disclosure:  Marie RIOS, am serving as a scribe at 10:07 AM on 3/15/2020 to document services personally performed by Sloan Baeza MD based on my observations and the provider's statements to me.   3/15/2020   [facility] EMERGENCY DEPARTMENT       Sloan Baeza MD  03/15/20 1024

## 2020-03-15 NOTE — ED AVS SNAPSHOT
[facility] Emergency Department  [address]  [facility] [address]  Phone:  [phone]  Fax:  [phone]                                    Huseyin Pettit   MRN: [id number]    Department:  [facility] Emergency Department   Date of Visit:  3/15/2020           After Visit Summary Signature Page    I have received my discharge instructions, and my questions have been answered. I have discussed any challenges I see with this plan with the nurse or doctor.    ..........................................................................................................................................  Patient/Patient Representative Signature      ..........................................................................................................................................  Patient Representative Print Name and Relationship to Patient    ..................................................               ................................................  Date                                   Time    ..........................................................................................................................................  Reviewed by Signature/Title    ...................................................              ..............................................  Date                                               Time          22EPIC Rev 08/18

## 2020-03-15 NOTE — ED TRIAGE NOTES
Pt post op 2 weeks for partial amputation of right big toe. Pt was given Norco upon discharge. Pt states ran out of pain medicine 2 days ago and is having pain. Pt c/o pain. No other complaints. Sutures intact and site WNL. Pt states follow up appointment not scheduled until next week. Pt ABCs intact and A&Ox4.

## 2020-03-16 ENCOUNTER — PATIENT OUTREACH (OUTPATIENT)
Dept: CARE COORDINATION | Facility: CLINIC | Age: 60
End: 2020-03-16

## 2020-03-16 SDOH — ECONOMIC STABILITY: FOOD INSECURITY: WITHIN THE PAST 12 MONTHS, THE FOOD YOU BOUGHT JUST DIDN'T LAST AND YOU DIDN'T HAVE MONEY TO GET MORE.: NEVER TRUE

## 2020-03-16 SDOH — ECONOMIC STABILITY: FOOD INSECURITY: WITHIN THE PAST 12 MONTHS, YOU WORRIED THAT YOUR FOOD WOULD RUN OUT BEFORE YOU GOT MONEY TO BUY MORE.: NEVER TRUE

## 2020-03-16 SDOH — ECONOMIC STABILITY: TRANSPORTATION INSECURITY
IN THE PAST 12 MONTHS, HAS LACK OF TRANSPORTATION KEPT YOU FROM MEETINGS, WORK, OR FROM GETTING THINGS NEEDED FOR DAILY LIVING?: NO

## 2020-03-16 SDOH — ECONOMIC STABILITY: INCOME INSECURITY: HOW HARD IS IT FOR YOU TO PAY FOR THE VERY BASICS LIKE FOOD, HOUSING, MEDICAL CARE, AND HEATING?: NOT HARD AT ALL

## 2020-03-16 SDOH — ECONOMIC STABILITY: TRANSPORTATION INSECURITY
IN THE PAST 12 MONTHS, HAS THE LACK OF TRANSPORTATION KEPT YOU FROM MEDICAL APPOINTMENTS OR FROM GETTING MEDICATIONS?: NO

## 2020-03-16 ASSESSMENT — ACTIVITIES OF DAILY LIVING (ADL)
DEPENDENT_IADLS:: TRANSPORTATION
DEPENDENT_IADLS:: TRANSPORTATION

## 2020-03-16 NOTE — PROGRESS NOTES
Clinic Care Coordination Contact    Clinic Care Coordination Contact  OUTREACH    Referral Information:  Referral Source: IP Report    Primary Diagnosis: Other (include Comment box)    Chief Complaint   Patient presents with     Clinic Care Coordination - Post Hospital     Acute post Operative pain        Universal Utilization: 83%Admission or ED Risk  Clinic Utilization  Difficulty keeping appointments:: No  Compliance Concerns: No  No-Show Concerns: No  Utilization    Last refreshed: 3/16/2020  9:38 AM:  Hospital Admissions 2           Last refreshed: 3/16/2020  9:38 AM:  ED Visits 2           Last refreshed: 3/16/2020  9:38 AM:  No Show Count (past year) 0              Current as of: 3/16/2020  9:38 AM              Clinical Concerns:  Current Medical Concerns:  History of diabetes, hypertension, hyperlipidemia, osteomyelitis amongst others as noted below, currently on Plavix, who presented to ED on 3/15/20 for evaluation of right foot pain in the setting of being 2 weeks (3/3) post-op for partial amputation of his right great toe secondary to infection and osteomyelitis.  Patient reported that he ran out of his Norco 2 days ago and notes that he has had increasing pain since. He has since follow up with his podiatrist with a reassuring exam. Patient has a follow up appointment in two days, but states pain is too severe, thus was prompted to present.     Huseyin reports that he is doing better and has decreased pain.  He requested that he be called at number [phone].       Current Behavioral Concerns:NA    Education Provided to patient: Care Coordination   Pain  Pain (GOAL):: Yes  Type: Acute (<3mo)  Location of chronic pain:: toe  Progression: Improving  Health Maintenance Reviewed:    Clinical Pathway: None    Medication Management:  Patient independently managing his own medications. Reports taking medications as prescribed. No concerns.      Functional Status:  Dependent ADLs:: Independent  Dependent IADLs::  Transportation  Bed or wheelchair confined:: No  Mobility Status: Independent  Fallen 2 or more times in the past year?: No  Any fall with injury in the past year?: No    Living Situation:  Current living arrangement:: I live in a private home with family  Type of residence:: Private home - stairs    Lifestyle & Psychosocial Needs:     Social Needs     Financial resource strain: Not hard at all     Food insecurity     Worry: Never true     Inability: Never true     Transportation needs     Medical: No     Non-medical: No     Diet:: Diabetic diet  Inadequate nutrition (GOAL):: No  Tube Feeding: No  Inadequate activity/exercise (GOAL):: No  Significant changes in sleep pattern (GOAL): No  Transportation means:: Family     Mental health DX:: Yes  Mental health DX how managed:: Medication  Mental health management concern (GOAL):: No  Informal Support system:: Family   Socioeconomic History     Marital status: Single     Spouse name: Not on file     Number of children: Not on file     Years of education: Not on file     Highest education level: Not on file     Tobacco Use     Smoking status: Current Every Day Smoker     Packs/day: 0.50     Years: 25.00     Pack years: 12.50     Smokeless tobacco: Never Used     Tobacco comment: trying to adela   Substance and Sexual Activity     Alcohol use: No     Drug use: No     Sexual activity: Never             Resources and Interventions:  Current Resources:      Community Resources: None  Supplies used at home:: Wound Care Supplies, Diabetic Supplies  Equipment Currently Used at Home: glucometer, none    Advance Care Plan/Directive  Advanced Care Plans/Directives on file:: No  Advanced Care Plan/Directive Status: Declined Further Information    Referrals Placed: None     Goals: NA      Patient/Caregiver understanding: Yes       Future Appointments              Tomorrow Sergey Barnes DPM [facility]    In 1 week Hunter Carlton MD [facility]  DAISY Barone    In 4 months Hunter Carlton MD [facility], [facility]          Plan: Huseyin has Podiatry appointment tomorrow.  He has follow up PCP appointment scheduled on 3/24/20.  No further outreaches needed at this time. He has this writers informatin if anything is needed in the future.    AVERY Manriquez   Care Coordination Team  [phone]

## 2020-03-17 ENCOUNTER — OFFICE VISIT (OUTPATIENT)
Dept: PODIATRY | Facility: CLINIC | Age: 60
End: 2020-03-17
Payer: COMMERCIAL

## 2020-03-17 VITALS
WEIGHT: 165 LBS | BODY MASS INDEX: 23.62 KG/M2 | HEIGHT: 70 IN | SYSTOLIC BLOOD PRESSURE: 84 MMHG | DIASTOLIC BLOOD PRESSURE: 60 MMHG

## 2020-03-17 DIAGNOSIS — Z89.421 S/P AMPUTATION OF LESSER TOE, RIGHT (H): Primary | ICD-10-CM

## 2020-03-17 PROCEDURE — 99024 POSTOP FOLLOW-UP VISIT: CPT | Performed by: PODIATRIST

## 2020-03-17 RX ORDER — HYDROCODONE BITARTRATE AND ACETAMINOPHEN 5; 325 MG/1; MG/1
TABLET ORAL
Qty: 18 TABLET | Refills: 0 | Status: SHIPPED | OUTPATIENT
Start: 2020-03-17 | End: 2020-03-30

## 2020-03-17 ASSESSMENT — MIFFLIN-ST. JEOR: SCORE: 1569.69

## 2020-03-17 NOTE — PROGRESS NOTES
"Foot & Ankle Surgery  March 17, 2020    S:  Patient in today approx 2 weeks sp partial R great toe amputation.  Pain levels elevated at night.  Following post-op instructions    BP (!) 84/60   Ht 1.778 m (5' 10\")   Wt 74.8 kg (165 lb)   BMI 23.68 kg/m        ROS - positive for CC.  Patient denies current nausea, vomiting, chills, fevers, belly pain, calf pain, chest pain or SOB.  Complete remainder of ROS is otherwise neg.    PE - sutures intact, healing well with the exception of 1 small area, approx 3mm in length, where there slight superficial gapping and maceration.  Minimal drainage is noted, there's no purulence, no increase in skin temp and no cellulitis appreciated today.  Skin shows no trophic, color or temperature changes otherwise.  No calf redness, swelling or pain noted otherwise.    A/P - 60 year old yo patient approx 2 weeks sp above procedure  -will leave sutures intact 1 more week, likely removed next week  -bandage applied; continue all post-op instructions  -Rx for Norco today  -no indication for PO abx    Follow up  -  1 week or sooner with acute issues    Body mass index is 23.68 kg/m .        Sergey Barnes DPM FACFAS FACFAOM  Podiatric Foot & Ankle Surgeon  [facility]  [phone]      "

## 2020-03-17 NOTE — PATIENT INSTRUCTIONS
Thank you for choosing [facility] Podiatry / Foot & Ankle Surgery!    DR. PALACIO'S CLINIC LOCATIONS:   MONDAY - [place]  TUESDAY AM - [place]   [address] [place] [address]  [address]   [place] [address] [place] [address]   [phone]       THURSDAY AM - [place] THURSDAY PM - [place]   [address]   [place] [address] [place], [place] [address] [phone]       FRIDAY AM - [place] SET UP SURGERY: [phone] [address] APPOINTMENTS: [phone]   [place] [address] BILLING QUESTIONS: [phone] FAX NUMBER: [phone]     Follow up: 1 week      Please read through the following handouts and if you have any questions, please feel free to call us or send a Shareight message!      SMOKING CESSATION  What's in cigarette smoke? - Cigarette smoke contains over 4,000 chemicals. Nicotine is one of the main ingredients which is an insecticide/herbicide. It is poisonous to our nervous system, increases blood clotting risk, and decreases the body's defenses to fight off infection. Another chemical is Carbon Monoxide is an asphyxiating gas that permanently binds to blood cells and blocks the transport of oxygen. This leads to tissue death and decreases your metabolism. Tar is a chemical that coats your lungs and trachea which impairs new oxygen coming in and carbon dioxide getting out of your body.   How does smoking impact surgery? - Smoking is particularly hazardous with regards to surgery. Surgery puts stress on the body and a smoker's body is already under strain from these chemicals. Putting the two together, especially for an elective surgery, could be a recipe for disaster. Smoking before and after surgery increases your risk of heart problems, slow wound healing, delayed bone healing, blood clots, wound infection and anesthesia complications.   What are the benefits of quitting? - In 20 minutes your blood  pressure will drop back down to normal. In 8 hours the carbon monoxide (a toxic gas) levels in your blood stream will drop by half, and oxygen levels will return to normal. In 48 hours your chance of having a heart attack will have decreased. All nicotine will have left your body. Your sense of taste and smell will return to a normal level. In 72 hours your bronchial tubes will relax, and your energy levels will increase. In 2 weeks your circulation will increase, and it will continue to improve for the next 10 weeks.    Recommendations for elective surgery - Ideally, patients should quit smoking 8 weeks before and at least 2 weeks after elective surgery in order to avoid complications. Simply cutting back on the amount of cigarettes smoked per day does not offer any benefit or decrease the risk of poor wound healing, heart problems, and infection. Smokers should also start taking Vitamin C and B for two weeks before surgery and two weeks after surgery.    Ways to Stop Smokin. Nicotine patches, lozenges, or gum  2. Support Groups  3. Medications (see below)    List of Medications:  1. Varenicline Tartrate (CHANTIX)   2. Bupropion HCL (WELLBUTRIN, ZYBAN) - note: make sure Wellbutrin is for smoking cessation and not other issues   3. Nicotine Patch (NICODERM)   4. Nicotine Inhaler (NICOTROL)   5. Nicotine Gum Nicotine Polacrilex   6. Nicotine Lozenge: Nicotine Polacrilex (COMMIT)   * [facility] offers a smoking support group as well!  Please visit: [email or web address]  If you are interested in these, ask about getting a prescription or talk to your primary care doctor about what may be the best way for you to quit.               Huseyin to follow up with Primary Care provider regarding elevated blood pressure. (if equal or greater than 140/90)    Body Mass Index (BMI)  Many things can cause foot and ankle problems. Foot structure, activity level, foot mechanics and injuries are common causes of  pain.    One very important issue that often goes unmentioned, is body weight.  Extra weight can cause increased stress on muscles, ligaments, bones and tendons. Sometimes just a few extra pounds is all it takes to put one over her/his threshold. Without reducing that stress, it can be difficult to alleviate pain.      Some people are uncomfortable addressing this issue, but we feel it is important for you to think about it. As Foot & Ankle specialists, our job is addressing the lower extremity problem and possible causes.     Regarding extra body weight, we encourage patients to discuss diet and weight management plans with their primary care doctors. It is this team approach that gives you the best opportunity for pain relief and getting you back on your feet.

## 2020-03-24 ENCOUNTER — OFFICE VISIT (OUTPATIENT)
Dept: PODIATRY | Facility: CLINIC | Age: 60
End: 2020-03-24
Payer: COMMERCIAL

## 2020-03-24 ENCOUNTER — VIRTUAL VISIT (OUTPATIENT)
Dept: FAMILY MEDICINE | Facility: CLINIC | Age: 60
End: 2020-03-24
Payer: COMMERCIAL

## 2020-03-24 VITALS
DIASTOLIC BLOOD PRESSURE: 66 MMHG | SYSTOLIC BLOOD PRESSURE: 104 MMHG | WEIGHT: 165 LBS | BODY MASS INDEX: 23.62 KG/M2 | HEIGHT: 70 IN

## 2020-03-24 DIAGNOSIS — T81.89XD NONHEALING SURGICAL WOUND, SUBSEQUENT ENCOUNTER: ICD-10-CM

## 2020-03-24 DIAGNOSIS — Z89.421 S/P AMPUTATION OF LESSER TOE, RIGHT (H): Primary | ICD-10-CM

## 2020-03-24 DIAGNOSIS — Z53.9 ERRONEOUS ENCOUNTER--DISREGARD: Primary | ICD-10-CM

## 2020-03-24 PROCEDURE — 99024 POSTOP FOLLOW-UP VISIT: CPT | Performed by: PODIATRIST

## 2020-03-24 PROCEDURE — 11042 DBRDMT SUBQ TIS 1ST 20SQCM/<: CPT | Mod: 78 | Performed by: PODIATRIST

## 2020-03-24 RX ORDER — HYDROCODONE BITARTRATE AND ACETAMINOPHEN 5; 325 MG/1; MG/1
TABLET ORAL
Qty: 25 TABLET | Refills: 0 | Status: SHIPPED | OUTPATIENT
Start: 2020-03-24 | End: 2020-03-30

## 2020-03-24 ASSESSMENT — MIFFLIN-ST. JEOR: SCORE: 1569.69

## 2020-03-24 NOTE — LETTER
[facility] [place] [facility]  [address]  [facility] [address]  [phone]          March 24, 2020    RE:  Huseyin Pettit                                                                                                                                                       [address] [address]          Post-op instructions after 3/24/20 visit:    All sutures were removed.  A portion of the incision requires further healing    Daily cares - wash thoroughly(no soaking), dry thoroughly, apply a bandage consisting of antibiotic ointment(neosporin, bacitracin) and a bandage.  Continue with heel weight bearing in the surgical shoe    Follow up in 1 week for a wound check      Sergey

## 2020-03-24 NOTE — PROGRESS NOTES
I tried to call the patient multiple times but no response.  I left a message but he did not call back.

## 2020-03-24 NOTE — PROGRESS NOTES
"Foot & Ankle Surgery  March 24, 2020    S:  Patient in today approx 3 weeks sp partial R hallux amputation.  Pain levels still elevated.  Following post-op instructions.  Trying to fly back to [place] in the near future to see his mother    /66   Ht 1.778 m (5' 10\")   Wt 74.8 kg (165 lb)   BMI 23.68 kg/m        ROS - positive for CC.  Patient denies current nausea, vomiting, chills, fevers, belly pain, calf pain, chest pain or SOB.  Complete remainder of ROS is otherwise neg.    PE - sutures removed, an area measuring 11 x 4mm has not healed distal-medially.  Down to fat, no deep probing, no local SOI, no drainage.  Remaining incision healed..  Skin shows no trophic, color or temperature changes otherwise.  No calf redness, swelling or pain noted otherwise.    A/P - 60 year old yo patient approx 3 weeks sp above procedure  -all sutures removed  -regarding the open area, Excisional debridement was performed, full-thickness, sharply debriding the wound down to and including exposed sub-cutaneous tissue/fat, excising nonviable tissue to the above dimensions with a tissue nipper  -rx for Norco.  Discussed that this will be the last narcotic Rx  -no indication for PO abx  -daily care instructions discussed/dispensed - wash/dry, abx ointment/bandaid and heel WB in surgical shoe  -going to try to fly to [place] to see his mother.  Follow up 1 week for wound check     Follow up  -  1 week or sooner with acute issues    Body mass index is 23.68 kg/m .        Sergey Barnes DPM FACFAS FACFAOM  Podiatric Foot & Ankle Surgeon  [facility]  [phone]    "

## 2020-03-27 ENCOUNTER — TELEPHONE (OUTPATIENT)
Dept: PODIATRY | Facility: CLINIC | Age: 60
End: 2020-03-27

## 2020-03-27 NOTE — TELEPHONE ENCOUNTER
Spoke to patient. R/s to different date. Patient aware and verbalized understanding.         Avani Vo, Surgery Scheduler

## 2020-03-27 NOTE — TELEPHONE ENCOUNTER
Podiatry clinic consolidation due to covid precaution: patient need to r/s 3/31/20 to a different date and location.      Options are:   Monday: KETAN Márquez in the PM  Thursday: Claudia Kate in the AM  Friday: KETAN Márquez in the AM    Left vmail, awaiting callback.      Avani Vo, Surgery Scheduler

## 2020-03-30 ENCOUNTER — HOSPITAL ENCOUNTER (EMERGENCY)
Facility: CLINIC | Age: 60
Discharge: HOME | End: 2020-03-30
Attending: PHYSICIAN ASSISTANT | Admitting: PHYSICIAN ASSISTANT
Payer: COMMERCIAL

## 2020-03-30 ENCOUNTER — APPOINTMENT (OUTPATIENT)
Dept: GENERAL RADIOLOGY | Facility: CLINIC | Age: 60
End: 2020-03-30
Attending: PHYSICIAN ASSISTANT
Payer: COMMERCIAL

## 2020-03-30 ENCOUNTER — OFFICE VISIT (OUTPATIENT)
Dept: PODIATRY | Facility: CLINIC | Age: 60
End: 2020-03-30
Payer: COMMERCIAL

## 2020-03-30 VITALS
SYSTOLIC BLOOD PRESSURE: 109 MMHG | TEMPERATURE: 98.2 F | BODY MASS INDEX: 23.68 KG/M2 | DIASTOLIC BLOOD PRESSURE: 78 MMHG | RESPIRATION RATE: 16 BRPM | HEART RATE: 93 BPM | WEIGHT: 165 LBS | OXYGEN SATURATION: 100 %

## 2020-03-30 VITALS
BODY MASS INDEX: 23.62 KG/M2 | WEIGHT: 165 LBS | DIASTOLIC BLOOD PRESSURE: 60 MMHG | HEIGHT: 70 IN | SYSTOLIC BLOOD PRESSURE: 106 MMHG

## 2020-03-30 DIAGNOSIS — Z89.421 S/P AMPUTATION OF LESSER TOE, RIGHT (H): ICD-10-CM

## 2020-03-30 DIAGNOSIS — Z89.411 HISTORY OF AMPUTATION OF RIGHT GREAT TOE (H): ICD-10-CM

## 2020-03-30 DIAGNOSIS — G62.9 PERIPHERAL NEUROPATHY: ICD-10-CM

## 2020-03-30 DIAGNOSIS — R07.89 ATYPICAL CHEST PAIN: ICD-10-CM

## 2020-03-30 DIAGNOSIS — T81.89XD NONHEALING SURGICAL WOUND, SUBSEQUENT ENCOUNTER: ICD-10-CM

## 2020-03-30 DIAGNOSIS — Z89.421 S/P AMPUTATION OF LESSER TOE, RIGHT (H): Primary | ICD-10-CM

## 2020-03-30 LAB
ALBUMIN SERPL-MCNC: 3.5 G/DL (ref 3.4–5)
ALP SERPL-CCNC: 62 U/L (ref 40–150)
ALT SERPL W P-5'-P-CCNC: 14 U/L (ref 0–70)
ANION GAP SERPL CALCULATED.3IONS-SCNC: 6 MMOL/L (ref 3–14)
AST SERPL W P-5'-P-CCNC: 7 U/L (ref 0–45)
BASOPHILS # BLD AUTO: 0 10E9/L (ref 0–0.2)
BASOPHILS NFR BLD AUTO: 0.4 %
BILIRUB DIRECT SERPL-MCNC: <0.1 MG/DL (ref 0–0.2)
BILIRUB SERPL-MCNC: 0.2 MG/DL (ref 0.2–1.3)
BUN SERPL-MCNC: 29 MG/DL (ref 7–30)
CALCIUM SERPL-MCNC: 8.8 MG/DL (ref 8.5–10.1)
CHLORIDE SERPL-SCNC: 104 MMOL/L (ref 94–109)
CO2 SERPL-SCNC: 24 MMOL/L (ref 20–32)
CREAT SERPL-MCNC: 1.08 MG/DL (ref 0.66–1.25)
D DIMER PPP FEU-MCNC: 0.3 UG/ML FEU (ref 0–0.5)
DIFFERENTIAL METHOD BLD: ABNORMAL
EOSINOPHIL # BLD AUTO: 0.1 10E9/L (ref 0–0.7)
EOSINOPHIL NFR BLD AUTO: 1.4 %
ERYTHROCYTE [DISTWIDTH] IN BLOOD BY AUTOMATED COUNT: 14.6 % (ref 10–15)
GFR SERPL CREATININE-BSD FRML MDRD: 75 ML/MIN/{1.73_M2}
GLUCOSE SERPL-MCNC: 304 MG/DL (ref 70–99)
HCT VFR BLD AUTO: 38.9 % (ref 40–53)
HGB BLD-MCNC: 12.1 G/DL (ref 13.3–17.7)
IMM GRANULOCYTES # BLD: 0.1 10E9/L (ref 0–0.4)
IMM GRANULOCYTES NFR BLD: 1.1 %
LIPASE SERPL-CCNC: 245 U/L (ref 73–393)
LYMPHOCYTES # BLD AUTO: 3.6 10E9/L (ref 0.8–5.3)
LYMPHOCYTES NFR BLD AUTO: 35.8 %
MCH RBC QN AUTO: 29.1 PG (ref 26.5–33)
MCHC RBC AUTO-ENTMCNC: 31.1 G/DL (ref 31.5–36.5)
MCV RBC AUTO: 94 FL (ref 78–100)
MONOCYTES # BLD AUTO: 0.6 10E9/L (ref 0–1.3)
MONOCYTES NFR BLD AUTO: 5.4 %
NEUTROPHILS # BLD AUTO: 5.7 10E9/L (ref 1.6–8.3)
NEUTROPHILS NFR BLD AUTO: 55.9 %
NRBC # BLD AUTO: 0 10*3/UL
NRBC BLD AUTO-RTO: 0 /100
PLATELET # BLD AUTO: 198 10E9/L (ref 150–450)
POTASSIUM SERPL-SCNC: 4.4 MMOL/L (ref 3.4–5.3)
PROT SERPL-MCNC: 7.4 G/DL (ref 6.8–8.8)
RBC # BLD AUTO: 4.16 10E12/L (ref 4.4–5.9)
SODIUM SERPL-SCNC: 134 MMOL/L (ref 133–144)
TROPONIN I SERPL-MCNC: <0.015 UG/L (ref 0–0.04)
TROPONIN I SERPL-MCNC: <0.015 UG/L (ref 0–0.04)
WBC # BLD AUTO: 10.1 10E9/L (ref 4–11)

## 2020-03-30 PROCEDURE — 96374 THER/PROPH/DIAG INJ IV PUSH: CPT

## 2020-03-30 PROCEDURE — 99285 EMERGENCY DEPT VISIT HI MDM: CPT | Mod: 25

## 2020-03-30 PROCEDURE — 99024 POSTOP FOLLOW-UP VISIT: CPT | Performed by: PODIATRIST

## 2020-03-30 PROCEDURE — 85379 FIBRIN DEGRADATION QUANT: CPT | Performed by: PHYSICIAN ASSISTANT

## 2020-03-30 PROCEDURE — 83690 ASSAY OF LIPASE: CPT | Performed by: PHYSICIAN ASSISTANT

## 2020-03-30 PROCEDURE — 85025 COMPLETE CBC W/AUTO DIFF WBC: CPT | Performed by: PHYSICIAN ASSISTANT

## 2020-03-30 PROCEDURE — 25000128 H RX IP 250 OP 636: Performed by: PHYSICIAN ASSISTANT

## 2020-03-30 PROCEDURE — 80048 BASIC METABOLIC PNL TOTAL CA: CPT | Performed by: PHYSICIAN ASSISTANT

## 2020-03-30 PROCEDURE — 84484 ASSAY OF TROPONIN QUANT: CPT | Performed by: PHYSICIAN ASSISTANT

## 2020-03-30 PROCEDURE — 93005 ELECTROCARDIOGRAM TRACING: CPT

## 2020-03-30 PROCEDURE — 96376 TX/PRO/DX INJ SAME DRUG ADON: CPT

## 2020-03-30 PROCEDURE — 71046 X-RAY EXAM CHEST 2 VIEWS: CPT

## 2020-03-30 PROCEDURE — 11042 DBRDMT SUBQ TIS 1ST 20SQCM/<: CPT | Mod: 58 | Performed by: PODIATRIST

## 2020-03-30 PROCEDURE — 80076 HEPATIC FUNCTION PANEL: CPT | Performed by: PHYSICIAN ASSISTANT

## 2020-03-30 RX ORDER — HYDROMORPHONE HYDROCHLORIDE 1 MG/ML
0.5 INJECTION, SOLUTION INTRAMUSCULAR; INTRAVENOUS; SUBCUTANEOUS
Status: COMPLETED | OUTPATIENT
Start: 2020-03-30 | End: 2020-03-30

## 2020-03-30 RX ORDER — HYDROCODONE BITARTRATE AND ACETAMINOPHEN 5; 325 MG/1; MG/1
1 TABLET ORAL
Qty: 4 TABLET | Refills: 0 | Status: SHIPPED | OUTPATIENT
Start: 2020-03-30 | End: 2020-04-08

## 2020-03-30 RX ORDER — GABAPENTIN 100 MG/1
100 CAPSULE ORAL 3 TIMES DAILY
Qty: 21 CAPSULE | Refills: 0 | Status: SHIPPED | OUTPATIENT
Start: 2020-03-30 | End: 2020-04-28

## 2020-03-30 RX ORDER — LIDOCAINE HYDROCHLORIDE 20 MG/ML
15 SOLUTION OROPHARYNGEAL ONCE
Status: COMPLETED | OUTPATIENT
Start: 2020-03-30 | End: 2020-03-30

## 2020-03-30 RX ORDER — ALUMINA, MAGNESIA, AND SIMETHICONE 2400; 2400; 240 MG/30ML; MG/30ML; MG/30ML
15 SUSPENSION ORAL ONCE
Status: COMPLETED | OUTPATIENT
Start: 2020-03-30 | End: 2020-03-30

## 2020-03-30 RX ORDER — SUCRALFATE 1 G/1
1 TABLET ORAL 4 TIMES DAILY
Qty: 16 TABLET | Refills: 0 | Status: SHIPPED | OUTPATIENT
Start: 2020-03-30 | End: 2020-04-24

## 2020-03-30 RX ADMIN — HYDROMORPHONE HYDROCHLORIDE 0.5 MG: 1 INJECTION, SOLUTION INTRAMUSCULAR; INTRAVENOUS; SUBCUTANEOUS at 14:24

## 2020-03-30 RX ADMIN — HYDROMORPHONE HYDROCHLORIDE 0.5 MG: 1 INJECTION, SOLUTION INTRAMUSCULAR; INTRAVENOUS; SUBCUTANEOUS at 16:03

## 2020-03-30 ASSESSMENT — ENCOUNTER SYMPTOMS
SHORTNESS OF BREATH: 1
ABDOMINAL PAIN: 1

## 2020-03-30 ASSESSMENT — MIFFLIN-ST. JEOR: SCORE: 1569.69

## 2020-03-30 NOTE — PATIENT INSTRUCTIONS
Thank you for choosing [facility] Podiatry / Foot & Ankle Surgery!    DR. PALACIO'S CLINIC LOCATIONS:   MONDAY PM - [place] FRIDAY AM - [place]   [address]   [place] [address] [place] [address] [phone]       THURSDAY AM - [place] SET UP SURGERY: [phone] [address] APPOINTMENTS: [phone]   [place] [address] BILLING QUESTIONS: [phone] FAX NUMBER: [phone]                         Follow up: 1 week    Next steps: get iodosorb    Iodosorb topical wound care gel at most drug stores or [facility]     Topical lidocaine gel

## 2020-03-30 NOTE — TELEPHONE ENCOUNTER
Product is not covered by insurance. [facility] plan so he can not pay out of pocket for it.   Please send a change of if you would like to try for a prior authorization please call [phone]  ID# [id number]    Thank you,  Malaika He [facility]  [phone]

## 2020-03-30 NOTE — PROGRESS NOTES
"Foot & Ankle Surgery  March 30, 2020    S:  Patient in today approx 4 weeks sp partial R great toe amputation.  Pain levels elevated.  Sutures were removed last visit.  \"Yeah\" when asked if he's been on his feet a lot.  Again asking for pain meds, even though we discussed that the Rx from the last visit would be the last.      /60   Ht 1.778 m (5' 10\")   Wt 74.8 kg (165 lb)   BMI 23.68 kg/m        ROS - positive for CC.  Patient denies current nausea, vomiting, chills, fevers, belly pain, calf pain, chest pain or SOB.  Complete remainder of ROS is otherwise neg.    PE - the wound has opened quite a bit, now measuring 3.5 x 0.4cm, full-thickness, serous drainage, slight erythema.  Skin shows no trophic, color or temperature changes otherwise.  No calf redness, swelling or pain noted otherwise.    A/P - 60 year old yo patient approx 4 weeks sp above procedure  -Excisional debridement was performed, full-thickness, sharply debriding the wound down to and including exposed sub-cutaneous tissue/fat, excising nonviable tissue to the above dimensions with a tissue nipper  -strongly advised patient to limit activities, as the wound has deteriorated quite a bit since last visit; continue heel WB in surgical shoe  -Rx for Iodosorb for every other day dressing changes; OTC handout  -Rx for lidocaine gel around the wound for pain control; OTC handout  -Rx for Augmentin.  No strong signs of infection noted. However, the wound has deteriorated, there's slight increase in serous drainage  -again discussed that we will not be utilizing narcotics any longer.  Lidocaine gel, advised tylenol and decreasing activity levels to facilitate healing and decrease trauma to the wound.     Follow up  -  1 week or sooner with acute issues    Body mass index is 23.68 kg/m .        Sergey Barnes DPM FACFAS FACFAOM  Podiatric Foot & Ankle Surgeon  [facility]  [phone]    "

## 2020-03-30 NOTE — ED NOTES
"RN gave pt 0.5mg Dilaudid. Pt declined GI cocktail because \"it tastes bad.\" Pt requested the RN talk to the provider regarding an alternative PO medication. Provider requested the RN reassess the pt's pain after the Dilaudid before giving any other medication.  "

## 2020-03-30 NOTE — ED PROVIDER NOTES
History     Chief Complaint:  Chest Pain      HPI   Huseyin Pettit is a 60 year old male with a PMH of CAD s/p PCI, HTN, HLD, DM II with neuropathy, tobacco dependence and medication non-compliance, anticoagulated on Plavix, who presents to the ED for evaluation of chest pain. Chart review reveals patient has been admitted twice within the last 4 weeks for chest pain. Serial troponins were negative. TTE on 2/29 showed normal EF without WMA. Coronary angiogram on 3/2 showed mild generalized CAD with patent stents without intervention. Of note, Patient did have R great toe amputation on 3/3 for osteomyelitis.    Here, patient reports onset of midsternal pleuritic chest pain that began approximately 3 days ago.  He reports worsening of this chest pain today.  He describes it as constant, sharp, and nonradiating.  Other than taking a deep breath, he denies anything makes it worse or better.  He reports associated mild dyspnea and epigastric discomfort.  He states he has been taking aspirin and Nexium without any change to his symptoms.  He denies any fevers, chills, cough, upper respiratory symptoms, nausea, vomiting, diarrhea, or lower extremity swelling.    Allergies:  No Known Drug Allergies     Medications:    Crestor  Seroquel  Metformin  Xylocaine  Imdur  Novolog  Norco  Amaryl  Prozac  Depakote  Plavix  Anafranil  Buspar  Zebeta  Aspirin  Augmentin  Exforge      Past Medical History:    CAD  Depressive disorder  Diabetes  Hypertension   CKD  MILES  Adenomatous polyp of colon  Toe osteomyelitis    Past Surgical History:    Gallbladder surgery  CV coronary angiogram  Cholecystectomy   Amputate toes     Family History:    Family history reviewed. No pertinent family history.     Social History:  Smoking Status: Current Smoker  Smokeless Tobacco: Never Used  Alcohol Use: Negative   Drug Use: Negative  PCP: Hunetr Carlton   Marital Status:  Single      Review of Systems   Respiratory: Positive for shortness of breath.     Cardiovascular: Positive for chest pain.   Gastrointestinal: Positive for abdominal pain.   All other systems reviewed and are negative.        Physical Exam     Patient Vitals for the past 24 hrs:   BP Temp Pulse Heart Rate Resp SpO2 Weight   03/30/20 1619 -- -- -- -- -- 100 % --   03/30/20 1618 113/73 -- 90 -- -- -- --   03/30/20 1504 -- -- -- 94 -- 99 % --   03/30/20 1503 -- -- -- 92 -- 99 % --   03/30/20 1502 -- -- -- 91 -- 98 % --   03/30/20 1501 -- -- -- 91 -- 99 % --   03/30/20 1500 105/67 -- 91 92 -- 98 % --   03/30/20 1445 -- -- 92 96 -- 100 % --   03/30/20 1430 -- -- 95 93 -- 100 % --   03/30/20 1415 101/67 -- 97 98 -- 100 % --   03/30/20 1400 109/72 -- 99 98 -- 100 % --   03/30/20 1345 110/66 -- 97 -- -- 100 % --   03/30/20 1343 107/74 98.2  F (36.8  C) 101 -- 16 98 % 74.8 kg (165 lb)        Physical Exam  Constitutional: Pleasant. Cooperative.   Eyes: Pupils equally round and reactive  HENT: Head is normal in appearance. Oropharynx is normal with moist mucus membranes.  Cardiovascular: Tachycardic. Regular rhythm and without murmurs.  Respiratory: Normal respiratory effort, lungs are clear bilaterally.  GI: Abdomen is soft, non-tender, non-distended. No guarding, rebound, or rigidity.  Musculoskeletal: No asymmetry of the lower extremities, no tenderness to palpation. R great toe with healing amputated toe. See below.   Skin: Normal, without rash.  Neurologic: Cranial nerves grossly intact, normal cognition, no focal deficits. Alert and oriented x 3.   Psychiatric: Normal affect.  Nursing notes and vital signs reviewed.      Emergency Department Course     ECG:  ECG taken at 1346, ECG read at 1357  Normal sinus rhythm  Normal ECG  No significant change compared to EKG dated 3/1/20  Rate 97 bpm. OK interval 174 ms. QRS duration 78 ms. QT/QTc 352/447 ms. P-R-T axes 27 8 25.    Imaging:  Radiology findings were communicated with the patient who voiced understanding of the findings.    XR Chest 2  Views  Since February 29, 2020, heart size is normal. Scattered  bibasilar opacities again identified and relatively unchanged  suggestive of atelectasis.  ERWIN BOTELLO MD  Reading per radiology     Laboratory:  Laboratory findings were communicated with the patient who voiced understanding of the findings.    CBC: WBC 10.1, HGB 12.1 (L),   BMP:  (H) o/w WNL (Creatinine 1.08)  Troponin (Collected 1351): <0.015  Troponin (Collected 1641): <0.015   Hepatic panel: WNL  D Dimer: 0.3   Lipase: 245    Interventions:  1424 Dilaudid 0.5 mg IV   1603 Dilaudid 0.5 mg IV     Emergency Department Course:    1350 Nursing notes and vitals reviewed. IV was inserted and blood was drawn for laboratory testing, results above.     1353 I performed an exam of the patient as documented above.     WELLS PE SCORE for Pulmonary Embolism likelihood (calculator)  Background  Calculates likelihood of PE based on 7 criteria including suspected DVT, HR>100, recent surgery or immobilization, prior VTE, hemoptysis, active cancer.  Data  has Hyperlipidemia LDL goal <100; Benign essential hypertension; Severe episode of recurrent major depressive disorder, without psychotic features (H); Coronary artery disease involving native coronary artery of native heart without angina pectoris; Type 2 diabetes mellitus with other circulatory complication, with long-term current use of insulin (H); Closed fracture of multiple ribs of left side with routine healing; Toe osteomyelitis, right (H); Tobacco abuse; MILES (generalized anxiety disorder); Adenomatous polyp of colon, unspecified part of colon; Amputation of right great toe (H); and CKD (chronic kidney disease) stage 2, GFR 60-89 ml/min on their problem list.   has a past surgical history that includes Gallbladder surgery (2011); Cholecystectomy; Coronary Angiogram (N/A, 3/2/2020); and Amputate toe(s) (Right, 3/3/2020).  Pulse: 97  Criteria   Of possible 12.5 points for 7 possible  items:  1.5 points: Tachycardia with pulse >100 beats per minute  1.5 points: Surgery or immobilization in last 4 weeks  Interpretation  Wells PE Score: 3  Score 3-6 points: Moderate PE probability (20.5% risk)    1507 The patient was sent for a XR while in the emergency department, results above.     Pt staffed with Dr. See.    1715 Patient rechecked and updated.        The patient is discharged to home.     Impression & Plan      Medical Decision Making:  Huseyin Pettit is a 60 year old male who presents to the emergency department today for evaluation of pleuritic chest pain of 3 days duration.  This is in the setting of 2 recent admissions in the last month with normal TTE and essentially unremarkable coronary angiogram.  See HPI as above for additional details.  Vitals and physical exam as above.  Differential was broad and included ACS, dissection, esophageal rupture, PE, pneumothorax, GERD, MSK, gastritis, pericarditis, pneumonia, among others.  The above work-up was obtained.  D-dimer was negative, essentially ruling out PE.  Chest x-ray obtained without evidence for dissection, free air suggestive for esophageal rupture, pneumothorax, or pneumonia.  ECG obtained revealed NSR, and delta troponin obtained were negative.  No evidence for intra-abdominal cause of the patient's pain based upon lab work.  Discussed with patient the unclear etiology of his symptoms.  He reported improvement of his symptoms following the above interventions.  Patient declined GI cocktail, and notes that he is currently on omeprazole.  Nevertheless, as he has been taking aspirin for his symptoms, will prescribe sucralfate to see if this alleviates his symptoms at all.  Patient did note some symptoms consistent with peripheral neuropathy on reevaluation, so few capsules of gabapentin were prescribed.  Patient has had multiple prescriptions for Norco over the last month following his toe amputation.  He requests refill, and discussed  that I will only fill four tablets for his toe pain and his chest pain, and that he should not expect additional narcotics for toe related pain. Toe appeared to be healing nicely. He reported understanding. Discussed narcotic precautions. Although patient had a HEART score of 4, he had full work up over past 2 admissions, including angiogram, thus I do not feel at this time that admission is warranted for further cardiac work up. Discussed importance of follow up with his PCP regarding his presentation to the ED. Discussed reasons to return. All questions answered. Patient discharged to home in stable condition.    Diagnosis:    ICD-10-CM    1. Atypical chest pain  R07.89    2. History of amputation of right great toe (H)  Z89.411    3. Peripheral neuropathy  G62.9      Disposition:   Findings and plan explained to the Patient. Patient discharged home with instructions regarding supportive care, medications, and reasons to return. The importance of close follow-up was reviewed.     Discharge Medications:  New Prescriptions    GABAPENTIN (NEURONTIN) 100 MG CAPSULE    Take 1 capsule (100 mg) by mouth 3 times daily    HYDROCODONE-ACETAMINOPHEN (NORCO) 5-325 MG TABLET    Take 1 tablet by mouth nightly as needed for severe pain    SUCRALFATE (CARAFATE) 1 GM TABLET    Take 1 tablet (1 g) by mouth 4 times daily       Scribe Disclosure:  I, Dennis Gandhi, am serving as a scribe at 1:52 PM on 3/30/2020 to document services personally performed by Kilo Rosales PA-C based on my observations and the provider's statements to me.    [facility] EMERGENCY DEPARTMENT       Kilo Rosales PA-C  03/30/20 1956

## 2020-03-31 ENCOUNTER — PATIENT OUTREACH (OUTPATIENT)
Dept: CARE COORDINATION | Facility: CLINIC | Age: 60
End: 2020-03-31

## 2020-03-31 DIAGNOSIS — Z71.89 OTHER SPECIFIED COUNSELING: Primary | Chronic | ICD-10-CM

## 2020-03-31 LAB — INTERPRETATION ECG - MUSE: NORMAL

## 2020-03-31 ASSESSMENT — ACTIVITIES OF DAILY LIVING (ADL)
DEPENDENT_IADLS:: TRANSPORTATION
DEPENDENT_IADLS:: TRANSPORTATION

## 2020-03-31 NOTE — PROGRESS NOTES
Clinic Care Coordination Contact  [facility]/Voicemail    Referral Source: IP Report  Clinical Data: Care Coordinator Outreach  Outreach attempted x 1.  Left message on patient's voicemail with call back information and requested return call.  Plan: Care Coordinator will send care coordination introduction letter with care coordinator contact information and explanation of care coordination services via mail. Care Coordinator will try to reach patient again in 1-2 business days.      AVERY Manriquez   Care Coordination Team  [phone]

## 2020-03-31 NOTE — LETTER
[place] CARE COORDINATION  [facility] April 2, 2020    Huseyin Pettit  [address] [address]      Dear Huseyin,    I am a clinic care coordinator who works with Hunter Carlton MD at [facility] . I have been trying to reach you recently to introduce Clinic Care Coordination and to see if there was anything I could assist you with.  Below is a description of clinic care coordination and how I can further assist you.      The clinic care coordinator team is made up of a registered nurse,  and community health worker who understand the health care system. The goal of clinic care coordination is to help you manage your health and improve access to the health care system in the most efficient manner. The team can assist you in meeting your health care goals by providing education, coordinating services, strengthening the communication among your providers  and supporting you with any resource needs.    Please feel free to contact me, at [phone] with any questions or concerns. We are focused on providing you with the highest-quality healthcare experience possible and that all starts with you.     Sincerely,     AVERY Manriquez   Care Coordination Team  [phone]

## 2020-04-02 ASSESSMENT — ACTIVITIES OF DAILY LIVING (ADL)
DEPENDENT_IADLS:: TRANSPORTATION
DEPENDENT_IADLS:: TRANSPORTATION

## 2020-04-02 NOTE — PROGRESS NOTES
Clinic Care Coordination Contact  [facility]/Voicemail    Referral Source: IP Report  Clinical Data: Care Coordinator Outreach  Outreach attempted x 2.  Left message on patient's voicemail with call back information and requested return call.  Plan: Care Coordinator sent care coordination introduction letter on 34/2/20 via mail. Care Coordinator will do no further outreaches at this time.      AVERY Manriquez   Care Coordination Team  [phone]

## 2020-04-03 ENCOUNTER — TELEPHONE (OUTPATIENT)
Dept: PODIATRY | Facility: CLINIC | Age: 60
End: 2020-04-03

## 2020-04-03 NOTE — TELEPHONE ENCOUNTER
Malaika He           3:17 PM   Note      Product is not covered by insurance. [facility] so he can not pay out of pocket for it.   Please send a change of if you would like to try for a prior authorization please call [phone]  ID# [id number]     Thank you,  Malaika He [facility]  [phone]

## 2020-04-03 NOTE — TELEPHONE ENCOUNTER
Central Prior Authorization Team  Phone: [phone]    PA Initiation    Medication: IODOSORB 0.9 % GEL   Insurance Company: [employer] - Phone [phone] Fax [phone]  Pharmacy Filling the Rx: [facility] [address] [facility]  Filling Pharmacy Phone: [phone]  Filling Pharmacy Fax:    Start Date: 4/2/2020

## 2020-04-06 ENCOUNTER — OFFICE VISIT (OUTPATIENT)
Dept: PODIATRY | Facility: CLINIC | Age: 60
End: 2020-04-06
Payer: COMMERCIAL

## 2020-04-06 VITALS
BODY MASS INDEX: 23.62 KG/M2 | HEIGHT: 70 IN | WEIGHT: 165 LBS | SYSTOLIC BLOOD PRESSURE: 120 MMHG | DIASTOLIC BLOOD PRESSURE: 72 MMHG

## 2020-04-06 DIAGNOSIS — Z89.421 S/P AMPUTATION OF LESSER TOE, RIGHT (H): Primary | ICD-10-CM

## 2020-04-06 DIAGNOSIS — T81.89XD NONHEALING SURGICAL WOUND, SUBSEQUENT ENCOUNTER: ICD-10-CM

## 2020-04-06 PROCEDURE — 11042 DBRDMT SUBQ TIS 1ST 20SQCM/<: CPT | Mod: 78 | Performed by: PODIATRIST

## 2020-04-06 PROCEDURE — 99024 POSTOP FOLLOW-UP VISIT: CPT | Performed by: PODIATRIST

## 2020-04-06 RX ORDER — MUPIROCIN 20 MG/G
OINTMENT TOPICAL
Qty: 22 G | Refills: 1 | Status: SHIPPED | OUTPATIENT
Start: 2020-04-06 | End: 2020-04-06

## 2020-04-06 ASSESSMENT — MIFFLIN-ST. JEOR: SCORE: 1569.69

## 2020-04-06 NOTE — PROGRESS NOTES
"Foot & Ankle Surgery  April 6, 2020    S:  Patient in today approx 5 weeks sp partial R great toe amputation.  Pain levels elevated.  He was seen in the ER after his visit with me last week.  He was put on gabapentin for his foot pain.  He was able to get the iodosorb through his niece.  He indicates he just go the lidocaine ointment today.  He's wearing sandals today.  He again asks for pain medication.  He also asked me to Rx a sleeping pill, \"hydrocodone\".  I indicated that this is a narcotic.  He states it helps him sleep.    /72   Ht 1.778 m (5' 10\")   Wt 74.8 kg (165 lb)   BMI 23.68 kg/m        ROS - positive for CC.  Patient denies current nausea, vomiting, chills, fevers, belly pain, calf pain, chest pain or SOB.  Complete remainder of ROS is otherwise neg.    PE - wound is still 3.5 x 0.4cm, but there's increased granular buds at the base of the wound.  No exposed bone, no local SOI noted.  Skin shows no trophic, color or temperature changes otherwise.  No calf redness, swelling or pain noted otherwise.    A/P - 60 year old yo patient approx 5 weeks sp above procedure  -Excisional debridement was performed, full-thickness, sharply debriding the wound down to and including exposed sub-cutaneous tissue/fat, excising nonviable tissue to the above dimensions with a tissue nipper  -continue iodosorb every other day dressing changes  -discussed using lidocaine gel in/around the base of the wound for pain control  -I again reiterated I will not be Rx'ing any further narcotics and I won't be Rx'ing sleeping aides.  Discussed trying melatonin for sleeping or discussing Rx sleeping med with his PCP.       Follow up  -  2 weeks or sooner with acute issues      Body mass index is 23.68 kg/m .        Sergey Barnes DPM FACFAS FACFAOM  Podiatric Foot & Ankle Surgeon  [facility]  [phone]    "

## 2020-04-06 NOTE — TELEPHONE ENCOUNTER
PRIOR AUTHORIZATION DENIED    Medication: IODOSORB 0.9 % GEL- DENIED     Denial Date: 4/3/2020    Denial Rational: Patient must have a history of trial & failure to the formulary alternative(s) or have a contraindication or intolerance to the formulary alternatives:  - Mupirocin 2% Ointment     Appeal Information: If provider would like to appeal please provide a letter of medical necessity.

## 2020-04-07 ENCOUNTER — PATIENT OUTREACH (OUTPATIENT)
Dept: CARE COORDINATION | Facility: CLINIC | Age: 60
End: 2020-04-07

## 2020-04-07 ENCOUNTER — TELEPHONE (OUTPATIENT)
Dept: CARE COORDINATION | Facility: CLINIC | Age: 60
End: 2020-04-07

## 2020-04-07 ASSESSMENT — ACTIVITIES OF DAILY LIVING (ADL): DEPENDENT_IADLS:: TRANSPORTATION

## 2020-04-07 NOTE — TELEPHONE ENCOUNTER
Patient would like to schedule appointment with Dr. Carlton.  He has had little sleep in the past week - averaging about two hours sleep per night. He is requesting medication to help him sleep. He is requesting a call back to schedule an appointment.      AVERY Manriquez   Care Coordination Team  [phone]

## 2020-04-08 ENCOUNTER — TELEPHONE (OUTPATIENT)
Dept: FAMILY MEDICINE | Facility: CLINIC | Age: 60
End: 2020-04-08

## 2020-04-08 ENCOUNTER — VIRTUAL VISIT (OUTPATIENT)
Dept: FAMILY MEDICINE | Facility: CLINIC | Age: 60
End: 2020-04-08
Payer: COMMERCIAL

## 2020-04-08 DIAGNOSIS — M86.9 TOE OSTEOMYELITIS, RIGHT (H): ICD-10-CM

## 2020-04-08 DIAGNOSIS — M54.50 ACUTE BILATERAL LOW BACK PAIN WITHOUT SCIATICA: Primary | ICD-10-CM

## 2020-04-08 PROCEDURE — 99442 ZZC PHYSICIAN TELEPHONE EVALUATION 11-20 MIN: CPT | Performed by: FAMILY MEDICINE

## 2020-04-08 RX ORDER — PREGABALIN 75 MG/1
75 CAPSULE ORAL 2 TIMES DAILY
Qty: 30 CAPSULE | Refills: 0 | Status: SHIPPED | OUTPATIENT
Start: 2020-04-08 | End: 2020-04-21

## 2020-04-08 RX ORDER — CYCLOBENZAPRINE HCL 10 MG
10 TABLET ORAL DAILY PRN
Qty: 30 TABLET | Refills: 0 | Status: SHIPPED | OUTPATIENT
Start: 2020-04-08 | End: 2020-05-14

## 2020-04-08 ASSESSMENT — ACTIVITIES OF DAILY LIVING (ADL): DEPENDENT_IADLS:: TRANSPORTATION

## 2020-04-08 NOTE — PROGRESS NOTES
Clinic Care Coordination Contact    Clinic Care Coordination Contact  OUTREACH    Referral Information:  Referral Source: IP Report    Primary Diagnosis: Other (include Comment box)    Chief Complaint   Patient presents with     Clinic Care Coordination - [facility]     Atypical Chest pain        Universal Utilization: 86% Admission or ED Risk  Clinic Utilization  Difficulty keeping appointments:: No  Compliance Concerns: No  No-Show Concerns: No  Utilization    Last refreshed: 4/8/2020 12:37 PM:  Hospital Admissions 2           Last refreshed: 4/8/2020 12:37 PM:  ED Visits 3           Last refreshed: 4/8/2020 12:37 PM:  No Show Count (past year) 0              Current as of: 4/8/2020 12:37 PM              Clinical Concerns:  Current Medical Concerns:    Presented to the emergency department on 3/30/20  for evaluation of pleuritic chest pain of 3 days duration.  This is in the setting of 2 recent admissions in the last month with normal TTE and essentially unremarkable coronary angiogram.    Diagnosis:     ICD-10-CM     1. Atypical chest pain  R07.89     2. History of amputation of right great toe (H)  Z89.411     3. Peripheral neuropathy  G62.9       ROME HERNANDEZ returned a call from patient on 4/7/20. He stated that he had not slept well in about a week only sleeping 2 hours per night.  He requested help making an appointment with his PCP in order to discuss getting sleep medication.       Current Behavioral Concerns: NA    Education Provided to patient: Assisted him with getting follow up PCP appointment scheduled.   Pain  Pain (GOAL):: Yes  Type: Acute (<3mo)  Location of chronic pain:: toe  Health Maintenance Reviewed:    Clinical Pathway: None    Medication Management:  Post Discharge Medication Reconciliation Status: discharge medications reconciled, continue medications without change.       Functional Status:  Dependent ADLs:: Independent  Dependent IADLs:: Transportation  Bed or wheelchair confined::  No  Mobility Status: Independent  Fallen 2 or more times in the past year?: No  Any fall with injury in the past year?: No    Living Situation:  Current living arrangement:: I live in a private home with family  Type of residence:: Private home - stairs    Lifestyle & Psychosocial Needs:     Social Needs     Financial resource strain: Not hard at all     Food insecurity     Worry: Never true     Inability: Never true     Transportation needs     Medical: No     Non-medical: No     Diet:: Diabetic diet  Inadequate nutrition (GOAL):: No  Tube Feeding: No  Inadequate activity/exercise (GOAL):: No  Significant changes in sleep pattern (GOAL): No  Transportation means:: Family     Mental health DX:: Yes  Mental health DX how managed:: Medication  Mental health management concern (GOAL):: No  Informal Support system:: Family   Socioeconomic History     Marital status: Single     Spouse name: Not on file     Number of children: Not on file     Years of education: Not on file     Highest education level: Not on file     Tobacco Use     Smoking status: Current Every Day Smoker     Packs/day: 0.50     Years: 25.00     Pack years: 12.50     Smokeless tobacco: Never Used     Tobacco comment: trying to adela   Substance and Sexual Activity     Alcohol use: No     Drug use: No     Sexual activity: Never          Resources and Interventions:  Current Resources:      Community Resources: None  Supplies used at home:: Wound Care Supplies, Diabetic Supplies  Equipment Currently Used at Home: glucometer, none    Advance Care Plan/Directive  Advanced Care Plans/Directives on file:: No  Advanced Care Plan/Directive Status: Declined Further Information    Referrals Placed: None     Goals: NA      Patient/Caregiver understanding: Yes       Future Appointments              In 1 week Thaddeus Bynum DPM [facility]    In 3 months Hunter Carlton MD [facility]          Plan: ROME HERNANDEZ sent request  to [facility]/MA pool to schedule follow up PCP appointment to discuss his concerns. ROME CC will outreach again in 2 - 3 weeks to offer additional Care Coordination support if interested.    AVERY Manriquez Care Coordination Team  [phone]

## 2020-04-08 NOTE — TELEPHONE ENCOUNTER
HI Luz Maria, can we get pain clinic over the phone, so I would like to talk to someone over the phone about this patient.

## 2020-04-08 NOTE — PROGRESS NOTES
"Subjective     Huseyin Pettit is a 60 year old male who is being evaluated via a billable telephone visit.      The patient has been notified of following:     \"This telephone visit will be conducted via a call between you and your physician/provider. We have found that certain health care needs can be provided without the need for a physical exam.  This service lets us provide the care you need with a short phone conversation.  If a prescription is necessary we can send it directly to your pharmacy.  If lab work is needed we can place an order for that and you can then stop by our lab to have the test done at a later time.    Telephone visits are billed at different rates depending on your insurance coverage. During this emergency period, for some insurers they may be billed the same as an in-person visit.  Please reach out to your insurance provider with any questions.    If during the course of the call the physician/provider feels a telephone visit is not appropriate, you will not be charged for this service.\"    Patient has given verbal consent for Telephone visit?  Yes    Huseyin Pettit complains of   Chief Complaint   Patient presents with     Sleep Problem       ALLERGIES  Patient has no known allergies.    Insomnia  Onset: 3 weeks    Description:   Time to fall asleep (sleep latency): 3-4 hours  Middle of night awakening:  YES  Early morning awakening:  YES    Progression of Symptoms:  same and constant    Accompanying Signs & Symptoms:  Daytime sleepiness/napping: no   Excessive snoring/apnea: YES  Restless legs: not sure  Frequent urination: YES  Chronic pain:  YES    History:  Prior Insomnia: YES    Precipitating factors:   Caffeine intake: yes - cup of coffee a day  OTC decongestants: no   Any new medications: no     Alleviating factors:  Self medicating (alcohol, etc.):  no    Therapies Tried and outcome: lorazepam        Back Pain       Duration: 5 days        Specific cause: lifting    Description: "   Location of pain: low back right and middle  Character of pain: sharp  Pain radiation:none  New numbness or weakness in legs, not attributed to pain:  no     Intensity: Currently 9/10    History:   Pain interferes with job: Not applicable  History of back problems: no prior back problems  Any previous MRI or X-rays: None  Sees a specialist for back pain:  No  Therapies tried without relief: acetaminophen (Tylenol)    Alleviating factors:   Improved by: sitting    Precipitating factors:  Worsened by: Standing                       Patient Active Problem List   Diagnosis     Hyperlipidemia LDL goal <100     Benign essential hypertension     Severe episode of recurrent major depressive disorder, without psychotic features (H)     Coronary artery disease involving native coronary artery of native heart without angina pectoris     Type 2 diabetes mellitus with other circulatory complication, with long-term current use of insulin (H)     Closed fracture of multiple ribs of left side with routine healing     Toe osteomyelitis, right (H)     Tobacco abuse     MILES (generalized anxiety disorder)     Adenomatous polyp of colon, unspecified part of colon     Amputation of right great toe (H)     CKD (chronic kidney disease) stage 2, GFR 60-89 ml/min     Past Surgical History:   Procedure Laterality Date     AMPUTATE TOE(S) Right 3/3/2020    Procedure: AMPUTATION RIGHT GREAT TOE.;  Surgeon: Sergey Barnes DPM;  Location:  OR     CHOLECYSTECTOMY       CV CORONARY ANGIOGRAM N/A 3/2/2020    Procedure: Coronary Angiogram;  Surgeon: Thaddeus Sun MD;  Location:  [facility] CATH LAB     GALLBLADDER SURGERY  2011       Social History     Tobacco Use     Smoking status: Current Every Day Smoker     Packs/day: 0.50     Years: 25.00     Pack years: 12.50     Smokeless tobacco: Never Used     Tobacco comment: trying to adela   Substance Use Topics     Alcohol use: No     History reviewed. No pertinent family history.       Current Outpatient Medications   Medication Sig Dispense Refill     amLODIPine-valsartan (EXFORGE)  MG tablet Take 1 tablet by mouth daily 90 tablet 3     amoxicillin-clavulanate (AUGMENTIN) 875-125 MG tablet Take 1 tablet by mouth 2 times daily 14 tablet 0     aspirin 81 MG tablet Take 81 mg by mouth daily        bisoprolol (ZEBETA) 5 MG tablet Take 1 tablet (5 mg) by mouth daily 90 tablet 3     busPIRone (BUSPAR) 10 MG tablet Take 1 tablet (10 mg) by mouth 4 times daily 120 tablet 3     Cadexomer Iodine, topical, 0.9% (IODOSORB) 0.9 % GEL gel Apply to right great toe wound every other day with bandage 40 g 0     clomiPRAMINE (ANAFRANIL) 50 MG capsule Take 2 capsules (100 mg) by mouth 2 times daily 180 capsule 3     clopidogrel (PLAVIX) 75 MG tablet Take 1 tablet (75 mg) by mouth daily 90 tablet 3     divalproex sodium delayed-release (DEPAKOTE) 500 MG DR tablet Take 2 tablets (1,000 mg) by mouth 2 times daily 180 tablet 3     FLUoxetine (PROZAC) 20 MG capsule Take 1 capsule (20 mg) by mouth daily 90 capsule 3     gabapentin (NEURONTIN) 100 MG capsule Take 1 capsule (100 mg) by mouth 3 times daily 21 capsule 0     glimepiride (AMARYL) 2 MG tablet Take 1 tablet (2 mg) by mouth every morning (before breakfast) 90 tablet 3     insulin aspart prot & aspart (NOVOLOG MIX 70/30 PEN) (70-30) 100 UNIT/ML pen 28 units in AM, and 38 units in PM 9 mL 3     isosorbide mononitrate (IMDUR) 30 MG 24 hr tablet Take 1 tablet (30 mg) by mouth daily 90 tablet 3     lidocaine (XYLOCAINE) 2 % external gel Apply to right great toe(not in wound) as needed for pain control 12 mL 0     metFORMIN (GLUCOPHAGE) 850 MG tablet Take 1 tablet (850 mg) by mouth 2 times daily (with meals) (Need A1c for further refills) 180 tablet 3     QUEtiapine (SEROQUEL) 300 MG tablet Take 2 tablets (600 mg) by mouth At Bedtime 180 tablet 3     rosuvastatin (CRESTOR) 20 MG tablet Take 1 tablet (20 mg) by mouth daily 90 tablet 3     sucralfate (CARAFATE)  1 GM tablet Take 1 tablet (1 g) by mouth 4 times daily 16 tablet 0     No Known Allergies    Reviewed and updated as needed this visit by Provider         Review of Systems   ROS COMP: CONSTITUTIONAL: NEGATIVE for fever, chills, change in weight  CV: NEGATIVE for chest pain, palpitations or peripheral edema  MUSCULOSKELETAL: foot pain and back pain.       Objective   Reported vitals:  There were no vitals taken for this visit.   healthy, alert and no distress  Psych: Alert and oriented times 3; coherent speech, normal   rate and volume, able to articulate logical thoughts, able   to abstract reason, no tangential thoughts, no hallucinations   or delusions.               Assessment/Plan:  1. Acute bilateral low back pain without sciatica  Mostly musculoskeletal, talking to the patient over the phone, I sensed a suspicion of drug seeking behavior as he kept asking about narcotics like Vicodin, oxycodone, saying that it is the only thing that works for him, he mentioned many times, that he has multiple pain including his foot, his mouth and his back, and so forth, he also asked about sleeping pills like (xanax or ativan) to help him with sleep.  I strongly recommend to avoid any narcotic drugs, and start on   - cyclobenzaprine (FLEXERIL) 10 MG tablet; Take 1 tablet (10 mg) by mouth daily as needed for muscle spasms  Dispense: 30 tablet; Refill: 0    2. Toe osteomyelitis, right (H)  In regards to pain management I think it is reasonable to start on lyrica, given the high risk for drug addiction in this patient, will start on   - pregabalin (LYRICA) 75 MG capsule; Take 1 capsule (75 mg) by mouth 2 times daily  Dispense: 30 capsule; Refill: 0    Follow up in 7 days if symptoms persist, sooner if symptoms worsen or new ones develops, pt may contact us over the phone for any questions or concerns.      Phone call duration:  13 minutes    Hunter Carlton MD

## 2020-04-09 ENCOUNTER — TELEPHONE (OUTPATIENT)
Dept: PALLIATIVE MEDICINE | Facility: CLINIC | Age: 60
End: 2020-04-09

## 2020-04-09 NOTE — TELEPHONE ENCOUNTER
Dr. Cartlon calling from [facility] requesting to speak to a pain provider for some input regarding pt. Dr. Carlton can be reached at [phone].    Katya HUFF    [facility]

## 2020-04-09 NOTE — TELEPHONE ENCOUNTER
Spoke with Rachana from  Pain Management and she will have a provider call you shortly.     Pain Management [phone]    Luz Maria-Referrals

## 2020-04-10 NOTE — TELEPHONE ENCOUNTER
Did the [facility] Clinic call you? I spoke with them yesterday and a provider should of called you.    Luz Maria-Referrals

## 2020-04-20 ENCOUNTER — OFFICE VISIT (OUTPATIENT)
Dept: PODIATRY | Facility: CLINIC | Age: 60
End: 2020-04-20
Payer: COMMERCIAL

## 2020-04-20 ENCOUNTER — ANCILLARY PROCEDURE (OUTPATIENT)
Dept: GENERAL RADIOLOGY | Facility: CLINIC | Age: 60
End: 2020-04-20
Attending: PODIATRIST
Payer: COMMERCIAL

## 2020-04-20 VITALS
DIASTOLIC BLOOD PRESSURE: 72 MMHG | BODY MASS INDEX: 23.62 KG/M2 | SYSTOLIC BLOOD PRESSURE: 126 MMHG | HEIGHT: 70 IN | WEIGHT: 165 LBS

## 2020-04-20 DIAGNOSIS — E11.59 TYPE 2 DIABETES MELLITUS WITH OTHER CIRCULATORY COMPLICATION, WITH LONG-TERM CURRENT USE OF INSULIN (H): ICD-10-CM

## 2020-04-20 DIAGNOSIS — Z79.4 TYPE 2 DIABETES MELLITUS WITH OTHER CIRCULATORY COMPLICATION, WITH LONG-TERM CURRENT USE OF INSULIN (H): ICD-10-CM

## 2020-04-20 DIAGNOSIS — Z09 SURGERY FOLLOW-UP EXAMINATION: ICD-10-CM

## 2020-04-20 DIAGNOSIS — S91.109D OPEN WOUND OF TOE, SUBSEQUENT ENCOUNTER: ICD-10-CM

## 2020-04-20 DIAGNOSIS — M86.9 TOE OSTEOMYELITIS, RIGHT (H): ICD-10-CM

## 2020-04-20 DIAGNOSIS — S91.109D OPEN WOUND OF TOE, SUBSEQUENT ENCOUNTER: Primary | ICD-10-CM

## 2020-04-20 PROCEDURE — 99024 POSTOP FOLLOW-UP VISIT: CPT | Performed by: PODIATRIST

## 2020-04-20 PROCEDURE — 73660 X-RAY EXAM OF TOE(S): CPT | Mod: RT

## 2020-04-20 ASSESSMENT — MIFFLIN-ST. JEOR: SCORE: 1569.69

## 2020-04-20 NOTE — PATIENT INSTRUCTIONS
[id number]  [place] [address]  Phone: [phone]  Fax: [phone]    Wound Care Recommendations:    1)  Keep the wound covered by a bandage when bathing.    2)  Gently clean the wound with soap water, separate from bath/shower water.      3)  Each day, apply a topical antibiotic ointment to the wound (Neosporin, Triple antibiotic, Bacitracin).   Cover with large band-aid or gauze.      5)  Please seek immediate medical attention if any increasing redness, drainage, smell, or pain related to the wound.     6)  Please return to clinic in the period of time requested by Dr. Bynum.            SIGNS OF INFECTION    expanding redness around the wound     yellow or greenish-colored pus or cloudy wound drainage     red streaking spreading from the wound     increased swelling, tenderness, or pain around the wound     fever  *If you notice any of these signs of infection, call us right away!

## 2020-04-20 NOTE — LETTER
4/20/2020         RE: Huseyin Pettit  [address] [address]        Dear Colleague,    Thank you for referring your patient, Huseyin Pettit, to the [facility]. Please see a copy of my visit note below.    S: Huseyin Pettit  presents  post op.      POSTOPERATIVE DIAGNOSES:   1.  Diabetes mellitus with peripheral neuropathy.   2.  Peripheral arterial disease.   3.  Gangrene, right great toe with osteomyelitis of the distal phalanx.      PROCEDURE:  Partial right great toe amputation.       O:   Vascular:  Pedal pulses are palpable.  No edema.    Neuro: Light touch sensation is diminished     Derm: There is a transverse open wound where the incision dehisced.  I wiped off the Iodosorb and the base appears granular.  I did not find it to probe to a hard end point.   No associated erythema or edema.     X-Ray Findings: I do not appreciate any changes at the residual right hallux, proximal phalanx to raise concern for osteomyelitis.     ASSESSMENT:  1) s/p above surgery.  No clinical signs of infection.  Pain is controlled.  Encounter Diagnoses   Name Primary?     Open wound of toe, subsequent encounter Yes     Surgery follow-up examination      Type 2 diabetes mellitus with other circulatory complication, with long-term current use of insulin (H)      PLAN:  1) I reviewed daily wound care recommendations. It did not sound like he was cleansing the wound.      Wound Care Recommendations:    1)  Keep the wound covered by a bandage when bathing.    2)  Gently clean the wound with soap water, separate from bath/shower water.      3)  Each day, apply a topical antibiotic ointment to the wound (Neosporin, Triple antibiotic, Bacitracin).   Cover with large band-aid or gauze.      5)  Please seek immediate medical attention if any increasing redness, drainage, smell, or pain related to the wound.     6)  Please return to clinic in the period of time requested by Dr. Bynum.        2) A new surgical  shoe was provided to better offload the wound.    3) He asked for pain medication. Consistent with Dr. Barnes's approach, I do not think additional narcotic is indicated. I told him that I support his surgeons thinking. He can address this with his PCP. The surgical shoe should help and his gait, in his regular shoe, was not antalgic. He was understanding.     Follow up in 1 month    Thaddeus Bynum DPM, FACFAS, MS    [place] Department of Podiatry/Foot & Ankle Surgery        Again, thank you for allowing me to participate in the care of your patient.        Sincerely,        Thaddeus Bynum DPM

## 2020-04-20 NOTE — PROGRESS NOTES
S: Huseyin Pettit  presents  post op.      POSTOPERATIVE DIAGNOSES:   1.  Diabetes mellitus with peripheral neuropathy.   2.  Peripheral arterial disease.   3.  Gangrene, right great toe with osteomyelitis of the distal phalanx.      PROCEDURE:  Partial right great toe amputation.       O:   Vascular:  Pedal pulses are palpable.  No edema.    Neuro: Light touch sensation is diminished     Derm: There is a transverse open wound where the incision dehisced.  I wiped off the Iodosorb and the base appears granular.  I did not find it to probe to a hard end point.   No associated erythema or edema.     X-Ray Findings: I do not appreciate any changes at the residual right hallux, proximal phalanx to raise concern for osteomyelitis.     ASSESSMENT:  1) s/p above surgery.  No clinical signs of infection.  Pain is controlled.  Encounter Diagnoses   Name Primary?     Open wound of toe, subsequent encounter Yes     Surgery follow-up examination      Type 2 diabetes mellitus with other circulatory complication, with long-term current use of insulin (H)      PLAN:  1) I reviewed daily wound care recommendations. It did not sound like he was cleansing the wound.      Wound Care Recommendations:    1)  Keep the wound covered by a bandage when bathing.    2)  Gently clean the wound with soap water, separate from bath/shower water.      3)  Each day, apply a topical antibiotic ointment to the wound (Neosporin, Triple antibiotic, Bacitracin).   Cover with large band-aid or gauze.      5)  Please seek immediate medical attention if any increasing redness, drainage, smell, or pain related to the wound.     6)  Please return to clinic in the period of time requested by Dr. Bynum.        2) A new surgical shoe was provided to better offload the wound.    3) He asked for pain medication. Consistent with Dr. Barnes's approach, I do not think additional narcotic is indicated. I told him that I support his surgeons thinking. He can address  this with his PCP. The surgical shoe should help and his gait, in his regular shoe, was not antalgic. He was understanding.     Follow up in 1 month    Thaddeus Bynum DPM, DORA, MS [place] Department of Podiatry/Foot & Ankle Surgery

## 2020-04-21 RX ORDER — PREGABALIN 75 MG/1
CAPSULE ORAL
Qty: 30 CAPSULE | Refills: 0 | Status: SHIPPED | OUTPATIENT
Start: 2020-04-21 | End: 2020-04-28

## 2020-04-22 ENCOUNTER — PATIENT OUTREACH (OUTPATIENT)
Dept: CARE COORDINATION | Facility: CLINIC | Age: 60
End: 2020-04-22

## 2020-04-22 ENCOUNTER — TELEPHONE (OUTPATIENT)
Dept: CARE COORDINATION | Facility: CLINIC | Age: 60
End: 2020-04-22

## 2020-04-22 SDOH — HEALTH STABILITY: MENTAL HEALTH
STRESS IS WHEN SOMEONE FEELS TENSE, NERVOUS, ANXIOUS, OR CAN'T SLEEP AT NIGHT BECAUSE THEIR MIND IS TROUBLED. HOW STRESSED ARE YOU?: VERY MUCH

## 2020-04-22 SDOH — SOCIAL STABILITY: SOCIAL INSECURITY: WITHIN THE LAST YEAR, HAVE YOU BEEN HUMILIATED OR EMOTIONALLY ABUSED IN OTHER WAYS BY YOUR PARTNER OR EX-PARTNER?: NO

## 2020-04-22 SDOH — HEALTH STABILITY: PHYSICAL HEALTH: ON AVERAGE, HOW MANY MINUTES DO YOU ENGAGE IN EXERCISE AT THIS LEVEL?: 0 MIN

## 2020-04-22 SDOH — HEALTH STABILITY: PHYSICAL HEALTH: ON AVERAGE, HOW MANY DAYS PER WEEK DO YOU ENGAGE IN MODERATE TO STRENUOUS EXERCISE (LIKE A BRISK WALK)?: 0 DAYS

## 2020-04-22 SDOH — SOCIAL STABILITY: SOCIAL NETWORK: IN A TYPICAL WEEK, HOW MANY TIMES DO YOU TALK ON THE PHONE WITH FAMILY, FRIENDS, OR NEIGHBORS?: NOT ASKED

## 2020-04-22 SDOH — SOCIAL STABILITY: SOCIAL INSECURITY
WITHIN THE LAST YEAR, HAVE TO BEEN RAPED OR FORCED TO HAVE ANY KIND OF SEXUAL ACTIVITY BY YOUR PARTNER OR EX-PARTNER?: NO

## 2020-04-22 SDOH — SOCIAL STABILITY: SOCIAL INSECURITY
WITHIN THE LAST YEAR, HAVE YOU BEEN KICKED, HIT, SLAPPED, OR OTHERWISE PHYSICALLY HURT BY YOUR PARTNER OR EX-PARTNER?: NO

## 2020-04-22 SDOH — SOCIAL STABILITY: SOCIAL INSECURITY: WITHIN THE LAST YEAR, HAVE YOU BEEN AFRAID OF YOUR PARTNER OR EX-PARTNER?: NO

## 2020-04-22 ASSESSMENT — ACTIVITIES OF DAILY LIVING (ADL): DEPENDENT_IADLS:: TRANSPORTATION

## 2020-04-22 NOTE — TELEPHONE ENCOUNTER
Patient calling and states two of his medications were given differently.  States Isosorbide has always been 40 mg twice a day and he was given 30 mg daily.  States Fluoxetine was 40 mg twice a day (80 mg) and he was given 20 mg a day.   States has been on both at the same doses for years.  Has 9 am appointment for phone visit tomorrow.  Advised I would route FYI and he could discuss with Dr. Carlton during telephone visit in the morning.  Patient agrees with plan.  Bethanie Werner RN

## 2020-04-22 NOTE — PROGRESS NOTES
Clinic Care Coordination Contact    Follow Up Progress Note      Assessment: Returned call to Huseyin.  He is requesting a Neurology referral from PCP for numbness in his feet and hands.  He requested assistance with scheduling a PCP appointment to discuss hi on going concerns with not sleeping well and having a great deal of anxiety.  He is declining a therapy referral at this time.    Goals addressed this encounter:   Goals Addressed                 This Visit's Progress       Patient Stated      Decrease my Anxiety (pt-stated)        Goal Statement: In the next three months I would like to reduce my anxiety.  Date Goal set: 4/22/20  Barriers: I have many worries and concerns  Strengths: I am motivated to reduce my anxiety  Date to Achieve By: 7/30/20  Patient expressed understanding of goal: Yes  Action steps to achieve this goal:  1. I can work on improving my quality of sleep by trying things such as getting fresh air daily and getting regular physical activity.     2. I can continue working with my PCP on medication management that will help reduce my anxiety.  3. I can talk with my family when I am feeling anxious.              Intervention/Education provided during outreach: Scheduled a PCP appointment.        Plan:   Huseyin will discuss the possibility of a Neurology referral with his PCP at appointment on 4/23/20.        Care Coordinator will follow up in one month.    AVERY Manriquez   Care Coordination Team  [phone]

## 2020-04-22 NOTE — TELEPHONE ENCOUNTER
ROME HERNANDEZ spoke with Huseyin today.  He would like to discuss with PCP getting a Neurology referral due to his having numbness in his feet and hands.  He also has concerns about his blood pressure medication and Prozac medication  that he would like to discuss.  He has an appointment scheduled with PCP at 9 am tomorrow.  He is requesting that a nurse call him today about his medication concerns if possible.      AEVRY Manriquez   Care Coordination Team  [phone]

## 2020-04-22 NOTE — LETTER
[place] CARE COORDINATION  [facility]   April 22, 2020    Huseyin Pettit  [address] [address]      Dear Huseyin,    I am a clinic care coordinator who works with Hunter Carlton MD at [facility] . I wanted to thank you for spending the time to talk with me.  Below is a description of clinic care coordination and how I can further assist you.      The clinic care coordination team is made up of a registered nurse,  and community health worker who understand the health care system. The goal of clinic care coordination is to help you manage your health and improve access to the health care system in the most efficient manner. The team can assist you in meeting your health care goals by providing education, coordinating services, strengthening the communication among your providers and supporting you with any resource needs.    Please feel free to contact me at [phone] with any questions or concerns. We are focused on providing you with the highest-quality healthcare experience possible and that all starts with you.     Sincerely,     AVERY Manriquez   Care Coordination Team  [phone]      Enclosed: I have enclosed a copy of the Complex Care Plan. This has helpful information and goals that we have talked about. Please keep this in an easy to access place to use as needed.

## 2020-04-22 NOTE — LETTER
[facility]  Complex Care Plan  About Me:    Patient Name:  Huseyin Pettit    YOB: 1960  Age:         60 year old   [facility] MRN:    [id number] Telephone Information:  Home Phone [phone]   Mobile [phone]       Address:  [address] [place] [address] Email address:  No e-mail address on record      Emergency Contact(s)    Name Relationship Lgl Grd Work Phone Home Phone Mobile Phone   1. GABBY PETTIT Brother    [phone]   2. CELINE PETTIT Grandchild   [phone]           Primary language:  English     needed? No   [facility]:  [phone] op. 1  Other communication barriers: English as a second language, Language barrier  Preferred Method of Communication:     Current living arrangement: I live in a private home with family  Mobility Status/ Medical Equipment: Independent    Health Maintenance  Health Maintenance Reviewed:      My Access Plan  Medical Emergency 911   Primary Clinic Line [facility] - [phone]   24 Hour Appointment Line [phone] or  [phone]) (toll-free)   24 Hour Nurse Line [phone] (toll-free)   Preferred Urgent Care [facility] [address] [phone]   Preferred Hospital [facility]  [phone]   Preferred Pharmacy [id number] - [place], [place] - [address]     Behavioral Health Crisis Line The National Suicide Prevention Lifeline at 1-444.143.6258 or 911             My Care Team Members  Patient Care Team       Relationship Specialty Notifications Start End    Hunter Carlton MD PCP - General Family Practice  7/3/18     Phone: [phone] Fax: [phone] [address] [facility] [address]    Hunter Carlton MD Assigned PCP   7/15/18     Phone: [phone] Fax: [phone] [address] [facility] [address]    Estela Serrano, RN Personal Advocate & Liaison (PAL)   Admissions 3/12/20     Phone: [phone] Fax: [phone]        Mary Ann Olmstead LSW Lead Care Coordinator Primary Care - CC  4/22/20     Phone: [phone]                 My Care Plans  Self Management and Treatment Plan  Goals and (Comments)  Goals        General    Decrease my Anxiety (pt-stated)     Notes - Note edited  4/22/2020  1:31 PM by Mary Ann Olmstead LSW    Goal Statement: In the next three months I would like to reduce my anxiety.  Date Goal set: 4/22/20  Barriers: I have many worries and concerns  Strengths: I am motivated to reduce my anxiety  Date to Achieve By: 7/30/20  Patient expressed understanding of goal: Yes  Action steps to achieve this goal:  1. I can work on improving my quality of sleep by trying things such as getting fresh air daily and getting regular physical activity.     2. I can continue working with my PCP on medication management that will help reduce my anxiety.  3. I can talk with my family when I am feeling anxious.              Action Plans on File:                       Advance Care Plans/Directives Type:        My Medical and Care Information  Problem List   Patient Active Problem List   Diagnosis     Hyperlipidemia LDL goal <100     Benign essential hypertension     Severe episode of recurrent major depressive disorder, without psychotic features (H)     Coronary artery disease involving native coronary artery of native heart without angina pectoris     Type 2 diabetes mellitus with other circulatory complication, with long-term current use of insulin (H)     Closed fracture of multiple ribs of left side with routine healing     Toe osteomyelitis, right (H)     Tobacco abuse     MILES (generalized anxiety disorder)     Adenomatous polyp of colon, unspecified part of colon     Amputation of right great toe (H)     CKD (chronic kidney disease) stage 2, GFR 60-89 ml/min      Current Medications and Allergies:  See printed Medication Report.    Care Coordination Start  Date: 4/22/2020   Frequency of Care Coordination:     Form Last Updated: 04/22/2020

## 2020-04-22 NOTE — LETTER
[place] CARE COORDINATION  [facility] April 22, 2020    Huseyin Pettit  [address] [address]      Dear Huseyin,    I am a clinic care coordinator who works with Hunter Carlton MD at [facility] . I wanted to thank you for spending the time to talk with me.  Below is a description of clinic care coordination and how I can further assist you.      The clinic care coordination team is made up of a registered nurse,  and community health worker who understand the health care system. The goal of clinic care coordination is to help you manage your health and improve access to the health care system in the most efficient manner. The team can assist you in meeting your health care goals by providing education, coordinating services, strengthening the communication among your providers and supporting you with any resource needs.    Please feel free to contact me at [phone] with any questions or concerns. We are focused on providing you with the highest-quality healthcare experience possible and that all starts with you.     Sincerely,     AVERY Manriquez   Care Coordination Team  [phone]      Enclosed: I have enclosed a copy of the Complex Care Plan. This has helpful information and goals that we have talked about. Please keep this in an easy to access place to use as needed.

## 2020-04-23 ENCOUNTER — TELEPHONE (OUTPATIENT)
Dept: FAMILY MEDICINE | Facility: CLINIC | Age: 60
End: 2020-04-23

## 2020-04-23 ENCOUNTER — VIRTUAL VISIT (OUTPATIENT)
Dept: FAMILY MEDICINE | Facility: CLINIC | Age: 60
End: 2020-04-23
Payer: COMMERCIAL

## 2020-04-23 DIAGNOSIS — R10.13 EPIGASTRIC PAIN: Primary | ICD-10-CM

## 2020-04-23 DIAGNOSIS — F33.2 SEVERE EPISODE OF RECURRENT MAJOR DEPRESSIVE DISORDER, WITHOUT PSYCHOTIC FEATURES (H): ICD-10-CM

## 2020-04-23 DIAGNOSIS — G89.4 CHRONIC PAIN SYNDROME: Primary | ICD-10-CM

## 2020-04-23 DIAGNOSIS — I25.10 CORONARY ARTERY DISEASE INVOLVING NATIVE CORONARY ARTERY OF NATIVE HEART WITHOUT ANGINA PECTORIS: ICD-10-CM

## 2020-04-23 PROCEDURE — 99442 ZZC PHYSICIAN TELEPHONE EVALUATION 11-20 MIN: CPT | Performed by: FAMILY MEDICINE

## 2020-04-23 RX ORDER — ISOSORBIDE DINITRATE 40 MG/1
40 TABLET ORAL 2 TIMES DAILY
Qty: 180 TABLET | Refills: 1 | Status: SHIPPED | OUTPATIENT
Start: 2020-04-23 | End: 2021-04-29

## 2020-04-23 ASSESSMENT — ANXIETY QUESTIONNAIRES
5. BEING SO RESTLESS THAT IT IS HARD TO SIT STILL: SEVERAL DAYS
IF YOU CHECKED OFF ANY PROBLEMS ON THIS QUESTIONNAIRE, HOW DIFFICULT HAVE THESE PROBLEMS MADE IT FOR YOU TO DO YOUR WORK, TAKE CARE OF THINGS AT HOME, OR GET ALONG WITH OTHER PEOPLE: SOMEWHAT DIFFICULT
GAD7 TOTAL SCORE: 13
6. BECOMING EASILY ANNOYED OR IRRITABLE: NEARLY EVERY DAY
7. FEELING AFRAID AS IF SOMETHING AWFUL MIGHT HAPPEN: NEARLY EVERY DAY
2. NOT BEING ABLE TO STOP OR CONTROL WORRYING: NEARLY EVERY DAY
3. WORRYING TOO MUCH ABOUT DIFFERENT THINGS: SEVERAL DAYS
1. FEELING NERVOUS, ANXIOUS, OR ON EDGE: SEVERAL DAYS

## 2020-04-23 ASSESSMENT — PATIENT HEALTH QUESTIONNAIRE - PHQ9
SUM OF ALL RESPONSES TO PHQ QUESTIONS 1-9: 7
5. POOR APPETITE OR OVEREATING: SEVERAL DAYS

## 2020-04-23 NOTE — PROGRESS NOTES
"Huseyin Pettit is a 60 year old male who is being evaluated via a billable telephone visit.      The patient has been notified of following:     \"This telephone visit will be conducted via a call between you and your physician/provider. We have found that certain health care needs can be provided without the need for a physical exam.  This service lets us provide the care you need with a short phone conversation.  If a prescription is necessary we can send it directly to your pharmacy.  If lab work is needed we can place an order for that and you can then stop by our lab to have the test done at a later time.    Telephone visits are billed at different rates depending on your insurance coverage. During this emergency period, for some insurers they may be billed the same as an in-person visit.  Please reach out to your insurance provider with any questions.    If during the course of the call the physician/provider feels a telephone visit is not appropriate, you will not be charged for this service.\"    Patient has given verbal consent for Telephone visit?  Yes    How would you like to obtain your AVS? No needed  Subjective     Huseyin Pettit is a 60 year old male who presents to clinic today for the following health issues:    HPI    Depression Followup    How are you doing with your depression since your last visit? Worsened     Are you having other symptoms that might be associated with depression? No    Have you had a significant life event?  No - wants to travel and can't     Are you feeling anxious or having panic attacks?   Yes:  worried about his health, OCD symptoms too    Do you have any concerns with your use of alcohol or other drugs? No    Social History     Tobacco Use     Smoking status: Current Every Day Smoker     Packs/day: 0.50     Years: 25.00     Pack years: 12.50     Smokeless tobacco: Never Used     Tobacco comment: trying to adela   Substance Use Topics     Alcohol use: No     Drug use: No     PHQ " 7/10/2018   PHQ-9 Total Score 4   Q9: Thoughts of better off dead/self-harm past 2 weeks Several days   F/U: Thoughts of suicide or self-harm No   F/U: Safety concerns No     No flowsheet data found.  Last PHQ-9 4/23/2020   1.  Little interest or pleasure in doing things 0   2.  Feeling down, depressed, or hopeless 2   3.  Trouble falling or staying asleep, or sleeping too much 0   4.  Feeling tired or having little energy 2   5.  Poor appetite or overeating 3   6.  Feeling bad about yourself 0   7.  Trouble concentrating 0   8.  Moving slowly or restless 0   Q9: Thoughts of better off dead/self-harm past 2 weeks 0   PHQ-9 Total Score 7   Difficulty at work, home, or with people Somewhat difficult   In the past two weeks have you had thoughts of suicide or self harm? -   Do you have concerns about your personal safety or the safety of others? -     MILES-7  4/23/2020   1. Feeling nervous, anxious, or on edge 1   2. Not being able to stop or control worrying 3   3. Worrying too much about different things 1   4. Trouble relaxing 1   5. Being so restless that it is hard to sit still 1   6. Becoming easily annoyed or irritable 3   7. Feeling afraid, as if something awful might happen 3   MILES-7 Total Score 13   If you checked any problems, how difficult have they made it for you to do your work, take care of things at home, or get along with other people? Somewhat difficult     In the past two weeks have you had thoughts of suicide or self-harm?  No.    Do you have concerns about your personal safety or the safety of others?   No    Suicide Assessment Five-step Evaluation and Treatment (SAFE-T)                His pain has improved and and wonder if he can increase his medicine to 150 mg twice daily, as his pain is not well controlled.but he feels like lyrica is helping significantly.    I discussed the case previously with Pain management who agreed on using lyrica, pt mentioned that lyrica is also helping with his nerve  pain and discomfort in his hands and legs. And does not wish to see Neurology at this time.             Patient Active Problem List   Diagnosis     Hyperlipidemia LDL goal <100     Benign essential hypertension     Severe episode of recurrent major depressive disorder, without psychotic features (H)     Coronary artery disease involving native coronary artery of native heart without angina pectoris     Type 2 diabetes mellitus with other circulatory complication, with long-term current use of insulin (H)     Closed fracture of multiple ribs of left side with routine healing     Toe osteomyelitis, right (H)     Tobacco abuse     MILES (generalized anxiety disorder)     Adenomatous polyp of colon, unspecified part of colon     Amputation of right great toe (H)     CKD (chronic kidney disease) stage 2, GFR 60-89 ml/min     Past Surgical History:   Procedure Laterality Date     AMPUTATE TOE(S) Right 3/3/2020    Procedure: AMPUTATION RIGHT GREAT TOE.;  Surgeon: Sergey Barnes DPM;  Location:  OR     CHOLECYSTECTOMY       CV CORONARY ANGIOGRAM N/A 3/2/2020    Procedure: Coronary Angiogram;  Surgeon: Thaddeus Sun MD;  Location:  HEART CARDIAC CATH LAB     GALLBLADDER SURGERY  2011       Social History     Tobacco Use     Smoking status: Current Every Day Smoker     Packs/day: 0.50     Years: 25.00     Pack years: 12.50     Smokeless tobacco: Never Used     Tobacco comment: trying to adela   Substance Use Topics     Alcohol use: No     History reviewed. No pertinent family history.      Current Outpatient Medications   Medication Sig Dispense Refill     FLUoxetine (PROZAC) 20 MG capsule Take 2 capsules (40 mg) by mouth daily 180 capsule 3     isosorbide Dinitrate (ISORDIL) 40 MG TABS Take 1 tablet (40 mg) by mouth 2 times daily 180 tablet 1     amLODIPine-valsartan (EXFORGE)  MG tablet Take 1 tablet by mouth daily 90 tablet 3     amoxicillin-clavulanate (AUGMENTIN) 875-125 MG tablet Take 1 tablet by  mouth 2 times daily 14 tablet 0     aspirin 81 MG tablet Take 81 mg by mouth daily        bisoprolol (ZEBETA) 5 MG tablet Take 1 tablet (5 mg) by mouth daily 90 tablet 3     busPIRone (BUSPAR) 10 MG tablet Take 1 tablet (10 mg) by mouth 4 times daily 120 tablet 3     Cadexomer Iodine, topical, 0.9% (IODOSORB) 0.9 % GEL gel Apply to right great toe wound every other day with bandage 40 g 0     clomiPRAMINE (ANAFRANIL) 50 MG capsule Take 2 capsules (100 mg) by mouth 2 times daily 180 capsule 3     clopidogrel (PLAVIX) 75 MG tablet Take 1 tablet (75 mg) by mouth daily 90 tablet 3     cyclobenzaprine (FLEXERIL) 10 MG tablet Take 1 tablet (10 mg) by mouth daily as needed for muscle spasms 30 tablet 0     divalproex sodium delayed-release (DEPAKOTE) 500 MG DR tablet Take 2 tablets (1,000 mg) by mouth 2 times daily 180 tablet 3     gabapentin (NEURONTIN) 100 MG capsule Take 1 capsule (100 mg) by mouth 3 times daily 21 capsule 0     glimepiride (AMARYL) 2 MG tablet Take 1 tablet (2 mg) by mouth every morning (before breakfast) 90 tablet 3     insulin aspart prot & aspart (NOVOLOG MIX 70/30 PEN) (70-30) 100 UNIT/ML pen 28 units in AM, and 38 units in PM 9 mL 3     lidocaine (XYLOCAINE) 2 % external gel Apply to right great toe(not in wound) as needed for pain control 12 mL 0     metFORMIN (GLUCOPHAGE) 850 MG tablet Take 1 tablet (850 mg) by mouth 2 times daily (with meals) (Need A1c for further refills) 180 tablet 3     pregabalin (LYRICA) 75 MG capsule TAKE ONE CAPSULE BY MOUTH TWICE A DAY 30 capsule 0     QUEtiapine (SEROQUEL) 300 MG tablet Take 2 tablets (600 mg) by mouth At Bedtime 180 tablet 3     rosuvastatin (CRESTOR) 20 MG tablet Take 1 tablet (20 mg) by mouth daily 90 tablet 3     sucralfate (CARAFATE) 1 GM tablet Take 1 tablet (1 g) by mouth 4 times daily 16 tablet 0     No Known Allergies    Reviewed and updated as needed this visit by Provider         Review of Systems   ROS COMP: CONSTITUTIONAL: NEGATIVE for  fever, chills, change in weight  RESP: NEGATIVE for significant cough or SOB  CV: occasionaly chest pain       Objective   Reported vitals:  There were no vitals taken for this visit.   healthy and no distress  PSYCH: Alert and oriented times 3; coherent speech, normal   rate and volume, able to articulate logical thoughts, able   to abstract reason, no tangential thoughts, no hallucinations   or delusions  His affect is fearful and stressed.  RESP: No cough, no audible wheezing, able to talk in full sentences  Remainder of exam unable to be completed due to telephone visits            Assessment/Plan:  1. Coronary artery disease involving native coronary artery of native heart without angina pectoris  Will stop imdur and return to his old dose of medicine : isosorbide twice daily.  - isosorbide Dinitrate (ISORDIL) 40 MG TABS; Take 1 tablet (40 mg) by mouth 2 times daily  Dispense: 180 tablet; Refill: 1    2. Severe episode of recurrent major depressive disorder, without psychotic features (H)  Return to prozac 40 mg daily, (pt has been using this dose for a long time) and continue on same medicine, sympotms are under ok control.  - FLUoxetine (PROZAC) 20 MG capsule; Take 2 capsules (40 mg) by mouth daily  Dispense: 180 capsule; Refill: 3    3. Chronic pain syndrome  Improved using lyrica, will increase dose to 150 mg twice daily (pt to take 75 mg 2 tabs twice daily for the next 10 to 12 days then he can ask for a refill, at that time, will refill 150 mg twice daily)      Follow up in 1 month if symptoms fail to improve.    Phone call duration:  14 minutes    Hunter Carlton MD

## 2020-04-23 NOTE — TELEPHONE ENCOUNTER
04/23/2020    Pt called, stated that he had an appt with Dr. Carlton today but forgot to talk about his stomach issues. Pt is wondering if he can get something for his stomach ulcer. Please call pt back at [phone] message okay    Clarisa Soliman Patient Representative

## 2020-04-23 NOTE — TELEPHONE ENCOUNTER
Pt called back but couldn't get RN, please call back- [phone]    Clarisa Soliman Patient Representative

## 2020-04-23 NOTE — TELEPHONE ENCOUNTER
Called patient.  Male caller states he is [place] busy right now.  Advised to call clinic back and ask for a triage nurse.  Bethanie Werner RN      3/30/2020 (4 hours)  [facility] Emergency Department  Impression & Plan       Medical Decision Making:  Huseyin Pettit is a 60 year old male who presents to the emergency department today for evaluation of pleuritic chest pain of 3 days duration.  This is in the setting of 2 recent admissions in the last month with normal TTE and essentially unremarkable coronary angiogram.  See HPI as above for additional details.  Vitals and physical exam as above.  Differential was broad and included ACS, dissection, esophageal rupture, PE, pneumothorax, GERD, MSK, gastritis, pericarditis, pneumonia, among others.  The above work-up was obtained.  D-dimer was negative, essentially ruling out PE.  Chest x-ray obtained without evidence for dissection, free air suggestive for esophageal rupture, pneumothorax, or pneumonia.  ECG obtained revealed NSR, and delta troponin obtained were negative.  No evidence for intra-abdominal cause of the patient's pain based upon lab work.  Discussed with patient the unclear etiology of his symptoms.  He reported improvement of his symptoms following the above interventions.  Patient declined GI cocktail, and notes that he is currently on omeprazole.  Nevertheless, as he has been taking aspirin for his symptoms, will prescribe sucralfate to see if this alleviates his symptoms at all.  Patient did note some symptoms consistent with peripheral neuropathy on reevaluation, so few capsules of gabapentin were prescribed.  Patient has had multiple prescriptions for Norco over the last month following his toe amputation.  He requests refill, and discussed that I will only fill four tablets for his toe pain and his chest pain, and that he should not expect additional narcotics for toe related pain. Toe appeared to be healing nicely. He reported  understanding. Discussed narcotic precautions. Although patient had a HEART score of 4, he had full work up over past 2 admissions, including angiogram, thus I do not feel at this time that admission is warranted for further cardiac work up. Discussed importance of follow up with his PCP regarding his presentation to the ED. Discussed reasons to return. All questions answered. Patient discharged to home in stable condition.     Diagnosis:     ICD-10-CM     1. Atypical chest pain  R07.89     2. History of amputation of right great toe (H)  Z89.411     3. Peripheral neuropathy  G62.9       Disposition:   Findings and plan explained to the Patient. Patient discharged home with instructions regarding supportive care, medications, and reasons to return. The importance of close follow-up was reviewed.      Discharge Medications:       New Prescriptions     GABAPENTIN (NEURONTIN) 100 MG CAPSULE    Take 1 capsule (100 mg) by mouth 3 times daily     HYDROCODONE-ACETAMINOPHEN (NORCO) 5-325 MG TABLET    Take 1 tablet by mouth nightly as needed for severe pain     SUCRALFATE (CARAFATE) 1 GM TABLET    Take 1 tablet (1 g) by mouth 4 times daily        Scribe Disclosure:  I, Dennis Gandhi, am serving as a scribe at 1:52 PM on 3/30/2020 to document services personally performed by Kilo Rosales PA-C based on my observations and the provider's statements to me.     [facility] EMERGENCY DEPARTMENT        Kilo Rosales PA-C  03/30/20 1956

## 2020-04-24 RX ORDER — SUCRALFATE 1 G/1
1 TABLET ORAL 4 TIMES DAILY PRN
Qty: 120 TABLET | Refills: 1 | Status: SHIPPED | OUTPATIENT
Start: 2020-04-24 | End: 2020-05-14

## 2020-04-24 ASSESSMENT — ANXIETY QUESTIONNAIRES: GAD7 TOTAL SCORE: 13

## 2020-04-24 NOTE — TELEPHONE ENCOUNTER
Patient calling back.  States forgot to discuss his stomach issues during telephone visit.  Was to ER 3/30/20.  Got Sucralfate at hospital.  States it did help.  Do you want to continue that med?  Please advise.  Bethanie Werner RN

## 2020-04-24 NOTE — TELEPHONE ENCOUNTER
I think so,also take his palvix with food.  Meanwhile, if his symptoms continue, let me know so we can start on prilosec daily.  Hunter Carlton MD  [facility]  [phone]

## 2020-04-28 DIAGNOSIS — F41.1 GAD (GENERALIZED ANXIETY DISORDER): ICD-10-CM

## 2020-04-28 DIAGNOSIS — M86.9 TOE OSTEOMYELITIS, RIGHT (H): ICD-10-CM

## 2020-04-28 RX ORDER — PREGABALIN 75 MG/1
CAPSULE ORAL
Qty: 30 CAPSULE | Refills: 0 | Status: CANCELLED | OUTPATIENT
Start: 2020-04-28

## 2020-04-28 RX ORDER — BUSPIRONE HYDROCHLORIDE 10 MG/1
10 TABLET ORAL 4 TIMES DAILY
Qty: 120 TABLET | Refills: 3 | Status: CANCELLED | OUTPATIENT
Start: 2020-04-28

## 2020-04-28 RX ORDER — BUSPIRONE HYDROCHLORIDE 10 MG/1
10 TABLET ORAL 4 TIMES DAILY
Qty: 360 TABLET | Refills: 3 | Status: ON HOLD | OUTPATIENT
Start: 2020-04-28 | End: 2021-03-18

## 2020-04-28 RX ORDER — PREGABALIN 150 MG/1
150 CAPSULE ORAL 2 TIMES DAILY
Qty: 60 CAPSULE | Refills: 0 | Status: SHIPPED | OUTPATIENT
Start: 2020-04-28 | End: 2020-05-28

## 2020-05-04 ENCOUNTER — TRANSFERRED RECORDS (OUTPATIENT)
Dept: HEALTH INFORMATION MANAGEMENT | Facility: CLINIC | Age: 60
End: 2020-05-04

## 2020-05-04 LAB — RETINOPATHY: NEGATIVE

## 2020-05-05 DIAGNOSIS — M86.9 TOE OSTEOMYELITIS, RIGHT (H): ICD-10-CM

## 2020-05-06 RX ORDER — PREGABALIN 150 MG/1
CAPSULE ORAL
Qty: 60 CAPSULE | Refills: 0 | OUTPATIENT
Start: 2020-05-06

## 2020-05-08 ENCOUNTER — TELEPHONE (OUTPATIENT)
Dept: FAMILY MEDICINE | Facility: CLINIC | Age: 60
End: 2020-05-08

## 2020-05-08 DIAGNOSIS — K29.70 GASTRITIS WITHOUT BLEEDING, UNSPECIFIED CHRONICITY, UNSPECIFIED GASTRITIS TYPE: Primary | ICD-10-CM

## 2020-05-08 RX ORDER — ESOMEPRAZOLE MAGNESIUM 40 MG/1
40 CAPSULE, DELAYED RELEASE ORAL
Qty: 30 CAPSULE | Refills: 1 | Status: ON HOLD | OUTPATIENT
Start: 2020-05-08 | End: 2021-03-04

## 2020-05-08 NOTE — TELEPHONE ENCOUNTER
Pt calling he states Carafate is not working.    He is taking four X day but has not noted any improvement of symptoms.     He would like to try Nexium as he has tried that in the past and found it helpful.    Would this be appropriate RX?    Soumya Washington RN

## 2020-05-08 NOTE — TELEPHONE ENCOUNTER
I think it is a very good idea, I sent the prescription to the pharmacy. Take it once daily.  Cut down on coffee use please, and ask if there is any alcohol use.  Hunter Carlton MD  [facility]  [phone]

## 2020-05-14 ENCOUNTER — TELEPHONE (OUTPATIENT)
Dept: FAMILY MEDICINE | Facility: CLINIC | Age: 60
End: 2020-05-14

## 2020-05-14 ENCOUNTER — HOSPITAL ENCOUNTER (OUTPATIENT)
Facility: CLINIC | Age: 60
End: 2020-05-14
Attending: INTERNAL MEDICINE | Admitting: INTERNAL MEDICINE
Payer: COMMERCIAL

## 2020-05-14 DIAGNOSIS — Z79.4 TYPE 2 DIABETES MELLITUS WITH OTHER CIRCULATORY COMPLICATION, WITH LONG-TERM CURRENT USE OF INSULIN (H): Primary | ICD-10-CM

## 2020-05-14 DIAGNOSIS — E11.59 TYPE 2 DIABETES MELLITUS WITH OTHER CIRCULATORY COMPLICATION, WITH LONG-TERM CURRENT USE OF INSULIN (H): Primary | ICD-10-CM

## 2020-05-14 DIAGNOSIS — Z11.59 ENCOUNTER FOR SCREENING FOR OTHER VIRAL DISEASES: Primary | ICD-10-CM

## 2020-05-14 RX ORDER — LIDOCAINE 40 MG/G
CREAM TOPICAL
Status: CANCELLED | OUTPATIENT
Start: 2020-05-14

## 2020-05-14 RX ORDER — ONDANSETRON 2 MG/ML
4 INJECTION INTRAMUSCULAR; INTRAVENOUS
Status: CANCELLED | OUTPATIENT
Start: 2020-05-14

## 2020-05-14 NOTE — TELEPHONE ENCOUNTER
Patient requests a new prescription for the BD micro-fine pen needles (size 30g 8mm) to use with the Novolog insulin pens.    Thanks,  Judith Meng [facility]  [phone]

## 2020-05-14 NOTE — TELEPHONE ENCOUNTER
Prescription approved per [facility] Refill Protocol.  Shena Acevedo RN, BSN

## 2020-05-15 ENCOUNTER — TELEPHONE (OUTPATIENT)
Dept: PODIATRY | Facility: CLINIC | Age: 60
End: 2020-05-15

## 2020-05-15 ENCOUNTER — APPOINTMENT (OUTPATIENT)
Dept: GENERAL RADIOLOGY | Facility: CLINIC | Age: 60
End: 2020-05-15
Attending: EMERGENCY MEDICINE
Payer: COMMERCIAL

## 2020-05-15 ENCOUNTER — HOSPITAL ENCOUNTER (EMERGENCY)
Facility: CLINIC | Age: 60
Discharge: LEFT BEFORE DISCHARGE | End: 2020-05-15
Attending: EMERGENCY MEDICINE | Admitting: EMERGENCY MEDICINE
Payer: COMMERCIAL

## 2020-05-15 VITALS
HEART RATE: 98 BPM | DIASTOLIC BLOOD PRESSURE: 78 MMHG | TEMPERATURE: 99.6 F | RESPIRATION RATE: 16 BRPM | OXYGEN SATURATION: 98 % | SYSTOLIC BLOOD PRESSURE: 128 MMHG

## 2020-05-15 DIAGNOSIS — S91.102A OPEN WOUND OF LEFT GREAT TOE, INITIAL ENCOUNTER: ICD-10-CM

## 2020-05-15 DIAGNOSIS — R07.9 CHEST PAIN, UNSPECIFIED TYPE: ICD-10-CM

## 2020-05-15 LAB
ANION GAP SERPL CALCULATED.3IONS-SCNC: 11 MMOL/L (ref 3–14)
BASOPHILS # BLD AUTO: 0.1 10E9/L (ref 0–0.2)
BASOPHILS NFR BLD AUTO: 0.4 %
BUN SERPL-MCNC: 20 MG/DL (ref 7–30)
CALCIUM SERPL-MCNC: 8.1 MG/DL (ref 8.5–10.1)
CHLORIDE SERPL-SCNC: 104 MMOL/L (ref 94–109)
CO2 SERPL-SCNC: 22 MMOL/L (ref 20–32)
CREAT SERPL-MCNC: 1.06 MG/DL (ref 0.66–1.25)
DIFFERENTIAL METHOD BLD: ABNORMAL
EOSINOPHIL # BLD AUTO: 0.2 10E9/L (ref 0–0.7)
EOSINOPHIL NFR BLD AUTO: 1.7 %
ERYTHROCYTE [DISTWIDTH] IN BLOOD BY AUTOMATED COUNT: 15.3 % (ref 10–15)
GFR SERPL CREATININE-BSD FRML MDRD: 76 ML/MIN/{1.73_M2}
GLUCOSE SERPL-MCNC: 145 MG/DL (ref 70–99)
HCT VFR BLD AUTO: 40 % (ref 40–53)
HGB BLD-MCNC: 12.7 G/DL (ref 13.3–17.7)
IMM GRANULOCYTES # BLD: 0.1 10E9/L (ref 0–0.4)
IMM GRANULOCYTES NFR BLD: 1.1 %
LYMPHOCYTES # BLD AUTO: 4.9 10E9/L (ref 0.8–5.3)
LYMPHOCYTES NFR BLD AUTO: 38.9 %
MCH RBC QN AUTO: 30.5 PG (ref 26.5–33)
MCHC RBC AUTO-ENTMCNC: 31.8 G/DL (ref 31.5–36.5)
MCV RBC AUTO: 96 FL (ref 78–100)
MONOCYTES # BLD AUTO: 0.7 10E9/L (ref 0–1.3)
MONOCYTES NFR BLD AUTO: 5.4 %
NEUTROPHILS # BLD AUTO: 6.6 10E9/L (ref 1.6–8.3)
NEUTROPHILS NFR BLD AUTO: 52.5 %
NRBC # BLD AUTO: 0 10*3/UL
NRBC BLD AUTO-RTO: 0 /100
PLATELET # BLD AUTO: 220 10E9/L (ref 150–450)
POTASSIUM SERPL-SCNC: 3.9 MMOL/L (ref 3.4–5.3)
RBC # BLD AUTO: 4.17 10E12/L (ref 4.4–5.9)
SODIUM SERPL-SCNC: 137 MMOL/L (ref 133–144)
TROPONIN I SERPL-MCNC: <0.015 UG/L (ref 0–0.04)
WBC # BLD AUTO: 12.5 10E9/L (ref 4–11)

## 2020-05-15 PROCEDURE — 85025 COMPLETE CBC W/AUTO DIFF WBC: CPT | Performed by: EMERGENCY MEDICINE

## 2020-05-15 PROCEDURE — 25000128 H RX IP 250 OP 636: Performed by: EMERGENCY MEDICINE

## 2020-05-15 PROCEDURE — 71045 X-RAY EXAM CHEST 1 VIEW: CPT

## 2020-05-15 PROCEDURE — 80048 BASIC METABOLIC PNL TOTAL CA: CPT | Performed by: EMERGENCY MEDICINE

## 2020-05-15 PROCEDURE — 73660 X-RAY EXAM OF TOE(S): CPT | Mod: LT

## 2020-05-15 PROCEDURE — 96375 TX/PRO/DX INJ NEW DRUG ADDON: CPT

## 2020-05-15 PROCEDURE — 96374 THER/PROPH/DIAG INJ IV PUSH: CPT

## 2020-05-15 PROCEDURE — 93005 ELECTROCARDIOGRAM TRACING: CPT

## 2020-05-15 PROCEDURE — 84484 ASSAY OF TROPONIN QUANT: CPT | Performed by: EMERGENCY MEDICINE

## 2020-05-15 PROCEDURE — 99285 EMERGENCY DEPT VISIT HI MDM: CPT | Mod: 25

## 2020-05-15 RX ORDER — MORPHINE SULFATE 4 MG/ML
4 INJECTION, SOLUTION INTRAMUSCULAR; INTRAVENOUS
Status: DISCONTINUED | OUTPATIENT
Start: 2020-05-15 | End: 2020-05-15 | Stop reason: HOSPADM

## 2020-05-15 RX ORDER — ONDANSETRON 2 MG/ML
4 INJECTION INTRAMUSCULAR; INTRAVENOUS EVERY 30 MIN PRN
Status: DISCONTINUED | OUTPATIENT
Start: 2020-05-15 | End: 2020-05-15 | Stop reason: HOSPADM

## 2020-05-15 RX ADMIN — MORPHINE SULFATE 4 MG: 4 INJECTION INTRAVENOUS at 16:01

## 2020-05-15 RX ADMIN — ONDANSETRON 4 MG: 2 INJECTION INTRAMUSCULAR; INTRAVENOUS at 16:00

## 2020-05-15 ASSESSMENT — ENCOUNTER SYMPTOMS
FLANK PAIN: 0
APPETITE CHANGE: 0
COUGH: 0
ABDOMINAL PAIN: 0
VOMITING: 0
PALPITATIONS: 0
SHORTNESS OF BREATH: 1
NAUSEA: 0
MYALGIAS: 0
DIZZINESS: 0
ACTIVITY CHANGE: 0
FEVER: 0
WEAKNESS: 0
WOUND: 1
DYSURIA: 0
SORE THROAT: 0

## 2020-05-15 NOTE — DISCHARGE INSTRUCTIONS
THE EXACT CAUSE OF YOUR CHEST PAIN IS UNCLEAR IT COULD BE A WARNING SIGN OF A SERIOUS PROBLEM LIKE A BLOCKED ARTERY IN THE HEART OR IMPENDING HEART ATTACK OR OTHER SERIOUS PROCESS WHICH CAN CAUSE SERIOUS ILLNESS, DISABILITY OR DEATH IF NOT DIAGNOSED AND TREATED. IT WAS RECOMMENDED THAT YOU BE ADMITTED TO THE HOSPITAL FOR FURTHER TESTING AND MONITORING. YOU CHOSE TO SIGN OUT AND LEAVE AGAINST MEDICAL ADVICE. YOU MAY RETURN AT ANY TIME IF YOUR SYMPTOMS WORSEN OR YOU WISH TO CONTINUE EVALUATION OR TREATMENT. FOLLOW UP WITH YOUR PRIMARY DOCTOR AS SOON AS POSSIBLE.

## 2020-05-15 NOTE — TELEPHONE ENCOUNTER
Upon chart review, patient had partial Right great toe amputation completed on 3/3/20 with Dr. Barens. Patient's last follow up regarding right great toe was with Dr. Bynum on 4/20/20 who recommended follow up in 1 month.    Patient cancelled follow up appointment with Dr. Brooks on 5/20/20.    Phone call to patient. He states that he has no concerns about his right great toe. However earlier this morning he noticed some bleeding on the outside of the LEFT great toe. He notes it that its on the side of his toe not around the nail. He denies injury or trauma. He states that he was able to clean the toe and put clean bandages on the toe to stop the bleeding. He denies fever or chills. He notes pain is 4/10.    Patient scheduled for appointment with Dr. Brooks for 5/19/20 in [address].    Instructed patient to keep the toe clean and call back with any concerns. Informed patient that writer will notify Dr. Brooks and that someone may be calling him if his appointment needs to be changed.    He verbalized understanding and was appreciative of call back.    Please advise if any other recommendations prior to appt on 5/19.    Mary Dow, LATOYA

## 2020-05-15 NOTE — ED PROVIDER NOTES
"  History     Chief Complaint:    Chest Pain      The history is provided by the patient.      Huseyin Pettit is a 60 year old male who presents with 2 days of increasing chest discomfort which he describes as \"a rock pressing on my chest\". Reports that it started yesterday and improved with 1 sublingual nitroglycerin. However it came back today and felt stronger. He denies that it is exertional but notes that he has not moved much because he is not feeling well. He denies any pleuritic pain, cough or ongoing shortness of breath. He notes he had a short period of shortness of breath earlier today but this has since resolved. He denies any abdominal pain, nausea, vomiting.  No fevers. No falls or injuries. He denies any leg swelling but notes that he has had a punctate area of bleeding to the medial aspect of the base of the left great toenail. He recently had the right great toe amputated secondary to osteomyelitis and he is concerned about this on the left toe.  e denies any injuries to the toe. He denies any increasing pain in his feet. He denies any redness swelling or purulence. He denies any recent travel. No known sick contacts.    He reports that he initially had coronary stents placed when he was living in [place] several years ago and has since recently had a heart catheterization which he reports \"looked good.\"      Heart catheterization from March 1, 2020:  1.  There is mild generalized coronary atherosclerosis without significant obstruction.  The stented segments in the proximal LAD and mid circumflex remain widely patent with only minimal in-stent restenosis.  The left main and dominant right coronary have no significant focal narrowing.      Allergies:  No Known Drug Allergies     Medications:    Exforge  Aspirin 81 mg  Zebeta  Buspar  Plavix  Depakote  Anafranil  Nexium  Prozac  Amaryl  Insulin aspart   Isosorbide Dinitrate  Lidocaine gel  Metformin   Pregabalin   Crestor  Seroquel    Past Medical " History:    CKD, stage 2  Chronic pain syndrome  Anxiety  Tobacco abuse  Adenomatous polyp of colon  Toe osteomyelitis, right  Coronary artery disease  Depression  Type 2 diabetes  Hypertension  Hyperlipidemia   Closed fracture of multiple ribs of left side    Past Surgical History:    Amputate toe, right great toe  Cholecystectomy  Colonoscopy  CV coronary angiogram    Family History:    No past pertinent family history.    Social History:  Positive for tobacco use - 0.50 packs/day.  Negative for alcohol use.  Negative for drug use.  Marital Status:  Single    Review of Systems   Constitutional: Negative for activity change, appetite change and fever.   HENT: Negative for congestion and sore throat.    Respiratory: Positive for shortness of breath. Negative for cough.    Cardiovascular: Positive for chest pain. Negative for palpitations and leg swelling.   Gastrointestinal: Negative for abdominal pain, nausea and vomiting.   Genitourinary: Negative for dysuria and flank pain.   Musculoskeletal: Negative for myalgias.   Skin: Positive for wound. Negative for rash.   Neurological: Negative for dizziness, syncope and weakness.   All other systems reviewed and are negative.    Physical Exam     Patient Vitals for the past 24 hrs:   BP Temp Pulse Heart Rate Resp SpO2   05/15/20 1359 128/78 99.6  F (37.6  C) 98 98 16 98 %       Physical Exam  General: Well appearing, nontoxic.  Resting comfortably  Head:  Scalp, face, and head appear normal  Eyes:  Pupils are equal, round    Conjunctivae non-injected and sclerae white  ENT:    The external nose is normal    Pinnae are normal  Neck:  Normal range of motion    There is no rigidity noted    Trachea is in the midline  CV:  Regular rate and rhythm     Normal S1/S2, no S3/S4    No murmur or rub. Radial pulses 2+ bilaterally. DP pulses 2+ and intact bilaterally.   Resp:  Lungs are clear and equal bilaterally    There is no tachypnea    No increased work of breathing    No  rales, wheezing, or rhonchi  GI:  Abdomen is soft, no rigidity or guarding    No distension, or mass    No tenderness or rebound tenderness   MS:  Normal muscular tone    Symmetric motor strength    No lower extremity edema  Skin:  Punctate wound to the medial base of the left great toenail with evidence of recent bleeding but no active bleeding.  No tenderness, crepitus, erythema, induration or purulence.  No pain with movement of the IP or MCP joints.  The remainder of the left foot otherwise appears normal.  Right foot is status post amputation of the right great toe.  Surgical site is clean dry and intact without erythema.  Neuro: Awake and alert    Speech is normal and fluent    Moves all extremities spontaneously  Psych:  Normal affect.  Appropriate interactions.    Emergency Department Course     ECG:  Indication: Chest pain  Completed at 1405.  Sinus rhythm  Normal ECG  When compared with ECG of 30-MAR-2020 13:46,  No significant change was found   Rate 96 bpm. CA interval 166. QRS duration 84. QT/QTc 360/454. P-R-T axes 37 16 25.  Agree with computer interpretation.    Imaging:  Radiology findings were communicated with the patient who voiced understanding of the findings.    XR Toe Port Left G/E 2 Views:  Normal.  Report per radiology.    XR Chest Port 1 View:  Scarring versus atelectasis at the left lung base similar  to the prior study. No other airspace consolidation. No pneumothorax  or pleural effusion.  Report per radiology.    Laboratory:  Laboratory findings were communicated with the patient who voiced understanding of the findings.    CBC: WBC 12.5 (H), HGB 12.7 (L),   BMP: Glucose 145 (H), Calcium 8.1 (L), o/w WNL (Creatinine 1.06)  (1458) Troponin I: <0.015     Procedures  None.    Interventions:  1600 Zofran 4 mg IV  1601 Morphine 4 mg IV    Emergency Department Course:  Past medical records, nursing notes, and vitals reviewed.    1509 I performed an exam of the patient as documented  above.     EKG obtained in the ED, see results above.    IV was inserted and blood was drawn for laboratory testing, results above.    The patient had X-rays of the chest and left toe while in the emergency department, results above.     1714 I rechecked the patient and discussed the results of his workup thus far.     Findings and plan explained to the Patient. The importance of close follow-up was reviewed. Patient left ED against medical advice.    I personally reviewed the laboratory and imaging results with the Patient and answered all related questions prior to discharge.     Impression & Plan     Medical Decision Making:  Huseyin Pettit is a 60 year old male with a past medical history of coronary artery disease status post stenting several years ago and recent cardiac catheterization in March which did not reveal any significant coronary artery stenosis who presents for essentially 2 concerns today.  He seems to be most worried about bleeding from the base of the left great toe toenail but also notes that he has had chest pain.  He recently had osteomyelitis and amputation of the right great toe and he is concerned that he may be developing a similar condition in the left side.  On my evaluation of the toe there is a punctate area with evidence of recent bleeding but no ongoing bleeding.  There is no tenderness to palpation, swelling, erythema, induration, purulence or evidence of injury.  Radiographs of the toe and foot were obtained and were negative.  Clinically this does not appear to represent cellulitis, osteomyelitis or other infection at this time.  He has not having any fevers.  He denies any traumatic injuries to the toe, although he has chronic neuropathy related to underlying diabetes and he may have had a missed injury.  The small punctate area was cleaned and a bandage was applied.  Was recommended that he check the area on a daily basis to look for any signs of infection.    In regards to his  chest pain he continues to smoke and has multiple risk factors and known coronary artery disease.  EKG is thankfully reassuring without evidence of ischemia dysrhythmia, STEMI or any other abnormality.  Initial troponin is undetectable.  I had a long discussion with the patient regarding his chest pain symptoms.  We discussed that this may be a warning sign of underlying acute coronary syndrome or other serious or life-threatening process.  I recommended admission to the hospital for trending of the troponins and consideration for stress testing however the patient adamantly declined.  I then offered to repeat a second troponin in the emergency department to make sure that it was not increasing over time however he declined this as well.  Patient is of sound mind and understands the risks of possible undiagnosed serious underlying process such as heart attack or other serious health condition.  He was understanding of this and accepting of the risk of serious illness, disability or death.  He signed out AGAINST MEDICAL ADVICE.  He understood that he could return to the hospital at any time for worsening symptoms or if you wish to continue his evaluation and treatment.  I stressed the importance of close outpatient follow-up and he reported he would see his primary care physician.    Diagnosis:    ICD-10-CM    1. Chest pain, unspecified type  R07.9    2. Open wound of left great toe, initial encounter  S91.102A        Disposition:  The patient left ED against medical advice.    Scribe Disclosure:  I, Drea Vidal, am serving as a scribe at 3:09 PM on 5/15/2020 to document services personally performed by Kilo Greenwood MD based on my observations and the provider's statements to me.     Drea Vidal  5/15/2020   [facility] EMERGENCY DEPARTMENT       Kilo Greenwood MD  05/15/20 2030

## 2020-05-15 NOTE — ED NOTES
Patient leaving AMA after seeing provider. Patient signed paperwork. Pt refused discharge vital signs.

## 2020-05-15 NOTE — ED TRIAGE NOTES
Pt with onset of chest pain and shortness of breath about 1000 this morning with at rest. Pt took 81 mg asa and came to ed

## 2020-05-15 NOTE — TELEPHONE ENCOUNTER
----- Message from Adriana Mcintosh sent at 5/15/2020  8:24 AM CDT -----  Good Morning!    This patient had an appt to see Dr. Brooks on 5/20 but had to cancel due to other medical appts.    This morning, his (L) big toe was bleeding and wanted to get in Mon or Tue next week.    Will someone please call him to triage his symptoms?    The home # in Epic is current.    Thank you!  -Terri

## 2020-05-17 LAB — INTERPRETATION ECG - MUSE: NORMAL

## 2020-05-18 ENCOUNTER — PATIENT OUTREACH (OUTPATIENT)
Dept: CARE COORDINATION | Facility: CLINIC | Age: 60
End: 2020-05-18

## 2020-05-18 ENCOUNTER — OFFICE VISIT (OUTPATIENT)
Dept: URGENT CARE | Facility: URGENT CARE | Age: 60
End: 2020-05-18
Attending: INTERNAL MEDICINE
Payer: COMMERCIAL

## 2020-05-18 DIAGNOSIS — Z11.59 ENCOUNTER FOR SCREENING FOR OTHER VIRAL DISEASES: ICD-10-CM

## 2020-05-18 PROCEDURE — 99207 ZZC NO BILLABLE SERVICE THIS VISIT: CPT

## 2020-05-18 PROCEDURE — U0003 INFECTIOUS AGENT DETECTION BY NUCLEIC ACID (DNA OR RNA); SEVERE ACUTE RESPIRATORY SYNDROME CORONAVIRUS 2 (SARS-COV-2) (CORONAVIRUS DISEASE [COVID-19]), AMPLIFIED PROBE TECHNIQUE, MAKING USE OF HIGH THROUGHPUT TECHNOLOGIES AS DESCRIBED BY CMS-2020-01-R: HCPCS | Mod: 90 | Performed by: INTERNAL MEDICINE

## 2020-05-18 PROCEDURE — 99000 SPECIMEN HANDLING OFFICE-LAB: CPT | Performed by: INTERNAL MEDICINE

## 2020-05-19 ENCOUNTER — ANCILLARY PROCEDURE (OUTPATIENT)
Dept: GENERAL RADIOLOGY | Facility: CLINIC | Age: 60
End: 2020-05-19
Attending: PODIATRIST
Payer: COMMERCIAL

## 2020-05-19 ENCOUNTER — OFFICE VISIT (OUTPATIENT)
Dept: PODIATRY | Facility: CLINIC | Age: 60
End: 2020-05-19
Payer: COMMERCIAL

## 2020-05-19 VITALS
HEIGHT: 70 IN | WEIGHT: 165 LBS | DIASTOLIC BLOOD PRESSURE: 70 MMHG | BODY MASS INDEX: 23.62 KG/M2 | SYSTOLIC BLOOD PRESSURE: 122 MMHG

## 2020-05-19 DIAGNOSIS — T81.30XA WOUND DEHISCENCE: ICD-10-CM

## 2020-05-19 DIAGNOSIS — E11.42 DIABETIC POLYNEUROPATHY ASSOCIATED WITH TYPE 2 DIABETES MELLITUS (H): Primary | ICD-10-CM

## 2020-05-19 DIAGNOSIS — L03.032 CELLULITIS OF GREAT TOE OF LEFT FOOT: ICD-10-CM

## 2020-05-19 DIAGNOSIS — L97.512 ULCER OF RIGHT FOOT WITH FAT LAYER EXPOSED (H): ICD-10-CM

## 2020-05-19 DIAGNOSIS — E11.42 DIABETIC POLYNEUROPATHY ASSOCIATED WITH TYPE 2 DIABETES MELLITUS (H): ICD-10-CM

## 2020-05-19 LAB
SARS-COV-2 RNA SPEC QL NAA+PROBE: NOT DETECTED
SPECIMEN SOURCE: NORMAL

## 2020-05-19 PROCEDURE — 11042 DBRDMT SUBQ TIS 1ST 20SQCM/<: CPT | Mod: 78 | Performed by: PODIATRIST

## 2020-05-19 PROCEDURE — 99024 POSTOP FOLLOW-UP VISIT: CPT | Performed by: PODIATRIST

## 2020-05-19 PROCEDURE — 73630 X-RAY EXAM OF FOOT: CPT | Mod: RT

## 2020-05-19 PROCEDURE — 99213 OFFICE O/P EST LOW 20 MIN: CPT | Mod: 24 | Performed by: PODIATRIST

## 2020-05-19 RX ORDER — SILVER SULFADIAZINE 10 MG/G
CREAM TOPICAL DAILY
Qty: 25 G | Refills: 1 | Status: ON HOLD | OUTPATIENT
Start: 2020-05-19 | End: 2021-03-04

## 2020-05-19 RX ORDER — CEPHALEXIN 500 MG/1
500 CAPSULE ORAL 2 TIMES DAILY
Qty: 20 CAPSULE | Refills: 0 | Status: SHIPPED | OUTPATIENT
Start: 2020-05-19 | End: 2020-06-01

## 2020-05-19 ASSESSMENT — ACTIVITIES OF DAILY LIVING (ADL): DEPENDENT_IADLS:: TRANSPORTATION

## 2020-05-19 ASSESSMENT — MIFFLIN-ST. JEOR: SCORE: 1569.69

## 2020-05-19 NOTE — LETTER
"    5/19/2020         RE: Huseyin Pettit  [address] [facility] [address]        Dear Colleague,    Thank you for referring your patient, Huseyin Pettit, to the [facility] PODIATRY. Please see a copy of my visit note below.    Podiatry / Foot and Ankle Surgery Progress Note    May 19, 2020    Subject: Patient was seen for follow up on 6 weeks s/p right great toe amputation with Dr. Barnes.  Notes that he was using Iodosorb gel to the right great toe however he is out of that.  Notes it is been draining quite a bit.  Also notes bleeding and redness to the left great toe.  States he was cutting his nail and not sure if he cut his skin but noticed bleeding the other day.  Went to urgent care and was told that he was \"okay\".  He notes pain to feet is 6 out of 10.  He does have neuropathy.    Objective:  Vitals: /70   Ht 1.778 m (5' 10\")   Wt 74.8 kg (165 lb)   BMI 23.68 kg/m    BMI= Body mass index is 23.68 kg/m .    A1C: 7.2 (3/2020)    General:  Patient is alert and orientated.  NAD.    Vascular:  DP and PT pulses are palpable.  No varicosities noted  CFT's < 3secs.  Skin temp is normal.    Neuro:  Light and gross touch sensation diminished to feet.    Derm: Previous partial right great toe amputation wound dehiscence.  This is full-thickness to the subcutaneous tissue.  This measures approximately 1.4 cm x 0.3 cm x 0.3 cm.  No purulent drainage noted.  Moderate clear serous drainage noted.  Redness noticed to the left great toe to the medial toenail border.  Patient appears to have cut the skin when cutting the nails.  Currently no open lesion noted.    Musculoskeletal: Partial right great toe amputation.    Imaging: Right foot x-ray - I personally reviewed the xrays. x-rays show partial right great toe amputation through the proximal phalanx.  There is no gas in the tissue or signs of bone infection at this time.    Assessment:    Diabetic polyneuropathy associated with type 2 diabetes " mellitus (H)  Wound dehiscence  Ulcer of right foot with fat layer exposed (H)  Cellulitis of great toe of left foot    Plan: At this time the right great toe wound dehiscence/ulcer was debrided.  We will give him an order for Silvadene cream to apply daily.  He will continue offloading in his postop shoe.  We will get a baseline x-ray today.  We will call him with the results if there is anything concerning.  Given the redness to the left great toe we will start patient on an antibiotic.  He is to follow-up with Dr. Pleitez in 2 weeks.  He was told to call with further questions or concerns.      Cathy Brooks DPM, Podiatry/Foot and Ankle Surgery          Again, thank you for allowing me to participate in the care of your patient.        Sincerely,        Cahty Brooks DPM, Podiatry/Foot and Ankle Surgery

## 2020-05-19 NOTE — PATIENT INSTRUCTIONS
[facility]  [address]  [place] [address]  Phone: [phone]  Fax: [phone]    Follow up: 2 weeks with Dr Barnes    FOOT ULCER (WOUND) EDUCATION  Ulceration ofthe foot involves a break or hole in the skin. Skin is our best protection against infection. Skin is quite durable, however, the underlying tissues are fragile. For this reason, the wound is likely to deepen rapidly. Deep wounds usually get infected and require amputation. Prompt healing is therefore essential to avoid limb loss.     Foot ulcers do not heal without intervention. Walking on the foot and living your normal life is not typically compatible with healing the sore. Successful healing will require several months of significant alteration of your daily activities.   Ulcer complications frequently develop. This primarily includes infection of skin, which then spreads deep into your joints, bones and tendons. Spreading infection may travel up your leg and into other parts of your body. Deep infection is usually treated with amputation ofpart ofyour foot or your leg. Signs of infection include fever, chills, nausea, vomiting, erratic blood sugars, local redness, pus, strong odor and localized warmth. Signs of infection should be taken seriously. Prompt evaluation in the clinic or hospital emergency room is required.   Ulcer treatment requires debridement or surgical removal of devitalized tissue. Your doctor will trim away callused, moistened, unhealthy tissue from the wound surface and margin. This helps to clean the wound and allows proper inspection. Debridement also stimulates healing even though the wound originally appears larger. Expect some bleeding with each debridement. You will be given instruction regarding wound bandaging. This often includes ointment and gauze. Avoid tape directly on the skin. Hand washing is essential since most infections will come from your fingertips. Ulcer care requires a  no touch technique. Your fingers should not touch the margin or base of the wound.    HELPFUL HEALING TIPS:  1. Debridement: Getting rid of bad tissue makes way for good tissue to promote healing  2. Addressing Foot Deformities: Hammertoes and bunions can cause increased pressure  3. Pressure Reduction: If pressure remains to the wound, it won't heal  4. Good Pulses: If bloodflow is not getting to the foot, the ulcer will not heal  5. Good Nutrition: If you are not getting proper nutrition your body can't heal.Protein! At least 90g a day.  Supplements are a good way to help get this, such as Yon, Glucerna, Ensure. Also taking 5000units of Vitamin D a day.   6. Infection Control: Keep the ulcer clean with wound cleanser. DO NOT SOAK IT!  7. Moisture Control: Keep edema down and make sure that drainage is getting pulled away from the ulcer    IMPORTANCE OF DEBRIDEMENT    Reduces bioburden to help control or reduce infection. Even if an ulcer is not  infected,  the bacterial bioburden causes increased local inflammation.    Allows more accurate visualization of the wound base and edges, which allows for more precise staging.    Removes necrotic/non-viable tissue, which impedes wound healing, causes protein loss and can be a nidus for infection.    Stimulates new circulation (angiogenesis) and allows adequate oxygen delivery to the wound.    Removes undermining and tunneling, and may help reduce abscess formation.    Releases healing growth factors at the edge of the wound.    Prepares the wound bed by leaving only tissues that are capable of regenerating.      DIABETES AND YOUR FEET  Diabetes can result in several problems in the feet including ulcers (open sores) and amputations. Two of the most important reasons why people develop foot problems when they have diabetes is : 1. Neuropathy (loss of feeling)  2. Vascular disease (loss or decrease of blood flow).    Neuropathy is a term used to describe a loss of nerve  "function.  Patients with diabetes are at risk of developing neuropathy if their sugars continue to run high and are above the normal value. One theory for neuropathy is that the \"extra\" sugar in the body enters the nerves and is broken down. These by-products build up in the nerve causing it to swell and impairing nerve function. Often times, this can be prevented by controlling your sugars, dieting and exercise.    When a person develops neuropathy, they usually begin to feel numbness or tingling in their feet and sometime in their legs.  Other symptoms may include painful burning or hot feet, tingling or feeling like insects or ants are crawling on your feet or legs.  If the diabetes is sever and the sugars run high for long periods of time, neuropathy can also occur in the hands.    Vascular disease  is a term used to describe a loss or decrease in circulation (blood flow). There is a problem in getting blood and oxygen to areas that need it. Similar to neuropathy, sugars can build up in the walls of the arteries (blood vessels) and cause them to become swollen, thickened and hardened. This decreases the amount of blood that can go to an area that needs it. Though this is common in the legs of diabetic patients, it can also affect other arteries (blood vessels) in the body such as in the heart and eyes.    In the legs, vascular disease usually results in cramping. Patients who develop leg cramps after walking the same distance every time (i.e. One block, half a mile, ect.) need to let their doctors know so that their circulation may be checked. Cramps causing severe pain in the feet and/or legs while sleeping and the cramps go away when you stand or hang your legs off the side of the bed, may also be a sign of poor blood circulation.  Occasional cramping in cold weather or on rare occasions with activity may not be due to poor circulation, but you should inform your doctor.    PREVENTION OF THESE DISEASES  The " key to prevention is good blood sugar control. Poor blood sugar control is a big reason many of these problems start. Physical activity (exercise) is a very good way to help decrease your blood sugars. Exercise can lower your blood sugar, blood pressure, and cholesterol. It also reduces your risk for heart disease and stroke, relieves stress, and strengthens your heart, muscles and bones.  In addition, regular activity helps insulin work better, improves your blood circulation, and keeps your joints flexible. If you're trying to lose weight, a combination of exercise and wise food choices can help you reach your target weight and maintain it.      PAIN MANAGEMENT  1.Blood Sugar Control - Most important  2. Medications such as:  Amytriptylline, duloxetine, gabapentin, lyrica, tramadol  3. Nutritional therapy:  Vitamin B6 (100mg daily), Vitamin B12 (75mcg daily), Vitamin D 2000 IU daily), Alpha-Lipoic Acid (600-1800mg daily), Acetyl-L-Carnitine (500-1000mg TID, L-methyl folate (1500mcg daily)    ** Metformin can block Vitamin B6 and B12 so it is important to supplement**    FOOT CARE RECOMMENDATIONS   1. Wash your feet with lukewarm water and a mild soap and then dry them thoroughly, especially between the toes.     2. Examine your feet daily looking for cuts, corns, blisters, cracks, ect, especially after wearing new shoes. Make sure to look between your toes. If you cannot see the bottom of your feet, set a mirror on the floor and hold your foot over it, or ask a spouse, friend or family member to examine your feet for you. Contact your doctor immediately if new problems are noted or if sores are not healing.     3. Immediately apply moisturizer to the tops and bottoms of your feet, avoiding areas between the toes. Hand lotion (Intesive Care, Salma, Eucerin, Neutrogena, Curel, ect) is sufficient unless your doctor prescribes a medicated lotion. Apply sunscreen to your feet when going swimming outside.     4. Use  clean comfortable shoes, wear white socks (if you have any bleeding or drainage, you will see it on white socks). Socks should not have thick seams or cut off the circulation around the leg. Break in new shoes slowly and rotate with older shoes until broken in. Check the inside of your shoes with your hand to look for areas of irritation or objects that may have fallen into your shoes.       5. Keep slippers by the side of your bed for use during the night.     6.  Shoes should be fitted by a professional and should not cause areas of irritation.  Check your feet regularly when wearing a new pair of shoes and replace them as needed.     7.  Talk to your doctor about proper exercise. Exercise and stretching stimulate blood flow to your feet and maintain proper glucose levels.     8.  Monitor your blood glucose level as instructed by your doctor. Notify your doctor immediately if your blood sugar is abnormally high or low.    9. Cut your nails straight across, but then gently round any sharp edges with a cardboard nail file. If you have neuropathy, peripheral vascular disease or cannot see that well to trim your own toenails contact [employer] ([phone]) or [employer] ([phone]).      THINGS TO AVOID DOING   1.  Do not soak your feet if you have an open sore. Use only lukewarm water and always check the temperature with your hand as hot water can easily burn your feet.       2.  Never use a hot water bottle or heating pad on your feet. Also do not apply cold compresses to your feet. With decreased sensation, you could burn or freeze your feet.       3.  Do not apply any of these to your feet:    -  Over the counter medicine for corns or warts    -  Harsh chemicals like boric acid    -  Do not self-treat corns, cuts, blisters or infections. Always consult your doctor.       4.  Do not wear sandals, slippers or walk barefoot, especially on hot sand or concrete or other harsh surfaces.     5.  If you smoke,  stop!!!

## 2020-05-19 NOTE — PROGRESS NOTES
"Podiatry / Foot and Ankle Surgery Progress Note    May 19, 2020    Subject: Patient was seen for follow up on 6 weeks s/p right great toe amputation with Dr. Barnes.  Notes that he was using Iodosorb gel to the right great toe however he is out of that.  Notes it is been draining quite a bit.  Also notes bleeding and redness to the left great toe.  States he was cutting his nail and not sure if he cut his skin but noticed bleeding the other day.  Went to urgent care and was told that he was \"okay\".  He notes pain to feet is 6 out of 10.  He does have neuropathy.    Objective:  Vitals: /70   Ht 1.778 m (5' 10\")   Wt 74.8 kg (165 lb)   BMI 23.68 kg/m    BMI= Body mass index is 23.68 kg/m .    A1C: 7.2 (3/2020)    General:  Patient is alert and orientated.  NAD.    Vascular:  DP and PT pulses are palpable.  No varicosities noted  CFT's < 3secs.  Skin temp is normal.    Neuro:  Light and gross touch sensation diminished to feet.    Derm: Previous partial right great toe amputation wound dehiscence.  This is full-thickness to the subcutaneous tissue.  This measures approximately 1.4 cm x 0.3 cm x 0.3 cm.  No purulent drainage noted.  Moderate clear serous drainage noted.  Redness noticed to the left great toe to the medial toenail border.  Patient appears to have cut the skin when cutting the nails.  Currently no open lesion noted.    Musculoskeletal: Partial right great toe amputation.    Imaging: Right foot x-ray - I personally reviewed the xrays. x-rays show partial right great toe amputation through the proximal phalanx.  There is no gas in the tissue or signs of bone infection at this time.    Assessment:    Diabetic polyneuropathy associated with type 2 diabetes mellitus (H)  Wound dehiscence  Ulcer of right foot with fat layer exposed (H)  Cellulitis of great toe of left foot    Plan: At this time the right great toe wound dehiscence/ulcer was debrided.  We will give him an order for Silvadene cream " to apply daily.  He will continue offloading in his postop shoe.  We will get a baseline x-ray today.  We will call him with the results if there is anything concerning.  Given the redness to the left great toe we will start patient on an antibiotic.  He is to follow-up with Dr. Pleitez in 2 weeks.  He was told to call with further questions or concerns.    Procedure: After verbal consent, excisional debridement was performed on ulcer.  #15 blade was used to debride ulcer down to and including subcutaneous tissue. Bleeding controlled with light pressure.   No drainage noted.  No anesthesia was used due to neuropathy. Dry dressing applied to foot.  Patient tolerated procedure well.        Cathy Brooks DPM, Podiatry/Foot and Ankle Surgery

## 2020-05-19 NOTE — PROGRESS NOTES
Clinic Care Coordination Contact  [facility]/Voicemail    Referral Source: IP Report  Clinical Data: Care Coordinator Outreach  Outreach attempted x 1.  Left message on patient's voicemail with call back information and requested return call.  Plan: Care Coordinator will send unable to contact letter with care coordinator contact information via mail. Care Coordinator will try to reach patient again in 1-2 business days.      AVERY Manriquez   Care Coordination Team  [phone]

## 2020-05-28 ENCOUNTER — TELEPHONE (OUTPATIENT)
Dept: FAMILY MEDICINE | Facility: CLINIC | Age: 60
End: 2020-05-28

## 2020-05-28 DIAGNOSIS — M86.9 TOE OSTEOMYELITIS, RIGHT (H): ICD-10-CM

## 2020-05-28 RX ORDER — PREGABALIN 150 MG/1
CAPSULE ORAL
Qty: 60 CAPSULE | Refills: 0 | Status: SHIPPED | OUTPATIENT
Start: 2020-05-28 | End: 2021-03-19

## 2020-05-28 NOTE — TELEPHONE ENCOUNTER
Routing refill request to provider for review/approval because:  Drug not on the [facility] refill protocol     Per patient: (see telephone encounter dated today)  Last prescription for lyrica was only a 15 day supply (qty #30 taking 2 per day).  Patient is requesting a quantity of #60 because [place] is [place] and he is not sure how long it will take him to get a new prescription once he is home.  He is leaving next week.     If approved please send #60 of the lyrica (pregabalin).    Jesse HAQUE RN, BSN

## 2020-05-28 NOTE — TELEPHONE ENCOUNTER
Patient message copied to refill encounter and forwarded to provider for review.    Jesse HAQUE RN, BSN

## 2020-05-28 NOTE — TELEPHONE ENCOUNTER
Last prescription for lyrica was only a 15 day supply (qty #30 taking 2 per day).  Patient is requesting a quantity of #60 because he is leaving [place] and he is not sure how long it will take him to get a new prescription once he is home.  He is leaving next week.    If approved please send #60 of the lyrica (pregabalin).    Thanks!  Judith Meng CPhT  [facility]  [phone]

## 2020-06-01 ENCOUNTER — VIRTUAL VISIT (OUTPATIENT)
Dept: FAMILY MEDICINE | Facility: CLINIC | Age: 60
End: 2020-06-01
Payer: COMMERCIAL

## 2020-06-01 ENCOUNTER — TELEPHONE (OUTPATIENT)
Dept: FAMILY MEDICINE | Facility: CLINIC | Age: 60
End: 2020-06-01

## 2020-06-01 DIAGNOSIS — E11.59 TYPE 2 DIABETES MELLITUS WITH OTHER CIRCULATORY COMPLICATION, WITH LONG-TERM CURRENT USE OF INSULIN (H): ICD-10-CM

## 2020-06-01 DIAGNOSIS — Z79.4 TYPE 2 DIABETES MELLITUS WITH OTHER CIRCULATORY COMPLICATION, WITH LONG-TERM CURRENT USE OF INSULIN (H): ICD-10-CM

## 2020-06-01 DIAGNOSIS — F51.01 PRIMARY INSOMNIA: Primary | ICD-10-CM

## 2020-06-01 PROCEDURE — 99213 OFFICE O/P EST LOW 20 MIN: CPT | Mod: 95 | Performed by: FAMILY MEDICINE

## 2020-06-01 RX ORDER — GLUCOSAMINE HCL/CHONDROITIN SU 500-400 MG
CAPSULE ORAL
Qty: 100 EACH | Refills: 3 | Status: SHIPPED | OUTPATIENT
Start: 2020-06-01 | End: 2021-04-29

## 2020-06-01 RX ORDER — LANCETS
EACH MISCELLANEOUS
Qty: 100 EACH | Refills: 6 | Status: SHIPPED | OUTPATIENT
Start: 2020-06-01 | End: 2021-04-29

## 2020-06-01 NOTE — TELEPHONE ENCOUNTER
Evin Magaña:  I'm sorry to bother you again. Huseyin is very complicated patient, he has been using seroquel 600 mg daily for night time to help with sleep but it is causing him dizziness, falls and feeling confused, so I recommended to cut down to 300 mg at night time.   Is there anything else you recommend, if you look at his meds, he is taking TCA also (clomipramine 100 mg ).  Pt does have drug seeking behavior and I'm worried that he is asking for ativan that he used to use in the past and has helped a lot according to him.  Hunter Carlton MD  [facility] [address]  [phone]

## 2020-06-01 NOTE — PROGRESS NOTES
"Pre-Visit Planning     Future Appointments   Date Time Provider Department Center   6/1/2020 11:40 AM Hunter Carlton MD [facility]   6/4/2020  8:45 AM Sergey Barnes DPM [facility]   7/16/2020  2:30 PM Hunter Carlton MD [facility]     Arrival Time for this Appointment: 11:40 AM     Appointment Notes for this encounter:   [phone] sleeping issues    Health Maintenance Due   Topic Date Due     PREVENTIVE CARE VISIT  1960     ADVANCE CARE PLANNING  1960     COLORECTAL CANCER SCREENING  02/18/1971     ZOSTER IMMUNIZATION (1 of 2) 02/18/2011     Questionnaires Reviewed/Assigned  No additional questionnaires are needed        Patient preferred phone number: [phone] RN did not contact for pvp as visit same day    Estela Serrano, Registered Nurse   [facility] [place]     Huseyin Pettit is a 60 year old male who is being evaluated via a billable telephone visit.      The patient has been notified of following:     \"This telephone visit will be conducted via a call between you and your physician/provider. We have found that certain health care needs can be provided without the need for a physical exam.  This service lets us provide the care you need with a short phone conversation.  If a prescription is necessary we can send it directly to your pharmacy.  If lab work is needed we can place an order for that and you can then stop by our lab to have the test done at a later time.    Telephone visits are billed at different rates depending on your insurance coverage. During this emergency period, for some insurers they may be billed the same as an in-person visit.  Please reach out to your insurance provider with any questions.    If during the course of the call the physician/provider feels a telephone visit is not appropriate, you will not be charged for this service.\"    Patient has given verbal consent for Telephone visit?  Yes    What phone number would you like to be contacted at? [phone]    How " would you like to obtain your AVS? Mail a copy    Subjective     Huseyin Pettit is a 60 year old male who presents via phone visit today for the following health issues:    HPI     Insomnia  Onset: 7 months    Description:   Time to fall asleep (sleep latency): 2 hours  Middle of night awakening:  YES  Early morning awakening:  YES    Progression of Symptoms:  worsening    Accompanying Signs & Symptoms:  Daytime sleepiness/napping: no  Excessive snoring/apnea: YES  Restless legs: YES  Frequent urination: YES  Chronic pain: Yes.    History:  Prior Insomnia: YES    Precipitating factors:   New stressful situation: no  Caffeine intake: YES  OTC decongestants: YES  Any new medications: no    Alleviating factors:  Self medicating (alcohol, etc.):  no    Therapies Tried and outcome:     He has been taking seroquel, 600 mg but it is making him very sluggish and dizzy.            Patient Active Problem List   Diagnosis     Hyperlipidemia LDL goal <100     Benign essential hypertension     Severe episode of recurrent major depressive disorder, without psychotic features (H)     Coronary artery disease involving native coronary artery of native heart without angina pectoris     Type 2 diabetes mellitus with other circulatory complication, with long-term current use of insulin (H)     Closed fracture of multiple ribs of left side with routine healing     Toe osteomyelitis, right (H)     Tobacco abuse     MILES (generalized anxiety disorder)     Adenomatous polyp of colon, unspecified part of colon     Amputation of right great toe (H)     CKD (chronic kidney disease) stage 2, GFR 60-89 ml/min     Chronic pain syndrome     Past Surgical History:   Procedure Laterality Date     AMPUTATE TOE(S) Right 3/3/2020    Procedure: AMPUTATION RIGHT GREAT TOE.;  Surgeon: Sergey Barnes DPM;  Location: [facility] OR     CHOLECYSTECTOMY       COLONOSCOPY       CV CORONARY ANGIOGRAM N/A 3/2/2020    Procedure: Coronary Angiogram;  Surgeon: Corinne  Thaddeus Obregon MD;  Location:  [facility]     GALLBLADDER SURGERY  2011       Social History     Tobacco Use     Smoking status: Current Every Day Smoker     Packs/day: 0.50     Years: 25.00     Pack years: 12.50     Smokeless tobacco: Never Used     Tobacco comment: trying to adela   Substance Use Topics     Alcohol use: No     Family History   Problem Relation Age of Onset     Colon Cancer No family hx of          Current Outpatient Medications   Medication Sig Dispense Refill     amLODIPine-valsartan (EXFORGE)  MG tablet Take 1 tablet by mouth daily 90 tablet 3     aspirin 81 MG tablet Take 81 mg by mouth daily        bisoprolol (ZEBETA) 5 MG tablet Take 1 tablet (5 mg) by mouth daily 90 tablet 3     busPIRone (BUSPAR) 10 MG tablet Take 1 tablet (10 mg) by mouth 4 times daily 360 tablet 3     Cadexomer Iodine, topical, 0.9% (IODOSORB) 0.9 % GEL gel Apply to right great toe wound every other day with bandage 40 g 0     clomiPRAMINE (ANAFRANIL) 50 MG capsule Take 2 capsules (100 mg) by mouth 2 times daily 180 capsule 3     clopidogrel (PLAVIX) 75 MG tablet Take 1 tablet (75 mg) by mouth daily 90 tablet 3     divalproex sodium delayed-release (DEPAKOTE) 500 MG DR tablet Take 2 tablets (1,000 mg) by mouth 2 times daily 180 tablet 3     esomeprazole (NEXIUM) 40 MG DR capsule Take 1 capsule (40 mg) by mouth every morning (before breakfast) Take 30-60 minutes before eating. 30 capsule 1     FLUoxetine (PROZAC) 20 MG capsule Take 2 capsules (40 mg) by mouth daily 180 capsule 3     glimepiride (AMARYL) 2 MG tablet Take 1 tablet (2 mg) by mouth every morning (before breakfast) 90 tablet 3     insulin aspart prot & aspart (NOVOLOG MIX 70/30 PEN) (70-30) 100 UNIT/ML pen 28 units in AM, and 38 units in PM 9 mL 3     insulin pen needle (31G X 8 MM) 31G X 8 MM miscellaneous Use 2 pen needles daily or as directed. 180 each 1     isosorbide Dinitrate (ISORDIL) 40 MG TABS Take 1 tablet (40 mg) by mouth 2 times  daily 180 tablet 1     lidocaine (XYLOCAINE) 2 % external gel Apply to right great toe(not in wound) as needed for pain control 12 mL 0     metFORMIN (GLUCOPHAGE) 850 MG tablet Take 1 tablet (850 mg) by mouth 2 times daily (with meals) (Need A1c for further refills) 180 tablet 3     pregabalin (LYRICA) 150 MG capsule TAKE ONE CAPSULE BY MOUTH TWICE A DAY 60 capsule 0     QUEtiapine (SEROQUEL) 300 MG tablet Take 2 tablets (600 mg) by mouth At Bedtime 180 tablet 3     rosuvastatin (CRESTOR) 20 MG tablet Take 1 tablet (20 mg) by mouth daily 90 tablet 3     silver sulfADIAZINE (SILVADENE) 1 % external cream Apply topically daily Apply to right foot ulcer daily. 25 g 1       Reviewed and updated as needed this visit by Provider         Review of Systems   CONSTITUTIONAL: NEGATIVE for fever, chills, change in weight  RESP: NEGATIVE for significant cough or SOB  CV: NEGATIVE for chest pain, palpitations or peripheral edema       Objective   Reported vitals:  There were no vitals taken for this visit.   healthy, alert and no distress  PSYCH: Alert and oriented times 3; coherent speech, normal   rate and volume, able to articulate logical thoughts, able   to abstract reason, no tangential thoughts, no hallucinations   or delusions  His affect is normal  RESP: No cough, no audible wheezing, able to talk in full sentences  Remainder of exam unable to be completed due to telephone visits            Assessment/Plan:  1. Primary insomnia  Will cut down on seroquel to 300 mg daily, and will discuss case with psychiatry to see if they have any recommendations about sleeping enhancement.        Phone call duration:  9 minutes    Hunter Carlton MD

## 2020-06-03 ENCOUNTER — TELEPHONE (OUTPATIENT)
Dept: FAMILY MEDICINE | Facility: CLINIC | Age: 60
End: 2020-06-03

## 2020-06-03 ENCOUNTER — PATIENT OUTREACH (OUTPATIENT)
Dept: CARE COORDINATION | Facility: CLINIC | Age: 60
End: 2020-06-03

## 2020-06-03 DIAGNOSIS — I10 BENIGN ESSENTIAL HYPERTENSION: Primary | ICD-10-CM

## 2020-06-03 SDOH — SOCIAL STABILITY: SOCIAL NETWORK
IN A TYPICAL WEEK, HOW MANY TIMES DO YOU TALK ON THE PHONE WITH FAMILY, FRIENDS, OR NEIGHBORS?: MORE THAN THREE TIMES A WEEK

## 2020-06-03 ASSESSMENT — ACTIVITIES OF DAILY LIVING (ADL): DEPENDENT_IADLS:: TRANSPORTATION

## 2020-06-03 NOTE — TELEPHONE ENCOUNTER
Dr. Carlton     Patient requesting blood pressure monitor - order pended     Thank you,   Estela Serrano, Registered Nurse   [facility]

## 2020-06-03 NOTE — TELEPHONE ENCOUNTER
Patient calling stating that he needs a blood pressure machine sent to  [place] pharmacy. Please call patient at [phone].    Tasha Martinez,

## 2020-06-03 NOTE — TELEPHONE ENCOUNTER
I am so sorry, I tried to reach to psychiatry but did not hear back from them yet.  Meanwhile, will he consider using Melatonin 3  To 5 mg daily, until I hear from psychiatry hopefully soon.  Also  To insure a good sleep make sure that you don't take naps during daytime, start exercising about 1/2 hour a day (walking outside will be great) if possible,go to bed at certain time every day.  I also recommend : No TV in bed, no reading in bed, no coffee/caffeine after 2 pm.  Hunter Carlton MD  [facility]  [phone]

## 2020-06-03 NOTE — PROGRESS NOTES
Clinic Care Coordination Contact    Follow Up Progress Note      Assessment: Huseyin continues to be concerned about his insomnia and PCP is consulting another provider about his situation.  Huseyin is planning to travel to [place] to visit family next week if the airport opens up.  He is concerned that he will not be able to sleep on the plane. He reports that he has been getting outside for walk which is helping reduce his anxiety a little.  He thinks that spending time with family in [place]  will also be good for his health.  ROME HERNANDEZ let him know that his blood glucose meter device kit is ready at the  pharmacy.     Goals addressed this encounter:   Goals Addressed                 This Visit's Progress       Patient Stated      Decrease my Anxiety (pt-stated)   30%     Goal Statement: In the next three months I would like to reduce my anxiety.  Date Goal set: 4/22/20  Barriers: I have many worries and concerns  Strengths: I am motivated to reduce my anxiety  Date to Achieve By: 7/30/20  Patient expressed understanding of goal: Yes  Action steps to achieve this goal:  1. I can work on improving my quality of sleep by trying things such as getting fresh air daily and getting regular physical activity.     2. I can continue working with my PCP on medication management that may help with sleep and  help reduce my anxiety.  3. I can talk with my family when I am feeling anxious.   As of today's date 6/3/2020 goal is met at 26 - 50%.   Goal Status:  Active           Social Determinants of Health      Tobacco Use  High Risk     Alcohol Use  Not on file        Financial Resource Strain  Low Risk      Food Insecurity  No Food Insecurity        Transportation Needs  No Transportation Needs     Physical Activity  Inactive        Stress  Stress Concern Present     Social Connections  Unknown        Intimate Partner Violence  Not At Risk     Depression  Not at risk        Housing Stability  Not on file             Find [facility]  resources            Intervention/Education provided during outreach: Called pharmacy per his request and let him know that his medication is ready to be picked up.             Plan:   Huseyin has a Podiatry appointment scheduled tomorrow.  He is hopeful that he will be traveling to [place] next week to visit family.  He plans to consult with PCP before trip regarding plan for his insomnia.    Care Coordinator will follow up in 3-4 weeks.    AVERY Manriquez   Care Coordination Team  [phone]

## 2020-06-03 NOTE — TELEPHONE ENCOUNTER
Spoke to patient and advised of message below - he states understanding     Estela Serrano, Registered Nurse   [facility]

## 2020-06-04 ENCOUNTER — OFFICE VISIT (OUTPATIENT)
Dept: PODIATRY | Facility: CLINIC | Age: 60
End: 2020-06-04
Payer: COMMERCIAL

## 2020-06-04 VITALS
DIASTOLIC BLOOD PRESSURE: 70 MMHG | SYSTOLIC BLOOD PRESSURE: 114 MMHG | HEIGHT: 70 IN | BODY MASS INDEX: 23.62 KG/M2 | WEIGHT: 165 LBS

## 2020-06-04 DIAGNOSIS — T81.30XA WOUND DEHISCENCE: Primary | ICD-10-CM

## 2020-06-04 DIAGNOSIS — E11.59 TYPE 2 DIABETES MELLITUS WITH OTHER CIRCULATORY COMPLICATION, WITH LONG-TERM CURRENT USE OF INSULIN (H): ICD-10-CM

## 2020-06-04 DIAGNOSIS — Z79.4 TYPE 2 DIABETES MELLITUS WITH OTHER CIRCULATORY COMPLICATION, WITH LONG-TERM CURRENT USE OF INSULIN (H): ICD-10-CM

## 2020-06-04 PROCEDURE — 11042 DBRDMT SUBQ TIS 1ST 20SQCM/<: CPT | Performed by: PODIATRIST

## 2020-06-04 PROCEDURE — 99213 OFFICE O/P EST LOW 20 MIN: CPT | Mod: 25 | Performed by: PODIATRIST

## 2020-06-04 ASSESSMENT — MIFFLIN-ST. JEOR: SCORE: 1569.69

## 2020-06-04 NOTE — PROGRESS NOTES
"Foot & Ankle Surgery  June 4, 2020    S:  Patient in today approx 3+ mo sp R great toe amputation for gangrene.  Pain levels low.  Saw Dr Brooks 2 weeks ago.  Was using Iodosorb, but was switched to silvadene.  xrays at that visit neg for concerns for deeper infection, but was started on PO abx course 2/2 to cellulitis.  This is now resolved.  Traveling to [place] in the next few weeks, likely to be there a few months    /70   Ht 1.778 m (5' 10\")   Wt 74.8 kg (165 lb)   BMI 23.68 kg/m        ROS - positive for CC.  Patient denies current nausea, vomiting, chills, fevers, belly pain, calf pain, chest pain or SOB.  Complete remainder of ROS is otherwise neg.    PE - the R hallux amp site is much improved.  While the medial wound is 8 x 2mm and deep(no probing to bone), the central area is healed and laterally there is a very superficial 7 x 1mm fissure.  No local SOI.  L hallux shows no wounds, drainage, paronychia or SOI along the medial nail fold.  Skin shows no trophic, color or temperature changes otherwise.  No calf redness, swelling or pain noted otherwise.    A/P - 60 year old yo patient approx 3+ mo sp above procedure  -looking much improved!  -Excisional debridement was performed, full-thickness, sharply debriding the wound down to and including exposed sub-cutaneous tissue/fat, excising nonviable tissue to the above dimensions with a tissue nipper  -continue daily Silvadene  -new surgical shoe dispensed, as old shoe is falling apart  -continue activity limitations to facilitate healing  -no indication for further PO for R great toe  -regarding L great toe, no wounds, drainage, paronychia or cellulitis.  No pain on palpation.  Monitor for now, no intervention needed     Follow up  -  2 weeks unless traveling to [place](advised seeing a specialist in [place] if he's going to be there for a few months) or sooner with acute issues      Body mass index is 23.68 kg/m .        Sergey Barnes, CAROL FACFAS " FACFA  Podiatric Foot & Ankle Surgeon  [facility]  [phone]

## 2020-06-05 ENCOUNTER — TELEPHONE (OUTPATIENT)
Dept: FAMILY MEDICINE | Facility: CLINIC | Age: 60
End: 2020-06-05

## 2020-06-05 DIAGNOSIS — F41.1 GAD (GENERALIZED ANXIETY DISORDER): Primary | ICD-10-CM

## 2020-06-05 RX ORDER — LORAZEPAM 0.5 MG/1
0.5 TABLET ORAL EVERY 6 HOURS PRN
Qty: 3 TABLET | Refills: 0 | Status: ON HOLD | OUTPATIENT
Start: 2020-06-05 | End: 2021-03-04

## 2020-06-05 NOTE — TELEPHONE ENCOUNTER
General Call:   Who is calling:  Patient  Reason for Call:  Had a phone visit with Dr. Carlton on 6/1/20 to discuss sleeping issues. Pt states Dr. Carlton was going to consult pt psychiatrist and get back to him about prescribing something. Pt states that he is leaving for the [facility] tomorrow 6/6/20 and is hoping to have something before he leave. Please return call   What are your questions or concerns:    Date of last appointment with provider: 6/1/20  Okay to leave a detailed message:Yes at Home number on file [phone] (home)         Amaris Judd-Patient Rep

## 2020-06-05 NOTE — TELEPHONE ENCOUNTER
Thank you so much Archana, this patient is difficult to manage, but I got a call from him saying that he is moving back to his country in [place] and they will continue on his management.  I did prescribe 3 pills of ativan for his trip to use as needed.  Again thank you for the thorough response.  Hunter Carlton MD  [facility]  [phone]

## 2020-06-05 NOTE — TELEPHONE ENCOUNTER
RN spoke to patient and informed that rx was sent to pharmacy     Estela Serrano, Registered Nurse   [facility]

## 2020-06-05 NOTE — TELEPHONE ENCOUNTER
If he tried lorazepam in the past and worked for him, we can try ativan 0.5 mg to use as needed, use every 8 hours.  Please do not use more than 1 pill every 8 hours  Hunter Carlton MD  [facility]  [phone]

## 2020-06-05 NOTE — TELEPHONE ENCOUNTER
Dr. Carlton -  Have you heard from psychiatry? Patient leaving tomorrow for [facility] and would like sleeping aid     Estela Serrano Registered Nurse   [facility]

## 2020-06-05 NOTE — TELEPHONE ENCOUNTER
Toño Almanza for the delay in response. I was out of office this week.   I see current medications of:  Seroquel (quetiapine) 300 mg (I agree about reducing dosing if not helping and having side effects).   Anafranil (clomipramine) 100 mg which I am guessing is for Obsessive Compulsive Disorder (OCD).   Prozac (fluoxetine) 40 mg  Lyrica (pregabalin)   Buspar (buspirone) 10 mg TID    I am not sure the diagnoses of this patient.   Recommend psychiatry and pharmacy to help do more med review and management.     There is room to increase the Prozac (fluoxetine) and the Buspar (buspirone) for anxiety , but I would be cautious with polypharmacy. Prozac (fluoxetine) could be increased to 60-80 mg per day. Buspar (buspirone) can be increased up to 60 mg per day.     Is Lyrica (pregabalin) that helpful? Perhaps Neurontin (gabapentin) would be helpful for pain and anxiety instead.     Inderal (propranolol) and Vistaril/Atarax (hydroxyzine) are also options for breakthrough anxiety.  Archana    The above treatment considerations and suggestions are based on consultations with the patient s primary care provider and a review of information available in the  Electronic Medical Record. I have not personally examined the patient. All recommendations should be implemented with consideration of the patient s relevant prior history and current clinical status.

## 2020-06-30 ENCOUNTER — PATIENT OUTREACH (OUTPATIENT)
Dept: CARE COORDINATION | Facility: CLINIC | Age: 60
End: 2020-06-30

## 2020-06-30 ASSESSMENT — ACTIVITIES OF DAILY LIVING (ADL): DEPENDENT_IADLS:: TRANSPORTATION

## 2020-06-30 NOTE — PROGRESS NOTES
Clinic Care Coordination Contact  [facility]/Voicemail    Referral Source: IP Report  Clinical Data: Care Coordinator Outreach  Spoke to pt's brother who reports that Huseyin is currently in [place] and he plans to return in one month  Outreach attempted x 1.  Left message on patient's voicemail with call back information and requested return call.  Plan: Care Coordinator will send unable to contact letter with care coordinator contact information via mail. Care Coordinator will try to reach patient again in 1 month.    AVERY Manriquez   Care Coordination Team  [phone]

## 2020-08-06 ENCOUNTER — PATIENT OUTREACH (OUTPATIENT)
Dept: CARE COORDINATION | Facility: CLINIC | Age: 60
End: 2020-08-06

## 2020-08-06 NOTE — PROGRESS NOTES
Clinic Care Coordination Contact  [facility]/Voicemail  Left Voicemail     Clinical Data: Care Coordinator Outreach  Outreach attempted x 2.  Left message on patient's voicemail with call back information and requested return call.    Plan: Care Coordinator will try to reach patient again in 10 business days.    EZEQUIEL Enriquez   Clinic Care Coordination  [facility]:  [place], [place], [place], [place], and [place]  Phone: [phone]

## 2020-08-20 ENCOUNTER — PATIENT OUTREACH (OUTPATIENT)
Dept: CARE COORDINATION | Facility: CLINIC | Age: 60
End: 2020-08-20

## 2020-08-20 ASSESSMENT — ACTIVITIES OF DAILY LIVING (ADL): DEPENDENT_IADLS:: TRANSPORTATION

## 2020-08-20 NOTE — LETTER
[place] CARE COORDINATION  [facility]   August 20, 2020    Huseyin Pettit  [address] [facility] [address]      Dear Huseyin,    I have been unsuccessful in reaching you since our last contact. At this time the Care Coordination team will make no further attempts to reach you, however this does not change your ability to continue receiving care from your providers at your primary care clinic. If you need additional support from a care coordinator in the future please contact me at [phone].    All of us at [facility]  are invested in your health and are here to assist you in meeting your goals.     Sincerely,    AVERY Manriquez   Care Coordination Team  [phone]

## 2020-08-20 NOTE — PROGRESS NOTES
Clinic Care Coordination Contact  [facility]/Voicemail    Referral Source: IP Report  Clinical Data: Care Coordinator Outreach  Outreach attempted x 3.  Left message on patient's voicemail with call back information and requested return call.  Plan: Care Coordinator will send disenrollment letter with care coordinator contact information via mail. Care Coordinator will do no further outreaches at this time.    AVERY Manriquez   Care Coordination Team  [phone]

## 2021-01-28 DIAGNOSIS — I25.10 CORONARY ARTERY DISEASE INVOLVING NATIVE CORONARY ARTERY OF NATIVE HEART WITHOUT ANGINA PECTORIS: ICD-10-CM

## 2021-01-28 DIAGNOSIS — Z79.4 TYPE 2 DIABETES MELLITUS WITH OTHER CIRCULATORY COMPLICATION, WITH LONG-TERM CURRENT USE OF INSULIN (H): ICD-10-CM

## 2021-01-28 DIAGNOSIS — I10 BENIGN ESSENTIAL HYPERTENSION: ICD-10-CM

## 2021-01-28 DIAGNOSIS — E11.59 TYPE 2 DIABETES MELLITUS WITH OTHER CIRCULATORY COMPLICATION, WITH LONG-TERM CURRENT USE OF INSULIN (H): ICD-10-CM

## 2021-01-28 DIAGNOSIS — F33.2 SEVERE EPISODE OF RECURRENT MAJOR DEPRESSIVE DISORDER, WITHOUT PSYCHOTIC FEATURES (H): ICD-10-CM

## 2021-01-29 RX ORDER — ROSUVASTATIN CALCIUM 20 MG/1
TABLET, COATED ORAL
Qty: 90 TABLET | Refills: 0 | OUTPATIENT
Start: 2021-01-29

## 2021-01-29 RX ORDER — QUETIAPINE FUMARATE 300 MG/1
TABLET, FILM COATED ORAL
Qty: 180 TABLET | Refills: 0 | OUTPATIENT
Start: 2021-01-29

## 2021-01-29 RX ORDER — ISOSORBIDE MONONITRATE 30 MG/1
TABLET, EXTENDED RELEASE ORAL
Qty: 90 TABLET | Refills: 0 | OUTPATIENT
Start: 2021-01-29

## 2021-01-29 RX ORDER — CLOPIDOGREL BISULFATE 75 MG/1
TABLET ORAL
Qty: 90 TABLET | Refills: 0 | OUTPATIENT
Start: 2021-01-29

## 2021-01-29 RX ORDER — GLIMEPIRIDE 2 MG/1
TABLET ORAL
Qty: 90 TABLET | Refills: 0 | OUTPATIENT
Start: 2021-01-29

## 2021-01-29 RX ORDER — BISOPROLOL FUMARATE 5 MG/1
TABLET, FILM COATED ORAL
Qty: 90 TABLET | Refills: 0 | OUTPATIENT
Start: 2021-01-29

## 2021-01-29 RX ORDER — AMLODIPINE AND VALSARTAN 10; 160 MG/1; MG/1
TABLET ORAL
Qty: 90 TABLET | Refills: 0 | OUTPATIENT
Start: 2021-01-29

## 2021-01-29 NOTE — TELEPHONE ENCOUNTER
I didn't refill any of his medicine, as pt needs to be seen, can we get him an appointment ASAP with me. F2F.

## 2021-01-29 NOTE — TELEPHONE ENCOUNTER
Isosorbide- dose requested is not current dose on med list    Plavix, Exforge, Crestor, Glipizide, Seroquel, Metformin - refills not due til 3/10/21    Routing refill request to provider for review/approval because:  Labs not current:  A1C and HGB  Patient needs to be seen because:  Failing visit  PPI on med list    Visit prior to refills?  Please advise.  Bethanie Werner RN      Isosorbide-   4/23/2020  [facility]     Assessment/Plan:  1. Coronary artery disease involving native coronary artery of native heart without angina pectoris  Will stop imdur and return to his old dose of medicine : isosorbide twice daily.  - isosorbide Dinitrate (ISORDIL) 40 MG TABS; Take 1 tablet (40 mg) by mouth 2 times daily  Dispense: 180 tablet; Refill: 1    Follow up in 1 month if symptoms fail to improve.     Phone call duration:  14 minutes     Hunter Carlton MD

## 2021-03-04 ENCOUNTER — HOSPITAL ENCOUNTER (EMERGENCY)
Facility: CLINIC | Age: 61
Discharge: ANOTHER HOSPITAL | End: 2021-03-04
Attending: EMERGENCY MEDICINE | Admitting: EMERGENCY MEDICINE
Payer: COMMERCIAL

## 2021-03-04 ENCOUNTER — APPOINTMENT (OUTPATIENT)
Dept: GENERAL RADIOLOGY | Facility: CLINIC | Age: 61
End: 2021-03-04
Attending: EMERGENCY MEDICINE
Payer: COMMERCIAL

## 2021-03-04 ENCOUNTER — APPOINTMENT (OUTPATIENT)
Dept: CT IMAGING | Facility: CLINIC | Age: 61
End: 2021-03-04
Attending: EMERGENCY MEDICINE
Payer: COMMERCIAL

## 2021-03-04 ENCOUNTER — APPOINTMENT (OUTPATIENT)
Dept: ULTRASOUND IMAGING | Facility: CLINIC | Age: 61
End: 2021-03-04
Attending: EMERGENCY MEDICINE
Payer: COMMERCIAL

## 2021-03-04 ENCOUNTER — HOSPITAL ENCOUNTER (INPATIENT)
Facility: CLINIC | Age: 61
LOS: 14 days | Discharge: HOME | End: 2021-03-18
Attending: INTERNAL MEDICINE | Admitting: HOSPITALIST
Payer: COMMERCIAL

## 2021-03-04 VITALS
DIASTOLIC BLOOD PRESSURE: 67 MMHG | TEMPERATURE: 97.6 F | SYSTOLIC BLOOD PRESSURE: 103 MMHG | HEART RATE: 94 BPM | BODY MASS INDEX: 19.99 KG/M2 | HEIGHT: 69 IN | RESPIRATION RATE: 13 BRPM | OXYGEN SATURATION: 97 % | WEIGHT: 135 LBS

## 2021-03-04 DIAGNOSIS — Z79.4 TYPE 2 DIABETES MELLITUS WITH OTHER CIRCULATORY COMPLICATION, WITH LONG-TERM CURRENT USE OF INSULIN (H): ICD-10-CM

## 2021-03-04 DIAGNOSIS — M86.9 TOE OSTEOMYELITIS, RIGHT (H): ICD-10-CM

## 2021-03-04 DIAGNOSIS — K59.00 CONSTIPATION, UNSPECIFIED CONSTIPATION TYPE: ICD-10-CM

## 2021-03-04 DIAGNOSIS — M00.072: ICD-10-CM

## 2021-03-04 DIAGNOSIS — Z72.0 TOBACCO ABUSE: ICD-10-CM

## 2021-03-04 DIAGNOSIS — R12 HEARTBURN: ICD-10-CM

## 2021-03-04 DIAGNOSIS — I10 BENIGN ESSENTIAL HYPERTENSION: ICD-10-CM

## 2021-03-04 DIAGNOSIS — I25.10 CORONARY ARTERY DISEASE INVOLVING NATIVE CORONARY ARTERY OF NATIVE HEART WITHOUT ANGINA PECTORIS: ICD-10-CM

## 2021-03-04 DIAGNOSIS — D64.9 ANEMIA, UNSPECIFIED TYPE: ICD-10-CM

## 2021-03-04 DIAGNOSIS — M86.172 OTHER ACUTE OSTEOMYELITIS OF LEFT FOOT (H): ICD-10-CM

## 2021-03-04 DIAGNOSIS — D69.2 PURPURA (H): ICD-10-CM

## 2021-03-04 DIAGNOSIS — F33.2 SEVERE EPISODE OF RECURRENT MAJOR DEPRESSIVE DISORDER, WITHOUT PSYCHOTIC FEATURES (H): ICD-10-CM

## 2021-03-04 DIAGNOSIS — M86.9 OSTEOMYELITIS OF LEFT FOOT, UNSPECIFIED TYPE (H): ICD-10-CM

## 2021-03-04 DIAGNOSIS — E11.59 TYPE 2 DIABETES MELLITUS WITH OTHER CIRCULATORY COMPLICATION, WITH LONG-TERM CURRENT USE OF INSULIN (H): ICD-10-CM

## 2021-03-04 DIAGNOSIS — R78.81 BACTEREMIA: Primary | ICD-10-CM

## 2021-03-04 DIAGNOSIS — F41.1 GAD (GENERALIZED ANXIETY DISORDER): ICD-10-CM

## 2021-03-04 PROBLEM — R07.9 CHEST PAIN, RULE OUT ACUTE MYOCARDIAL INFARCTION: Status: ACTIVE | Noted: 2021-03-04

## 2021-03-04 LAB
ALBUMIN SERPL-MCNC: 1.9 G/DL (ref 3.4–5)
ALBUMIN UR-MCNC: 50 MG/DL
ALP SERPL-CCNC: 72 U/L (ref 40–150)
ALT SERPL W P-5'-P-CCNC: 22 U/L (ref 0–70)
ANION GAP SERPL CALCULATED.3IONS-SCNC: 9 MMOL/L (ref 3–14)
APPEARANCE UR: CLEAR
AST SERPL W P-5'-P-CCNC: 20 U/L (ref 0–45)
BASOPHILS # BLD AUTO: 0 10E9/L (ref 0–0.2)
BASOPHILS NFR BLD AUTO: 0.2 %
BILIRUB DIRECT SERPL-MCNC: <0.1 MG/DL (ref 0–0.2)
BILIRUB SERPL-MCNC: 0.2 MG/DL (ref 0.2–1.3)
BILIRUB UR QL STRIP: NEGATIVE
BUN SERPL-MCNC: 26 MG/DL (ref 7–30)
CALCIUM SERPL-MCNC: 8.9 MG/DL (ref 8.5–10.1)
CHLORIDE SERPL-SCNC: 99 MMOL/L (ref 94–109)
CK SERPL-CCNC: 49 U/L (ref 30–300)
CO2 SERPL-SCNC: 25 MMOL/L (ref 20–32)
COLOR UR AUTO: YELLOW
CREAT SERPL-MCNC: 0.84 MG/DL (ref 0.66–1.25)
CRP SERPL-MCNC: 160 MG/L (ref 0–8)
D DIMER PPP FEU-MCNC: 3.1 UG/ML FEU (ref 0–0.5)
DIFFERENTIAL METHOD BLD: ABNORMAL
EOSINOPHIL # BLD AUTO: 0.1 10E9/L (ref 0–0.7)
EOSINOPHIL NFR BLD AUTO: 0.5 %
ERYTHROCYTE [DISTWIDTH] IN BLOOD BY AUTOMATED COUNT: 14.5 % (ref 10–15)
ERYTHROCYTE [SEDIMENTATION RATE] IN BLOOD BY WESTERGREN METHOD: 91 MM/H (ref 0–20)
FLUAV RNA RESP QL NAA+PROBE: NEGATIVE
FLUBV RNA RESP QL NAA+PROBE: NEGATIVE
GFR SERPL CREATININE-BSD FRML MDRD: >90 ML/MIN/{1.73_M2}
GLUCOSE BLDC GLUCOMTR-MCNC: 123 MG/DL (ref 70–99)
GLUCOSE BLDC GLUCOMTR-MCNC: 180 MG/DL (ref 70–99)
GLUCOSE SERPL-MCNC: 125 MG/DL (ref 70–99)
GLUCOSE UR STRIP-MCNC: NEGATIVE MG/DL
HBA1C MFR BLD: 6.8 % (ref 0–5.6)
HCT VFR BLD AUTO: 30.3 % (ref 40–53)
HGB BLD-MCNC: 9.6 G/DL (ref 13.3–17.7)
HGB UR QL STRIP: ABNORMAL
HYALINE CASTS #/AREA URNS LPF: 8 /LPF (ref 0–2)
IMM GRANULOCYTES # BLD: 0.1 10E9/L (ref 0–0.4)
IMM GRANULOCYTES NFR BLD: 0.9 %
INR PPP: 1.24 (ref 0.86–1.14)
INTERPRETATION ECG - MUSE: NORMAL
KETONES UR STRIP-MCNC: 10 MG/DL
LABORATORY COMMENT REPORT: NORMAL
LACTATE BLD-SCNC: 0.6 MMOL/L (ref 0.7–2)
LACTATE BLD-SCNC: 0.8 MMOL/L (ref 0.7–2)
LEUKOCYTE ESTERASE UR QL STRIP: ABNORMAL
LYMPHOCYTES # BLD AUTO: 2 10E9/L (ref 0.8–5.3)
LYMPHOCYTES NFR BLD AUTO: 14.6 %
MCH RBC QN AUTO: 28.2 PG (ref 26.5–33)
MCHC RBC AUTO-ENTMCNC: 31.7 G/DL (ref 31.5–36.5)
MCV RBC AUTO: 89 FL (ref 78–100)
MONOCYTES # BLD AUTO: 1.1 10E9/L (ref 0–1.3)
MONOCYTES NFR BLD AUTO: 8.2 %
NEUTROPHILS # BLD AUTO: 10.1 10E9/L (ref 1.6–8.3)
NEUTROPHILS NFR BLD AUTO: 75.6 %
NITRATE UR QL: NEGATIVE
NRBC # BLD AUTO: 0 10*3/UL
NRBC BLD AUTO-RTO: 0 /100
PH UR STRIP: 7 PH (ref 5–7)
PLATELET # BLD AUTO: 449 10E9/L (ref 150–450)
POTASSIUM SERPL-SCNC: 4.1 MMOL/L (ref 3.4–5.3)
PROT SERPL-MCNC: 6.9 G/DL (ref 6.8–8.8)
RBC # BLD AUTO: 3.41 10E12/L (ref 4.4–5.9)
RBC #/AREA URNS AUTO: 11 /HPF (ref 0–2)
RSV RNA SPEC QL NAA+PROBE: NORMAL
SARS-COV-2 RNA RESP QL NAA+PROBE: NEGATIVE
SODIUM SERPL-SCNC: 133 MMOL/L (ref 133–144)
SOURCE: ABNORMAL
SP GR UR STRIP: 1.02 (ref 1–1.03)
SPECIMEN SOURCE: NORMAL
SQUAMOUS #/AREA URNS AUTO: <1 /HPF (ref 0–1)
TROPONIN I SERPL-MCNC: <0.015 UG/L (ref 0–0.04)
UROBILINOGEN UR STRIP-MCNC: 2 MG/DL (ref 0–2)
WBC # BLD AUTO: 13.3 10E9/L (ref 4–11)
WBC #/AREA URNS AUTO: 3 /HPF (ref 0–5)

## 2021-03-04 PROCEDURE — 84484 ASSAY OF TROPONIN QUANT: CPT | Performed by: NURSE PRACTITIONER

## 2021-03-04 PROCEDURE — 96366 THER/PROPH/DIAG IV INF ADDON: CPT

## 2021-03-04 PROCEDURE — 87636 SARSCOV2 & INF A&B AMP PRB: CPT | Performed by: EMERGENCY MEDICINE

## 2021-03-04 PROCEDURE — 120N000001 HC R&B MED SURG/OB

## 2021-03-04 PROCEDURE — 258N000003 HC RX IP 258 OP 636: Performed by: EMERGENCY MEDICINE

## 2021-03-04 PROCEDURE — 82550 ASSAY OF CK (CPK): CPT | Performed by: NURSE PRACTITIONER

## 2021-03-04 PROCEDURE — 250N000011 HC RX IP 250 OP 636: Performed by: EMERGENCY MEDICINE

## 2021-03-04 PROCEDURE — 71275 CT ANGIOGRAPHY CHEST: CPT

## 2021-03-04 PROCEDURE — 96365 THER/PROPH/DIAG IV INF INIT: CPT | Mod: 59

## 2021-03-04 PROCEDURE — 86140 C-REACTIVE PROTEIN: CPT | Performed by: EMERGENCY MEDICINE

## 2021-03-04 PROCEDURE — 85379 FIBRIN DEGRADATION QUANT: CPT | Performed by: EMERGENCY MEDICINE

## 2021-03-04 PROCEDURE — 96376 TX/PRO/DX INJ SAME DRUG ADON: CPT

## 2021-03-04 PROCEDURE — 87800 DETECT AGNT MULT DNA DIREC: CPT | Performed by: EMERGENCY MEDICINE

## 2021-03-04 PROCEDURE — 83605 ASSAY OF LACTIC ACID: CPT | Performed by: NURSE PRACTITIONER

## 2021-03-04 PROCEDURE — 999N001017 HC STATISTIC GLUCOSE BY METER IP

## 2021-03-04 PROCEDURE — 84484 ASSAY OF TROPONIN QUANT: CPT | Performed by: EMERGENCY MEDICINE

## 2021-03-04 PROCEDURE — 36415 COLL VENOUS BLD VENIPUNCTURE: CPT | Performed by: NURSE PRACTITIONER

## 2021-03-04 PROCEDURE — 99223 1ST HOSP IP/OBS HIGH 75: CPT | Mod: AI | Performed by: HOSPITALIST

## 2021-03-04 PROCEDURE — 96375 TX/PRO/DX INJ NEW DRUG ADDON: CPT

## 2021-03-04 PROCEDURE — 250N000009 HC RX 250: Performed by: EMERGENCY MEDICINE

## 2021-03-04 PROCEDURE — 250N000011 HC RX IP 250 OP 636: Performed by: NURSE PRACTITIONER

## 2021-03-04 PROCEDURE — 250N000013 HC RX MED GY IP 250 OP 250 PS 637: Performed by: NURSE PRACTITIONER

## 2021-03-04 PROCEDURE — 250N000012 HC RX MED GY IP 250 OP 636 PS 637: Performed by: NURSE PRACTITIONER

## 2021-03-04 PROCEDURE — 93010 ELECTROCARDIOGRAM REPORT: CPT | Performed by: INTERNAL MEDICINE

## 2021-03-04 PROCEDURE — C9803 HOPD COVID-19 SPEC COLLECT: HCPCS

## 2021-03-04 PROCEDURE — 85025 COMPLETE CBC W/AUTO DIFF WBC: CPT | Performed by: EMERGENCY MEDICINE

## 2021-03-04 PROCEDURE — 83605 ASSAY OF LACTIC ACID: CPT | Performed by: EMERGENCY MEDICINE

## 2021-03-04 PROCEDURE — 80076 HEPATIC FUNCTION PANEL: CPT | Performed by: NURSE PRACTITIONER

## 2021-03-04 PROCEDURE — 85610 PROTHROMBIN TIME: CPT | Performed by: EMERGENCY MEDICINE

## 2021-03-04 PROCEDURE — 93970 EXTREMITY STUDY: CPT

## 2021-03-04 PROCEDURE — 93005 ELECTROCARDIOGRAM TRACING: CPT

## 2021-03-04 PROCEDURE — 83036 HEMOGLOBIN GLYCOSYLATED A1C: CPT | Performed by: NURSE PRACTITIONER

## 2021-03-04 PROCEDURE — 85652 RBC SED RATE AUTOMATED: CPT | Performed by: EMERGENCY MEDICINE

## 2021-03-04 PROCEDURE — 87186 SC STD MICRODIL/AGAR DIL: CPT | Performed by: EMERGENCY MEDICINE

## 2021-03-04 PROCEDURE — 73630 X-RAY EXAM OF FOOT: CPT | Mod: LT

## 2021-03-04 PROCEDURE — 81001 URINALYSIS AUTO W/SCOPE: CPT | Performed by: EMERGENCY MEDICINE

## 2021-03-04 PROCEDURE — 87040 BLOOD CULTURE FOR BACTERIA: CPT | Performed by: EMERGENCY MEDICINE

## 2021-03-04 PROCEDURE — 96367 TX/PROPH/DG ADDL SEQ IV INF: CPT

## 2021-03-04 PROCEDURE — 80048 BASIC METABOLIC PNL TOTAL CA: CPT | Performed by: EMERGENCY MEDICINE

## 2021-03-04 PROCEDURE — 99285 EMERGENCY DEPT VISIT HI MDM: CPT | Mod: 25

## 2021-03-04 PROCEDURE — 87077 CULTURE AEROBIC IDENTIFY: CPT | Performed by: EMERGENCY MEDICINE

## 2021-03-04 RX ORDER — ONDANSETRON 2 MG/ML
4 INJECTION INTRAMUSCULAR; INTRAVENOUS EVERY 6 HOURS PRN
Status: DISCONTINUED | OUTPATIENT
Start: 2021-03-04 | End: 2021-03-18 | Stop reason: HOSPADM

## 2021-03-04 RX ORDER — CLOPIDOGREL BISULFATE 75 MG/1
75 TABLET ORAL DAILY
Status: DISCONTINUED | OUTPATIENT
Start: 2021-03-05 | End: 2021-03-18 | Stop reason: HOSPADM

## 2021-03-04 RX ORDER — POLYETHYLENE GLYCOL 3350 17 G/17G
17 POWDER, FOR SOLUTION ORAL 2 TIMES DAILY
Status: DISCONTINUED | OUTPATIENT
Start: 2021-03-04 | End: 2021-03-18 | Stop reason: HOSPADM

## 2021-03-04 RX ORDER — HYDROMORPHONE HYDROCHLORIDE 1 MG/ML
0.5 INJECTION, SOLUTION INTRAMUSCULAR; INTRAVENOUS; SUBCUTANEOUS
Status: COMPLETED | OUTPATIENT
Start: 2021-03-04 | End: 2021-03-04

## 2021-03-04 RX ORDER — PROCHLORPERAZINE 25 MG
25 SUPPOSITORY, RECTAL RECTAL EVERY 12 HOURS PRN
Status: DISCONTINUED | OUTPATIENT
Start: 2021-03-04 | End: 2021-03-15

## 2021-03-04 RX ORDER — ASPIRIN 81 MG/1
81 TABLET, CHEWABLE ORAL DAILY
Status: DISCONTINUED | OUTPATIENT
Start: 2021-03-05 | End: 2021-03-18 | Stop reason: HOSPADM

## 2021-03-04 RX ORDER — NALOXONE HYDROCHLORIDE 0.4 MG/ML
0.4 INJECTION, SOLUTION INTRAMUSCULAR; INTRAVENOUS; SUBCUTANEOUS
Status: DISCONTINUED | OUTPATIENT
Start: 2021-03-04 | End: 2021-03-05

## 2021-03-04 RX ORDER — NALOXONE HYDROCHLORIDE 0.4 MG/ML
0.2 INJECTION, SOLUTION INTRAMUSCULAR; INTRAVENOUS; SUBCUTANEOUS
Status: DISCONTINUED | OUTPATIENT
Start: 2021-03-04 | End: 2021-03-05

## 2021-03-04 RX ORDER — NICOTINE POLACRILEX 4 MG
15-30 LOZENGE BUCCAL
Status: DISCONTINUED | OUTPATIENT
Start: 2021-03-04 | End: 2021-03-18 | Stop reason: HOSPADM

## 2021-03-04 RX ORDER — BISACODYL 5 MG
15 TABLET, DELAYED RELEASE (ENTERIC COATED) ORAL DAILY PRN
Status: DISCONTINUED | OUTPATIENT
Start: 2021-03-04 | End: 2021-03-18 | Stop reason: HOSPADM

## 2021-03-04 RX ORDER — PIPERACILLIN SODIUM, TAZOBACTAM SODIUM 4; .5 G/20ML; G/20ML
4.5 INJECTION, POWDER, LYOPHILIZED, FOR SOLUTION INTRAVENOUS EVERY 6 HOURS
Status: DISCONTINUED | OUTPATIENT
Start: 2021-03-04 | End: 2021-03-05

## 2021-03-04 RX ORDER — CEFEPIME HYDROCHLORIDE 1 G/1
1 INJECTION, POWDER, FOR SOLUTION INTRAMUSCULAR; INTRAVENOUS ONCE
Status: COMPLETED | OUTPATIENT
Start: 2021-03-04 | End: 2021-03-04

## 2021-03-04 RX ORDER — OXYCODONE HYDROCHLORIDE 5 MG/1
5-10 TABLET ORAL
Status: DISCONTINUED | OUTPATIENT
Start: 2021-03-04 | End: 2021-03-05

## 2021-03-04 RX ORDER — MORPHINE SULFATE 4 MG/ML
4 INJECTION, SOLUTION INTRAMUSCULAR; INTRAVENOUS ONCE
Status: COMPLETED | OUTPATIENT
Start: 2021-03-04 | End: 2021-03-04

## 2021-03-04 RX ORDER — ACETAMINOPHEN 650 MG/1
650 SUPPOSITORY RECTAL EVERY 4 HOURS PRN
Status: DISCONTINUED | OUTPATIENT
Start: 2021-03-04 | End: 2021-03-05

## 2021-03-04 RX ORDER — ACETAMINOPHEN 325 MG/1
650 TABLET ORAL EVERY 4 HOURS PRN
Status: DISCONTINUED | OUTPATIENT
Start: 2021-03-04 | End: 2021-03-05

## 2021-03-04 RX ORDER — HYDROMORPHONE HCL IN WATER/PF 6 MG/30 ML
0.2 PATIENT CONTROLLED ANALGESIA SYRINGE INTRAVENOUS
Status: DISCONTINUED | OUTPATIENT
Start: 2021-03-04 | End: 2021-03-05

## 2021-03-04 RX ORDER — IOPAMIDOL 755 MG/ML
500 INJECTION, SOLUTION INTRAVASCULAR ONCE
Status: COMPLETED | OUTPATIENT
Start: 2021-03-04 | End: 2021-03-04

## 2021-03-04 RX ORDER — PROCHLORPERAZINE MALEATE 5 MG
10 TABLET ORAL EVERY 6 HOURS PRN
Status: DISCONTINUED | OUTPATIENT
Start: 2021-03-04 | End: 2021-03-15

## 2021-03-04 RX ORDER — BISACODYL 5 MG
5 TABLET, DELAYED RELEASE (ENTERIC COATED) ORAL DAILY PRN
Status: DISCONTINUED | OUTPATIENT
Start: 2021-03-04 | End: 2021-03-18 | Stop reason: HOSPADM

## 2021-03-04 RX ORDER — BISACODYL 5 MG
10 TABLET, DELAYED RELEASE (ENTERIC COATED) ORAL DAILY PRN
Status: DISCONTINUED | OUTPATIENT
Start: 2021-03-04 | End: 2021-03-18 | Stop reason: HOSPADM

## 2021-03-04 RX ORDER — HYDROMORPHONE HYDROCHLORIDE 1 MG/ML
0.4 INJECTION, SOLUTION INTRAMUSCULAR; INTRAVENOUS; SUBCUTANEOUS ONCE
Status: COMPLETED | OUTPATIENT
Start: 2021-03-04 | End: 2021-03-04

## 2021-03-04 RX ORDER — ONDANSETRON 4 MG/1
4 TABLET, ORALLY DISINTEGRATING ORAL EVERY 6 HOURS PRN
Status: DISCONTINUED | OUTPATIENT
Start: 2021-03-04 | End: 2021-03-18 | Stop reason: HOSPADM

## 2021-03-04 RX ORDER — DEXTROSE MONOHYDRATE 25 G/50ML
25-50 INJECTION, SOLUTION INTRAVENOUS
Status: DISCONTINUED | OUTPATIENT
Start: 2021-03-04 | End: 2021-03-18 | Stop reason: HOSPADM

## 2021-03-04 RX ORDER — LIDOCAINE 40 MG/G
CREAM TOPICAL
Status: DISCONTINUED | OUTPATIENT
Start: 2021-03-04 | End: 2021-03-05

## 2021-03-04 RX ORDER — MAGNESIUM CARB/ALUMINUM HYDROX 105-160MG
148 TABLET,CHEWABLE ORAL
Status: DISCONTINUED | OUTPATIENT
Start: 2021-03-04 | End: 2021-03-18 | Stop reason: HOSPADM

## 2021-03-04 RX ADMIN — HYDROMORPHONE HYDROCHLORIDE 0.5 MG: 1 INJECTION, SOLUTION INTRAMUSCULAR; INTRAVENOUS; SUBCUTANEOUS at 12:17

## 2021-03-04 RX ADMIN — HYDROMORPHONE HYDROCHLORIDE 0.5 MG: 1 INJECTION, SOLUTION INTRAMUSCULAR; INTRAVENOUS; SUBCUTANEOUS at 15:06

## 2021-03-04 RX ADMIN — ENOXAPARIN SODIUM 40 MG: 40 INJECTION SUBCUTANEOUS at 18:06

## 2021-03-04 RX ADMIN — OXYCODONE HYDROCHLORIDE 5 MG: 5 TABLET ORAL at 22:02

## 2021-03-04 RX ADMIN — SODIUM CHLORIDE 84 ML: 9 INJECTION, SOLUTION INTRAVENOUS at 12:32

## 2021-03-04 RX ADMIN — IOPAMIDOL 62 ML: 755 INJECTION, SOLUTION INTRAVENOUS at 12:31

## 2021-03-04 RX ADMIN — CEFEPIME HYDROCHLORIDE 1 G: 1 INJECTION, POWDER, FOR SOLUTION INTRAMUSCULAR; INTRAVENOUS at 12:17

## 2021-03-04 RX ADMIN — MORPHINE SULFATE 4 MG: 4 INJECTION INTRAVENOUS at 10:37

## 2021-03-04 RX ADMIN — PIPERACILLIN SODIUM AND TAZOBACTAM SODIUM 4.5 G: 4; .5 INJECTION, POWDER, LYOPHILIZED, FOR SOLUTION INTRAVENOUS at 18:00

## 2021-03-04 RX ADMIN — INSULIN GLARGINE 10 UNITS: 100 INJECTION, SOLUTION SUBCUTANEOUS at 22:03

## 2021-03-04 RX ADMIN — HYDROMORPHONE HYDROCHLORIDE 0.4 MG: 1 INJECTION, SOLUTION INTRAMUSCULAR; INTRAVENOUS; SUBCUTANEOUS at 16:51

## 2021-03-04 RX ADMIN — VANCOMYCIN HYDROCHLORIDE 1250 MG: 5 INJECTION, POWDER, LYOPHILIZED, FOR SOLUTION INTRAVENOUS at 13:09

## 2021-03-04 ASSESSMENT — ENCOUNTER SYMPTOMS
FEVER: 0
CHILLS: 0
WOUND: 1
SORE THROAT: 1
ABDOMINAL PAIN: 0
SHORTNESS OF BREATH: 1
COUGH: 1

## 2021-03-04 ASSESSMENT — MIFFLIN-ST. JEOR: SCORE: 1412.74

## 2021-03-04 ASSESSMENT — ACTIVITIES OF DAILY LIVING (ADL): ADLS_ACUITY_SCORE: 12

## 2021-03-04 NOTE — PHARMACY-VANCOMYCIN DOSING SERVICE
Pharmacy Vancomycin Initial Note  Date of Service 2021  Patient's  1960  61 year old, male    Indication: Skin and Soft Tissue Infection    Current estimated CrCl = Estimated Creatinine Clearance: 81 mL/min (based on SCr of 0.84 mg/dL).    Creatinine for last 3 days  3/4/2021:  9:42 AM Creatinine 0.84 mg/dL    Recent Vancomycin Level(s) for last 3 days  No results found for requested labs within last 72 hours.      Vancomycin IV Administrations (past 72 hours)                   vancomycin 1250 mg in 0.9% NaCl 250 mL intermittent infusion 1,250 mg (mg) 1,250 mg New Bag 21 1309                Nephrotoxins and other renal medications (From now, onward)    Start     Dose/Rate Route Frequency Ordered Stop    21 1700  piperacillin-tazobactam (ZOSYN) 4.5 g vial to attach to  mL bag      4.5 g  over 30 Minutes Intravenous EVERY 6 HOURS 21 1632            Contrast Orders - past 72 hours (72h ago, onward)    None                Plan:  1.  Start vancomycin  1250 mg IV q12h.   2.  Goal Trough Level: 15-20 mg/L   3.  Pharmacy will check trough levels as appropriate in 1-3 Days.    4. Serum creatinine levels will be ordered daily for the first week of therapy and at least twice weekly for subsequent weeks.    5. [place] method utilized to dose vancomycin therapy: Method 1    Akil Aliheyder, [facility]

## 2021-03-04 NOTE — CONSULTS
VASCULAR SURGERY HOSPITAL PATIENT CONSULTATION NOTE  Consulted by: Foot wounds, assess for PAD  Reason for consultation: Varsha Parada of the hospitalist service    HPI:  Huseyin Pettit is a 61 year old year old male who has a PMH significant for CAD s/p PCI, HTN, hyperlipidemia, DM II with neuropathy, tobacco dependence and medication non-compliance, and osteomyelitis of the right toe S/P amputation. Pt presented to [facility] with left foot wounds, chest pain, and petechial rash over lower extremities R>L. X-rays of the foot were concerning for osteomyelitis and/or septic joint of the toe w/ multiple fractures of the metatarsals and phalanges.  ESR and CRP were both elevated.  Blood cultures were obtained in the ED as were bilateral LE venous duplex ultrasounds  and CTA chest done for increased D-dimer which showed no DVT and no PE. Pt was started on Cefepime and Vanc and transferred to [facility] for Vascular Surgery and podiatry evaluation.    Today, pt is found resting comfortably in bed. Pt confirms the above history. Pt reports that since he was last seen inpatient for similar complaints by vascular surgery 3/2/2020, he has not quit smoking and his diabetes is not particularly well controlled. He has been following with Dr. Cespedes of podiatry for the left foot wounds, which have not improved. He's also having intermittent chest pain. He usually takes Aspirin and Plavix daily. He will consider our suggestion of smoking cessation and better diabetic control.       Review Of Systems:   General: Denies F/C  Respiratory: Denies SOB  Cardio: Confirms CP, medicine assessing  Gastrointestinal: Denies N/V  Genitourinary: Denies recent change in urination  Musculoskeletal: See HPI  Neurologic: Denies HA  Psychiatric: Denies confusion  Hematology/immunology: no unexpected bruising  Eyes: no acute changes in vision  ENT: Denies trauma    PAST MEDICAL HISTORY:  Past Medical History:   Diagnosis Date     CAD (coronary artery  disease) 2/18/2015    S/p 3 stents in 2011 In [place]     Closed fracture of multiple ribs of left side with routine healing 7/10/2018     Coronary artery disease      Coronary artery disease involving native coronary artery of native heart without angina pectoris 7/3/2018    S/p 3 stents put in [place] in 2011.     Depressive disorder      Diabetes (H)      Hypertension      Severe episode of recurrent major depressive disorder, without psychotic features (H) 7/3/2018     Type 2 diabetes mellitus with other circulatory complication, with long-term current use of insulin (H) 7/3/2018     PAST SURGICAL HISTORY:  Past Surgical History:   Procedure Laterality Date     AMPUTATE TOE(S) Right 3/3/2020    Procedure: AMPUTATION RIGHT GREAT TOE.;  Surgeon: Sergey Barnes DPM;  Location:  OR     CHOLECYSTECTOMY       COLONOSCOPY       CV CORONARY ANGIOGRAM N/A 3/2/2020    Procedure: Coronary Angiogram;  Surgeon: Thaddeus Sun MD;  Location:  HEART CARDIAC CATH LAB     GALLBLADDER SURGERY  2011     FAMILY HISTORY:  Family History   Problem Relation Age of Onset     Colon Cancer No family hx of      SOCIAL HISTORY:   Social History     Tobacco Use     Smoking status: Current Every Day Smoker     Packs/day: 0.50     Years: 25.00     Pack years: 12.50     Smokeless tobacco: Never Used     Tobacco comment: trying to adela   Substance Use Topics     Alcohol use: No     HOME MEDICATIONS:  Prior to Admission medications    Medication Sig Start Date End Date Taking? Authorizing Provider   alcohol swab prep pads Use to swab area of injection/angelic as directed. 6/1/20   Hunter Carlton MD   amLODIPine-valsartan (EXFORGE)  MG tablet Take 1 tablet by mouth daily 3/10/20   Hunter Carlton MD   aspirin 81 MG tablet Take 81 mg by mouth daily     Reported, Patient   bisoprolol (ZEBETA) 5 MG tablet Take 1 tablet (5 mg) by mouth daily 3/10/20   Hunter Carlton MD   blood glucose (NO BRAND SPECIFIED) test strip Use to test blood sugar 1  to 3 times daily or as directed. To accompany: Blood Glucose Monitor Brands: per insurance. 6/1/20   Hunter Carlton MD   blood glucose calibration (NO BRAND SPECIFIED) solution To accompany: Blood Glucose Monitor Brands: per insurance. 6/1/20   Hunter Carlton MD   blood glucose monitoring (NO BRAND SPECIFIED) meter device kit Use to test blood sugar 1 to 2 times daily or as directed. 6/1/20   Hunter Carlton MD   busPIRone (BUSPAR) 10 MG tablet Take 1 tablet (10 mg) by mouth 4 times daily 4/28/20   Hunter Carlton MD   Cadexomer Iodine, topical, 0.9% (IODOSORB) 0.9 % GEL gel Apply to right great toe wound every other day with bandage 3/30/20   Sergey Barnes, DPCADENCE   clomiPRAMINE (ANAFRANIL) 50 MG capsule Take 2 capsules (100 mg) by mouth 2 times daily 3/10/20   Hunter Carlton MD   clopidogrel (PLAVIX) 75 MG tablet Take 1 tablet (75 mg) by mouth daily 3/10/20   Hunter Carlton MD   divalproex sodium delayed-release (DEPAKOTE) 500 MG DR tablet Take 2 tablets (1,000 mg) by mouth 2 times daily 3/10/20   Hunter Carlton MD   esomeprazole (NEXIUM) 40 MG DR capsule Take 1 capsule (40 mg) by mouth every morning (before breakfast) Take 30-60 minutes before eating. 5/8/20   Hunter Carlton MD   FLUoxetine (PROZAC) 20 MG capsule Take 2 capsules (40 mg) by mouth daily 4/23/20   Hunter Carlton MD   glimepiride (AMARYL) 2 MG tablet Take 1 tablet (2 mg) by mouth every morning (before breakfast) 3/10/20   Hunter Carlton MD   insulin aspart prot & aspart (NOVOLOG MIX 70/30 PEN) (70-30) 100 UNIT/ML pen 28 units in AM, and 38 units in PM 3/10/20   Hunter Carlton MD   insulin pen needle (31G X 8 MM) 31G X 8 MM miscellaneous Use 2 pen needles daily or as directed. 5/14/20   Hunter Carlton MD   isosorbide Dinitrate (ISORDIL) 40 MG TABS Take 1 tablet (40 mg) by mouth 2 times daily 4/23/20   Hunter Carlton MD   lidocaine (XYLOCAINE) 2 % external gel Apply to right great toe(not in wound) as needed for pain control 3/30/20   Sergey Barnes DPM   LORazepam (ATIVAN) 0.5 MG  tablet Take 1 tablet (0.5 mg) by mouth every 6 hours as needed for anxiety 6/5/20   Hunter Carlton MD   metFORMIN (GLUCOPHAGE) 850 MG tablet Take 1 tablet (850 mg) by mouth 2 times daily (with meals) (Need A1c for further refills) 3/10/20   Hunter Carlton MD   order for DME Equipment being ordered: surgical shoe 6/4/20   Sergey Barnes DPM   order for DME Equipment being ordered: blood pressure monitor. 6/3/20   Hunter Carlton MD   pregabalin (LYRICA) 150 MG capsule TAKE ONE CAPSULE BY MOUTH TWICE A DAY 5/28/20   Hunter Carlton MD   QUEtiapine (SEROQUEL) 300 MG tablet Take 2 tablets (600 mg) by mouth At Bedtime 3/10/20   Hunter Carlton MD   rosuvastatin (CRESTOR) 20 MG tablet Take 1 tablet (20 mg) by mouth daily 3/10/20   Hunter Carlton MD   silver sulfADIAZINE (SILVADENE) 1 % external cream Apply topically daily Apply to right foot ulcer daily. 5/19/20   Cathy Brooks DPM, Podiatry/Foot and Ankle Surgery   thin (NO BRAND SPECIFIED) lancets Use with lanceting device. To accompany: Blood Glucose Monitor Brands: per insurance. 6/1/20   Hunter Carlton MD     VITAL SIGNS:  BP 98/64   Pulse 99   Temp 98.3  F (36.8  C) (Oral)   Resp 18   SpO2 98%     Intake/Output Summary (Last 24 hours) at 3/4/2021 1622  Last data filed at 3/4/2021 1607  Gross per 24 hour   Intake --   Output 150 ml   Net -150 ml       Labs:  ROUTINE IP LABS (Last four results)  BMP  Recent Labs   Lab 03/04/21  0942      POTASSIUM 4.1   CHLORIDE 99   MYRON 8.9   CO2 25   BUN 26   CR 0.84   *     CBC  Recent Labs   Lab 03/04/21  0942   WBC 13.3*   RBC 3.41*   HGB 9.6*   HCT 30.3*   MCV 89   MCH 28.2   MCHC 31.7   RDW 14.5        INR  Recent Labs   Lab 03/04/21  0942   INR 1.24*       PHYSICAL EXAM:  Constitutional: healthy, alert, no acute distress and cooperative   Cardiovascular: RRR  Respiratory: CTAB anteriorly, breathing unlabored without secondary muscle use  Psychiatric: mentation appears normal and affect normal/bright  Neck: no  asymmetry  GI/Abdomen: abdomen soft, non-tender other than mild LUQ tenderness  MSK: able to move all extremities without new weakness or ataxia  Hematology: no bruising on visible skin  Skin:   Rheum: no redness, warmth, or swelling of the visible joints. Full joint range of motion noted  Integument: Skin warm, dry,  Diffuse petechia and purpura over bilateral lower extremities more prominently along the right lower extremity with confluence of purpura along the right calf. He also has scattered petechiae over upper extremities. Significant chronic wounds of the left foot  Vascular: left femoral pulse palpable, peripheral pulses NOT palpable. Left DP signal monophasic by doppler, left PT and peroneal signals borderline biphasic by doppler. Significant foul odor from left foot    Left foot:      LEft foot:          IMAGING:  ABIs pending today. ABIs from last year:    ULTRASOUND LOWER EXTREMITY PPG 3/2/2020 11:50 AM  FINDINGS: Toe pressure in the right second digit is 104 mmHg with  brachial pressure 109 mmHg for an index of 0.95. Absolute pressures in  the left first digit are 68 mmHg and in the second digit 97 mmHg.  Indices in the left first digit for toe-brachial index is 0.62 and  0.89 in the left second digit.    Patient Active Problem List   Diagnosis     Hyperlipidemia LDL goal <100     Benign essential hypertension     Severe episode of recurrent major depressive disorder, without psychotic features (H)     Coronary artery disease involving native coronary artery of native heart without angina pectoris     Type 2 diabetes mellitus with other circulatory complication, with long-term current use of insulin (H)     Closed fracture of multiple ribs of left side with routine healing     Toe osteomyelitis, right (H)     Tobacco abuse     MILES (generalized anxiety disorder)     Adenomatous polyp of colon, unspecified part of colon     Amputation of right great toe (H)     CKD (chronic kidney disease) stage 2, GFR  60-89 ml/min     Chronic pain syndrome       ASSESSMENT:  61 year old year old male who has a PMH significant for CAD s/p PCI, HTN, hyperlipidemia, DM II with neuropathy, tobacco dependence and medication non-compliance. Pt presents with left foot wounds and fractures. Vascular surgery is consulted for lower extremity vascular optimization prior to likely  Podiatry intervention. Left femoral pulse palpable, peripheral pulses NOT palpable. Left DP signal monophasic by doppler, left PT and peroneal signals borderline biphasic by doppler.     PLAN:  -Continue cares per hospitalist, podiatry  -Discussed importance of good diabetic control for wound healing  -Discussed importance of tobacco cessation. He will consider  - ABIs with toe pressures ordered  -Further recommendations to follow pending results    Discussed pt history, exam, assessment and plan with Dr. Cota of the vascular surgery service, who is in agreement with the above.    Melia Tucker PA-C   Division of Vascular Surgery   Pager: [phone]

## 2021-03-04 NOTE — H&P
[facility]    History and Physical - Hospitalist Service       Date of Admission:  (Not on file)    Assessment & Plan   Huseyin Pettit is a 61 year old male w/ PMH of DM II, CAD w/ prior PCI in 2011, former smoker HTN, HLD, CKD, chronic pain, anxiety and depression, osteomyelitis of toe s/p amputation of right foot admitted on 03/04/21 w/ CP and possible septic arthritis or multilevel osteomyelitis.      Chest pain, +risk factors including HTN, HLD, DM2, initial troponin negative, possible unstable angina  CAD w/ PCI in 2011 to LAD and CX iin [place], last coronary angiogram was 3/2020 which did not require any mechanical revascularization and GDMT was recommended  --serial troponins  --telemetry  --echo  --ekg, as above repeated on arrival to [facility], again without any ischemic changes  --Resume patient's PTA aspirin and Plavix    Possible septic arthritis or multilevel osteomyelitis, elevated crp and esr and wbc without sepsis at time of admission, SIRS criteria only 1 for WBC, qsofa score is 0  Multiple fractures of L foot:   comminuted displaced fracture of the distal first metatarsal,  base of the first proximal phalanx, prominently displaced fractures of the second and third metatarsal necks, fracture involving the distal portion of the fifth proximal phalanx, including mild distraction and depression at the articular surface.  Suspected fracture at the medial aspect of the great toe distal phalanx base.   --consult vascular surgery to assist w/ mgmt, particularly re: diagnostics needed to determine if wound healing will be impaired  --podiatry consult, question need for amputation  --broad antiobiotics w/ Zosyn, vanc  -Nonweightbearing status on left lower extremity until patient is evaluated by podiatry    DM2  --check A1c, last was in 3/2020, 7.2%  -Medium dose sliding scale insulin before meals and at bedtime  -Moderate carbohydrate diet  -Until med reconciliation has been  completed we will give one-time dose of Lantus tonight 10 units, thereafter depending on whether or not he needs a procedure can resume his PTA dosing of his long-acting insulin    CKD stage 2-3, baseline Cr 0.8-1.0 as of early 2020  Mild hyponatremia  --recheck BMP in am    Anemia, chronic, normocytic baseline 12-13g  -Recheck CBC in the a.m., may be reflection of chronic inflammation    Abdominal pain on palpation in LUQ and RLQ, although patient is requesting food and drink  Constipation  -Checking lactic acid, if elevated will obtain CT of the abdomen and pelvis though may be related to constipation  -Aggressive bowel regimen  -Moderate carbohydrate diet as above    Depression  Anxiety  --resume PTA meds when reconciliation complete    Mild basilar predominant bronchial wall thickening with some mucous plugging may be related to bronchitis, seen on CT  -Encourage smoking cessation  -IS 10x q1h    Extensive lower extremity erythematous macular rash, confluent in areas, concerning for vasculitic process  -Since vascular surgery is already involved perhaps they would consider doing a biopsy    covid-19 status  -Negative as of 3/4/2021       Diet: Consistent Carbohydrate Diet 4341-9392 Calories: Moderate Consistent CHO (4-6 CHO units/meal)moderate carbohydrate diet  DVT Prophylaxis: Enoxaparin (Lovenox) SQ  Rangel Catheter: not present  Code Status: Full CodeFull code         Disposition Plan   Expected discharge: 4 - 7 days, recommended to Will be dependent on whether or not patient any surgical procedures once adequate pain management/ tolerating PO medications, antibiotic plan established and safe disposition plan/ TCU bed available.  Entered:    The patient's care was discussed with the Attending Physician, Dr. Ndiaye and Bedside Nurse.    Total time was 40 minutes of which 30 minutes was face to face time, remainder spent in counseling & coordination of are.     Varsha Parada CNP  Hospitalist    [phone]     Text Page   ______________________________________________________________________    Chief Complaint   Chest pain, foot wound    History is obtained from the patient and EMR    History of Present Illness   Huseyin Pettit is a 61 year old male w/ PMH of H/o DM II, CAD w/ prior PCI to LAD and CX in 2011 in [place] (coronary angiogram 3/2020 which did not require any mechanical revascularization and GDMT was recommended), active smoker, HTN, HLD, CKD, osteomyelitis of toe s/p amputation of right foot, who presented to [facility] ED with chest pain and wounds to the left foot.  CP onset was approximately 8 PM on the evening of 3/3/2021.  It occurred while at rest.  He is never had any prior similar chest pain.  He did not try any relieving treatments.  He gives variable answers as to whether or not there was any dyspnea associated.  He denies any diaphoresis, presyncope.  He rates the pain as 8/10 but did really have relief with opioid analgesia in the ED.  Because the pain was worse in the am 03/04/21 he sought treatment at [facility] ED.  While having an EKG he was noted to have a rash from the waist distally for the last 1 week.  EKG was nonischemic in the ED and initial troponin was negative.    Patient also reports a wound on the dorsal aspect of his left foot ongoing for two months.  He denies that he sought any medical treatment for it.  It has made ambulation difficult and now he requires assistive device.  He denies any fall with this.  He reports worsening pain, drainage, edema.  He describes the pain as 6-7/10.  He denies any trauma to the left.  X-rays of the foot in the ED were concerning for osteomyelitis and/or septic joint of the toe w/ multiple fractures of the metatarsals and phalanges.  ESR and CRP were both elevated.  Vascular surgery and podiatry both consulted in ED who recommended transfer to [facility] for further surgical management. Blood cultures were obtained in the ED and he was  "started on Cefepime and Vanc.  He had LE duplex and CTA chest done for increased Ddimer which were negative for PE and DVT respectively.  He was medicated w/ morphine and dilaudid.      On arrival to [facility] patient continues to report central chest pain.  EKG was read repeated which did not show any acute ischemic changes and was very similar to the study from 8:51 AM on 3/4/2021.  He reports 2 days of anorexia and constipation.  He checks blood sugars \"a few times a week\" he often forgets.  He denies any low readings.  Regarding the rash he denies that it is itchy or painful and appears most prominent on the right lower extremity though it is evident on the left lower extremity as well.    Review of Systems    The 10 point Review of Systems is negative other than noted in the HPI or here.     Past Medical History    I have reviewed this patient's medical history and updated it with pertinent information if needed.   Past Medical History:   Diagnosis Date     CAD (coronary artery disease) 2/18/2015    S/p 3 stents in 2011 In [place]     Closed fracture of multiple ribs of left side with routine healing 7/10/2018     Coronary artery disease      Coronary artery disease involving native coronary artery of native heart without angina pectoris 7/3/2018    S/p 3 stents put in [place] in 2011.     Depressive disorder      Diabetes (H)      Hypertension      Severe episode of recurrent major depressive disorder, without psychotic features (H) 7/3/2018     Type 2 diabetes mellitus with other circulatory complication, with long-term current use of insulin (H) 7/3/2018       Past Surgical History   I have reviewed this patient's surgical history and updated it with pertinent information if needed.  Past Surgical History:   Procedure Laterality Date     AMPUTATE TOE(S) Right 3/3/2020    Procedure: AMPUTATION RIGHT GREAT TOE.;  Surgeon: Sergey Barnes DPM;  Location:  OR     CHOLECYSTECTOMY       COLONOSCOPY       CV " CORONARY ANGIOGRAM N/A 3/2/2020    Procedure: Coronary Angiogram;  Surgeon: Thaddeus Sun MD;  Location:  HEART CARDIAC CATH LAB     GALLBLADDER SURGERY  2011       Social History   I have reviewed this patient's social history and updated it with pertinent information if needed.  Social History     Tobacco Use     Smoking status: Current Every Day Smoker     Packs/day: 0.50     Years: 25.00     Pack years: 12.50     Smokeless tobacco: Never Used     Tobacco comment: trying to adela   Substance Use Topics     Alcohol use: No     Drug use: No       Family History     No significant family history, patient does not have any kids, is not     Prior to Admission Medications   Prior to Admission Medications   Prescriptions Last Dose Informant Patient Reported? Taking?   Cadexomer Iodine, topical, 0.9% (IODOSORB) 0.9 % GEL gel   No No   Sig: Apply to right great toe wound every other day with bandage   FLUoxetine (PROZAC) 20 MG capsule   No No   Sig: Take 2 capsules (40 mg) by mouth daily   LORazepam (ATIVAN) 0.5 MG tablet   No No   Sig: Take 1 tablet (0.5 mg) by mouth every 6 hours as needed for anxiety   QUEtiapine (SEROQUEL) 300 MG tablet   No No   Sig: Take 2 tablets (600 mg) by mouth At Bedtime   alcohol swab prep pads   No No   Sig: Use to swab area of injection/angelic as directed.   amLODIPine-valsartan (EXFORGE)  MG tablet   No No   Sig: Take 1 tablet by mouth daily   aspirin 81 MG tablet   Yes No   Sig: Take 81 mg by mouth daily    bisoprolol (ZEBETA) 5 MG tablet   No No   Sig: Take 1 tablet (5 mg) by mouth daily   blood glucose (NO BRAND SPECIFIED) test strip   No No   Sig: Use to test blood sugar 1 to 3 times daily or as directed. To accompany: Blood Glucose Monitor Brands: per insurance.   blood glucose calibration (NO BRAND SPECIFIED) solution   No No   Sig: To accompany: Blood Glucose Monitor Brands: per insurance.   blood glucose monitoring (NO BRAND SPECIFIED) meter device kit   No No    Sig: Use to test blood sugar 1 to 2 times daily or as directed.   busPIRone (BUSPAR) 10 MG tablet   No No   Sig: Take 1 tablet (10 mg) by mouth 4 times daily   clomiPRAMINE (ANAFRANIL) 50 MG capsule   No No   Sig: Take 2 capsules (100 mg) by mouth 2 times daily   clopidogrel (PLAVIX) 75 MG tablet   No No   Sig: Take 1 tablet (75 mg) by mouth daily   divalproex sodium delayed-release (DEPAKOTE) 500 MG DR tablet   No No   Sig: Take 2 tablets (1,000 mg) by mouth 2 times daily   esomeprazole (NEXIUM) 40 MG DR capsule   No No   Sig: Take 1 capsule (40 mg) by mouth every morning (before breakfast) Take 30-60 minutes before eating.   glimepiride (AMARYL) 2 MG tablet   No No   Sig: Take 1 tablet (2 mg) by mouth every morning (before breakfast)   insulin aspart prot & aspart (NOVOLOG MIX 70/30 PEN) (70-30) 100 UNIT/ML pen   No No   Si units in AM, and 38 units in PM   insulin pen needle (31G X 8 MM) 31G X 8 MM miscellaneous   No No   Sig: Use 2 pen needles daily or as directed.   isosorbide Dinitrate (ISORDIL) 40 MG TABS   No No   Sig: Take 1 tablet (40 mg) by mouth 2 times daily   lidocaine (XYLOCAINE) 2 % external gel   No No   Sig: Apply to right great toe(not in wound) as needed for pain control   metFORMIN (GLUCOPHAGE) 850 MG tablet   No No   Sig: Take 1 tablet (850 mg) by mouth 2 times daily (with meals) (Need A1c for further refills)   order for DME   No No   Sig: Equipment being ordered: blood pressure monitor.   order for DME   No No   Sig: Equipment being ordered: surgical shoe   pregabalin (LYRICA) 150 MG capsule   No No   Sig: TAKE ONE CAPSULE BY MOUTH TWICE A DAY   rosuvastatin (CRESTOR) 20 MG tablet   No No   Sig: Take 1 tablet (20 mg) by mouth daily   silver sulfADIAZINE (SILVADENE) 1 % external cream   No No   Sig: Apply topically daily Apply to right foot ulcer daily.   thin (NO BRAND SPECIFIED) lancets   No No   Sig: Use with lanceting device. To accompany: Blood Glucose Monitor Brands: per  insurance.      Facility-Administered Medications: None     Allergies   No Known Allergies    Physical Exam   Vital Signs: Temp: 98.3  F (36.8  C) Temp src: Oral BP: 98/64 Pulse: 99   Resp: 18 SpO2: 98 % O2 Device: None (Room air)    Weight: 0 lbs 0 oz    General middle-age man appears stated age without acute distress  Neuro: +follows commands wiggle toes and show 2 fingers bilat, face symmetric, tongue midline, speech fluent  HEENT: NC/AT, pupils equal round 3mm briskly reactive bilat, sclera nonicteric, noninjected, conjunctiva pink, mouth moist oral mucosa  Neck: supple  Heart: S1S2, no murmurs  Lungs: CTAB upper lobes,, few rales  Abd:normoactive bowel sounds, soft, tender in left upper quadrant and right lower quadrant, nondisteded  Ext: +2 bilateral lower extremity edema, dorsum of left foot near the middle toe approximately coin sized open lesion with discoloration of the third toe, there is also a horizontal lesion at the MTP joint of the first toe and sloughing of the skin of the third toe on the plantar aspect.  Right more than left lower extremity have nonblanching erythematous lesions somewhat confluent on the distal right lower extremity, confined to the proximal left lower extremity      Data   Data reviewed today: I reviewed all medications, new labs and imaging results over the last 24 hours. I personally reviewed the EKG tracing showing Normal sinus rhythm normal axis, no ischemic changes not changed with this study earlier in the day on 3/4/2021.    Recent Labs   Lab 03/04/21  0942   WBC 13.3*   HGB 9.6*   MCV 89      INR 1.24*      POTASSIUM 4.1   CHLORIDE 99   CO2 25   BUN 26   CR 0.84   ANIONGAP 9   MYRON 8.9   *   TROPI <0.015

## 2021-03-04 NOTE — ED TRIAGE NOTES
Pt arrives c/o chest pain and possible gangrene to left foot. Hx of toe amputations to right foot. Pt reports that chest pain has been intermittent for the last 2-3 days. ABCs intact.

## 2021-03-04 NOTE — ED PROVIDER NOTES
History     Chief Complaint:  Chest Pain and Wound Check     HPI   Huseyin Pettit is a 61 year old male with history of type II diabetes, CKD, hypertension, hyperlipidemia, Coronary Artery Disease, osteomyelitis of toe, who presents for evaluation of chest pain and a wound check. The patient reports that last night he started to experience chest pain that has been constant since and worsening this morning. He notes he has associated shortness of breath, mild cough, and sore throat. The patient has had a rash from his waist down bilaterally for the past week that he has never had prior. The patient is concerned it could be related to his wound on his left foot with his history of gangrene. The patient follows Dr. Barnes (Podiatry) regarding this and last had the dressing changed approximately 3 days ago. He denies fever, chills, or abdominal pain.     Review of Systems   Constitutional: Negative for chills and fever.   HENT: Positive for sore throat.    Respiratory: Positive for cough and shortness of breath.    Cardiovascular: Positive for chest pain.   Gastrointestinal: Negative for abdominal pain.   Skin: Positive for rash and wound.   All other systems reviewed and are negative.    Allergies:  No Known Allergies    Medications:  amlodipine-valsartan   aspirin   bisoprolol  Buspar   Clomipramine   Plavix   Depakote   Prozac   glimepiride   Novolog   isosorbide Dinitrate   Ativan   Metformin   pregabalin   Seroquel   rosuvastatin     Past Medical History:    Coronary Artery Disease   Depressive disorder   Diabetes    Hypertension   Type 2 diabetes mellitus   Chronic pain syndrome   CKD   Anxiety   Toe osteomyelitis   Adenomatous polyp of colon  Hyperlipidemia     Past Surgical History:    Amputate toes   Choleystectomy   Colonoscopy  Coronary angiogram    Social History:  The patient was unaccompanied to the ED.  PCP: Hunter Carlton     Physical Exam     Patient Vitals for the past 24 hrs:   BP Temp Temp src Pulse  "Resp SpO2 Height Weight   03/04/21 1325 -- -- -- -- -- 98 % -- --   03/04/21 1324 103/67 -- -- 94 -- -- -- --   03/04/21 1200 119/80 -- -- 96 13 -- -- --   03/04/21 1145 114/76 -- -- 100 20 100 % -- --   03/04/21 1130 117/79 -- -- 96 15 99 % -- --   03/04/21 1115 123/72 -- -- 94 16 98 % -- --   03/04/21 1100 115/74 -- -- 96 15 99 % -- --   03/04/21 1045 (!) 143/82 -- -- 93 16 100 % 1.753 m (5' 9\") 61.2 kg (135 lb)   03/04/21 1030 126/82 -- -- 96 11 -- -- --   03/04/21 1015 126/74 -- -- 95 13 -- -- --   03/04/21 1000 104/72 -- -- 99 10 100 % -- --   03/04/21 0945 109/70 -- -- 98 12 100 % -- --   03/04/21 0930 112/74 -- -- 103 12 100 % -- --   03/04/21 0915 119/76 -- -- 104 12 100 % -- --   03/04/21 0830 117/73 97.6  F (36.4  C) Oral 101 18 100 % -- --     Physical Exam  General: Patient is awake, alert and interactive when I enter the room  Head: The scalp, face, and head appear normal  CV: tachycardiac but regular    Resp: Lungs are clear without wheezes or rales. No respiratory distress.   GI: Abdomen is soft, no rigidity, guarding, or rebound. No distension. No tenderness to palpation in any quadrant.     MS: Normal tone. Joints grossly normal without effusions. No asymmetric leg swelling, calf or thigh tenderness.    Skin: Diffuse petechia and purpura over bilateral lower extremities more prominently along the right lower extremity with confluence of purpura along the right calf. He also has scattered petechiae over upper extremities. Significant chronic wounds of the right foot which are visualized below  Neuro: Speech is normal and fluent. Face is symmetric. Moving all extremities.   Psych:  Normal affect.  Appropriate interactions.                      Emergency Department Course     Imaging:    US Lower Extremity Venous Duplex Bilateral  No deep vein thrombosis in either lower extremity.    CHARY IQBAL DO  Reading per radiology    CT Chest Pulmonary Embolism w Contrast  1.  No evidence of pulmonary " embolism.  2.  Moderate coronary calcifications.  3.  Stable pulmonary nodule.  4.  Mild basilar predominant bronchial wall thickening with some  mucous plugging may be related to bronchitis.  LEENA ANTHONY MD  Reading per radiology    Foot XR, G/E 3 views, left  1. First MTP joint space narrowing; this is new compared with the  previous exam and, in light of the adjacent ulcer, is very suggestive  of septic arthritis. There is a comminuted displaced fracture of the  distal first metatarsal. There is also a fracture at the base of the  first proximal phalanx. These may well represent pathologic fractures  with underlying osteomyelitis.  2. Prominently displaced fractures of the second and third metatarsal  necks.  3. Fracture involving the distal portion of the fifth proximal  phalanx, including mild distraction and depression at the articular  surface.  4. I suspect a fracture at the medial aspect of the great toe distal  phalanx base. Clinical correlation recommended.  NEGRO RICE MD  Reading per radiology     Laboratory:    Symptomatic Influenza A/B & SARS-CoV2 (COVID-19) Virus PCR Multiplex: Negative     UA: Urineketon 10 (A), Blood trace (A), Protein Albumin 50 (A), Leukocyte Esterase trace (A), RBC 11 (H), Hyaline Casts 8 (H), o/w WNL.       CBC: WBC 13.3 (H), HGB 9.6 (L),   BMP: Glucose 125 (H), o/w WNL (Creatinine 0.84)    Lactic Acid (Resulted: 0942): 0.8    CRP inflammation: 160.0 (H)  Erythrocyte Sedimentation Rate Auto: 91 (H)    Troponin (Collected 0942): <0.015     D Dimer: 3.1 (H)    INR: 1.24 (H)    Blood Culture x2: Pending     Emergency Department Course:    Reviewed:    I reviewed the patient's nursing notes, vitals, past medical records, Care Everywhere.     Assessments:    0926 I performed an exam of the patient as documented above.     Consults:     1117 I spoke with Dr. Madrid of the podiatry service regarding patient's presentation, findings, and plan of care.     1219 I spoke  with Dr. Cota of the vascular surgery service regarding patient's presentation, findings, and plan of care.     1231 I spoke with Dr. Monreal of the hospitalist service from [facility] regarding patient's presentation, findings, and plan of care.     Interventions:  1037 Morphine 4 mg IV  1217 Dilaudid 0.5 mg IV  1217 Cefepime 1 g IV   1309 Vancomycin 1,2500 mg IV      Disposition:  The patient was transferred to [facility] via EMS. Dr. Monreal accepted the patient for transfer.     Impression & Plan      Covid-19  Huseyin Pettit was evaluated during a global COVID-19 pandemic, which necessitated consideration that the patient might be at risk for infection with the SARS-CoV-2 virus that causes COVID-19.   Applicable protocols for evaluation were followed during the patient's care.   COVID-19 was considered as part of the patient's evaluation. The plan for testing is:  a test was obtained during this visit.    Medical Decision Making:  Huseyin Pettit is a 61 year old male with complex past medical history including coronary artery disease status post stenting, type 2 diabetes, chronic kidney disease, hyperlipidemia, hypertension and osteomyelitis of the right foot status post right great toe amputation who presents to the emergency department today for evaluation of multiple complaints including chest pain, petechial rash over lower extremities and pain and drainage from his left foot.  Upon initial evaluation the patient is hemodynamically stable with normal vital signs.  He is afebrile.    In regards to his chest pain, this is been present since last night.  He locates it in the middle of his chest and denies any radiating pain.  Initial EKG was obtained which did not show any acute signs of ischemia nor dysrhythmia.  There is no significant changes when compared to his previous EKG.  At this point I have low suspicion for ACS and will not start the patient on heparin.  Can be further evaluated during  his inpatient admission.  D-dimer was quite elevated therefore CT PE study was obtained to rule out pulmonary embolism as the cause of his chest pain.  Fortunately this was negative for any evidence of clot burden.  Additionally there was no evidence of pneumothorax, pneumonia, pleural effusion or pulmonary edema.  At this point it is somewhat unclear what is causing his chest pain.    Secondly in regards to his rash, this appears to be a diffuse rash consistent with petechia and purpura.  More prominent on the right lower extremity versus the left.  There certainly is a confluence along his right calf.  He also has some petechiae in his upper extremities but I do not see any clear petechiae along his trunk or face.  He has never had similar episodes like this before in the past.  Platelets are normal.  INR is mildly elevated as well as his inflammatory markers.  Possible that this may represent underlying bacteremia or undiagnosed vasculitis.  During his admission at [facility] he would benefit from seeing vascular medicine for further evaluation.    Finally in regards the patient's left foot, evaluation here today concerning for osteomyelitis given multiple fractures and open wounds seen on x-ray and physical exam.  Please see pictures as above.  Patient was started on broad-spectrum antibiotics to cover for osteomyelitis.  I spoke with Dr. Barnes of podiatry who is operated on this patient before in the past and he will evaluate the patient upon admission.  I was unable to palpate a DP pulse and had some concern for vascular compromise.  However in reviewing the chart it has been difficult to palpate DP pulses in the past.  I reviewed his REYMUNDO testing which was performed 1 year ago which did not display any evidence of peripheral vascular disease.  I spoke with vascular surgery at [facility] who agreed that this is unlikely to be caused by a arterial occlusion but will evaluate the patient at the bedside when  he arrives for transfer.  Patient remained hemodynamically stable while under my care and is currently awaiting transport to [facility].    Diagnosis:    ICD-10-CM    1. Osteomyelitis of left foot, unspecified type (H)  M86.9 Blood culture     Blood culture   2. Purpura (H)  D69.2    3. Anemia, unspecified type  D64.9      Scribe Disclosure:  I, Orla Severson, am serving as a scribe at 9:03 AM on 3/4/2021 to document services personally performed by Kevin Aponte MD based on my observations and the provider's statements to me.     [facility] EMERGENCY DEPT         Kevin Aponte MD  03/04/21 5915

## 2021-03-05 ENCOUNTER — ANESTHESIA EVENT (OUTPATIENT)
Dept: SURGERY | Facility: CLINIC | Age: 61
End: 2021-03-05
Payer: COMMERCIAL

## 2021-03-05 ENCOUNTER — ANESTHESIA (OUTPATIENT)
Dept: SURGERY | Facility: CLINIC | Age: 61
End: 2021-03-05
Payer: COMMERCIAL

## 2021-03-05 ENCOUNTER — APPOINTMENT (OUTPATIENT)
Dept: ULTRASOUND IMAGING | Facility: CLINIC | Age: 61
End: 2021-03-05
Attending: PHYSICIAN ASSISTANT
Payer: COMMERCIAL

## 2021-03-05 ENCOUNTER — APPOINTMENT (OUTPATIENT)
Dept: CARDIOLOGY | Facility: CLINIC | Age: 61
End: 2021-03-05
Attending: NURSE PRACTITIONER
Payer: COMMERCIAL

## 2021-03-05 ENCOUNTER — PREP FOR PROCEDURE (OUTPATIENT)
Dept: PODIATRY | Facility: CLINIC | Age: 61
End: 2021-03-05

## 2021-03-05 DIAGNOSIS — M86.172 OTHER ACUTE OSTEOMYELITIS OF LEFT FOOT (H): Primary | ICD-10-CM

## 2021-03-05 LAB
ANION GAP SERPL CALCULATED.3IONS-SCNC: 8 MMOL/L (ref 3–14)
BUN SERPL-MCNC: 20 MG/DL (ref 7–30)
CALCIUM SERPL-MCNC: 8.3 MG/DL (ref 8.5–10.1)
CHLORIDE SERPL-SCNC: 102 MMOL/L (ref 94–109)
CO2 SERPL-SCNC: 25 MMOL/L (ref 20–32)
CREAT SERPL-MCNC: 0.73 MG/DL (ref 0.66–1.25)
ERYTHROCYTE [DISTWIDTH] IN BLOOD BY AUTOMATED COUNT: 14.6 % (ref 10–15)
GFR SERPL CREATININE-BSD FRML MDRD: >90 ML/MIN/{1.73_M2}
GLUCOSE BLDC GLUCOMTR-MCNC: 188 MG/DL (ref 70–99)
GLUCOSE BLDC GLUCOMTR-MCNC: 196 MG/DL (ref 70–99)
GLUCOSE BLDC GLUCOMTR-MCNC: 204 MG/DL (ref 70–99)
GLUCOSE BLDC GLUCOMTR-MCNC: 215 MG/DL (ref 70–99)
GLUCOSE BLDC GLUCOMTR-MCNC: 233 MG/DL (ref 70–99)
GLUCOSE BLDC GLUCOMTR-MCNC: 240 MG/DL (ref 70–99)
GLUCOSE SERPL-MCNC: 213 MG/DL (ref 70–99)
GRAM STN SPEC: ABNORMAL
GRAM STN SPEC: ABNORMAL
HCT VFR BLD AUTO: 31.7 % (ref 40–53)
HGB BLD-MCNC: 10.3 G/DL (ref 13.3–17.7)
MCH RBC QN AUTO: 27.9 PG (ref 26.5–33)
MCHC RBC AUTO-ENTMCNC: 32.5 G/DL (ref 31.5–36.5)
MCV RBC AUTO: 86 FL (ref 78–100)
PLATELET # BLD AUTO: 512 10E9/L (ref 150–450)
POTASSIUM SERPL-SCNC: 4.3 MMOL/L (ref 3.4–5.3)
RBC # BLD AUTO: 3.69 10E12/L (ref 4.4–5.9)
SODIUM SERPL-SCNC: 135 MMOL/L (ref 133–144)
SPECIMEN SOURCE: ABNORMAL
WBC # BLD AUTO: 16.6 10E9/L (ref 4–11)

## 2021-03-05 PROCEDURE — 272N000001 HC OR GENERAL SUPPLY STERILE: Performed by: PODIATRIST

## 2021-03-05 PROCEDURE — 99233 SBSQ HOSP IP/OBS HIGH 50: CPT | Performed by: HOSPITALIST

## 2021-03-05 PROCEDURE — 360N000076 HC SURGERY LEVEL 3, PER MIN: Performed by: PODIATRIST

## 2021-03-05 PROCEDURE — 258N000003 HC RX IP 258 OP 636: Performed by: ANESTHESIOLOGY

## 2021-03-05 PROCEDURE — 120N000001 HC R&B MED SURG/OB

## 2021-03-05 PROCEDURE — 999N000141 HC STATISTIC PRE-PROCEDURE NURSING ASSESSMENT: Performed by: PODIATRIST

## 2021-03-05 PROCEDURE — 80048 BASIC METABOLIC PNL TOTAL CA: CPT | Performed by: NURSE PRACTITIONER

## 2021-03-05 PROCEDURE — 93306 TTE W/DOPPLER COMPLETE: CPT

## 2021-03-05 PROCEDURE — 99254 IP/OBS CNSLTJ NEW/EST MOD 60: CPT | Mod: 57 | Performed by: PODIATRIST

## 2021-03-05 PROCEDURE — 250N000011 HC RX IP 250 OP 636: Performed by: PODIATRIST

## 2021-03-05 PROCEDURE — 250N000011 HC RX IP 250 OP 636

## 2021-03-05 PROCEDURE — 250N000009 HC RX 250: Performed by: NURSE ANESTHETIST, CERTIFIED REGISTERED

## 2021-03-05 PROCEDURE — 999N001017 HC STATISTIC GLUCOSE BY METER IP

## 2021-03-05 PROCEDURE — 86160 COMPLEMENT ANTIGEN: CPT | Performed by: HOSPITALIST

## 2021-03-05 PROCEDURE — 0Y6N0Z9 DETACHMENT AT LEFT FOOT, PARTIAL 1ST RAY, OPEN APPROACH: ICD-10-PCS | Performed by: PODIATRIST

## 2021-03-05 PROCEDURE — 250N000013 HC RX MED GY IP 250 OP 250 PS 637: Performed by: PODIATRIST

## 2021-03-05 PROCEDURE — 0Y6N0ZB DETACHMENT AT LEFT FOOT, PARTIAL 2ND RAY, OPEN APPROACH: ICD-10-PCS | Performed by: PODIATRIST

## 2021-03-05 PROCEDURE — 87075 CULTR BACTERIA EXCEPT BLOOD: CPT | Performed by: PODIATRIST

## 2021-03-05 PROCEDURE — 370N000017 HC ANESTHESIA TECHNICAL FEE, PER MIN: Performed by: PODIATRIST

## 2021-03-05 PROCEDURE — 250N000011 HC RX IP 250 OP 636: Performed by: NURSE PRACTITIONER

## 2021-03-05 PROCEDURE — 88307 TISSUE EXAM BY PATHOLOGIST: CPT | Mod: TC | Performed by: PODIATRIST

## 2021-03-05 PROCEDURE — 86255 FLUORESCENT ANTIBODY SCREEN: CPT | Performed by: HOSPITALIST

## 2021-03-05 PROCEDURE — 36415 COLL VENOUS BLD VENIPUNCTURE: CPT | Performed by: NURSE PRACTITIONER

## 2021-03-05 PROCEDURE — 86038 ANTINUCLEAR ANTIBODIES: CPT | Performed by: HOSPITALIST

## 2021-03-05 PROCEDURE — 36415 COLL VENOUS BLD VENIPUNCTURE: CPT | Performed by: INTERNAL MEDICINE

## 2021-03-05 PROCEDURE — 88311 DECALCIFY TISSUE: CPT | Mod: TC | Performed by: PODIATRIST

## 2021-03-05 PROCEDURE — 258N000001 HC RX 258: Performed by: PODIATRIST

## 2021-03-05 PROCEDURE — 88311 DECALCIFY TISSUE: CPT | Mod: 26 | Performed by: PATHOLOGY

## 2021-03-05 PROCEDURE — 250N000011 HC RX IP 250 OP 636: Performed by: INTERNAL MEDICINE

## 2021-03-05 PROCEDURE — 87070 CULTURE OTHR SPECIMN AEROBIC: CPT | Performed by: PODIATRIST

## 2021-03-05 PROCEDURE — 258N000003 HC RX IP 258 OP 636: Performed by: NURSE ANESTHETIST, CERTIFIED REGISTERED

## 2021-03-05 PROCEDURE — 87205 SMEAR GRAM STAIN: CPT | Performed by: PODIATRIST

## 2021-03-05 PROCEDURE — 250N000013 HC RX MED GY IP 250 OP 250 PS 637: Performed by: NURSE PRACTITIONER

## 2021-03-05 PROCEDURE — 87147 CULTURE TYPE IMMUNOLOGIC: CPT | Performed by: PODIATRIST

## 2021-03-05 PROCEDURE — 258N000003 HC RX IP 258 OP 636

## 2021-03-05 PROCEDURE — 88307 TISSUE EXAM BY PATHOLOGIST: CPT | Mod: 26 | Performed by: PATHOLOGY

## 2021-03-05 PROCEDURE — 710N000009 HC RECOVERY PHASE 1, LEVEL 1, PER MIN: Performed by: PODIATRIST

## 2021-03-05 PROCEDURE — 93926 LOWER EXTREMITY STUDY: CPT | Mod: LT

## 2021-03-05 PROCEDURE — 93922 UPR/L XTREMITY ART 2 LEVELS: CPT

## 2021-03-05 PROCEDURE — 99221 1ST HOSP IP/OBS SF/LOW 40: CPT | Performed by: SURGERY

## 2021-03-05 PROCEDURE — 250N000012 HC RX MED GY IP 250 OP 636 PS 637: Performed by: NURSE PRACTITIONER

## 2021-03-05 PROCEDURE — 0Y6N0ZC DETACHMENT AT LEFT FOOT, PARTIAL 3RD RAY, OPEN APPROACH: ICD-10-PCS | Performed by: PODIATRIST

## 2021-03-05 PROCEDURE — 250N000013 HC RX MED GY IP 250 OP 250 PS 637: Performed by: HOSPITALIST

## 2021-03-05 PROCEDURE — 93306 TTE W/DOPPLER COMPLETE: CPT | Mod: 26 | Performed by: INTERNAL MEDICINE

## 2021-03-05 PROCEDURE — 85027 COMPLETE CBC AUTOMATED: CPT | Performed by: NURSE PRACTITIONER

## 2021-03-05 PROCEDURE — 87077 CULTURE AEROBIC IDENTIFY: CPT | Performed by: PODIATRIST

## 2021-03-05 PROCEDURE — 87040 BLOOD CULTURE FOR BACTERIA: CPT | Performed by: INTERNAL MEDICINE

## 2021-03-05 PROCEDURE — 87040 BLOOD CULTURE FOR BACTERIA: CPT | Performed by: HOSPITALIST

## 2021-03-05 PROCEDURE — 87186 SC STD MICRODIL/AGAR DIL: CPT | Performed by: PODIATRIST

## 2021-03-05 PROCEDURE — 250N000011 HC RX IP 250 OP 636: Performed by: NURSE ANESTHETIST, CERTIFIED REGISTERED

## 2021-03-05 PROCEDURE — 28805 AMPUTATION THRU METATARSAL: CPT | Mod: LT | Performed by: PODIATRIST

## 2021-03-05 RX ORDER — BUPIVACAINE HYDROCHLORIDE 2.5 MG/ML
INJECTION, SOLUTION EPIDURAL; INFILTRATION; INTRACAUDAL
Status: DISCONTINUED
Start: 2021-03-05 | End: 2021-03-05 | Stop reason: HOSPADM

## 2021-03-05 RX ORDER — QUETIAPINE FUMARATE 300 MG/1
600 TABLET, FILM COATED ORAL AT BEDTIME
Status: DISCONTINUED | OUTPATIENT
Start: 2021-03-05 | End: 2021-03-16

## 2021-03-05 RX ORDER — SODIUM CHLORIDE, SODIUM LACTATE, POTASSIUM CHLORIDE, CALCIUM CHLORIDE 600; 310; 30; 20 MG/100ML; MG/100ML; MG/100ML; MG/100ML
INJECTION, SOLUTION INTRAVENOUS CONTINUOUS
Status: DISCONTINUED | OUTPATIENT
Start: 2021-03-05 | End: 2021-03-05 | Stop reason: HOSPADM

## 2021-03-05 RX ORDER — DIVALPROEX SODIUM 500 MG/1
1000 TABLET, DELAYED RELEASE ORAL 2 TIMES DAILY
Status: DISCONTINUED | OUTPATIENT
Start: 2021-03-05 | End: 2021-03-18 | Stop reason: HOSPADM

## 2021-03-05 RX ORDER — NALOXONE HYDROCHLORIDE 0.4 MG/ML
0.4 INJECTION, SOLUTION INTRAMUSCULAR; INTRAVENOUS; SUBCUTANEOUS
Status: DISCONTINUED | OUTPATIENT
Start: 2021-03-05 | End: 2021-03-18 | Stop reason: HOSPADM

## 2021-03-05 RX ORDER — ROSUVASTATIN CALCIUM 20 MG/1
20 TABLET, COATED ORAL DAILY
Status: DISCONTINUED | OUTPATIENT
Start: 2021-03-05 | End: 2021-03-18 | Stop reason: HOSPADM

## 2021-03-05 RX ORDER — HYDROMORPHONE HYDROCHLORIDE 1 MG/ML
.3-.5 INJECTION, SOLUTION INTRAMUSCULAR; INTRAVENOUS; SUBCUTANEOUS EVERY 5 MIN PRN
Status: DISCONTINUED | OUTPATIENT
Start: 2021-03-05 | End: 2021-03-05 | Stop reason: HOSPADM

## 2021-03-05 RX ORDER — ISOSORBIDE DINITRATE 20 MG/1
40 TABLET ORAL 2 TIMES DAILY
Status: DISCONTINUED | OUTPATIENT
Start: 2021-03-05 | End: 2021-03-18 | Stop reason: HOSPADM

## 2021-03-05 RX ORDER — ACETAMINOPHEN 325 MG/1
975 TABLET ORAL EVERY 8 HOURS
Status: DISPENSED | OUTPATIENT
Start: 2021-03-05 | End: 2021-03-08

## 2021-03-05 RX ORDER — MEPERIDINE HYDROCHLORIDE 25 MG/ML
12.5 INJECTION INTRAMUSCULAR; INTRAVENOUS; SUBCUTANEOUS EVERY 5 MIN PRN
Status: DISCONTINUED | OUTPATIENT
Start: 2021-03-05 | End: 2021-03-05 | Stop reason: HOSPADM

## 2021-03-05 RX ORDER — ACETAMINOPHEN 325 MG/1
650 TABLET ORAL EVERY 4 HOURS PRN
Status: DISCONTINUED | OUTPATIENT
Start: 2021-03-08 | End: 2021-03-18 | Stop reason: HOSPADM

## 2021-03-05 RX ORDER — PIPERACILLIN SODIUM, TAZOBACTAM SODIUM 3; .375 G/15ML; G/15ML
3.38 INJECTION, POWDER, LYOPHILIZED, FOR SOLUTION INTRAVENOUS EVERY 6 HOURS
Status: DISCONTINUED | OUTPATIENT
Start: 2021-03-05 | End: 2021-03-09

## 2021-03-05 RX ORDER — ONDANSETRON 4 MG/1
4 TABLET, ORALLY DISINTEGRATING ORAL EVERY 30 MIN PRN
Status: DISCONTINUED | OUTPATIENT
Start: 2021-03-05 | End: 2021-03-05 | Stop reason: HOSPADM

## 2021-03-05 RX ORDER — ALBUTEROL SULFATE 0.83 MG/ML
2.5 SOLUTION RESPIRATORY (INHALATION) EVERY 4 HOURS PRN
Status: DISCONTINUED | OUTPATIENT
Start: 2021-03-05 | End: 2021-03-05 | Stop reason: HOSPADM

## 2021-03-05 RX ORDER — BUSPIRONE HYDROCHLORIDE 10 MG/1
10 TABLET ORAL 4 TIMES DAILY
Status: DISCONTINUED | OUTPATIENT
Start: 2021-03-05 | End: 2021-03-15

## 2021-03-05 RX ORDER — NALOXONE HYDROCHLORIDE 0.4 MG/ML
0.2 INJECTION, SOLUTION INTRAMUSCULAR; INTRAVENOUS; SUBCUTANEOUS
Status: DISCONTINUED | OUTPATIENT
Start: 2021-03-05 | End: 2021-03-18 | Stop reason: HOSPADM

## 2021-03-05 RX ORDER — FENTANYL CITRATE 50 UG/ML
25-50 INJECTION, SOLUTION INTRAMUSCULAR; INTRAVENOUS
Status: DISCONTINUED | OUTPATIENT
Start: 2021-03-05 | End: 2021-03-05 | Stop reason: HOSPADM

## 2021-03-05 RX ORDER — ONDANSETRON 2 MG/ML
INJECTION INTRAMUSCULAR; INTRAVENOUS PRN
Status: DISCONTINUED | OUTPATIENT
Start: 2021-03-05 | End: 2021-03-05

## 2021-03-05 RX ORDER — PREGABALIN 75 MG/1
150 CAPSULE ORAL 2 TIMES DAILY
Status: DISCONTINUED | OUTPATIENT
Start: 2021-03-05 | End: 2021-03-18 | Stop reason: HOSPADM

## 2021-03-05 RX ORDER — EPHEDRINE SULFATE 50 MG/ML
INJECTION, SOLUTION INTRAMUSCULAR; INTRAVENOUS; SUBCUTANEOUS PRN
Status: DISCONTINUED | OUTPATIENT
Start: 2021-03-05 | End: 2021-03-05

## 2021-03-05 RX ORDER — HYDROMORPHONE HYDROCHLORIDE 1 MG/ML
.3-.5 INJECTION, SOLUTION INTRAMUSCULAR; INTRAVENOUS; SUBCUTANEOUS
Status: DISCONTINUED | OUTPATIENT
Start: 2021-03-05 | End: 2021-03-07

## 2021-03-05 RX ORDER — BISOPROLOL FUMARATE 5 MG/1
5 TABLET, FILM COATED ORAL DAILY
Status: DISCONTINUED | OUTPATIENT
Start: 2021-03-05 | End: 2021-03-18 | Stop reason: HOSPADM

## 2021-03-05 RX ORDER — PROPOFOL 10 MG/ML
INJECTION, EMULSION INTRAVENOUS CONTINUOUS PRN
Status: DISCONTINUED | OUTPATIENT
Start: 2021-03-05 | End: 2021-03-05

## 2021-03-05 RX ORDER — LABETALOL HYDROCHLORIDE 5 MG/ML
10 INJECTION, SOLUTION INTRAVENOUS
Status: DISCONTINUED | OUTPATIENT
Start: 2021-03-05 | End: 2021-03-05 | Stop reason: HOSPADM

## 2021-03-05 RX ORDER — ONDANSETRON 2 MG/ML
4 INJECTION INTRAMUSCULAR; INTRAVENOUS EVERY 30 MIN PRN
Status: DISCONTINUED | OUTPATIENT
Start: 2021-03-05 | End: 2021-03-05 | Stop reason: HOSPADM

## 2021-03-05 RX ORDER — OXYCODONE HYDROCHLORIDE 5 MG/1
5-10 TABLET ORAL
Status: DISCONTINUED | OUTPATIENT
Start: 2021-03-05 | End: 2021-03-12

## 2021-03-05 RX ORDER — ROPIVACAINE HYDROCHLORIDE 5 MG/ML
INJECTION, SOLUTION EPIDURAL; INFILTRATION; PERINEURAL
Status: DISCONTINUED
Start: 2021-03-05 | End: 2021-03-05 | Stop reason: HOSPADM

## 2021-03-05 RX ORDER — HYDRALAZINE HYDROCHLORIDE 20 MG/ML
2.5-5 INJECTION INTRAMUSCULAR; INTRAVENOUS EVERY 10 MIN PRN
Status: DISCONTINUED | OUTPATIENT
Start: 2021-03-05 | End: 2021-03-05 | Stop reason: HOSPADM

## 2021-03-05 RX ORDER — LIDOCAINE 40 MG/G
CREAM TOPICAL
Status: DISCONTINUED | OUTPATIENT
Start: 2021-03-05 | End: 2021-03-18 | Stop reason: HOSPADM

## 2021-03-05 RX ORDER — LIDOCAINE HYDROCHLORIDE 20 MG/ML
INJECTION, SOLUTION INFILTRATION; PERINEURAL PRN
Status: DISCONTINUED | OUTPATIENT
Start: 2021-03-05 | End: 2021-03-05

## 2021-03-05 RX ORDER — PROPOFOL 10 MG/ML
INJECTION, EMULSION INTRAVENOUS PRN
Status: DISCONTINUED | OUTPATIENT
Start: 2021-03-05 | End: 2021-03-05

## 2021-03-05 RX ORDER — CLOMIPRAMINE HYDROCHLORIDE 50 MG/1
100 CAPSULE ORAL 2 TIMES DAILY
Status: DISCONTINUED | OUTPATIENT
Start: 2021-03-05 | End: 2021-03-15

## 2021-03-05 RX ORDER — BUPIVACAINE HYDROCHLORIDE 2.5 MG/ML
INJECTION, SOLUTION EPIDURAL; INFILTRATION; INTRACAUDAL PRN
Status: DISCONTINUED | OUTPATIENT
Start: 2021-03-05 | End: 2021-03-05 | Stop reason: HOSPADM

## 2021-03-05 RX ADMIN — ROSUVASTATIN CALCIUM 20 MG: 20 TABLET, FILM COATED ORAL at 09:49

## 2021-03-05 RX ADMIN — PHENYLEPHRINE HYDROCHLORIDE 200 MCG: 10 INJECTION INTRAVENOUS at 19:08

## 2021-03-05 RX ADMIN — OXYCODONE HYDROCHLORIDE 10 MG: 5 TABLET ORAL at 08:00

## 2021-03-05 RX ADMIN — PROPOFOL 150 MCG/KG/MIN: 10 INJECTION, EMULSION INTRAVENOUS at 18:28

## 2021-03-05 RX ADMIN — Medication 5 MG: at 19:08

## 2021-03-05 RX ADMIN — MIDAZOLAM 1 MG: 1 INJECTION INTRAMUSCULAR; INTRAVENOUS at 18:27

## 2021-03-05 RX ADMIN — BISOPROLOL FUMARATE 5 MG: 5 TABLET ORAL at 11:11

## 2021-03-05 RX ADMIN — SODIUM CHLORIDE, POTASSIUM CHLORIDE, SODIUM LACTATE AND CALCIUM CHLORIDE: 600; 310; 30; 20 INJECTION, SOLUTION INTRAVENOUS at 19:17

## 2021-03-05 RX ADMIN — INSULIN ASPART 2 UNITS: 100 INJECTION, SOLUTION INTRAVENOUS; SUBCUTANEOUS at 12:58

## 2021-03-05 RX ADMIN — INSULIN ASPART 2 UNITS: 100 INJECTION, SOLUTION INTRAVENOUS; SUBCUTANEOUS at 08:16

## 2021-03-05 RX ADMIN — DIVALPROEX SODIUM 1000 MG: 500 TABLET, DELAYED RELEASE ORAL at 21:42

## 2021-03-05 RX ADMIN — PHENYLEPHRINE HYDROCHLORIDE 50 MCG: 10 INJECTION INTRAVENOUS at 18:40

## 2021-03-05 RX ADMIN — PHENYLEPHRINE HYDROCHLORIDE 200 MCG: 10 INJECTION INTRAVENOUS at 19:11

## 2021-03-05 RX ADMIN — BUSPIRONE HYDROCHLORIDE 10 MG: 10 TABLET ORAL at 11:11

## 2021-03-05 RX ADMIN — Medication 5 MG: at 19:11

## 2021-03-05 RX ADMIN — QUETIAPINE FUMARATE 600 MG: 300 TABLET ORAL at 21:42

## 2021-03-05 RX ADMIN — ISOSORBIDE DINITRATE 40 MG: 20 TABLET ORAL at 11:11

## 2021-03-05 RX ADMIN — OXYCODONE HYDROCHLORIDE 5 MG: 5 TABLET ORAL at 04:56

## 2021-03-05 RX ADMIN — ACETAMINOPHEN 975 MG: 325 TABLET, FILM COATED ORAL at 22:02

## 2021-03-05 RX ADMIN — SODIUM CHLORIDE, POTASSIUM CHLORIDE, SODIUM LACTATE AND CALCIUM CHLORIDE: 600; 310; 30; 20 INJECTION, SOLUTION INTRAVENOUS at 14:19

## 2021-03-05 RX ADMIN — Medication 10 MG: at 19:13

## 2021-03-05 RX ADMIN — Medication 5 MG: at 19:05

## 2021-03-05 RX ADMIN — LIDOCAINE HYDROCHLORIDE 40 MG: 20 INJECTION, SOLUTION INFILTRATION; PERINEURAL at 18:30

## 2021-03-05 RX ADMIN — DEXTROSE AND SODIUM CHLORIDE: 5; 450 INJECTION, SOLUTION INTRAVENOUS at 08:06

## 2021-03-05 RX ADMIN — PREGABALIN 150 MG: 75 CAPSULE ORAL at 09:49

## 2021-03-05 RX ADMIN — PHENYLEPHRINE HYDROCHLORIDE 150 MCG: 10 INJECTION INTRAVENOUS at 19:02

## 2021-03-05 RX ADMIN — PHENYLEPHRINE HYDROCHLORIDE 100 MCG: 10 INJECTION INTRAVENOUS at 18:54

## 2021-03-05 RX ADMIN — DIVALPROEX SODIUM 1000 MG: 500 TABLET, DELAYED RELEASE ORAL at 11:11

## 2021-03-05 RX ADMIN — OXYCODONE HYDROCHLORIDE 5 MG: 5 TABLET ORAL at 01:09

## 2021-03-05 RX ADMIN — VANCOMYCIN HYDROCHLORIDE 1250 MG: 5 INJECTION, POWDER, LYOPHILIZED, FOR SOLUTION INTRAVENOUS at 01:41

## 2021-03-05 RX ADMIN — PIPERACILLIN AND TAZOBACTAM 3.38 G: 3; .375 INJECTION, POWDER, FOR SOLUTION INTRAVENOUS at 18:30

## 2021-03-05 RX ADMIN — PIPERACILLIN SODIUM AND TAZOBACTAM SODIUM 4.5 G: 4; .5 INJECTION, POWDER, LYOPHILIZED, FOR SOLUTION INTRAVENOUS at 00:50

## 2021-03-05 RX ADMIN — PIPERACILLIN AND TAZOBACTAM 3.38 G: 3; .375 INJECTION, POWDER, FOR SOLUTION INTRAVENOUS at 11:08

## 2021-03-05 RX ADMIN — PREGABALIN 150 MG: 75 CAPSULE ORAL at 21:41

## 2021-03-05 RX ADMIN — BUSPIRONE HYDROCHLORIDE 10 MG: 10 TABLET ORAL at 21:42

## 2021-03-05 RX ADMIN — ONDANSETRON 4 MG: 2 INJECTION INTRAMUSCULAR; INTRAVENOUS at 18:40

## 2021-03-05 RX ADMIN — CLOMIPRAMINE HCL 100 MG: 50 CAPSULE ORAL at 11:11

## 2021-03-05 RX ADMIN — PIPERACILLIN SODIUM AND TAZOBACTAM SODIUM 4.5 G: 4; .5 INJECTION, POWDER, LYOPHILIZED, FOR SOLUTION INTRAVENOUS at 04:54

## 2021-03-05 RX ADMIN — PHENYLEPHRINE HYDROCHLORIDE 200 MCG: 10 INJECTION INTRAVENOUS at 19:20

## 2021-03-05 RX ADMIN — PROPOFOL 50 MG: 10 INJECTION, EMULSION INTRAVENOUS at 18:30

## 2021-03-05 RX ADMIN — BUSPIRONE HYDROCHLORIDE 10 MG: 10 TABLET ORAL at 12:26

## 2021-03-05 RX ADMIN — PHENYLEPHRINE HYDROCHLORIDE 100 MCG: 10 INJECTION INTRAVENOUS at 19:13

## 2021-03-05 RX ADMIN — FLUOXETINE 40 MG: 20 CAPSULE ORAL at 09:49

## 2021-03-05 RX ADMIN — DEXTROSE AND SODIUM CHLORIDE: 5; 450 INJECTION, SOLUTION INTRAVENOUS at 22:06

## 2021-03-05 ASSESSMENT — LIFESTYLE VARIABLES: TOBACCO_USE: 1

## 2021-03-05 ASSESSMENT — ACTIVITIES OF DAILY LIVING (ADL)
ADLS_ACUITY_SCORE: 12

## 2021-03-05 NOTE — H&P (VIEW-ONLY)
[facility]    Infectious Disease Consultation     Date of Admission:  3/4/2021  Date of Consult (When I saw the patient): 03/05/21    Assessment & Plan   Huseyin Pettit is a 61 year old male who was admitted on 3/4/2021.     Impression:  1. 61 y.o male with diabetes.   2. Neuropathy.   3. CAD  4. Admitted with left foot wound with spetic arthritis.   5. Previous admission last year for osteo s/p toe amputation that admission.   6. Blood cultures positive for MSSA.     Recommendations:   Continue on zosyn alone to cover for MSSA + potential GNR/ anaerobe involved in the foot.   Stop vancomycin.   Daily blood cultures till clears   No long term IV lines yet       Jagruti Danielle MD    Reason for Consult   Reason for consult: I was asked to evaluate this patient for bacteremia and osteomyelitis.    Primary Care Physician   Hunter Carlton    Chief Complaint   Left foot osteo     History is obtained from the patient and medical records    History of Present Illness   Huseyin Pettit is a 61 year old male who presents with    Past Medical History   I have reviewed this patient's medical history and updated it with pertinent information if needed.   Past Medical History:   Diagnosis Date     CAD (coronary artery disease) 2/18/2015    S/p 3 stents in 2011 [place]     Closed fracture of multiple ribs of left side with routine healing 7/10/2018     Coronary artery disease      Coronary artery disease involving native coronary artery of native heart without angina pectoris 7/3/2018    S/p 3 stents put in [place] in 2011.     Depressive disorder      Diabetes (H)      Hypertension      Severe episode of recurrent major depressive disorder, without psychotic features (H) 7/3/2018     Type 2 diabetes mellitus with other circulatory complication, with long-term current use of insulin (H) 7/3/2018       Past Surgical History   I have reviewed this patient's surgical history and updated it with pertinent information if  needed.  Past Surgical History:   Procedure Laterality Date     AMPUTATE TOE(S) Right 3/3/2020    Procedure: AMPUTATION RIGHT GREAT TOE.;  Surgeon: Sergey Barnes DPM;  Location:  OR     CHOLECYSTECTOMY       COLONOSCOPY       CV CORONARY ANGIOGRAM N/A 3/2/2020    Procedure: Coronary Angiogram;  Surgeon: Thaddeus Sun MD;  Location:  HEART CARDIAC CATH LAB     GALLBLADDER SURGERY  2011       Prior to Admission Medications   Prior to Admission Medications   Prescriptions Last Dose Informant Patient Reported? Taking?   FLUoxetine (PROZAC) 20 MG capsule Past Week at Unknown time  No Yes   Sig: Take 2 capsules (40 mg) by mouth daily   QUEtiapine (SEROQUEL) 300 MG tablet Past Week at Unknown time  No Yes   Sig: Take 2 tablets (600 mg) by mouth At Bedtime   alcohol swab prep pads   No No   Sig: Use to swab area of injection/angelic as directed.   amLODIPine-valsartan (EXFORGE)  MG tablet Past Week at Unknown time  No Yes   Sig: Take 1 tablet by mouth daily   aspirin 81 MG tablet Past Week at Unknown time  Yes Yes   Sig: Take 81 mg by mouth daily    bisoprolol (ZEBETA) 5 MG tablet Past Week at Unknown time  No Yes   Sig: Take 1 tablet (5 mg) by mouth daily   blood glucose (NO BRAND SPECIFIED) test strip   No No   Sig: Use to test blood sugar 1 to 3 times daily or as directed. To accompany: Blood Glucose Monitor Brands: per insurance.   blood glucose calibration (NO BRAND SPECIFIED) solution   No No   Sig: To accompany: Blood Glucose Monitor Brands: per insurance.   blood glucose monitoring (NO BRAND SPECIFIED) meter device kit   No No   Sig: Use to test blood sugar 1 to 2 times daily or as directed.   busPIRone (BUSPAR) 10 MG tablet Past Week at Unknown time  No Yes   Sig: Take 1 tablet (10 mg) by mouth 4 times daily   clomiPRAMINE (ANAFRANIL) 50 MG capsule More than a month at Unknown time  No No   Sig: Take 2 capsules (100 mg) by mouth 2 times daily   clopidogrel (PLAVIX) 75 MG tablet Past Week at  Unknown time  No Yes   Sig: Take 1 tablet (75 mg) by mouth daily   divalproex sodium delayed-release (DEPAKOTE) 500 MG DR tablet Past Week at Unknown time  No Yes   Sig: Take 2 tablets (1,000 mg) by mouth 2 times daily   glimepiride (AMARYL) 2 MG tablet Past Week at Unknown time  No Yes   Sig: Take 1 tablet (2 mg) by mouth every morning (before breakfast)   insulin aspart prot & aspart (NOVOLOG MIX 70/30 PEN) (70-30) 100 UNIT/ML pen Past Week at Unknown time  No Yes   Si units in AM, and 38 units in PM   Patient taking differently: Inject 50 Units Subcutaneous daily (with lunch) 28 units in AM, and 38 units in PM   insulin pen needle (31G X 8 MM) 31G X 8 MM miscellaneous   No No   Sig: Use 2 pen needles daily or as directed.   isosorbide Dinitrate (ISORDIL) 40 MG TABS Past Week at Unknown time  No Yes   Sig: Take 1 tablet (40 mg) by mouth 2 times daily   metFORMIN (GLUCOPHAGE) 850 MG tablet Past Week at Unknown time  No Yes   Sig: Take 1 tablet (850 mg) by mouth 2 times daily (with meals) (Need A1c for further refills)   order for DME   No No   Sig: Equipment being ordered: blood pressure monitor.   order for DME   No No   Sig: Equipment being ordered: surgical shoe   pregabalin (LYRICA) 150 MG capsule Past Week at Unknown time  No Yes   Sig: TAKE ONE CAPSULE BY MOUTH TWICE A DAY   rosuvastatin (CRESTOR) 20 MG tablet Past Week at Unknown time  No Yes   Sig: Take 1 tablet (20 mg) by mouth daily   thin (NO BRAND SPECIFIED) lancets   No No   Sig: Use with lanceting device. To accompany: Blood Glucose Monitor Brands: per insurance.      Facility-Administered Medications: None     Allergies   No Known Allergies    Immunization History   Immunization History   Administered Date(s) Administered     Pneumococcal 23 valent 03/10/2020     TDAP Vaccine (Adacel) 03/10/2020       Social History   I have reviewed this patient's social history and updated it with pertinent information if needed. Huseyin Wilver  reports that he  has been smoking. He has a 12.50 pack-year smoking history. He has never used smokeless tobacco. He reports that he does not drink alcohol or use drugs.    Family History   I have reviewed this patient's family history and updated it with pertinent information if needed.   Family History   Problem Relation Age of Onset     Colon Cancer No family hx of        Review of Systems   The 10 point Review of Systems is negative other than noted in the HPI or here.     Physical Exam   Temp: 97.7  F (36.5  C) Temp src: Oral BP: 126/77 Pulse: 115   Resp: 16 SpO2: 97 % O2 Device: None (Room air)    Vital Signs with Ranges  Temp:  [97.5  F (36.4  C)-98.4  F (36.9  C)] 97.7  F (36.5  C)  Pulse:  [] 115  Resp:  [11-25] 16  BP: ()/(64-82) 126/77  SpO2:  [95 %-100 %] 97 %  147 lbs 4.28 oz  Body mass index is 21.75 kg/m .    GENERAL APPEARANCE:  awake  EYES: Eyes grossly normal to inspection, PERRL and conjunctivae and sclerae normal  HENT: ear canals and TM's normal and nose and mouth without ulcers or lesions  NECK: no adenopathy, no asymmetry, masses, or scars and thyroid normal to palpation  RESP: lungs clear to auscultation - no rales, rhonchi or wheezes  CV: regular rates and rhythm, normal S1 S2, no S3 or S4 and no murmur, click or rub  LYMPHATICS: normal ant/post cervical and supraclavicular nodes  ABDOMEN: soft, nontender, without hepatosplenomegaly or masses and bowel sounds normal  MS: extremities normal- no gross deformities noted  SKIN: no suspicious lesions or rashes      Data   Lab Results   Component Value Date    WBC 13.3 (H) 03/04/2021    HGB 9.6 (L) 03/04/2021    HCT 30.3 (L) 03/04/2021     03/04/2021     03/04/2021    POTASSIUM 4.1 03/04/2021    CHLORIDE 99 03/04/2021    CO2 25 03/04/2021    BUN 26 03/04/2021    CR 0.84 03/04/2021     (H) 03/04/2021    SED 91 (H) 03/04/2021    DD 3.1 (H) 03/04/2021    TROPI <0.015 03/04/2021    AST 20 03/04/2021    ALT 22 03/04/2021    ALKPHOS 72  03/04/2021    BILITOTAL 0.2 03/04/2021    INR 1.24 (H) 03/04/2021     Recent Labs   Lab 03/04/21  1015 03/04/21  0942   CULT Cultured on the 1st day of incubation:  Gram positive cocci  *  Critical Value/Significant Value, preliminary result only, called to and read back by  Ashtyn Chicas RN at [facility] 3/5/21 hg    (Note)  POSITIVE for STAPHYLOCOCCUS AUREUS and NEGATIVE for the mecA gene  (not MRSA) by Verigene multiplex nucleic acid test. The mecA gene was  not detected. Final identification and antimicrobial susceptibility  testing will be verified by standard methods.    Specimen tested with Verigene multiplex, gram-positive blood culture  nucleic acid test for the following targets: Staph aureus, Staph  epidermidis, Staph lugdunensis, other Staph species, Enterococcus  faecalis, Enterococcus faecium, Streptococcus species, S. agalactiae,  S. anginosus grp., S. pneumoniae, S. pyogenes, Listeria sp., mecA  (methicillin resistance) and Lopez/B (vancomycin resistance).    Critical Value/Significant Value called to and read back by Sacha Del Rosario RN on 3.5.21 at 0830. bw   Cultured on the 1st day of incubation:  Gram positive cocci in clusters  *  Critical Value/Significant Value, preliminary result only, called to and read back by  Sacha Del Rosario RN on 3.5.21 at 0830.        Recent Labs   Lab Test 03/04/21  1015 03/04/21  0942 03/03/20  1928 02/29/20  1104   CULT Cultured on the 1st day of incubation:  Gram positive cocci  *  Critical Value/Significant Value, preliminary result only, called to and read back by  Ashtyn Chicas RN at [facility] 3/5/21 hg    (Note)  POSITIVE for STAPHYLOCOCCUS AUREUS and NEGATIVE for the mecA gene  (not MRSA) by Verigene multiplex nucleic acid test. The mecA gene was  not detected. Final identification and antimicrobial susceptibility  testing will be verified by standard methods.    Specimen tested with Verigene multiplex, gram-positive blood culture  nucleic acid test for the following  targets: Staph aureus, Staph  epidermidis, Staph lugdunensis, other Staph species, Enterococcus  faecalis, Enterococcus faecium, Streptococcus species, S. agalactiae,  S. anginosus grp., S. pneumoniae, S. pyogenes, Listeria sp., mecA  (methicillin resistance) and Lopez/B (vancomycin resistance).    Critical Value/Significant Value called to and read back by Sacha Del Rosario RN on 3.5.21 at 0830. bw   Cultured on the 1st day of incubation:  Gram positive cocci in clusters  *  Critical Value/Significant Value, preliminary result only, called to and read back by  Sacha Del Rosario RN on 3.5.21 at 0830. bw   No anaerobes isolated  Light growth  Coagulase negative Staphylococcus  *  Light growth  Alcaligenes faecalis  *  Light growth  Enterococcus faecalis  * No growth

## 2021-03-05 NOTE — PROGRESS NOTES
[facility]    Medicine Progress Note - Hospitalist Service       Date of Admission:  3/4/2021  Assessment & Plan       Huseyin Pettit is a 61 year old male w/ PMH of DM II, CAD w/ prior PCI in 2011, former smoker HTN, HLD, CKD, chronic pain, anxiety and depression, osteomyelitis of toe s/p amputation of right foot admitted on 03/04/21 w/ CP and possible septic arthritis or multilevel osteomyelitis.      Left foot wounds with septic arthritis and osteomyelitis  Multiple fractures of left foot  Staph aureus bacteremia    Vascular surgery and Podiatry consulted.    REYMUNDO pending.    Podiatry planning left TMA later today.    Continue vancomycin and Zosyn.    1/2 blood cultures from admission positive for Staph aureus.    Consult ID and repeat blood cultures.    Chest pain, +risk factors including HTN, HLD, DM2  CAD w/ PCI in 2011 to LAD and CX iin [place], last coronary angiogram was 3/2020 which did not require any mechanical revascularization and GDMT was recommended.    Continues to have pain.    Serial troponins negative.    No significant dysrhythmias on telemetry.    EKG x 2 yesterday did not show any acute ischemic changes.    CT chest negative for PE on 3/4/21.    He is tender to palpation in the area of discomfort.    Given negative work-up including negative serial troponins despite ongoing chest pain, suspicion for cardiac etiology of his pain is low.    Symptomatic management.    Continue PTA aspirin, plavix, bisoprolol, isosorbide dinitrate, and rosuvastatin.    Diabetes mellitus, type 2    Hemoglobin A1c 6.8 on 3/4/21.    Hold PTA glimepiride and metformin for now.    Was on NovoLog Mix 70/30 at 28 units in AM and 38 units in PM prior to admission, currently on hold.    Received 10 units of Lantus at bedtime on 3/4/21. Basal insulin would be about 46 units daily based on his home dosing. Will be NPO today for surgery this afternoon. Will plan to start Lantus 30 units at bedtime  starting tonight and titrate as indicated.    Continue accuchecks and medium dose sliding scale NovoLog.    Blood sugars fairly well controlled.    Monitor for hypoglycemia.    CKD stage 2-3, baseline Cr 0.8-1.0 as of early 2020  Mild hyponatremia    Labs pending this morning, follow-up on results.    Anemia, chronic, normocytic baseline 12-13g    Hemoglobin 9.6 on 3/4/21, labs pending this morning.    Abdominal pain on palpation in LUQ and RLQ, although patient is requesting food and drink.  Constipation.    Lactic acid was within normal limits.    Mild epigastric tenderness on exam this morning.    Continue bowel regimen.    Depression  Anxiety    Resume PTA buspirone, clomipramine, fluoxetine, and quetiapine.    Mild basilar predominant bronchial wall thickening with some mucous plugging may be related to bronchitis, seen on CT    Encouraged smoking cessation.    Encourage incentive spirometer.    Extensive lower extremity erythematous macular rash, confluent in areas, concerning for vasculitic process    Since vascular surgery is already involved perhaps they would consider doing a biopsy.    Will add vasculitis labs.    Covid-19 PCR NEGATIVE  Asymptomatic COVID-19 PCR testing was negative on 3/4/21.       Diet: NPO per Anesthesia Guidelines for Procedure/Surgery Except for: Meds    DVT Prophylaxis: Enoxaparin (Lovenox) SQ  Rangel Catheter: not present  Code Status: Full Code           Disposition Plan   Expected discharge: continue inpatient care pending podiatry intervention on his foot, consultant recommendations, safe discharge plan  Entered: Taras Newman MD 03/05/2021, 8:26 AM       The patient's care was discussed with the Bedside Nurse and Patient.    Taras Newman MD  Hospitalist Service  [facility]  Contact information available via [facility] Paging/Directory    ______________________________________________________________________    Interval History   Huseyin Pettit feels  about the same today. Continues to have chest pain, no significant change since yesterday. Denies fevers, diaphoresis, shortness of breath, nausea, abdominal pain. Was seen by podiatry this morning, planning for left TMA later today.    Data reviewed today: I reviewed all medications, new labs and imaging results over the last 24 hours. I personally reviewed no images or EKG's today.    Physical Exam   Vital Signs: Temp: 97.7  F (36.5  C) Temp src: Oral BP: 126/77 Pulse: 115   Resp: 16 SpO2: 97 % O2 Device: None (Room air)    Weight: 147 lbs 4.28 oz  Constitutional: awake, alert, cooperative, no apparent distress, laying in bed  Respiratory: clear to auscultation bilaterally, no crackles or wheezing  Cardiovascular: regular rate and rhythm, normal S1 and S2, no murmur noted, tender to palpation over lower sternum/epigastrium  GI: normal bowel sounds, soft, non-distended, mild tenderness in the epigastrium  Skin: warm, dry  Musculoskeletal: no lower extremity pitting edema present, left foot with open wound between 2nd and 3rd toes with some foul smelling drainage  Neurologic: awake, alert, oriented to name, place and time, motor is 5 out of 5 bilaterally, sensory is intact    Data   Recent Labs   Lab 03/04/21  2240 03/04/21  1654 03/04/21  0942   WBC  --   --  13.3*   HGB  --   --  9.6*   MCV  --   --  89   PLT  --   --  449   INR  --   --  1.24*   NA  --   --  133   POTASSIUM  --   --  4.1   CHLORIDE  --   --  99   CO2  --   --  25   BUN  --   --  26   CR  --   --  0.84   ANIONGAP  --   --  9   MYRON  --   --  8.9   GLC  --   --  125*   ALBUMIN  --  1.9*  --    PROTTOTAL  --  6.9  --    BILITOTAL  --  0.2  --    ALKPHOS  --  72  --    ALT  --  22  --    AST  --  20  --    TROPI <0.015 <0.015 <0.015     Recent Results (from the past 24 hour(s))   Foot XR, G/E 3 views, left    Narrative    LEFT FOOT THREE OR MORE VIEWS  3/4/2021 10:44 AM     HISTORY:  Skin breakdown, ruling out deep infection.    COMPARISON:   5/15/2020      Impression    IMPRESSION:   1. First MTP joint space narrowing; this is new compared with the  previous exam and, in light of the adjacent ulcer, is very suggestive  of septic arthritis. There is a comminuted displaced fracture of the  distal first metatarsal. There is also a fracture at the base of the  first proximal phalanx. These may well represent pathologic fractures  with underlying osteomyelitis.  2. Prominently displaced fractures of the second and third metatarsal  necks.  3. Fracture involving the distal portion of the fifth proximal  phalanx, including mild distraction and depression at the articular  surface.  4. I suspect a fracture at the medial aspect of the great toe distal  phalanx base. Clinical correlation recommended.    NEGRO RICE MD   CT Chest Pulmonary Embolism w Contrast    Narrative    CT CHEST PULMONARY EMBOLISM WITH CONTRAST March 4, 2021 12:57 PM    CLINICAL HISTORY: Pulmonary embolism suspected, low/intermediate  probability, positive D-dimer.    TECHNIQUE: CT angiogram chest during arterial phase injection IV  contrast. 2D and 3D MIP reconstructions were performed by the CT  technologist. Dose reduction techniques were used.     CONTRAST: 62 mL Isovue-370.    COMPARISON: 3/11/2020.    FINDINGS:  ANGIOGRAM CHEST: Pulmonary arteries are normal caliber and negative  for pulmonary emboli. Thoracic aorta is negative for dissection.  Moderate coronary calcifications are noted.    LUNGS AND PLEURA: 8 mm nodule right lower lobe (series 9, image 177)  is unchanged. There is mild basilar predominant bronchial wall  thickening with some mucous plugging. No effusion or pneumothorax.    MEDIASTINUM/AXILLAE: No mediastinal, hilar, or axillary adenopathy.  Thyroid gland is unremarkable.    UPPER ABDOMEN: The gallbladder has been removed.    MUSCULOSKELETAL: Unremarkable.      Impression    IMPRESSION:  1.  No evidence of pulmonary embolism.  2.  Moderate coronary calcifications.  3.   Stable pulmonary nodule.  4.  Mild basilar predominant bronchial wall thickening with some  mucous plugging may be related to bronchitis.    LEENA ANTHONY MD   US Lower Extremity Venous Duplex Bilateral    Narrative    VENOUS ULTRASOUND BILATERAL LEG(S) March 4, 2021 1:09 PM     HISTORY: Bilateral lower extremity swelling, evaluate for deep vein  thrombosis. Elevated D-dimer, worsening peripheral swelling and pain.    COMPARISON: None.    FINDINGS: Examination of the deep veins with graded compression and  color flow Doppler with spectral wave form analysis was performed.  Images show no evidence of thrombus in the bilateral common femoral  vein, femoral vein, popliteal vein or calf veins. Prominent lymph  nodes are noted in the left groin.      Impression    IMPRESSION: No deep vein thrombosis in either lower extremity.      CHARY IQBAL,      Medications     dextrose 5% and 0.45% NaCl 100 mL/hr at 03/05/21 0806     no pre procedure antibiotic needed         aspirin  81 mg Oral Daily     clopidogrel  75 mg Oral Daily     enoxaparin ANTICOAGULANT  40 mg Subcutaneous Q24H     insulin aspart  1-7 Units Subcutaneous TID AC     insulin aspart  1-5 Units Subcutaneous At Bedtime     piperacillin-tazobactam  4.5 g Intravenous Q6H     polyethylene glycol  17 g Oral BID     vancomycin (VANCOCIN) IV  1,250 mg Intravenous Q12H

## 2021-03-05 NOTE — PROVIDER NOTIFICATION
MD Notification    Notified Person: MD    Notified Person Name: Paty    Notification Date/Time: 3/5/21 0087    Notification Interaction: Patient has scheduled surgery this afternoon, would you like to hold the Plavix and Aspirin at 0900? Thanks!    Purpose of Notification: Clarification    Orders Received:    Comments:

## 2021-03-05 NOTE — ANESTHESIA PREPROCEDURE EVALUATION
Anesthesia Pre-Procedure Evaluation    Patient: Huseyin Pettit   MRN: [id number] : 1960        Preoperative Diagnosis: Other acute osteomyelitis of left foot (H) [M86.172]   Procedure : Procedure(s):  TRANSMETATARSAL AMPUTATION LEFT FOOT     Past Medical History:   Diagnosis Date     CAD (coronary artery disease) 2015    S/p 3 stents in 2011 In [place]     Closed fracture of multiple ribs of left side with routine healing 7/10/2018     Coronary artery disease      Coronary artery disease involving native coronary artery of native heart without angina pectoris 7/3/2018    S/p 3 stents put in [place] in .     Depressive disorder      Diabetes (H)      Hypertension      Severe episode of recurrent major depressive disorder, without psychotic features (H) 7/3/2018     Type 2 diabetes mellitus with other circulatory complication, with long-term current use of insulin (H) 7/3/2018      Past Surgical History:   Procedure Laterality Date     AMPUTATE TOE(S) Right 3/3/2020    Procedure: AMPUTATION RIGHT GREAT TOE.;  Surgeon: Sergey Barnes DPM;  Location:  OR     CHOLECYSTECTOMY       COLONOSCOPY       CV CORONARY ANGIOGRAM N/A 3/2/2020    Procedure: Coronary Angiogram;  Surgeon: Thaddeus Sun MD;  Location:  HEART CARDI[facility] CATH LAB     GALLBLADDER SURGERY        No Known Allergies   Social History     Tobacco Use     Smoking status: Current Every Day Smoker     Packs/day: 0.50     Years: 25.00     Pack years: 12.50     Smokeless tobacco: Never Used     Tobacco comment: trying to adela   Substance Use Topics     Alcohol use: No      Wt Readings from Last 1 Encounters:   21 66.8 kg (147 lb 4.3 oz)        Anesthesia Evaluation   Pt has had prior anesthetic.     No history of anesthetic complications       ROS/MED HX  ENT/Pulmonary:     (+) tobacco use, Current use,  (-) sleep apnea   Neurologic:     (+) peripheral neuropathy,     Cardiovascular:     (+) Dyslipidemia hypertension-Peripheral  Vascular Disease-CAD --stent-    METS/Exercise Tolerance:     Hematologic:       Musculoskeletal:       GI/Hepatic:  - neg GI/hepatic ROS  (-) GERD   Renal/Genitourinary:     (+) renal disease, type: CRI,     Endo:     (+) type II DM, Using insulin, Diabetic complications: neuropathy.     Psychiatric/Substance Use:     (+) psychiatric history anxiety and depression     Infectious Disease:       Malignancy:       Other:            Physical Exam    Airway        Mallampati: II   TM distance: > 3 FB   Neck ROM: full   Mouth opening: > 3 cm    Respiratory Devices and Support         Dental  no notable dental history         Cardiovascular   cardiovascular exam normal          Pulmonary   pulmonary exam normal                OUTSIDE LABS:  CBC:   Lab Results   Component Value Date    WBC 16.6 (H) 03/05/2021    WBC 13.3 (H) 03/04/2021    HGB 10.3 (L) 03/05/2021    HGB 9.6 (L) 03/04/2021    HCT 31.7 (L) 03/05/2021    HCT 30.3 (L) 03/04/2021     (H) 03/05/2021     03/04/2021     BMP:   Lab Results   Component Value Date     03/05/2021     03/04/2021    POTASSIUM 4.3 03/05/2021    POTASSIUM 4.1 03/04/2021    CHLORIDE 102 03/05/2021    CHLORIDE 99 03/04/2021    CO2 25 03/05/2021    CO2 25 03/04/2021    BUN 20 03/05/2021    BUN 26 03/04/2021    CR 0.73 03/05/2021    CR 0.84 03/04/2021     (H) 03/05/2021     (H) 03/04/2021     COAGS:   Lab Results   Component Value Date    INR 1.24 (H) 03/04/2021     POC:   Lab Results   Component Value Date     (H) 03/05/2021     HEPATIC:   Lab Results   Component Value Date    ALBUMIN 1.9 (L) 03/04/2021    PROTTOTAL 6.9 03/04/2021    ALT 22 03/04/2021    AST 20 03/04/2021    ALKPHOS 72 03/04/2021    BILITOTAL 0.2 03/04/2021     OTHER:   Lab Results   Component Value Date    LACT 0.6 (L) 03/04/2021    A1C 6.8 (H) 03/04/2021    MYRON 8.3 (L) 03/05/2021    LIPASE 245 03/30/2020    TSH 0.66 07/03/2018    .0 (H) 03/04/2021    SED 91 (H)  03/04/2021       Anesthesia Plan    ASA Status:  3   NPO Status:  NPO Appropriate    Anesthesia Type: MAC.     - Reason for MAC: straight local not clinically adequate              Consents    Anesthesia Plan(s) and associated risks, benefits, and realistic alternatives discussed. Questions answered and patient/representative(s) expressed understanding.     - Discussed with:  Patient         Postoperative Care    Pain management: Multi-modal analgesia.   PONV prophylaxis: Ondansetron (or other 5HT-3)     Comments:                Vanesa Berumen MD

## 2021-03-05 NOTE — PROGRESS NOTES
MD Notification    Notified Person: MD    Notified Person Name: Dr. River     Notification Date/Time: 3/4/2021; 1949     Notification Interaction: web-based paging     Purpose of Notification: Hello, question regarding US REYMUNDO Doppler exercise scheduled for patient.     Orders Received: Okay to wait until tomorrow for test to be completed    Comments:   Discussed with physician:   Ultrasound said test can be completed tonight, however vascular tech not available, only general tech available. Ultrasound wondering if test can be performed tomorrow.

## 2021-03-05 NOTE — PLAN OF CARE
PT: Orders received and chart reviewed. Patient scheduled for transmetatarsal amputation this afternoon. PT evaluation deferred today.

## 2021-03-05 NOTE — PLAN OF CARE
DATE & TIME: 3/5/2021; 3244-5854  Cognitive Concerns/ Orientation: A&Ox4; Pleasant  BEHAVIOR & AGGRESSION TOOL COLOR: Green  ABNL VS/O2: VSS at RA ex tachycardic (115). O2 sat 97%  MOBILITY: Assist+1 with gait belt and walker. Stands to use urinal at bedside, non-weight bearing on L foot   PAIN MANAGMENT: Midsternal chest pain 9.5/10 (unchanged from previous assessments); 10mg oxycodone given x1  DIET: NPO for surgery this afternoon  BOWEL/BLADDER: Continent. Stands at bedside and uses urinal. No BM, BS active  ABNL LAB/BG: , 233, WBC: 16.6, Platlet: 512  DRAIN/DEVICES: R PIV infusing D5 1/2 NS at 100mL/h; intermittent ABX  TELEMETRY RHYTHM: Sinus tachycardic   SKIN: Scattered bruising on upper extremity. Rash on bilateral legs/shins/ankles with +1 Edema. R great toe amputated. L transmetatarsal amputation.  TESTS/PROCEDURES: US REYMUNDO Doppler, Echo, Transmetatarsal amputation completed today (3/5/21)  D/C DAY/GOALS/PLACE: Expected discharge: 4 - 7 days, dependent on surgical procedures, pain management/ tolerating PO medications, antibiotic plan established and safe disposition plan/ TCU bed available  OTHER IMPORTANT INFO: CMS checks LLE q4 hours (intact - pulses not palpable but found with doppler). Vascular surgery, PT, and Podiatry following.

## 2021-03-05 NOTE — PROVIDER NOTIFICATION
MD Notification    Notified Person: MD    Notified Person Name: Paty    Notification Date/Time: 3/5/21 5422    Notification Interaction:   New culture results:  Positive for staph aureus and gram+ cocci clusters.    Thanks!    Purpose of Notification: Update    Orders Received:    Comments:

## 2021-03-05 NOTE — PHARMACY-ADMISSION MEDICATION HISTORY
"Pharmacy Medication History  Admission medication history interview status for the 3/4/2021  admission is complete. See EPIC admission navigator for prior to admission medications     Location of Interview: Phone  Medication history sources: Patient and [facility]    Significant changes made to the medication list:      In the past week, patient estimated taking medication this percent of the time: less than 50% due to running out and \"did not feel like taking\"    Additional medication history information:   -Isosorbide dinitrate 40 mg bid listed on PTA list which pt also mentioned, but per [facility], it was last filled in April, 2020. [facility] also has Imdur 30 mg which filled several times, last refill was on 11/5/20. Pt did not seem to be familiar with Imdur, so did not add to the list.    Medication reconciliation completed by provider prior to medication history? No    Time spent in this activity: 25 min    Prior to Admission medications    Medication Sig Last Dose Taking? Auth Provider   amLODIPine-valsartan (EXFORGE)  MG tablet Take 1 tablet by mouth daily Past Week at Unknown time Yes Hunter Carlton MD   aspirin 81 MG tablet Take 81 mg by mouth daily  Past Week at Unknown time Yes Reported, Patient   bisoprolol (ZEBETA) 5 MG tablet Take 1 tablet (5 mg) by mouth daily Past Week at Unknown time Yes Hunter Carlton MD   busPIRone (BUSPAR) 10 MG tablet Take 1 tablet (10 mg) by mouth 4 times daily Past Week at Unknown time Yes Hunter Carlton MD   clopidogrel (PLAVIX) 75 MG tablet Take 1 tablet (75 mg) by mouth daily Past Week at Unknown time Yes Hunter Carlton MD   divalproex sodium delayed-release (DEPAKOTE) 500 MG DR tablet Take 2 tablets (1,000 mg) by mouth 2 times daily Past Week at Unknown time Yes Hunter Carlton MD   FLUoxetine (PROZAC) 20 MG capsule Take 2 capsules (40 mg) by mouth daily Past Week at Unknown time Yes Hunter Carlton MD   glimepiride (AMARYL) 2 MG tablet Take 1 tablet (2 mg) by mouth every morning " (before breakfast) Past Week at Unknown time Yes Hunter Carlton MD   insulin aspart prot & aspart (NOVOLOG MIX 70/30 PEN) (70-30) 100 UNIT/ML pen 28 units in AM, and 38 units in PM  Patient taking differently: Inject 50 Units Subcutaneous daily (with lunch) 28 units in AM, and 38 units in PM Past Week at Unknown time Yes Hunter Carlton MD   isosorbide Dinitrate (ISORDIL) 40 MG TABS Take 1 tablet (40 mg) by mouth 2 times daily Past Week at Unknown time Yes Hunter Carlton MD   metFORMIN (GLUCOPHAGE) 850 MG tablet Take 1 tablet (850 mg) by mouth 2 times daily (with meals) (Need A1c for further refills) Past Week at Unknown time Yes Hunter Carlton MD   pregabalin (LYRICA) 150 MG capsule TAKE ONE CAPSULE BY MOUTH TWICE A DAY Past Week at Unknown time Yes Hunter Carlton MD   QUEtiapine (SEROQUEL) 300 MG tablet Take 2 tablets (600 mg) by mouth At Bedtime Past Week at Unknown time Yes Hunter Carlton MD   rosuvastatin (CRESTOR) 20 MG tablet Take 1 tablet (20 mg) by mouth daily Past Week at Unknown time Yes Hunter Carlton MD   alcohol swab prep pads Use to swab area of injection/angelic as directed.   Hunter Carlton MD   blood glucose (NO BRAND SPECIFIED) test strip Use to test blood sugar 1 to 3 times daily or as directed. To accompany: Blood Glucose Monitor Brands: per insurance.   Hunter Carlton MD   blood glucose calibration (NO BRAND SPECIFIED) solution To accompany: Blood Glucose Monitor Brands: per insurance.   Hunter Carlton MD   blood glucose monitoring (NO BRAND SPECIFIED) meter device kit Use to test blood sugar 1 to 2 times daily or as directed.   Hunter Carlton MD   clomiPRAMINE (ANAFRANIL) 50 MG capsule Take 2 capsules (100 mg) by mouth 2 times daily More than a month at Unknown time  Hunter Carlton MD   insulin pen needle (31G X 8 MM) 31G X 8 MM miscellaneous Use 2 pen needles daily or as directed.   Hunter Carlton MD   order for DME Equipment being ordered: Sergey Pickett DPM   order for DME Equipment being ordered: blood  pressure monitor.   Hunter Carlton MD   thin (NO BRAND SPECIFIED) lancets Use with lanceting device. To accompany: Blood Glucose Monitor Brands: per insurance.   Hunter Carlton MD       The information provided in this note is only as accurate as the sources available at the time of update(s)

## 2021-03-05 NOTE — PROVIDER NOTIFICATION
Brief update:    Paged regarding positive blood culture on 1 of 2 specimens with gram-positive cocci    On vancomycin and Zosyn currently.    No change to medical regimen currently.    Carlitos Tidwell MD  5:51 AM

## 2021-03-05 NOTE — PLAN OF CARE
DATE & TIME: 3/4/2021; [id number]   Cognitive Concerns/ Orientation : A & O x 4; calm and cooperative, Mongolian primary language, but does speak English well    BEHAVIOR & AGGRESSION TOOL COLOR: Green   CIWA SCORE: N/A   ABNL VS/O2: VSS on RA   MOBILITY: x 1 assist Gb/walker, non-weight bearing LLE, encouraged elevation and slow position changes when in bed  PAIN MANAGMENT: reported 10/10 midsternal chest pain, managed well with PRN dilaudid x 1 at start of shift, oxy at end of shift, and rest  DIET: Mod carb, poor appetite   BOWEL/BLADDER: BS active x 4; continent   ABNL LAB/BG: CRP=160.0; Lactic acid=0.6; A1C=6.8; Albumin= 1.9; troponins (-); BG= 123,180   DRAIN/DEVICES: PIV/SL   TELEMETRY RHYTHM: ST   SKIN: Scattered bruising UE; scattered red rash waist down; Sloughing L. Toe/foot, R. Great toe amputation  TESTS/PROCEDURES: EKG completed, Abd UA toe pressures scheduled  D/C DAY/GOALS/PLACE: pending discharge   OTHER IMPORTANT INFO: CMS checks LLE q4h (intact); Vitals q4h; Podiatry, vascular surgery following, PT consulted

## 2021-03-05 NOTE — CONSULTS
"Foot & Ankle Surgery  March 5, 2021    CC: severe diabetic left foot infection    I was asked to see Huseyin Pettit regarding the chief complaint by:  MEGAN BENTLEY CNP    HPI:  Pt is a 61 year old male who presents with above complaint.  Admission for worsening left foot infection.  Known to our department after undergoing right great toe amputation, patient now has multiple wounds left foot for \"2-3 weeks\", now with worsening discomfort.  No treatment thus far.  Admission xrays show comminuted impaction fracture of the 1st metatarsal, with 2nd and 3rd metatarsal neck fractures.  Open wounds in these areas, films indicative of pathologic fractures and septic arthritis/osteomyelitis.      ROS:   Pos for CC.  The patient denies current nausea, vomiting, chills, fevers, belly pain, calf pain, chest pain or SOB.  Complete remainder of ROS is otherwise neg.    VITALS:    Vitals:    03/05/21 0154 03/05/21 0451 03/05/21 0502 03/05/21 0541   BP:  114/71     BP Location:  Left arm     Pulse:  112     Resp: 16 18  16   Temp:  97.5  F (36.4  C)     TempSrc:  Oral     SpO2:  100%     Weight:   66.8 kg (147 lb 4.3 oz)        PMH:    Past Medical History:   Diagnosis Date     CAD (coronary artery disease) 2/18/2015    S/p 3 stents in 2011 In [place]     Closed fracture of multiple ribs of left side with routine healing 7/10/2018     Coronary artery disease      Coronary artery disease involving native coronary artery of native heart without angina pectoris 7/3/2018    S/p 3 stents put in [place] in 2011.     Depressive disorder      Diabetes (H)      Hypertension      Severe episode of recurrent major depressive disorder, without psychotic features (H) 7/3/2018     Type 2 diabetes mellitus with other circulatory complication, with long-term current use of insulin (H) 7/3/2018       SXHX:    Past Surgical History:   Procedure Laterality Date     AMPUTATE TOE(S) Right 3/3/2020    Procedure: AMPUTATION RIGHT GREAT TOE.;  Surgeon: " Sergey Barnes DPM;  Location:  OR     CHOLECYSTECTOMY       COLONOSCOPY       CV CORONARY ANGIOGRAM N/A 3/2/2020    Procedure: Coronary Angiogram;  Surgeon: Thaddeus Sun MD;  Location:  [facility]ATH LAB     GALLBLADDER SURGERY  2011        MEDS:    Current Facility-Administered Medications   Medication     acetaminophen (TYLENOL) Suppository 650 mg     acetaminophen (TYLENOL) tablet 650 mg     aspirin (ASA) chewable tablet 81 mg     bisacodyl (DULCOLAX) EC tablet 5 mg    Or     bisacodyl (DULCOLAX) EC tablet 10 mg    Or     bisacodyl (DULCOLAX) EC tablet 15 mg     clopidogrel (PLAVIX) tablet 75 mg     glucose gel 15-30 g    Or     dextrose 50 % injection 25-50 mL    Or     glucagon injection 1 mg     enoxaparin ANTICOAGULANT (LOVENOX) injection 40 mg     HYDROmorphone (DILAUDID) injection 0.2 mg     insulin aspart (NovoLOG) injection (RAPID ACTING)     insulin aspart (NovoLOG) injection (RAPID ACTING)     lidocaine (LMX4) cream     lidocaine 1 % 0.1-1 mL     magnesium citrate solution 148 mL     magnesium hydroxide (MILK OF MAGNESIA) suspension 30 mL     melatonin tablet 1 mg     naloxone (NARCAN) injection 0.2 mg    Or     naloxone (NARCAN) injection 0.4 mg    Or     naloxone (NARCAN) injection 0.2 mg    Or     naloxone (NARCAN) injection 0.4 mg     ondansetron (ZOFRAN-ODT) ODT tab 4 mg    Or     ondansetron (ZOFRAN) injection 4 mg     oxyCODONE (ROXICODONE) tablet 5-10 mg     piperacillin-tazobactam (ZOSYN) 4.5 g vial to attach to  mL bag     polyethylene glycol (MIRALAX) Packet 17 g     prochlorperazine (COMPAZINE) injection 10 mg    Or     prochlorperazine (COMPAZINE) tablet 10 mg    Or     prochlorperazine (COMPAZINE) suppository 25 mg     sodium chloride (PF) 0.9% PF flush 3 mL     sodium chloride (PF) 0.9% PF flush 3 mL     vancomycin 1250 mg in 0.9% NaCl 250 mL intermittent infusion 1,250 mg       ALL:   No Known Allergies    FMH:    Family History   Problem Relation Age of  Onset     Colon Cancer No family hx of        SocHx:    Social History     Socioeconomic History     Marital status: Single     Spouse name: Not on file     Number of children: Not on file     Years of education: Not on file     Highest education level: Not on file   Occupational History     Not on file   Social Needs     Financial resource strain: Not hard at all     Food insecurity     Worry: Never true     Inability: Never true     Transportation needs     Medical: No     Non-medical: No   Tobacco Use     Smoking status: Current Every Day Smoker     Packs/day: 0.50     Years: 25.00     Pack years: 12.50     Smokeless tobacco: Never Used     Tobacco comment: trying to adela   Substance and Sexual Activity     Alcohol use: No     Drug use: No     Sexual activity: Never   Lifestyle     Physical activity     Days per week: 0 days     Minutes per session: 0 min     Stress: Very much   Relationships     Social connections     Talks on phone: More than three times a week     Gets together: Not on file     Attends Scientologist service: Not on file     Active member of club or organization: Not on file     Attends meetings of clubs or organizations: Not on file     Relationship status: Not on file     Intimate partner violence     Fear of current or ex partner: No     Emotionally abused: No     Physically abused: No     Forced sexual activity: No   Other Topics Concern     Parent/sibling w/ CABG, MI or angioplasty before 65F 55M? Not Asked   Social History Narrative     Not on file           EXAMINATION:  Gen:   No apparent distress  Neuro:   A&Ox3, no deficits  Psych:    Answering questions appropriately for age and situation with normal affect  Head:    NCAT  Eye:    Visual scanning without deficit  Ear:    Response to auditory stimuli wnl  Lung:    Non-labored breathing on RA noted  Abd:    NTND per patient report  Lymph:    Neg for pitting/non-pitting edema BLE  Vasc:    PT pulse is weakly palpable, CFT minimally  delayed  Neuro:    Light touch sensation greatly diminished distally  Derm:    Ulceration medial L 1st MPJ, and at dorsal bases of 2nd and 3rd MPJs.  Both wounds readily probe to bone.  Mild malodor. No soft tissue crepitus.  Crusted seropurulent drainage around 2nd/3rd MPJ wound.  Shortening of 1st ray.  MSK:    ROM, strength wnl without limitation, no pain on palpation noted.  Calf:    Neg for redness, swelling or tenderness      Imaging:  IMPRESSION:   1. First MTP joint space narrowing; this is new compared with the  previous exam and, in light of the adjacent ulcer, is very suggestive  of septic arthritis. There is a comminuted displaced fracture of the  distal first metatarsal. There is also a fracture at the base of the  first proximal phalanx. These may well represent pathologic fractures  with underlying osteomyelitis.  2. Prominently displaced fractures of the second and third metatarsal  necks.  3. Fracture involving the distal portion of the fifth proximal  phalanx, including mild distraction and depression at the articular  surface.  4. I suspect a fracture at the medial aspect of the great toe distal  phalanx base. Clinical correlation recommended.    Labs:    Component      Latest Ref Rng & Units 3/4/2021   WBC      4.0 - 11.0 10e9/L 13.3 (H)   RBC Count      4.4 - 5.9 10e12/L 3.41 (L)   Hemoglobin      13.3 - 17.7 g/dL 9.6 (L)   Hematocrit      40.0 - 53.0 % 30.3 (L)   MCV      78 - 100 fl 89   MCH      26.5 - 33.0 pg 28.2   MCHC      31.5 - 36.5 g/dL 31.7   RDW      10.0 - 15.0 % 14.5   Platelet Count      150 - 450 10e9/L 449   Diff Method       Automated Method   % Neutrophils      % 75.6   % Lymphocytes      % 14.6   % Monocytes      % 8.2   % Eosinophils      % 0.5   % Basophils      % 0.2   % Immature Granulocytes      % 0.9   Nucleated RBCs      0 /100 0   Absolute Neutrophil      1.6 - 8.3 10e9/L 10.1 (H)   Absolute Lymphocytes      0.8 - 5.3 10e9/L 2.0   Absolute Monocytes      0.0 - 1.3  10e9/L 1.1   Absolute Eosinophils      0.0 - 0.7 10e9/L 0.1   Absolute Basophils      0.0 - 0.2 10e9/L 0.0   Abs Immature Granulocytes      0 - 0.4 10e9/L 0.1   Absolute Nucleated RBC       0.0   CRP Inflammation      0.0 - 8.0 mg/L 160.0 (H)   Sed Rate      0 - 20 mm/h 91 (H)   Hemoglobin A1C      0 - 5.6 % 6.8 (H)     Cultures:  Previous R great toe wound cultures -   Specimen Description great toe Right Bone SPECIMEN 1    Culture Micro Abnormal   Light growth   Coagulase negative Staphylococcus     Culture Micro Abnormal   Light growth   Alcaligenes faecalis     Culture Micro Abnormal   Light growth   Enterococcus faecalis          Assessment:  61 year old male with necrotic left foot wounds with osteomyelitis/septic arthritis in setting of poorly-controlled DMII with neuropathy, PAD, and current smoker      Plan:  Discussed etiologies, anatomy and options  1.  necrotic left foot wounds with osteomyelitis/septic arthritis in setting of poorly-controlled DMII with neuropathy, PAD, and current smoker  -I personally reviewed the patient's lower extremity history pertinent to today's visit, including imaging/labs, in preparation for initiating a treatment program.  -regarding films, questionable gas in soft tissue at adjacent surfaces of 2nd/3rd toes  -to OR later today for open TMA left foot; NPO now  -anticipate will require serial debridements prior to final closure  -Vascular following.  Awaiting results of non-invasive studies to determine further treatment options.        Patient's medical history was reviewed today      Sergey Barnes DPM FACFAS FACFAOM  Podiatric Foot & Ankle Surgeon  [facility]  [phone]

## 2021-03-05 NOTE — OR NURSING
"Offered to send pt back to his inpatient room, but pt refused.  Informed pt it could be a couple hour delay.  He stated \"as long as I am having surgery tonight I want to wait down here.\"  "

## 2021-03-05 NOTE — PROGRESS NOTES
Patient arrived to 5- from [facility] around 1545 this afternoon. A & O x 4, calm and cooperative. Hungarian primary language, but does understand English well. Reports 10/10 chest pain. Red rash present from waist down. LLE toes sloughing and edema present. Admission questions started. Nursing will continue to monitor and assess.     Mikki Hess RN on 3/4/2021 at 7:26 PM

## 2021-03-05 NOTE — CONSULTS
[facility]    Infectious Disease Consultation     Date of Admission:  3/4/2021  Date of Consult (When I saw the patient): 03/05/21    Assessment & Plan   Huseyin Pettit is a 61 year old male who was admitted on 3/4/2021.     Impression:  1. 61 y.o male with diabetes.   2. Neuropathy.   3. CAD  4. Admitted with left foot wound with spetic arthritis.   5. Previous admission last year for osteo s/p toe amputation that admission.   6. Blood cultures positive for MSSA.     Recommendations:   Continue on zosyn alone to cover for MSSA + potential GNR/ anaerobe involved in the foot.   Stop vancomycin.   Daily blood cultures till clears   No long term IV lines yet       Jagruti Danielle MD    Reason for Consult   Reason for consult: I was asked to evaluate this patient for bacteremia and osteomyelitis.    Primary Care Physician   Hunter Carlton    Chief Complaint   Left foot osteo     History is obtained from the patient and medical records    History of Present Illness   Huseyin Pettit is a 61 year old male who presents with    Past Medical History   I have reviewed this patient's medical history and updated it with pertinent information if needed.   Past Medical History:   Diagnosis Date     CAD (coronary artery disease) 2/18/2015    S/p 3 stents in 2011 In [place]     Closed fracture of multiple ribs of left side with routine healing 7/10/2018     Coronary artery disease      Coronary artery disease involving native coronary artery of native heart without angina pectoris 7/3/2018    S/p 3 stents put in [place] in 2011.     Depressive disorder      Diabetes (H)      Hypertension      Severe episode of recurrent major depressive disorder, without psychotic features (H) 7/3/2018     Type 2 diabetes mellitus with other circulatory complication, with long-term current use of insulin (H) 7/3/2018       Past Surgical History   I have reviewed this patient's surgical history and updated it with pertinent information if  needed.  Past Surgical History:   Procedure Laterality Date     AMPUTATE TOE(S) Right 3/3/2020    Procedure: AMPUTATION RIGHT GREAT TOE.;  Surgeon: Sergey Barnes DPM;  Location:  OR     CHOLECYSTECTOMY       COLONOSCOPY       CV CORONARY ANGIOGRAM N/A 3/2/2020    Procedure: Coronary Angiogram;  Surgeon: Thaddeus Sun MD;  Location:  HEART CARDIAC CATH LAB     GALLBLADDER SURGERY  2011       Prior to Admission Medications   Prior to Admission Medications   Prescriptions Last Dose Informant Patient Reported? Taking?   FLUoxetine (PROZAC) 20 MG capsule Past Week at Unknown time  No Yes   Sig: Take 2 capsules (40 mg) by mouth daily   QUEtiapine (SEROQUEL) 300 MG tablet Past Week at Unknown time  No Yes   Sig: Take 2 tablets (600 mg) by mouth At Bedtime   alcohol swab prep pads   No No   Sig: Use to swab area of injection/angelic as directed.   amLODIPine-valsartan (EXFORGE)  MG tablet Past Week at Unknown time  No Yes   Sig: Take 1 tablet by mouth daily   aspirin 81 MG tablet Past Week at Unknown time  Yes Yes   Sig: Take 81 mg by mouth daily    bisoprolol (ZEBETA) 5 MG tablet Past Week at Unknown time  No Yes   Sig: Take 1 tablet (5 mg) by mouth daily   blood glucose (NO BRAND SPECIFIED) test strip   No No   Sig: Use to test blood sugar 1 to 3 times daily or as directed. To accompany: Blood Glucose Monitor Brands: per insurance.   blood glucose calibration (NO BRAND SPECIFIED) solution   No No   Sig: To accompany: Blood Glucose Monitor Brands: per insurance.   blood glucose monitoring (NO BRAND SPECIFIED) meter device kit   No No   Sig: Use to test blood sugar 1 to 2 times daily or as directed.   busPIRone (BUSPAR) 10 MG tablet Past Week at Unknown time  No Yes   Sig: Take 1 tablet (10 mg) by mouth 4 times daily   clomiPRAMINE (ANAFRANIL) 50 MG capsule More than a month at Unknown time  No No   Sig: Take 2 capsules (100 mg) by mouth 2 times daily   clopidogrel (PLAVIX) 75 MG tablet Past Week at  Unknown time  No Yes   Sig: Take 1 tablet (75 mg) by mouth daily   divalproex sodium delayed-release (DEPAKOTE) 500 MG DR tablet Past Week at Unknown time  No Yes   Sig: Take 2 tablets (1,000 mg) by mouth 2 times daily   glimepiride (AMARYL) 2 MG tablet Past Week at Unknown time  No Yes   Sig: Take 1 tablet (2 mg) by mouth every morning (before breakfast)   insulin aspart prot & aspart (NOVOLOG MIX 70/30 PEN) (70-30) 100 UNIT/ML pen Past Week at Unknown time  No Yes   Si units in AM, and 38 units in PM   Patient taking differently: Inject 50 Units Subcutaneous daily (with lunch) 28 units in AM, and 38 units in PM   insulin pen needle (31G X 8 MM) 31G X 8 MM miscellaneous   No No   Sig: Use 2 pen needles daily or as directed.   isosorbide Dinitrate (ISORDIL) 40 MG TABS Past Week at Unknown time  No Yes   Sig: Take 1 tablet (40 mg) by mouth 2 times daily   metFORMIN (GLUCOPHAGE) 850 MG tablet Past Week at Unknown time  No Yes   Sig: Take 1 tablet (850 mg) by mouth 2 times daily (with meals) (Need A1c for further refills)   order for DME   No No   Sig: Equipment being ordered: blood pressure monitor.   order for DME   No No   Sig: Equipment being ordered: surgical shoe   pregabalin (LYRICA) 150 MG capsule Past Week at Unknown time  No Yes   Sig: TAKE ONE CAPSULE BY MOUTH TWICE A DAY   rosuvastatin (CRESTOR) 20 MG tablet Past Week at Unknown time  No Yes   Sig: Take 1 tablet (20 mg) by mouth daily   thin (NO BRAND SPECIFIED) lancets   No No   Sig: Use with lanceting device. To accompany: Blood Glucose Monitor Brands: per insurance.      Facility-Administered Medications: None     Allergies   No Known Allergies    Immunization History   Immunization History   Administered Date(s) Administered     Pneumococcal 23 valent 03/10/2020     TDAP Vaccine (Adacel) 03/10/2020       Social History   I have reviewed this patient's social history and updated it with pertinent information if needed. Huseyin Wilver  reports that he  has been smoking. He has a 12.50 pack-year smoking history. He has never used smokeless tobacco. He reports that he does not drink alcohol or use drugs.    Family History   I have reviewed this patient's family history and updated it with pertinent information if needed.   Family History   Problem Relation Age of Onset     Colon Cancer No family hx of        Review of Systems   The 10 point Review of Systems is negative other than noted in the HPI or here.     Physical Exam   Temp: 97.7  F (36.5  C) Temp src: Oral BP: 126/77 Pulse: 115   Resp: 16 SpO2: 97 % O2 Device: None (Room air)    Vital Signs with Ranges  Temp:  [97.5  F (36.4  C)-98.4  F (36.9  C)] 97.7  F (36.5  C)  Pulse:  [] 115  Resp:  [11-25] 16  BP: ()/(64-82) 126/77  SpO2:  [95 %-100 %] 97 %  147 lbs 4.28 oz  Body mass index is 21.75 kg/m .    GENERAL APPEARANCE:  awake  EYES: Eyes grossly normal to inspection, PERRL and conjunctivae and sclerae normal  HENT: ear canals and TM's normal and nose and mouth without ulcers or lesions  NECK: no adenopathy, no asymmetry, masses, or scars and thyroid normal to palpation  RESP: lungs clear to auscultation - no rales, rhonchi or wheezes  CV: regular rates and rhythm, normal S1 S2, no S3 or S4 and no murmur, click or rub  LYMPHATICS: normal ant/post cervical and supraclavicular nodes  ABDOMEN: soft, nontender, without hepatosplenomegaly or masses and bowel sounds normal  MS: extremities normal- no gross deformities noted  SKIN: no suspicious lesions or rashes      Data   Lab Results   Component Value Date    WBC 13.3 (H) 03/04/2021    HGB 9.6 (L) 03/04/2021    HCT 30.3 (L) 03/04/2021     03/04/2021     03/04/2021    POTASSIUM 4.1 03/04/2021    CHLORIDE 99 03/04/2021    CO2 25 03/04/2021    BUN 26 03/04/2021    CR 0.84 03/04/2021     (H) 03/04/2021    SED 91 (H) 03/04/2021    DD 3.1 (H) 03/04/2021    TROPI <0.015 03/04/2021    AST 20 03/04/2021    ALT 22 03/04/2021    ALKPHOS 72  03/04/2021    BILITOTAL 0.2 03/04/2021    INR 1.24 (H) 03/04/2021     Recent Labs   Lab 03/04/21  1015 03/04/21  0942   CULT Cultured on the 1st day of incubation:  Gram positive cocci  *  Critical Value/Significant Value, preliminary result only, called to and read back by  Ashtyn Chicas RN at [facility] 3/5/21 hg    (Note)  POSITIVE for STAPHYLOCOCCUS AUREUS and NEGATIVE for the mecA gene  (not MRSA) by Verigene multiplex nucleic acid test. The mecA gene was  not detected. Final identification and antimicrobial susceptibility  testing will be verified by standard methods.    Specimen tested with Verigene multiplex, gram-positive blood culture  nucleic acid test for the following targets: Staph aureus, Staph  epidermidis, Staph lugdunensis, other Staph species, Enterococcus  faecalis, Enterococcus faecium, Streptococcus species, S. agalactiae,  S. anginosus grp., S. pneumoniae, S. pyogenes, Listeria sp., mecA  (methicillin resistance) and Lopez/B (vancomycin resistance).    Critical Value/Significant Value called to and read back by Sacha Del Rosario RN on 3.5.21 at 0830. bw   Cultured on the 1st day of incubation:  Gram positive cocci in clusters  *  Critical Value/Significant Value, preliminary result only, called to and read back by  Sacha Del Rosario RN on 3.5.21 at 0830.        Recent Labs   Lab Test 03/04/21  1015 03/04/21  0942 03/03/20  1928 02/29/20  1104   CULT Cultured on the 1st day of incubation:  Gram positive cocci  *  Critical Value/Significant Value, preliminary result only, called to and read back by  Ashtyn Chicas RN at [facility] 3/5/21 hg    (Note)  POSITIVE for STAPHYLOCOCCUS AUREUS and NEGATIVE for the mecA gene  (not MRSA) by Verigene multiplex nucleic acid test. The mecA gene was  not detected. Final identification and antimicrobial susceptibility  testing will be verified by standard methods.    Specimen tested with Verigene multiplex, gram-positive blood culture  nucleic acid test for the following  targets: Staph aureus, Staph  epidermidis, Staph lugdunensis, other Staph species, Enterococcus  faecalis, Enterococcus faecium, Streptococcus species, S. agalactiae,  S. anginosus grp., S. pneumoniae, S. pyogenes, Listeria sp., mecA  (methicillin resistance) and Lopez/B (vancomycin resistance).    Critical Value/Significant Value called to and read back by Sacha Del Rosario RN on 3.5.21 at 0830. bw   Cultured on the 1st day of incubation:  Gram positive cocci in clusters  *  Critical Value/Significant Value, preliminary result only, called to and read back by  Sacha Del Rosario RN on 3.5.21 at 0830. bw   No anaerobes isolated  Light growth  Coagulase negative Staphylococcus  *  Light growth  Alcaligenes faecalis  *  Light growth  Enterococcus faecalis  * No growth

## 2021-03-05 NOTE — PROVIDER NOTIFICATION
MD Notification    Notified Person: MD    Notified Person Name: Dr. Tidwell     Notification Date/Time: 3/5/2021 0549    Notification Interaction: paged/spoke with on call provider     Purpose of Notification: Positive blood culture received: drawn 3/4 1015, L arm, gram + cocci. On vanco and zosyn now.    Orders Received: no new orders     Comments:

## 2021-03-05 NOTE — PROGRESS NOTES
VASCULAR SURGERY BRIEF FOLLOW-UP NOTE:    US REYMUNDO DOPPLER NO EXERCISE, 1-2 LEVELS, BILATERAL   3/5/2021 10:34 AM      FINDINGS:  Right REYMUNDO:   PT: 1.17  DP: 1.07     Left REYMUNDO:   PT: 1.05  DP: 0.99      Right Digital brachial index: 0.69.  Left Digital Brachial index: 0.44     Waveforms: Biphasic and triphasic bilaterally. Normal right second  digital waveform. Normal left first digital waveform. Absent left  second and third digital waveforms.                                                           IMPRESSION:   1. Normal ABIs bilaterally without evidence of arterial insufficiency.  2. Nearly absent digital waveforms in the left second and third  digits.    ASSESSMENT:  Huseyin Pettit is a 61 year old male who has a PMH significant for CAD s/p PCI, HTN, hyperlipidemia, DM II with neuropathy, tobacco dependence and medication non-compliance, and osteomyelitis of the right toe S/P amputation. Pt presented to [facility] with left foot wounds, chest pain, and petechial rash over lower extremities R>L. X-rays of the foot were concerning for osteomyelitis and/or septic joint of the toe w/ multiple fractures of the metatarsals and phalanges. left femoral pulse palpable, peripheral pulses NOT palpable. Left DP signal borderline monophasic by doppler, left PT and peroneal signals biphasic by doppler. Blood cultures from 3/5 with gram-positive cocci growing    PLAN:  -Given above ABIs, exam, and history suspect that foot wounds are likely related to poorly controlled diabetes and any peripheral vascular disease is minimal and likely distal small vessel disease that is not amenable to repair. However, await pending LLE arterial ultrasound to rule out more peripheral focal stenosis.   -Continue cares per hospitalist, podiatry  -Continue optimizing medical management including diabetic control and smoking cessation        Melia Tucker PA-C   Division of Vascular Surgery   Pager: [phone]    I agree with the above.  Status  post left TMA today with 300 mL intraoperative blood loss.  I suspect that he has more than adequate perfusion to heal this forefoot amputation.    Carlos Cota MD

## 2021-03-06 ENCOUNTER — APPOINTMENT (OUTPATIENT)
Dept: GENERAL RADIOLOGY | Facility: CLINIC | Age: 61
End: 2021-03-06
Attending: PODIATRIST
Payer: COMMERCIAL

## 2021-03-06 ENCOUNTER — APPOINTMENT (OUTPATIENT)
Dept: PHYSICAL THERAPY | Facility: CLINIC | Age: 61
End: 2021-03-06
Attending: NURSE PRACTITIONER
Payer: COMMERCIAL

## 2021-03-06 LAB
ANION GAP SERPL CALCULATED.3IONS-SCNC: 3 MMOL/L (ref 3–14)
ANION GAP SERPL CALCULATED.3IONS-SCNC: 3 MMOL/L (ref 3–14)
BASE EXCESS BLDV CALC-SCNC: 1.2 MMOL/L
BLD PROD TYP BPU: NORMAL
BLD PROD TYP BPU: NORMAL
BLD UNIT ID BPU: 0
BLD UNIT ID BPU: 0
BLOOD PRODUCT CODE: NORMAL
BLOOD PRODUCT CODE: NORMAL
BPU ID: NORMAL
BPU ID: NORMAL
BUN SERPL-MCNC: 16 MG/DL (ref 7–30)
BUN SERPL-MCNC: 21 MG/DL (ref 7–30)
CA-I BLD-MCNC: 4.4 MG/DL (ref 4.4–5.2)
CALCIUM SERPL-MCNC: 7.4 MG/DL (ref 8.5–10.1)
CALCIUM SERPL-MCNC: 8.4 MG/DL (ref 8.5–10.1)
CHLORIDE SERPL-SCNC: 103 MMOL/L (ref 94–109)
CHLORIDE SERPL-SCNC: 108 MMOL/L (ref 94–109)
CO2 SERPL-SCNC: 26 MMOL/L (ref 20–32)
CO2 SERPL-SCNC: 28 MMOL/L (ref 20–32)
CREAT SERPL-MCNC: 0.7 MG/DL (ref 0.66–1.25)
CREAT SERPL-MCNC: 0.81 MG/DL (ref 0.66–1.25)
ERYTHROCYTE [DISTWIDTH] IN BLOOD BY AUTOMATED COUNT: 14.5 % (ref 10–15)
ERYTHROCYTE [DISTWIDTH] IN BLOOD BY AUTOMATED COUNT: 14.7 % (ref 10–15)
GFR SERPL CREATININE-BSD FRML MDRD: >90 ML/MIN/{1.73_M2}
GFR SERPL CREATININE-BSD FRML MDRD: >90 ML/MIN/{1.73_M2}
GLUCOSE BLDC GLUCOMTR-MCNC: 144 MG/DL (ref 70–99)
GLUCOSE BLDC GLUCOMTR-MCNC: 184 MG/DL (ref 70–99)
GLUCOSE BLDC GLUCOMTR-MCNC: 216 MG/DL (ref 70–99)
GLUCOSE BLDC GLUCOMTR-MCNC: 236 MG/DL (ref 70–99)
GLUCOSE BLDC GLUCOMTR-MCNC: 255 MG/DL (ref 70–99)
GLUCOSE BLDC GLUCOMTR-MCNC: 401 MG/DL (ref 70–99)
GLUCOSE SERPL-MCNC: 219 MG/DL (ref 70–99)
GLUCOSE SERPL-MCNC: 386 MG/DL (ref 70–99)
HCO3 BLDV-SCNC: 26 MMOL/L (ref 21–28)
HCT VFR BLD AUTO: 19 % (ref 40–53)
HCT VFR BLD AUTO: 30.7 % (ref 40–53)
HGB BLD-MCNC: 10.1 G/DL (ref 13.3–17.7)
HGB BLD-MCNC: 6.1 G/DL (ref 13.3–17.7)
HGB BLD-MCNC: 7.7 G/DL (ref 13.3–17.7)
HGB BLD-MCNC: 7.8 G/DL (ref 13.3–17.7)
INTERPRETATION ECG - MUSE: NORMAL
LACTATE BLD-SCNC: 1.3 MMOL/L (ref 0.7–2)
MCH RBC QN AUTO: 27.6 PG (ref 26.5–33)
MCH RBC QN AUTO: 28.3 PG (ref 26.5–33)
MCHC RBC AUTO-ENTMCNC: 32.1 G/DL (ref 31.5–36.5)
MCHC RBC AUTO-ENTMCNC: 32.9 G/DL (ref 31.5–36.5)
MCV RBC AUTO: 86 FL (ref 78–100)
MCV RBC AUTO: 86 FL (ref 78–100)
OXYHGB MFR BLDV: 71 %
PCO2 BLDV: 43 MM HG (ref 40–50)
PH BLDV: 7.39 PH (ref 7.32–7.43)
PLATELET # BLD AUTO: 308 10E9/L (ref 150–450)
PLATELET # BLD AUTO: 360 10E9/L (ref 150–450)
PO2 BLDV: 39 MM HG (ref 25–47)
POTASSIUM SERPL-SCNC: 4.1 MMOL/L (ref 3.4–5.3)
POTASSIUM SERPL-SCNC: 4.3 MMOL/L (ref 3.4–5.3)
RBC # BLD AUTO: 2.21 10E12/L (ref 4.4–5.9)
RBC # BLD AUTO: 3.57 10E12/L (ref 4.4–5.9)
SODIUM SERPL-SCNC: 132 MMOL/L (ref 133–144)
SODIUM SERPL-SCNC: 139 MMOL/L (ref 133–144)
TRANSFUSION STATUS PATIENT QL: NORMAL
WBC # BLD AUTO: 10.5 10E9/L (ref 4–11)
WBC # BLD AUTO: 13.9 10E9/L (ref 4–11)

## 2021-03-06 PROCEDURE — 85027 COMPLETE CBC AUTOMATED: CPT | Performed by: NURSE PRACTITIONER

## 2021-03-06 PROCEDURE — 85027 COMPLETE CBC AUTOMATED: CPT | Performed by: PODIATRIST

## 2021-03-06 PROCEDURE — 85018 HEMOGLOBIN: CPT | Performed by: HOSPITALIST

## 2021-03-06 PROCEDURE — 36415 COLL VENOUS BLD VENIPUNCTURE: CPT | Performed by: NURSE PRACTITIONER

## 2021-03-06 PROCEDURE — 82330 ASSAY OF CALCIUM: CPT | Performed by: NURSE PRACTITIONER

## 2021-03-06 PROCEDURE — 999N000065 XR FOOT LT 2 VW: Mod: LT

## 2021-03-06 PROCEDURE — 258N000003 HC RX IP 258 OP 636: Performed by: INTERNAL MEDICINE

## 2021-03-06 PROCEDURE — 250N000011 HC RX IP 250 OP 636: Performed by: PODIATRIST

## 2021-03-06 PROCEDURE — 120N000001 HC R&B MED SURG/OB

## 2021-03-06 PROCEDURE — 250N000012 HC RX MED GY IP 250 OP 636 PS 637: Performed by: PODIATRIST

## 2021-03-06 PROCEDURE — 86923 COMPATIBILITY TEST ELECTRIC: CPT | Performed by: NURSE PRACTITIONER

## 2021-03-06 PROCEDURE — 36415 COLL VENOUS BLD VENIPUNCTURE: CPT | Performed by: HOSPITALIST

## 2021-03-06 PROCEDURE — 36415 COLL VENOUS BLD VENIPUNCTURE: CPT | Performed by: PODIATRIST

## 2021-03-06 PROCEDURE — 83605 ASSAY OF LACTIC ACID: CPT | Performed by: NURSE PRACTITIONER

## 2021-03-06 PROCEDURE — 87040 BLOOD CULTURE FOR BACTERIA: CPT | Performed by: PODIATRIST

## 2021-03-06 PROCEDURE — 99291 CRITICAL CARE FIRST HOUR: CPT | Performed by: NURSE PRACTITIONER

## 2021-03-06 PROCEDURE — 250N000013 HC RX MED GY IP 250 OP 250 PS 637: Performed by: PODIATRIST

## 2021-03-06 PROCEDURE — 86850 RBC ANTIBODY SCREEN: CPT | Performed by: NURSE PRACTITIONER

## 2021-03-06 PROCEDURE — 999N001017 HC STATISTIC GLUCOSE BY METER IP

## 2021-03-06 PROCEDURE — 80048 BASIC METABOLIC PNL TOTAL CA: CPT | Performed by: PODIATRIST

## 2021-03-06 PROCEDURE — 80048 BASIC METABOLIC PNL TOTAL CA: CPT | Performed by: NURSE PRACTITIONER

## 2021-03-06 PROCEDURE — 82805 BLOOD GASES W/O2 SATURATION: CPT | Performed by: NURSE PRACTITIONER

## 2021-03-06 PROCEDURE — P9016 RBC LEUKOCYTES REDUCED: HCPCS | Performed by: NURSE PRACTITIONER

## 2021-03-06 PROCEDURE — 86901 BLOOD TYPING SEROLOGIC RH(D): CPT | Performed by: NURSE PRACTITIONER

## 2021-03-06 PROCEDURE — 97161 PT EVAL LOW COMPLEX 20 MIN: CPT | Mod: GP

## 2021-03-06 PROCEDURE — 99233 SBSQ HOSP IP/OBS HIGH 50: CPT | Performed by: HOSPITALIST

## 2021-03-06 PROCEDURE — 258N000003 HC RX IP 258 OP 636: Performed by: HOSPITALIST

## 2021-03-06 PROCEDURE — 97116 GAIT TRAINING THERAPY: CPT | Mod: GP

## 2021-03-06 PROCEDURE — 250N000011 HC RX IP 250 OP 636: Performed by: NURSE PRACTITIONER

## 2021-03-06 PROCEDURE — 86900 BLOOD TYPING SEROLOGIC ABO: CPT | Performed by: NURSE PRACTITIONER

## 2021-03-06 PROCEDURE — 258N000003 HC RX IP 258 OP 636: Performed by: NURSE PRACTITIONER

## 2021-03-06 RX ORDER — SODIUM CHLORIDE, SODIUM LACTATE, POTASSIUM CHLORIDE, CALCIUM CHLORIDE 600; 310; 30; 20 MG/100ML; MG/100ML; MG/100ML; MG/100ML
INJECTION, SOLUTION INTRAVENOUS CONTINUOUS
Status: DISCONTINUED | OUTPATIENT
Start: 2021-03-06 | End: 2021-03-09

## 2021-03-06 RX ORDER — CALCIUM CARBONATE 500 MG/1
1000 TABLET, CHEWABLE ORAL EVERY 4 HOURS PRN
Status: DISCONTINUED | OUTPATIENT
Start: 2021-03-06 | End: 2021-03-18 | Stop reason: HOSPADM

## 2021-03-06 RX ADMIN — CLOMIPRAMINE HCL 100 MG: 50 CAPSULE ORAL at 08:18

## 2021-03-06 RX ADMIN — ISOSORBIDE DINITRATE 40 MG: 20 TABLET ORAL at 20:29

## 2021-03-06 RX ADMIN — PREGABALIN 150 MG: 75 CAPSULE ORAL at 20:29

## 2021-03-06 RX ADMIN — DIVALPROEX SODIUM 1000 MG: 500 TABLET, DELAYED RELEASE ORAL at 08:18

## 2021-03-06 RX ADMIN — ISOSORBIDE DINITRATE 40 MG: 20 TABLET ORAL at 08:18

## 2021-03-06 RX ADMIN — BUSPIRONE HYDROCHLORIDE 10 MG: 10 TABLET ORAL at 08:18

## 2021-03-06 RX ADMIN — SODIUM CHLORIDE, POTASSIUM CHLORIDE, SODIUM LACTATE AND CALCIUM CHLORIDE: 600; 310; 30; 20 INJECTION, SOLUTION INTRAVENOUS at 02:25

## 2021-03-06 RX ADMIN — CLOPIDOGREL BISULFATE 75 MG: 75 TABLET, FILM COATED ORAL at 08:19

## 2021-03-06 RX ADMIN — ASPIRIN 81 MG CHEWABLE TABLET 81 MG: 81 TABLET CHEWABLE at 08:18

## 2021-03-06 RX ADMIN — BUSPIRONE HYDROCHLORIDE 10 MG: 10 TABLET ORAL at 21:22

## 2021-03-06 RX ADMIN — HYDROMORPHONE HYDROCHLORIDE 0.5 MG: 1 INJECTION, SOLUTION INTRAMUSCULAR; INTRAVENOUS; SUBCUTANEOUS at 09:16

## 2021-03-06 RX ADMIN — OXYCODONE HYDROCHLORIDE 10 MG: 5 TABLET ORAL at 15:08

## 2021-03-06 RX ADMIN — SODIUM CHLORIDE, POTASSIUM CHLORIDE, SODIUM LACTATE AND CALCIUM CHLORIDE 500 ML: 600; 310; 30; 20 INJECTION, SOLUTION INTRAVENOUS at 12:43

## 2021-03-06 RX ADMIN — PREGABALIN 150 MG: 75 CAPSULE ORAL at 08:18

## 2021-03-06 RX ADMIN — HYDROMORPHONE HYDROCHLORIDE 0.5 MG: 1 INJECTION, SOLUTION INTRAMUSCULAR; INTRAVENOUS; SUBCUTANEOUS at 18:59

## 2021-03-06 RX ADMIN — HYDROMORPHONE HYDROCHLORIDE 0.5 MG: 1 INJECTION, SOLUTION INTRAMUSCULAR; INTRAVENOUS; SUBCUTANEOUS at 15:57

## 2021-03-06 RX ADMIN — SODIUM CHLORIDE, POTASSIUM CHLORIDE, SODIUM LACTATE AND CALCIUM CHLORIDE 500 ML: 600; 310; 30; 20 INJECTION, SOLUTION INTRAVENOUS at 13:55

## 2021-03-06 RX ADMIN — INSULIN ASPART 2 UNITS: 100 INJECTION, SOLUTION INTRAVENOUS; SUBCUTANEOUS at 12:46

## 2021-03-06 RX ADMIN — HYDROMORPHONE HYDROCHLORIDE 0.5 MG: 1 INJECTION, SOLUTION INTRAMUSCULAR; INTRAVENOUS; SUBCUTANEOUS at 21:27

## 2021-03-06 RX ADMIN — SODIUM CHLORIDE, POTASSIUM CHLORIDE, SODIUM LACTATE AND CALCIUM CHLORIDE 1000 ML: 600; 310; 30; 20 INJECTION, SOLUTION INTRAVENOUS at 15:09

## 2021-03-06 RX ADMIN — PIPERACILLIN AND TAZOBACTAM 3.38 G: 3; .375 INJECTION, POWDER, FOR SOLUTION INTRAVENOUS at 13:55

## 2021-03-06 RX ADMIN — INSULIN GLARGINE 30 UNITS: 100 INJECTION, SOLUTION SUBCUTANEOUS at 00:10

## 2021-03-06 RX ADMIN — BUSPIRONE HYDROCHLORIDE 10 MG: 10 TABLET ORAL at 12:51

## 2021-03-06 RX ADMIN — HYDROMORPHONE HYDROCHLORIDE 0.5 MG: 1 INJECTION, SOLUTION INTRAMUSCULAR; INTRAVENOUS; SUBCUTANEOUS at 11:33

## 2021-03-06 RX ADMIN — ACETAMINOPHEN 975 MG: 325 TABLET, FILM COATED ORAL at 15:57

## 2021-03-06 RX ADMIN — INSULIN GLARGINE 30 UNITS: 100 INJECTION, SOLUTION SUBCUTANEOUS at 21:22

## 2021-03-06 RX ADMIN — SODIUM CHLORIDE, POTASSIUM CHLORIDE, SODIUM LACTATE AND CALCIUM CHLORIDE 1000 ML: 600; 310; 30; 20 INJECTION, SOLUTION INTRAVENOUS at 00:39

## 2021-03-06 RX ADMIN — BUSPIRONE HYDROCHLORIDE 10 MG: 10 TABLET ORAL at 18:56

## 2021-03-06 RX ADMIN — QUETIAPINE FUMARATE 600 MG: 300 TABLET ORAL at 21:22

## 2021-03-06 RX ADMIN — SODIUM CHLORIDE 1000 ML: 9 INJECTION, SOLUTION INTRAVENOUS at 23:20

## 2021-03-06 RX ADMIN — POLYETHYLENE GLYCOL 3350 17 G: 17 POWDER, FOR SOLUTION ORAL at 08:16

## 2021-03-06 RX ADMIN — CALCIUM GLUCONATE 2 G: 98 INJECTION, SOLUTION INTRAVENOUS at 01:14

## 2021-03-06 RX ADMIN — INSULIN ASPART 2 UNITS: 100 INJECTION, SOLUTION INTRAVENOUS; SUBCUTANEOUS at 08:30

## 2021-03-06 RX ADMIN — OXYCODONE HYDROCHLORIDE 5 MG: 5 TABLET ORAL at 08:06

## 2021-03-06 RX ADMIN — BISOPROLOL FUMARATE 5 MG: 5 TABLET ORAL at 08:18

## 2021-03-06 RX ADMIN — PIPERACILLIN AND TAZOBACTAM 3.38 G: 3; .375 INJECTION, POWDER, FOR SOLUTION INTRAVENOUS at 20:28

## 2021-03-06 RX ADMIN — FLUOXETINE 40 MG: 20 CAPSULE ORAL at 08:18

## 2021-03-06 RX ADMIN — SODIUM CHLORIDE, POTASSIUM CHLORIDE, SODIUM LACTATE AND CALCIUM CHLORIDE 1000 ML: 600; 310; 30; 20 INJECTION, SOLUTION INTRAVENOUS at 01:14

## 2021-03-06 RX ADMIN — SODIUM CHLORIDE, POTASSIUM CHLORIDE, SODIUM LACTATE AND CALCIUM CHLORIDE: 600; 310; 30; 20 INJECTION, SOLUTION INTRAVENOUS at 16:27

## 2021-03-06 RX ADMIN — ACETAMINOPHEN 975 MG: 325 TABLET, FILM COATED ORAL at 08:08

## 2021-03-06 RX ADMIN — CLOMIPRAMINE HCL 100 MG: 50 CAPSULE ORAL at 20:29

## 2021-03-06 RX ADMIN — INSULIN ASPART 1 UNITS: 100 INJECTION, SOLUTION INTRAVENOUS; SUBCUTANEOUS at 18:53

## 2021-03-06 RX ADMIN — PIPERACILLIN AND TAZOBACTAM 3.38 G: 3; .375 INJECTION, POWDER, FOR SOLUTION INTRAVENOUS at 02:18

## 2021-03-06 RX ADMIN — ROSUVASTATIN CALCIUM 20 MG: 20 TABLET, FILM COATED ORAL at 08:18

## 2021-03-06 RX ADMIN — PIPERACILLIN AND TAZOBACTAM 3.38 G: 3; .375 INJECTION, POWDER, FOR SOLUTION INTRAVENOUS at 08:31

## 2021-03-06 RX ADMIN — DIVALPROEX SODIUM 1000 MG: 500 TABLET, DELAYED RELEASE ORAL at 21:22

## 2021-03-06 ASSESSMENT — ACTIVITIES OF DAILY LIVING (ADL)
ADLS_ACUITY_SCORE: 13
ADLS_ACUITY_SCORE: 14
ADLS_ACUITY_SCORE: 14
ADLS_ACUITY_SCORE: 13
ADLS_ACUITY_SCORE: 15
ADLS_ACUITY_SCORE: 13

## 2021-03-06 NOTE — PROGRESS NOTES
03/06/21 1130   Quick Adds   Type of Visit Initial PT Evaluation   Living Environment   People in home sibling(s)  (brother)   Current Living Arrangements house   Home Accessibility stairs within home   Number of Stairs, Within Home, Primary 5   Stair Railings, Within Home, Primary railings on both sides of stairs   Living Environment Comments per pt brother able bodied to help if needed   Self-Care   Usual Activity Tolerance moderate   Current Activity Tolerance fair   Equipment Currently Used at Home none   Disability/Function   Hearing Difficulty or Deaf no   Wear Glasses or Blind no   Concentrating, Remembering or Making Decisions Difficulty no   Difficulty Communicating no   Difficulty Eating/Swallowing no   Walking or Climbing Stairs Difficulty no   Dressing/Bathing Difficulty no   Toileting issues no   Doing Errands Independently Difficulty (such as shopping) yes   Errands Management cab or brother drives to the store, but he does shopping   Fall history within last six months no   Change in Functional Status Since Onset of Current Illness/Injury yes   General Information   Onset of Illness/Injury or Date of Surgery 03/04/21   Referring Physician Kevin Aponte MD   Patient/Family Therapy Goals Statement (PT) thinks home   Pertinent History of Current Problem (include personal factors and/or comorbidities that impact the POC) Pt admitted with L foot wound and septic arthritis.  He has PMH including DM and neuropathy as well as prior great toe amputation.  He had L foot TMA on 3/3/21 secondary to admitting diagnosis.   Existing Precautions/Restrictions fall;weight bearing   Weight-Bearing Status - LLE nonweight-bearing   General Observations RN in room, took BP prior to giving pain meds. BP low 88/48, pt reports it is fairly normal for him.   Cognition   Orientation Status (Cognition) oriented x 3   Affect/Mental Status (Cognition) WNL   Follows Commands (Cognition) 75-90% accuracy   Safety  Deficit (Cognition) minimal deficit;safety precautions follow-through/compliance;at risk behavior observed   Pain Assessment   Patient Currently in Pain Yes, see Vital Sign flowsheet  (6/10)   Posture    Posture Forward head position   Range of Motion (ROM)   ROM Comment WNL BLE (except L foot which is acewrapped and NT)   Strength   Strength Comments grossly 4/5 RLE, grossly at least 3/5 LLE   Bed Mobility   Comment (Bed Mobility) SBA for supine<>sit   Transfers   Transfer Safety Comments CGA with impaired safety for sit<>stands   Gait/Stairs (Locomotion)   [place] Level (Gait) dependent (less than 25% patient effort)   Distance in Feet (Required for LE Total Joints) 0   Comment (Gait/Stairs) difficulty to laterally shuffle heel/toe with RLE and maintain NWB status on L.  He is unable to effectively hop to ambulate at this time   Balance   Balance Comments requires AD for support in standing   Sensory Examination   Sensory Perception patient reports no sensory changes   Sensory Perception Comments h/o neuropathy   Clinical Impression   Criteria for Skilled Therapeutic Intervention yes, treatment indicated   PT Diagnosis (PT) impaired gait   Influenced by the following impairments weakness, pain, NWB LLE, impaired balance   Functional limitations due to impairments decreased IND and safety with all functional mobiltiy   Clinical Presentation Stable/Uncomplicated   Clinical Decision Making (Complexity) low complexity   Therapy Frequency (PT) 5x/week   Predicted Duration of Therapy Intervention (days/wks) 5-7days   Planned Therapy Interventions (PT) gait training;strengthening;transfer training;balance training   Anticipated Equipment Needs at Discharge (PT) walker, rolling   Risk & Benefits of therapy have been explained evaluation/treatment results reviewed;participants voiced agreement with care plan   PT Discharge Planning    PT Discharge Recommendation (DC Rec) Transitional Care Facility   PT Rationale for  DC Rec Pt is far below baseline and limited by NWB status of LLE.  He is unable to ambulate at this time.  Will require ongoing therapy to improve strength/mobility to return home and to baseline   PT Brief overview of current status  SBA for supine<>sit, CGA for sit<>stand, dependent for ambulation.   Total Evaluation Time   Total Evaluation Time (Minutes) 15

## 2021-03-06 NOTE — PROVIDER NOTIFICATION
BP 82/46 HR 89.  Prior 88/48, dilaudid given for pain.  Patient denies dizziness, states feels fine.  States he runs low but cannot say exactly.  Text page to MD.  Addendum:  Bolus ordered.

## 2021-03-06 NOTE — PLAN OF CARE
DATE & TIME: 3/5/2021; [id number]  Cognitive Concerns/ Orientation: A&Ox4; Pleasant  BEHAVIOR & AGGRESSION TOOL COLOR: Green  ABNL VS/O2: VSS at RA ex slight hypotension 92/58.  MOBILITY: non-weight bearing on L foot, not OOB since procedure  PAIN MANAGMENT: Midsternal chest pain 8/10 (unchanged from previous assessments) PRN tylenol given, resps 10-12 and hypotensive. Held isosorbide dinitrate and IV dilaudid- see MD note.  DIET: ADAT to mod carb, tolerating mod carb diet.   BOWEL/BLADDER: Continent. Has not voided or had a BM since procedure.  ABNL LAB/BG:  in surgery, did not get BG before eating and drinking- recheck at 2am. (+) BC for staph gram (+) cocci- see ID note. WBC: 16.6  DRAIN/DEVICES: R PIV infusing D5 1/2 NS at 100mL/h; intermittent ABX  TELEMETRY RHYTHM: NSR  SKIN: Scattered bruising on upper extremity. Rash on bilateral legs/shins/ankles with +1 Edema. R great toe previously amputated. L transmetatarsal amputation today. Surgical dressing in place with moderate drainage, dressing reinforced.  TESTS/PROCEDURES: Transmetatarsal amputation completed today   D/C DAY/GOALS/PLACE: Expected discharge: 4 - 7 days, dependent on surgical procedures, pain management/ tolerating PO medications, antibiotic plan established and safe disposition plan/ TCU bed available  OTHER IMPORTANT INFO: CMS checks LLE q4 hours (2+ posterior tibial pulse, TAMY dorsalis pedis pulse due to surgical dressing). Vascular surgery, PT, and Podiatry following.

## 2021-03-06 NOTE — PLAN OF CARE
DATE & TIME: 03/06/2021    Cognitive Concerns/ Orientation : A&Ox4   BEHAVIOR & AGGRESSION TOOL COLOR: green  CIWA SCORE: n/a  ABNL VS/O2: BP's low 80's/40's; 3rd bolus now infusing; denies sx.  MOBILITY: NWB L foot; up with pivot transfer SBA to commode or use urinal.  PAIN MANAGMENT: IV dilaudid x 2; oxycodone x 1 with temporary relief; he prefers IV medications, encouraged to avoid if possible due to low blood pressures; also on scheduled tylenol.    DIET: mod cho; but taking snacks brought in from home - carb count and snack coverage added for insulin.  BOWEL/BLADDER: constipation, on miralax, had small BM this morning; continent of urine with urinal.  ABNL LAB/BG: Hgb 7.8 this afternoon, completed blood transfusion this morning.  Positive culture of foot, on IV abx.  DRAIN/DEVICES: PIV bilateral antecubital  TELEMETRY RHYTHM: NS/ST  SKIN: incision L foot; dressing changed by Podiatry with bloody drainage; dry since.  TESTS/PROCEDURES: xray L foot this morning  D/C DAY/GOALS/PLACE: pending pathology of excised foot and 2nd phase of surgery.  OTHER IMPORTANT INFO: followed by ID, OT.

## 2021-03-06 NOTE — PROGRESS NOTES
[facility]  Infectious Disease Progress Note          Assessment and Plan:   Assessment & Plan     Huseyin Pettit is a 61 year old male who was admitted on 3/4/2021.      Impression:  1. 61 y.o male with diabetes.   2. Neuropathy.   3. CAD  4. Admitted with left foot wound with spetic arthritis.   5. Previous admission last year for osteo s/p toe amputation that admission.   6. Blood cultures positive for MSSA.      Recommendations:   Continue zosyn alone , OK for the  MSSA + potential GNR/ anaerobe involved in the foot.   Stop vancomycin.   Daily blood cultures till clears   No long term IV lines yet   Episode of low BP confusion may be sepsis but not clear  No clear secondary sites, no artificail material  Op findings noted cx pending further op needed        Interval History:   no new complaints and doing well; no cp, sob, n/v/d, or abd pain. Seems OK, T down Follow-up BC neg so far foot cx pending , note RRT for low BP              Medications:       acetaminophen  975 mg Oral Q8H     aspirin  81 mg Oral Daily     bisoprolol  5 mg Oral Daily     busPIRone  10 mg Oral 4x Daily     clomiPRAMINE  100 mg Oral BID     clopidogrel  75 mg Oral Daily     divalproex sodium delayed-release  1,000 mg Oral BID     enoxaparin ANTICOAGULANT  40 mg Subcutaneous Q24H     FLUoxetine  40 mg Oral Daily     insulin aspart  1-7 Units Subcutaneous TID AC     insulin aspart  1-5 Units Subcutaneous At Bedtime     insulin glargine  30 Units Subcutaneous At Bedtime     isosorbide Dinitrate  40 mg Oral BID     piperacillin-tazobactam  3.375 g Intravenous Q6H     polyethylene glycol  17 g Oral BID     pregabalin  150 mg Oral BID     QUEtiapine  600 mg Oral At Bedtime     rosuvastatin  20 mg Oral Daily                  Physical Exam:   Blood pressure 101/60, pulse 108, temperature 98.2  F (36.8  C), temperature source Oral, resp. rate 18, weight 66.8 kg (147 lb 4.3 oz), SpO2 96 %.  Wt Readings from Last 2 Encounters:    03/05/21 66.8 kg (147 lb 4.3 oz)   03/04/21 61.2 kg (135 lb)     Vital Signs with Ranges  Temp:  [97.2  F (36.2  C)-98.2  F (36.8  C)] 98.2  F (36.8  C)  Pulse:  [] 108  Resp:  [10-20] 18  BP: ()/(42-73) 101/60  SpO2:  [94 %-100 %] 96 %    Constitutional: Awake, alert, cooperative, no apparent distress   Lungs: Clear to auscultation bilaterally, no crackles or wheezing   Cardiovascular: Regular rate and rhythm, normal S1 and S2, and no murmur noted   Abdomen: Normal bowel sounds, soft, non-distended, non-tender   Skin: No rashes, no cyanosis, no edema   Other: Foot wrapped          Data:   All microbiology laboratory data reviewed.  Recent Labs   Lab Test 03/06/21  0857 03/06/21  0047 03/05/21  0947   WBC 13.9* 10.5 16.6*   HGB 10.1* 6.1* 10.3*   HCT 30.7* 19.0* 31.7*   MCV 86 86 86    308 512*     Recent Labs   Lab Test 03/06/21  0857 03/06/21  0047 03/05/21  0947   CR 0.70 0.81 0.73     Recent Labs   Lab Test 03/04/21  0942   SED 91*     Recent Labs   Lab Test 03/05/21  1847 03/05/21  1052 03/05/21  0947 03/04/21  1015 03/04/21  0942 03/03/20  1928 02/29/20  1104   CULT PENDING  PENDING No growth after 17 hours No growth after 17 hours Cultured on the 1st day of incubation:  Staphylococcus aureus  *  Critical Value/Significant Value, preliminary result only, called to and read back by  Ashtyn Chicas RN at 0537 3/5/21     Susceptibility testing in progress  (Note)  POSITIVE for STAPHYLOCOCCUS AUREUS and NEGATIVE for the mecA gene  (not MRSA) by [employer] multiplex nucleic acid test. The mecA gene was  not detected. Final identification and antimicrobial susceptibility  testing will be verified by standard methods.    Specimen tested with [employer] multiplex, gram-positive blood culture  nucleic acid test for the following targets: Staph aureus, Staph  epidermidis, Staph lugdunensis, other Staph species, Enterococcus  faecalis, Enterococcus faecium, Streptococcus species, S. agalactiae,  S.  anginosus grp., S. pneumoniae, S. pyogenes, Listeria sp., mecA  (methicillin resistance) and Lopez/B (vancomycin resistance).    Critical Value/Significant Value called to and read back by Sacha Del Rosario RN on 3.5.21 at 0830. bw   Cultured on the 1st day of incubation:  Gram positive cocci in clusters  *  Critical Value/Significant Value, preliminary result only, called to and read back by  Sacha Del Rosario RN on 3.5.21 at 0830. bw   No anaerobes isolated  Light growth  Coagulase negative Staphylococcus  *  Light growth  Alcaligenes faecalis  *  Light growth  Enterococcus faecalis  * No growth

## 2021-03-06 NOTE — PROGRESS NOTES
Foot & Ankle Surgery Progress Note  March 6, 2021    S:  Huseyin was seen at bedside today for continued monitoring of left lower extremity POD#1 sp open TMA for severe diabetic infection.  Loss of consciousness overnight, transfused 2U PRBC, doing better this AM.  Pain levels elevated.      /60   Pulse 108   Temp 98.2  F (36.8  C) (Oral)   Resp 18   Wt 66.8 kg (147 lb 4.3 oz)   SpO2 96%   BMI 21.75 kg/m      ROS - Pos for CC.  Denies nausea, vomiting, chills, fever, belly pain, calf pain, chest pain or shortness of breath. Complete remainder of ROS is otherwise neg.      PE - retention sutures intact, skin margins stable without obvious necrosis.  No acute bleeders, but some oozing noted from surgical site.  Skin shows no trophic, color or temperature changes otherwise.  No calf redness, swelling or pain noted otherwise.    Labs:    Component      Latest Ref Rng & Units 3/6/2021 3/6/2021          12:47 AM  8:57 AM   WBC      4.0 - 11.0 10e9/L 10.5 13.9 (H)   RBC Count      4.4 - 5.9 10e12/L 2.21 (L) 3.57 (L)   Hemoglobin      13.3 - 17.7 g/dL 6.1 (LL) 10.1 (L)   Hematocrit      40.0 - 53.0 % 19.0 (L) 30.7 (L)   MCV      78 - 100 fl 86 86   MCH      26.5 - 33.0 pg 27.6 28.3   MCHC      31.5 - 36.5 g/dL 32.1 32.9   RDW      10.0 - 15.0 % 14.7 14.5   Platelet Count      150 - 450 10e9/L 308 360     Imaging:  xrays of foot pending    Cultures:  Gram shows many G+ cocci    Pathology:  In process    Assessment:  61 year old male with severe diabetic left foot infection POD#1 open TMA with flap creation    Plan:  Dressing change at bedside  -discussed procedure and intra-op findings  -no plan to take patient back to OR until Path reports finalized; will need at least washout and closure at that point, but wound is stable for now  -continue monitoring Hgb  -PT today for NWB left lower extremity   -continue broad spectrum IV abx     Activity -  NWB left lower extremity    Pain Management -  Percocet, dilaudid     DVT Prophylaxis -  mechanical   Abx therapy -  Zosyn; ID following         Sergey Barnes DPM FACFAS FACFAOM  Podiatric Foot & Ankle Surgeon  [facility]  [phone]

## 2021-03-06 NOTE — OP NOTE
Procedure Date: 03/05/2021      SURGEON:  Sergey Barnes DPM      PREOPERATIVE DIAGNOSES:   1.  Diabetes mellitus with peripheral neuropathy and peripheral arterial disease.   2.  Left foot diabetic foot ulcers x 2 with underlying osteomyelitis and septic arthritis involving the first, second and third rays.      POSTOPERATIVE DIAGNOSES:   1.  Diabetes mellitus with peripheral neuropathy and peripheral arterial disease.   2.  Left foot diabetic foot ulcers x 2 with underlying osteomyelitis and septic arthritis involving the first, second and third rays.      PROCEDURES:   1.  Left transmetatarsal amputation.   2.  Elevation of an 11 x 4 cm dorsal soft tissue flap.      PATHOLOGY:   1.  Deep tissue was sent off for aerobic and anaerobic cultures and Gram stain.   2.  Proximal left first metatarsal clean margin biopsy.   3.  Left second metatarsal clean margin biopsy.   4.  Left third metatarsal clean margin biopsy.      ANESTHESIA:  MAC with local.      HEMOSTASIS:  None.      ESTIMATED BLOOD LOSS:  300 mL      MATERIALS:  None.      INJECTABLES:  20 mL of 0.25% bupivacaine plain preoperatively in ankle block fashion.      COMPLICATIONS:  None apparent.      INDICATIONS FOR PROCEDURE:  The patient is a pleasant 61-year-old neuropathic diabetic male with peripheral arterial disease with now well controlled diabetes who presented with worsening left foot infection, including a medial first MP joint wound and dorsal second and third metatarsophalangeal joint wounds that readily probed to bone with purulent drainage and malodor.  Admission x-ray showed a comminuted fracture of the first metatarsal with likely first metacarpophalangeal joint septic arthritis.  Second and third metatarsals had impaction fractures of the metatarsal necks.  Both wounds again readily probed to underlying bone with purulent drainage and malodor.  There is no read of gas in the tissue, but closer inspection showed likely early gas in the  adjacent surfaces of the left second and third toes.  The patient was brought to the operating room today for urgent surgical management.      DETAILS OF PROCEDURE:  After obtaining written consent, the patient was transferred to the operating room, placed in supine position on the operating room table.  IV sedation was initiated.  The foot was anesthetized with a preoperative local.  It was then prepped and draped in normal aseptic fashion.  No tourniquet was utilized.  The patient, procedure, and site were correctly identified by OR staff.      Attention was directed to the left foot where an incision was drawn out with apices medial and lateral.  A dorsal soft tissue flap 11 x 4 cm was drawn out to excise the ulcerations and gain access to the underlying metatarsals.  The incisions were then carried down to bone and the soft tissues reflected off of the metatarsal bases in full thickness.  A sagittal saw was used to perform the through and through osteotomies of the first through fifth metatarsals and the forefoot was handed off.  Deep tissue cultures were obtained from the forefoot wounds.  I then obtained the first, second and third proximal metatarsal clean margin biopsies.  Bleeding vessels were electrocauterized and hand tied as necessary.  The extensor and flexor tendons were distracted distally and cut proximally to minimize tension on the flap.  After adequate hemostasis was achieved, the wound was flushed with copious amounts of normal saline and 2-0 nylon retention sutures were placed to minimize contracture of the dorsal and plantar soft tissue flaps.      A dry sterile dressing was applied to the patient's left foot.  He appeared to tolerate the procedure and anesthesia well and was transferred to the PACU with vital signs stable and vascular status intact to remaining portions of the foot.  The patient will be readmitted to the floor for IV antibiotics and monitoring.  Vascular is following the  patient and they are currently working the patient up.  Based on Vascular's assessment, bedside exam and the pathology reports will determine timing of the next surgery.         SEVERINO PALACIO DPM             D: 2021   T: 2021   MT: VINICIUS      Name:     MORALES SHERMAN   MRN:      [id number]        Account:        [id number]   :      1960           Procedure Date: 2021      Document: [id number]

## 2021-03-06 NOTE — ANESTHESIA CARE TRANSFER NOTE
Patient: Huseyin Pettit    Procedure(s):  TRANSMETATARSAL AMPUTATION LEFT FOOT    Diagnosis: Other acute osteomyelitis of left foot (H) [M86.172]  Diagnosis Additional Information: No value filed.    Anesthesia Type:   MAC     Note:    Oropharynx: oropharynx clear of all foreign objects and spontaneously breathing  Level of Consciousness: drowsy  Oxygen Supplementation: face mask  Level of Supplemental Oxygen (L/min / FiO2): 4  Independent Airway: airway patency satisfactory and stable  Dentition: dentition unchanged  Vital Signs Stable: post-procedure vital signs reviewed and stable  Report to RN Given: handoff report given  Patient transferred to: PACU    Handoff Report: Identifed the Patient, Identified the Reponsible Provider, Reviewed the pertinent medical history, Discussed the surgical course, Reviewed Intra-OP anesthesia mangement and issues during anesthesia, Set expectations for post-procedure period and Allowed opportunity for questions and acknowledgement of understanding      Vitals: (Last set prior to Anesthesia Care Transfer)  CRNA VITALS  3/5/2021 1854 - 3/5/2021 1930      3/5/2021             Resp Rate (set):  10        Electronically Signed By: MC Juan CRNA  March 5, 2021  7:30 PM

## 2021-03-06 NOTE — ANESTHESIA POSTPROCEDURE EVALUATION
Patient: Huseyin Pettit    Procedure(s):  TRANSMETATARSAL AMPUTATION LEFT FOOT    Diagnosis:Other acute osteomyelitis of left foot (H) [M86.172]  Diagnosis Additional Information: No value filed.    Anesthesia Type:  MAC    Note:     Postop Pain Control: Uneventful            Sign Out: Well controlled pain   PONV: No   Neuro/Psych: Uneventful            Sign Out: Acceptable/Baseline neuro status   Airway/Respiratory: Uneventful            Sign Out: Acceptable/Baseline resp. status   CV/Hemodynamics: Uneventful            Sign Out: Acceptable CV status   Other NRE: NONE   DID A NON-ROUTINE EVENT OCCUR? No         Last vitals:  Vitals:    03/05/21 2045 03/05/21 2050 03/05/21 2054   BP:  100/62    Pulse:  101    Resp: 15 16 12   Temp:  36.3  C (97.4  F)    SpO2:   98%       Last vitals prior to Anesthesia Care Transfer:  CRNA VITALS  3/5/2021 1854 - 3/5/2021 1954      3/5/2021             Resp Rate (set):  10          Electronically Signed By: Taras Agee MD  March 5, 2021  9:08 PM

## 2021-03-06 NOTE — CODE/RAPID RESPONSE
[facility]  House LON RRT Note  3/6/2021   Time called: 0019  RRT called for: hypotension and lethargy   Code status: Full Code    Assessment & Plan   I was paged to the bedside to evaluate Mr. Huseyin Pettit for an acute episode of hypotension with blood pressures of 70's/40's, HRs in the low 100s. Patient is minimally responsive, pale and dusky. Patient is postop left transmetatarsal amputation with an EBL of 300 mL. Patient has not received narcotics, however did have a large PTA dose of seroquel at bedtime. Patient is also beta blocked and takes BID isordil (PM dose held last night).     Diagnosis:  -- hypovolemic shock with subsequent altered mental status   -- acute blood loss anemia    Interventions ordered/provided:  -- 2 L NS  -- labs: CBC, BMP, iCal, VBG, lactic acid  -- 2 g IV calcium gluconate   -- 2 unit(s) PRBC, given emergently as patient was hypotensive with sBPs of ~70 mmHg and with an altered mental status. I checked his consent for surgery which did not contain consent for blood products. Patient verbally agreed to blood transfusion but was unable to maintain a level of alertness that is consistent with informed consent.   -- discontinued D5, start LR infusion    At the conclusion of this RRT patient was hemodynamically stable and will remain on current unit    Interval History     Mr. Huseyin Pettit is a 61 year old male who was admitted on 3/4/2021 for osteomyelitis.    His history is significant for:  Past Medical History:   Diagnosis Date     CAD (coronary artery disease) 2/18/2015    S/p 3 stents in 2011 In [place]     Closed fracture of multiple ribs of left side with routine healing 7/10/2018     Coronary artery disease      Coronary artery disease involving native coronary artery of native heart without angina pectoris 7/3/2018    S/p 3 stents put in [place] in 2011.     Depressive disorder      Diabetes (H)      Hypertension      Severe episode of recurrent major  depressive disorder, without psychotic features (H) 7/3/2018     Type 2 diabetes mellitus with other circulatory complication, with long-term current use of insulin (H) 7/3/2018     Past Surgical History:   Procedure Laterality Date     AMPUTATE TOE(S) Right 3/3/2020    Procedure: AMPUTATION RIGHT GREAT TOE.;  Surgeon: Sergey Barnes DPM;  Location:  OR     CHOLECYSTECTOMY       COLONOSCOPY       CV CORONARY ANGIOGRAM N/A 3/2/2020    Procedure: Coronary Angiogram;  Surgeon: Thaddeus Sun MD;  Location:  HEART CARDIAC CATH LAB     GALLBLADDER SURGERY  2011       Review of Systems   The 10 point Review of Systems is negative other than noted in the HPI or here.     Allergies   No Known Allergies    Physical Exam   Physical Exam  Constitutional:       General: He is in acute distress.      Appearance: He is ill-appearing and toxic-appearing.   HENT:      Head: Normocephalic and atraumatic.      Nose: Nose normal.      Mouth/Throat:      Mouth: Mucous membranes are dry.   Eyes:      Pupils: Pupils are equal, round, and reactive to light.   Cardiovascular:      Rate and Rhythm: Regular rhythm. Tachycardia present.   Pulmonary:      Effort: Pulmonary effort is normal. No respiratory distress.   Abdominal:      General: Abdomen is flat. There is no distension.   Musculoskeletal:      Right lower leg: Edema present.      Left lower leg: No edema.   Skin:     General: Skin is cool and dry.      Capillary Refill: Capillary refill takes more than 3 seconds.      Coloration: Skin is pale.   Neurological:      Mental Status: He is disoriented.   Psychiatric:         Attention and Perception: He is inattentive.       Vital Signs with Ranges:  Temp:  [97.2  F (36.2  C)-97.7  F (36.5  C)] 97.4  F (36.3  C)  Pulse:  [] 101  Resp:  [10-20] 17  BP: ()/(44-77) 72/44  SpO2:  [96 %-100 %] 98 %  I/O last 3 completed shifts:  In: 1720 [P.O.:720; I.V.:1000]  Out: 1080 [Urine:780; Blood:300]    Data   Results  for orders placed or performed during the hospital encounter of 03/04/21 (from the past 24 hour(s))   Glucose by meter   Result Value Ref Range    Glucose 196 (H) 70 - 99 mg/dL   Infectious Diseases IP Consult: Patient to be seen: Routine - within 24 hours; left foot osteomyelitis, positive blood culture; Consultant may enter orders: Yes; Requesting provider? Attending physician    Jagruti Aguilar MD     3/5/2021  1:44 PM  [facility]    Infectious Disease Consultation     Date of Admission:  3/4/2021  Date of Consult (When I saw the patient): 03/05/21    Assessment & Plan   Huseyin Pettit is a 61 year old male who was admitted on 3/4/2021.     Impression:  1. 61 y.o male with diabetes.   2. Neuropathy.   3. CAD  4. Admitted with left foot wound with spetic arthritis.   5. Previous admission last year for osteo s/p toe amputation that   admission.   6. Blood cultures positive for MSSA.     Recommendations:   Continue on zosyn alone to cover for MSSA + potential GNR/   anaerobe involved in the foot.   Stop vancomycin.   Daily blood cultures till clears   No long term IV lines yet       Jagruti Danielle MD    Reason for Consult   Reason for consult: I was asked to evaluate this patient for   bacteremia and osteomyelitis.    Primary Care Physician   Hunter Carlton    Chief Complaint   Left foot osteo     History is obtained from the patient and medical records    History of Present Illness   Huseyin Pettit is a 61 year old male who presents with    Past Medical History   I have reviewed this patient's medical history and updated it   with pertinent information if needed.   Past Medical History:   Diagnosis Date     CAD (coronary artery disease) 2/18/2015    S/p 3 stents in 2011 In [place]     Closed fracture of multiple ribs of left side with routine   healing 7/10/2018     Coronary artery disease      Coronary artery disease involving native coronary artery of   native heart without angina  pectoris 7/3/2018    S/p 3 stents put in [place] in 2011.     Depressive disorder      Diabetes (H)      Hypertension      Severe episode of recurrent major depressive disorder, without   psychotic features (H) 7/3/2018     Type 2 diabetes mellitus with other circulatory complication,   with long-term current use of insulin (H) 7/3/2018       Past Surgical History   I have reviewed this patient's surgical history and updated it   with pertinent information if needed.  Past Surgical History:   Procedure Laterality Date     AMPUTATE TOE(S) Right 3/3/2020    Procedure: AMPUTATION RIGHT GREAT TOE.;  Surgeon: Sergey Barnes DPM;  Location:  OR     CHOLECYSTECTOMY       COLONOSCOPY       CV CORONARY ANGIOGRAM N/A 3/2/2020    Procedure: Coronary Angiogram;  Surgeon: Thaddeus Sun MD;  Location:  HEART CARDIAC CATH LAB     GALLBLADDER SURGERY  2011       Prior to Admission Medications   Prior to Admission Medications   Prescriptions Last Dose Informant Patient Reported? Taking?   FLUoxetine (PROZAC) 20 MG capsule Past Week at Unknown time  No   Yes   Sig: Take 2 capsules (40 mg) by mouth daily   QUEtiapine (SEROQUEL) 300 MG tablet Past Week at Unknown time  No   Yes   Sig: Take 2 tablets (600 mg) by mouth At Bedtime   alcohol swab prep pads   No No   Sig: Use to swab area of injection/angelic as directed.   amLODIPine-valsartan (EXFORGE)  MG tablet Past Week at   Unknown time  No Yes   Sig: Take 1 tablet by mouth daily   aspirin 81 MG tablet Past Week at Unknown time  Yes Yes   Sig: Take 81 mg by mouth daily    bisoprolol (ZEBETA) 5 MG tablet Past Week at Unknown time  No Yes     Sig: Take 1 tablet (5 mg) by mouth daily   blood glucose (NO BRAND SPECIFIED) test strip   No No   Sig: Use to test blood sugar 1 to 3 times daily or as directed.   To accompany: Blood Glucose Monitor Brands: per insurance.   blood glucose calibration (NO BRAND SPECIFIED) solution   No No   Sig: To accompany: Blood Glucose  Monitor Brands: per insurance.   blood glucose monitoring (NO BRAND SPECIFIED) meter device kit     No No   Sig: Use to test blood sugar 1 to 2 times daily or as directed.   busPIRone (BUSPAR) 10 MG tablet Past Week at Unknown time  No Yes     Sig: Take 1 tablet (10 mg) by mouth 4 times daily   clomiPRAMINE (ANAFRANIL) 50 MG capsule More than a month at   Unknown time  No No   Sig: Take 2 capsules (100 mg) by mouth 2 times daily   clopidogrel (PLAVIX) 75 MG tablet Past Week at Unknown time  No   Yes   Sig: Take 1 tablet (75 mg) by mouth daily   divalproex sodium delayed-release (DEPAKOTE) 500 MG DR tablet   Past Week at Unknown time  No Yes   Sig: Take 2 tablets (1,000 mg) by mouth 2 times daily   glimepiride (AMARYL) 2 MG tablet Past Week at Unknown time  No   Yes   Sig: Take 1 tablet (2 mg) by mouth every morning (before   breakfast)   insulin aspart prot & aspart (NOVOLOG MIX 70/30 PEN) (70-30) 100   UNIT/ML pen Past Week at Unknown time  No Yes   Si units in AM, and 38 units in PM   Patient taking differently: Inject 50 Units Subcutaneous daily   (with lunch) 28 units in AM, and 38 units in PM   insulin pen needle (31G X 8 MM) 31G X 8 MM miscellaneous   No No   Sig: Use 2 pen needles daily or as directed.   isosorbide Dinitrate (ISORDIL) 40 MG TABS Past Week at Unknown   time  No Yes   Sig: Take 1 tablet (40 mg) by mouth 2 times daily   metFORMIN (GLUCOPHAGE) 850 MG tablet Past Week at Unknown time    No Yes   Sig: Take 1 tablet (850 mg) by mouth 2 times daily (with meals)   (Need A1c for further refills)   order for DME   No No   Sig: Equipment being ordered: blood pressure monitor.   order for DME   No No   Sig: Equipment being ordered: surgical shoe   pregabalin (LYRICA) 150 MG capsule Past Week at Unknown time  No   Yes   Sig: TAKE ONE CAPSULE BY MOUTH TWICE A DAY   rosuvastatin (CRESTOR) 20 MG tablet Past Week at Unknown time  No   Yes   Sig: Take 1 tablet (20 mg) by mouth daily   thin (NO BRAND  SPECIFIED) lancets   No No   Sig: Use with lanceting device. To accompany: Blood Glucose   Monitor Brands: per insurance.      Facility-Administered Medications: None     Allergies   No Known Allergies    Immunization History   Immunization History   Administered Date(s) Administered     Pneumococcal 23 valent 03/10/2020     TDAP Vaccine (Adacel) 03/10/2020       Social History   I have reviewed this patient's social history and updated it with   pertinent information if needed. Huseyin Pettit  reports that he   has been smoking. He has a 12.50 pack-year smoking history. He   has never used smokeless tobacco. He reports that he does not   drink alcohol or use drugs.    Family History   I have reviewed this patient's family history and updated it with   pertinent information if needed.   Family History   Problem Relation Age of Onset     Colon Cancer No family hx of        Review of Systems   The 10 point Review of Systems is negative other than noted in   the HPI or here.     Physical Exam   Temp: 97.7  F (36.5  C) Temp src: Oral BP: 126/77 Pulse: 115     Resp: 16 SpO2: 97 % O2 Device: None (Room air)    Vital Signs with Ranges  Temp:  [97.5  F (36.4  C)-98.4  F (36.9  C)] 97.7  F (36.5  C)  Pulse:  [] 115  Resp:  [11-25] 16  BP: ()/(64-82) 126/77  SpO2:  [95 %-100 %] 97 %  147 lbs 4.28 oz  Body mass index is 21.75 kg/m .    GENERAL APPEARANCE:  awake  EYES: Eyes grossly normal to inspection, PERRL and conjunctivae   and sclerae normal  HENT: ear canals and TM's normal and nose and mouth without   ulcers or lesions  NECK: no adenopathy, no asymmetry, masses, or scars and thyroid   normal to palpation  RESP: lungs clear to auscultation - no rales, rhonchi or wheezes  CV: regular rates and rhythm, normal S1 S2, no S3 or S4 and no   murmur, click or rub  LYMPHATICS: normal ant/post cervical and supraclavicular nodes  ABDOMEN: soft, nontender, without hepatosplenomegaly or masses   and bowel sounds normal  MS:  extremities normal- no gross deformities noted  SKIN: no suspicious lesions or rashes      Data   Lab Results   Component Value Date    WBC 13.3 (H) 03/04/2021    HGB 9.6 (L) 03/04/2021    HCT 30.3 (L) 03/04/2021     03/04/2021     03/04/2021    POTASSIUM 4.1 03/04/2021    CHLORIDE 99 03/04/2021    CO2 25 03/04/2021    BUN 26 03/04/2021    CR 0.84 03/04/2021     (H) 03/04/2021    SED 91 (H) 03/04/2021    DD 3.1 (H) 03/04/2021    TROPI <0.015 03/04/2021    AST 20 03/04/2021    ALT 22 03/04/2021    ALKPHOS 72 03/04/2021    BILITOTAL 0.2 03/04/2021    INR 1.24 (H) 03/04/2021     Recent Labs   Lab 03/04/21  1015 03/04/21  0942   CULT Cultured on the 1st day of incubation:  Gram positive cocci  *  Critical Value/Significant Value, preliminary result only,   called to and read back by  Ashtyn Chicas RN at 0537 3/5/21 hg    (Note)  POSITIVE for STAPHYLOCOCCUS AUREUS and NEGATIVE for the mecA gene  (not MRSA) by [employer] multiplex nucleic acid test. The mecA gene   was  not detected. Final identification and antimicrobial   susceptibility  testing will be verified by standard methods.    Specimen tested with Verigene multiplex, gram-positive blood   culture  nucleic acid test for the following targets: Staph aureus, Staph  epidermidis, Staph lugdunensis, other Staph species, Enterococcus  faecalis, Enterococcus faecium, Streptococcus species, S.   agalactiae,  S. anginosus grp., S. pneumoniae, S. pyogenes, Listeria sp., mecA  (methicillin resistance) and Lopez/B (vancomycin resistance).    Critical Value/Significant Value called to and read back by   Sacha Del Rosario RN on 3.5.21 at 0830. bw   Cultured on the 1st day of incubation:  Gram positive cocci in clusters  *  Critical Value/Significant Value, preliminary result only,   called to and read back by  Sacha Del Rosario RN on 3.5.21 at 0830. bw       Recent Labs   Lab Test 03/04/21  1015 03/04/21  0942 03/03/20  1928 02/29/20  1104   CULT Cultured on  the 1st day of incubation:  Gram positive cocci  *  Critical Value/Significant Value, preliminary result only,   called to and read back by  Ashtyn Chicas RN at 0537 3/5/21 hg    (Note)  POSITIVE for STAPHYLOCOCCUS AUREUS and NEGATIVE for the mecA gene  (not MRSA) by Verigene multiplex nucleic acid test. The mecA gene   was  not detected. Final identification and antimicrobial   susceptibility  testing will be verified by standard methods.    Specimen tested with Verigene multiplex, gram-positive blood   culture  nucleic acid test for the following targets: Staph aureus, Staph  epidermidis, Staph lugdunensis, other Staph species, Enterococcus  faecalis, Enterococcus faecium, Streptococcus species, S.   agalactiae,  S. anginosus grp., S. pneumoniae, S. pyogenes, Listeria sp., mecA  (methicillin resistance) and Lopez/B (vancomycin resistance).    Critical Value/Significant Value called to and read back by   Sacha Del Rosario RN on 3.5.21 at 0830. bw   Cultured on the 1st day of incubation:  Gram positive cocci in clusters  *  Critical Value/Significant Value, preliminary result only,   called to and read back by  Sacha Del Rosario RN on 3.5.21 at 0830. bw   No anaerobes isolated  Light growth  Coagulase negative Staphylococcus  *  Light growth  Alcaligenes faecalis  *  Light growth  Enterococcus faecalis  * No growth            Basic metabolic panel   Result Value Ref Range    Sodium 135 133 - 144 mmol/L    Potassium 4.3 3.4 - 5.3 mmol/L    Chloride 102 94 - 109 mmol/L    Carbon Dioxide 25 20 - 32 mmol/L    Anion Gap 8 3 - 14 mmol/L    Glucose 213 (H) 70 - 99 mg/dL    Urea Nitrogen 20 7 - 30 mg/dL    Creatinine 0.73 0.66 - 1.25 mg/dL    GFR Estimate >90 >60 mL/min/[1.73_m2]    GFR Estimate If Black >90 >60 mL/min/[1.73_m2]    Calcium 8.3 (L) 8.5 - 10.1 mg/dL   CBC with platelets   Result Value Ref Range    WBC 16.6 (H) 4.0 - 11.0 10e9/L    RBC Count 3.69 (L) 4.4 - 5.9 10e12/L    Hemoglobin 10.3 (L) 13.3 - 17.7 g/dL     Hematocrit 31.7 (L) 40.0 - 53.0 %    MCV 86 78 - 100 fl    MCH 27.9 26.5 - 33.0 pg    MCHC 32.5 31.5 - 36.5 g/dL    RDW 14.6 10.0 - 15.0 %    Platelet Count 512 (H) 150 - 450 10e9/L   Blood culture    Specimen: Blood    Left Arm   Result Value Ref Range    Specimen Description Blood Left Arm     Culture Micro No growth after 8 hours    US REYMUNDO Doppler No Exercise    Narrative    US REYMUNDO DOPPLER NO EXERCISE, 1-2 LEVELS, BILATERAL   3/5/2021 10:34 AM     HISTORY: Foot wounds, assess for PAD.    COMPARISON: 3/1/2020    FINDINGS:  Right REYMUNDO:   PT: 1.17  DP: 1.07    Left REYMUNDO:   PT: 1.05  DP: 0.99     Right Digital brachial index: 0.69.  Left Digital Brachial index: 0.44    Waveforms: Biphasic and triphasic bilaterally. Normal right second  digital waveform. Normal left first digital waveform. Absent left  second and third digital waveforms.      Impression    IMPRESSION:   1. Normal ABIs bilaterally without evidence of arterial insufficiency.  2. Nearly absent digital waveforms in the left second and third  digits.    REYMUNDO CRITERIA:  >1.4 NC  0.95-1.4 Normal  0.90 - 0.94 Mild  0.5 - 0.89 Moderate  0.2 - 0.49 Severe  <0.2 Critical    CHARY IQBAL DO   Echocardiogram Complete    Narrative    [id number]  [id number]  [id number]  [id number]^[place]^AIDE^[facility]  Echocardiography Laboratory  [address] [place] [address]        Name: MORALES SHERMAN  MRN: [id number]  : 1960  Study Date: 2021 08:43 AM  Age: 61 yrs  Gender: Male  Patient Location: [facility]  Reason For Study: Chest Discomfort  Ordering Physician: AIDE IBRAHIM  Referring Physician: CATE LAYNE  Performed By: Omaira Soni     BSA: 1.8 m2  Height: 69 in  Weight: 147 lb  HR: 109  BP: 98/54 mmHg  _____________________________________________________________________________  __        Procedure  Complete Portable Echo  Adult.  _____________________________________________________________________________  __        Interpretation Summary     The visual ejection fraction is estimated at 65-70%. No regional wall motion  abnormalities noted. Mild LVH based on 2D.  The right ventricle is normal in size and function.  The inferior vena cava was normal in size with preserved respiratory  variability.  There is no pericardial effusion.  No hemodynamically significant valve disease.     Technically difficult study. Patient was tachycardic.  _____________________________________________________________________________  __        Left Ventricle  The left ventricle is normal in size. There is mild concentric left  ventricular hypertrophy. Lv mass does not correlate due to measurement errors  potentially. Left ventricular systolic function is normal. The visual ejection  fraction is estimated at 65-70%. Diastolic function not assessed due to  tachycardia. No regional wall motion abnormalities noted.     Right Ventricle  The right ventricle is normal in size and function.     Atria  Normal left atrial size. Right atrial size is normal. There is no color  Doppler evidence of an atrial shunt.     Mitral Valve  The mitral valve is normal in structure and function. There is mild mitral  annular calcification. There is trace mitral regurgitation. There is no mitral  valve stenosis.        Tricuspid Valve  The tricuspid valve is normal in structure and function. There is physiologic  tricuspid regurgitation. Right ventricular systolic pressure could not be  approximated due to inadequate tricuspid regurgitation.     Aortic Valve  The aortic valve is trileaflet with aortic valve sclerosis. There is trace  aortic regurgitation. No hemodynamically significant valvular aortic stenosis.     Pulmonic Valve  The pulmonic valve is not well visualized.     Vessels  The aortic root is normal size. The inferior vena cava was normal in size with  preserved  respiratory variability.     Pericardium  There is no pericardial effusion.     _____________________________________________________________________________  __  MMode/2D Measurements & Calculations  IVSd: 1.3 cm  LVIDd: 2.6 cm  LVIDs: 1.6 cm  LVPWd: 1.3 cm  FS: 40.0 %  LV mass(C)d: 102.9 grams  LV mass(C)dI: 56.8 grams/m2     Ao root diam: 3.8 cm  LA dimension: 2.8 cm  asc Aorta Diam: 3.5 cm  LA/Ao: 0.72  LA Volume (BP): 25.9 ml  LA Volume Index (BP): 14.3 ml/m2  RWT: 1.0        Doppler Measurements & Calculations  MV A max watson: 128.5 cm/sec  PA acc time: 0.08 sec              _____________________________________________________________________________  __           Report approved by: Tenzin Phelan 03/05/2021 11:56 AM      Blood culture    Specimen: Blood    Left Arm   Result Value Ref Range    Specimen Description Blood Left Arm     Culture Micro No growth after 8 hours    US Lower Extremity Arterial Duplex Left    Narrative    US LOWER EXTREMITY ARTERIAL DUPLEX LEFT   3/5/2021 12:23 PM     HISTORY: Assess for PAD. Left foot wounds at the base of the second  and third digits and on the ball of the foot. History of right great  toe amputation.    COMPARISON: REYMUNDO 3/5/2021    FINDINGS: Color Doppler and spectral waveform analysis was performed  throughout the arteries of the left lower extremity. The left common  femoral, profunda femoral, superficial femoral, popliteal, anterior  tibial, posterior tibial and peroneal arteries are patent with  triphasic and biphasic waveforms.      Impression    IMPRESSION: Patent arteries throughout the left lower extremity. No  evidence of stenosis or occlusion.    CHARY IQBAL,    Glucose by meter   Result Value Ref Range    Glucose 233 (H) 70 - 99 mg/dL   Glucose by meter   Result Value Ref Range    Glucose 240 (H) 70 - 99 mg/dL   Glucose by meter   Result Value Ref Range    Glucose 215 (H) 70 - 99 mg/dL   Anaerobic bacterial culture    Specimen: Foot, Left; Tissue    Result Value Ref Range    Specimen Description Left Foot     Special Requests Received in anaerobic tubes.     Culture Micro PENDING    Gram stain    Specimen: Foot, Left; Tissue    Tissue   Result Value Ref Range    Specimen Description Left Foot Tissue     Gram Stain Many  Gram positive cocci   (A)     Gram Stain Many  WBC'S seen  predominantly PMN's      Tissue Culture Aerobic Bacterial    Specimen: Foot, Left; Tissue    Tissue   Result Value Ref Range    Specimen Description Left Foot Tissue     Culture Micro PENDING    Glucose by meter   Result Value Ref Range    Glucose 204 (H) 70 - 99 mg/dL   Glucose by meter   Result Value Ref Range    Glucose 401 (H) 70 - 99 mg/dL   ABO/Rh type and screen   Result Value Ref Range    Units Ordered 2     ABO O     RH(D) Pos     Antibody Screen Neg     Test Valid Only At [facility]        Specimen Expires 03/09/2021     Crossmatch Red Blood Cells    Blood component   Result Value Ref Range    Unit Number [id number]     Blood Component Type Red Blood Cells Leukocyte Reduced     Division Number 00     Status of Unit Ready for patient 03/06/2021 0210     Blood Product Code [id number]     Unit Status BEA    Blood component   Result Value Ref Range    Unit Number [id number]     Blood Component Type Red Blood Cells Leukocyte Reduced     Division Number 00     Status of Unit Released to care unit 03/06/2021 0232     Blood Product Code [id number]     Unit Status ISS    Basic metabolic panel   Result Value Ref Range    Sodium 132 (L) 133 - 144 mmol/L    Potassium 4.3 3.4 - 5.3 mmol/L    Chloride 103 94 - 109 mmol/L    Carbon Dioxide 26 20 - 32 mmol/L    Anion Gap 3 3 - 14 mmol/L    Glucose 386 (H) 70 - 99 mg/dL    Urea Nitrogen 21 7 - 30 mg/dL    Creatinine 0.81 0.66 - 1.25 mg/dL    GFR Estimate >90 >60 mL/min/[1.73_m2]    GFR Estimate If Black >90 >60 mL/min/[1.73_m2]    Calcium 7.4 (L) 8.5 - 10.1 mg/dL   CBC with platelets   Result Value Ref Range    WBC 10.5 4.0  - 11.0 10e9/L    RBC Count 2.21 (L) 4.4 - 5.9 10e12/L    Hemoglobin 6.1 (LL) 13.3 - 17.7 g/dL    Hematocrit 19.0 (L) 40.0 - 53.0 %    MCV 86 78 - 100 fl    MCH 27.6 26.5 - 33.0 pg    MCHC 32.1 31.5 - 36.5 g/dL    RDW 14.7 10.0 - 15.0 %    Platelet Count 308 150 - 450 10e9/L   Lactic acid whole blood   Result Value Ref Range    Lactic Acid 1.3 0.7 - 2.0 mmol/L   Calcium ionized whole blood (Add on or recollect)   Result Value Ref Range    Calcium Ionized Whole Blood 4.4 4.4 - 5.2 mg/dL   Blood gas venous with oxyhemoglobin   Result Value Ref Range    Ph Venous 7.39 7.32 - 7.43 pH    PCO2 Venous 43 40 - 50 mm Hg    PO2 Venous 39 25 - 47 mm Hg    Bicarbonate Venous 26 21 - 28 mmol/L    FIO2 PENDING     Oxyhemoglobin Venous 71 %    Base Excess Venous 1.2 mmol/L     COVID-19 PCR Results    COVID-19 PCR Results 5/18/20 3/4/21   COVID-19 Virus PCR to [facility] - Result Not Detected    COVID-19 Virus PCR to [facility] - Source Nasopharyngeal    Flu A/B & SARS-COV-2 PCR Source  Nasopharyngeal   SARS-CoV-2 PCR Result  NEGATIVE      Comments are available for some flowsheets but are not being displayed.         COVID-19 Antibody Results, Testing for Immunity    COVID-19 Antibody Results, Testing for Immunity   No data to display.           EKG:  -- none performed    Time Spent on this Encounter   I spent 60 minutes of critical care time on the unit/floor managing the care of Huseyin Pettit. Upon evaluation, this patient had a high probability of imminent or life-threatening deterioration due to hypovolemic shock, which required my direct attention, intervention, and personal management. 100% of my time was spent at the bedside counseling the patient and/or coordinating care regarding services listed in this note.    MC Choe, CNP  Hospitalist - [facility]  Text Page  [phone])

## 2021-03-06 NOTE — PROGRESS NOTES
Vascular Update:    L LE arterial duplex images reviewed by myself and Dr. Cota.  Good triphasic flow through popliteal artery.  AT and PT have good brisk upstroke and reasonable velocities.  Do not anticipate the patient will need an angiogram.  For now follow the wound clinically.  Expect there to be enough blood flow to heal.  If there were wound healing issues then would recommend angiogram at that point but the arterial duplex does not identify a strong endovascular target for intervention. No plans for angiogram currently.    Caesar Davila MD  Vascular Surgery Fellow    I agree with the above.  Anticipate adequate blood flow to heal the TMA.    Carlos Cota MD

## 2021-03-06 NOTE — BRIEF OP NOTE
[facility]    Brief Operative Note    Pre-operative diagnosis: Other acute osteomyelitis of left foot (H) [M86.172]  Post-operative diagnosis sp open TMA left foot    Procedure: Procedure(s):  TRANSMETATARSAL AMPUTATION LEFT FOOT  Surgeon: Surgeon(s) and Role:     * Sergey Barnes DPM - Primary  Anesthesia: Combined General with Block   Estimated blood loss: 300 ml  Drains: None  Specimens:   ID Type Source Tests Collected by Time Destination   1 : Tissue Left Foot Tissue Foot, Left ANAEROBIC BACTERIAL CULTURE, GRAM STAIN, TISSUE CULTURE AEROBIC BACTERIAL Sergey Barnes DPM 3/5/2021  6:47 PM    A : Left First Metatarsal Clean Margin - evaluate for Osteomylitis Tissue Foot, Left SURGICAL PATHOLOGY EXAM Sergey Barnes DPM 3/5/2021  6:48 PM    B : Left Second Metatarsal Clean Margin - Evaluate for Osteomylitis Tissue Foot, Left SURGICAL PATHOLOGY EXAM Sergey Barnes DPM 3/5/2021  6:49 PM    C : Left Third Metatarsal Clean Margin - Evaluate for Osteomylitis Tissue Foot, Left SURGICAL PATHOLOGY EXAM Sergey Barnes DPM 3/5/2021  6:50 PM    D : Left Foot  Tissue Foot, Left SURGICAL PATHOLOGY EXAM Sergey Barnes DPM 3/5/2021  6:51 PM      Findings:   healthy tissue at amputation site.  Complications: None.  Implants: * No implants in log *      Anticipate at least 1 more surgery; timing/procedure based on daily wound appearance and Path reports

## 2021-03-06 NOTE — PROVIDER NOTIFICATION
MD Notification    Notified Person: NP    Notified Person Name: Mendez Lopez NP     Notification Date/Time: 3/6/21 0105    Notification Interaction: In person Jair morales NP at bedside.     Purpose of Notification: Critical hemoglobin, 6.1    Orders Received: Transfusion orders to be placed     Comments: Primary RN updated

## 2021-03-06 NOTE — PROVIDER NOTIFICATION
Repeat BP post bolus 89/50, HR 92.  Text to MD to update, question repeat bolus?  Addendum:  Repeat bolus ordered.

## 2021-03-06 NOTE — PLAN OF CARE
DATE & TIME: 3/06/21 7291-0085  Cognitive Concerns/ Orientation: A&Ox4; Pleasant  BEHAVIOR & AGGRESSION TOOL COLOR: Green  ABNL VS/O2: VSS hypotensive (60s-70s/40s) and somnolent, RRT called.  BP improved to 110s/60s.   MOBILITY: non-weight bearing on L foot, Ax1 standing at bedside on R foot  PAIN MANAGMENT:   DIET: Mod CHO  BOWEL/BLADDER: Continent, voiding in urinal.  No BM this shift.   ABNL LAB/BG:  and 386.  Hgb 6.1, transfused 1 unit PRBC, 2nd unit currently transfusing.   DRAIN/DEVICES: R PIV infusing LR at 100mL/h; intermittent ABX  TELEMETRY RHYTHM: NSR  SKIN: Scattered bruising on upper extremity. Rash on bilateral legs/shins/ankles with 1+ Edema. R great toe previously amputated. L transmetatarsal amputation yesterday (3/05). Surgical dressing in place with moderate drainage, dressing reinforced.  TESTS/PROCEDURES: Anticipate at least one more surgery per Podiatry note.  D/C DAY/GOALS/PLACE: Expected discharge: 4 - 7 days, dependent on surgical procedures, pain management/ tolerating PO medications, antibiotic plan established and safe disposition plan/ TCU bed available  OTHER IMPORTANT INFO: CMS checks LLE q4 hours (2+ posterior tibial pulse, TAMY dorsalis pedis pulse due to surgical dressing). Vascular surgery, PT, and Podiatry following. Cognition greatly improvement following bolus and blood transfusion.

## 2021-03-06 NOTE — SIGNIFICANT EVENT
I was giving insulin and was not getting much response from pt, minimally from sternal rub.  I checked vitals and BP 60s-70s/40.  I called an RRT.  Pt was given 2 L LR bolus.  Pt was found to have Hgb 6.1, 2 units of PRBC were ordered for transfusion.  BP improvement to 90s-110s/50-60s.

## 2021-03-06 NOTE — PROGRESS NOTES
MD Notification    Notified Person: MD    Notified Person Name: Perico    Notification Date/Time: 3/5/21 0569    Notification Interaction: page    Purpose of Notification: pt returned from surgery c/o 8/10 chest pain (reason for admission). BP is 92/50 should I give isosorbide dinitrate? what else can I give for chest pain?    Orders Received: Hold isosorbide dinitrate, continue fluids. Give scheduled tylenol- wait for BP to improve before administering isosorbide dinitrate and/or iv dilaudid.    Comments:

## 2021-03-06 NOTE — PROGRESS NOTES
[facility]    Medicine Progress Note - Hospitalist Service       Date of Admission:  3/4/2021  Assessment & Plan        Huseyin Pettit is a 61 year old male w/ PMH of DM II, CAD w/ prior PCI in 2011, former smoker HTN, HLD, CKD, chronic pain, anxiety and depression, osteomyelitis of toe s/p amputation of right foot admitted on 03/04/21 w/ CP and possible septic arthritis or multilevel osteomyelitis.      Left foot wounds with septic arthritis and osteomyelitis  Multiple fractures of left foot  Staph aureus bacteremia    Vascular surgery, Podiatry, and ID consulted.    REYMUNDO pending.    S/p left TMA on 3/5/21 by Dr. Barnes.    Continue Zosyn per ID.    1/2 blood cultures from admission positive for Staph aureus. Repeat blood cultures this morning pending. Repeat blood cultures again tomorrow.    Acute blood loss anemia  Chronic normocytic anemia with baseline 12-13g    Had an episode of hypotension early on the morning of 3/6/21. Labs were checked and hemoglobin was down to 6.1.    Transfused one unit PRBCs. Hemoglobin improved to 10.1, then 7.8 upon recheck. Suspect 10.1 was falsely elevated as it was drawn right as the transfusion was completed.    Recheck hemoglobin this evening.    Will hold prophylactic lovenox for now.    Hypotension    Likely hypovolemic.    Improved with IV fluids and PRBC transfusion.    Continue IV fluids, bolus PRN and transfuse PRBCs if hemoglobin drops below 7 again.    Chest pain, +risk factors including HTN, HLD, DM2  CAD w/ PCI in 2011 to LAD and CX iin [place], last coronary angiogram was 3/2020 which did not require any mechanical revascularization and GDMT was recommended.    Pain much improved, nearly resolved.    Serial troponins negative.    No significant dysrhythmias on telemetry.    EKG x 2 did not show any acute ischemic changes.    CT chest negative for PE on 3/4/21.    He is tender to palpation in the area of discomfort.    Given negative work-up  including negative serial troponins despite ongoing chest pain, suspicion for cardiac etiology of his pain is low.    Symptomatic management.    Continue PTA aspirin, plavix, bisoprolol, isosorbide dinitrate, and rosuvastatin.    Diabetes mellitus, type 2    Hemoglobin A1c 6.8 on 3/4/21.    Hold PTA glimepiride and metformin for now.    Blood sugars not well controlled.    Was on NovoLog Mix 70/30 at 28 units in AM and 38 units in PM prior to admission, currently on hold.    Currently on Lantus 30 units at bedtime, continue the same.    Continue accuchecks and medium dose sliding scale NovoLog.    Add prandial Novolog at 1 unit per 15 grams of carbs.    Monitor for hypoglycemia.    CKD stage 2-3, baseline Cr 0.8-1.0 as of early 2020  Mild hyponatremia    Hyponatremia resolved.    Creatinine improved to 0.70 this morning.    Abdominal pain on palpation in LUQ and RLQ, although patient is requesting food and drink.  Constipation.    Lactic acid was within normal limits.    Epigastric tenderness has resolved.    Continue bowel regimen.    Depression  Anxiety    Continue PTA buspirone, clomipramine, fluoxetine, and quetiapine.    Mild basilar predominant bronchial wall thickening with some mucous plugging may be related to bronchitis, seen on CT    Encouraged smoking cessation.    Encourage incentive spirometer.    Extensive lower extremity erythematous macular rash, confluent in areas, concerning for vasculitic process    Since vascular surgery is already involved perhaps they would consider doing a biopsy.    Vasculitis labs ordered, pending.    Covid-19 PCR NEGATIVE  Asymptomatic COVID-19 PCR testing was negative on 3/4/21.     Diet: Moderate Consistent CHO Diet    DVT Prophylaxis: PCDs  Rangel Catheter: not present  Code Status: Full Code           Disposition Plan   Expected discharge: continue inpatient care pending negative blood cultures, antibiotic plan, repeat trip to OR with podiatry  Entered: Taras RODRIGUEZ  MD Paty 03/06/2021, 11:19 AM       The patient's care was discussed with the Bedside Nurse and Patient.    Taras Newman MD  Hospitalist Service  [facility]  Contact information available via [facility] Paging/Directory    ______________________________________________________________________    Interval History   Huseyin Pettit feels OK this morning. Pain at surgical site is well controlled. Had an RRT overnight for hypotension and was found to be anemic, was given IV fluids and transfused one unit PRBCs. States his blood pressure is always low and he doesn't think it was that big of a deal last night, discussed reasons why I was concerned about the low blood pressure. Denies fevers, shortness of breath, nausea, abdominal pain. Chest pain has nearly resolved.    Data reviewed today: I reviewed all medications, new labs and imaging results over the last 24 hours. I personally reviewed no images or EKG's today.    Physical Exam   Vital Signs: Temp: 98.2  F (36.8  C) Temp src: Oral BP: 101/60 Pulse: 108   Resp: 18 SpO2: 96 % O2 Device: None (Room air) Oxygen Delivery: 1 LPM  Weight: 147 lbs 4.28 oz  Constitutional: awake, alert, cooperative, no apparent distress, laying in bed  Respiratory: clear to auscultation bilaterally, no crackles or wheezing  Cardiovascular: regular rate and rhythm, normal S1 and S2, no murmur noted  GI: normal bowel sounds, soft, non-distended, non-tender  Skin: warm, dry  Musculoskeletal: no lower extremity pitting edema present, dressing in place on left foot  Neurologic: awake, alert, answers questions appropriately    Data   Recent Labs   Lab 03/06/21  1408 03/06/21  0857 03/06/21  0047 03/05/21  0947 03/04/21  2240 03/04/21  1654 03/04/21  0942   WBC  --  13.9* 10.5 16.6*  --   --  13.3*   HGB 7.8* 10.1* 6.1* 10.3*  --   --  9.6*   MCV  --  86 86 86  --   --  89   PLT  --  360 308 512*  --   --  449   INR  --   --   --   --   --   --  1.24*   NA  --  139 132* 135   --   --  133   POTASSIUM  --  4.1 4.3 4.3  --   --  4.1   CHLORIDE  --  108 103 102  --   --  99   CO2  --  28 26 25  --   --  25   BUN  --  16 21 20  --   --  26   CR  --  0.70 0.81 0.73  --   --  0.84   ANIONGAP  --  3 3 8  --   --  9   MYRON  --  8.4* 7.4* 8.3*  --   --  8.9   GLC  --  219* 386* 213*  --   --  125*   ALBUMIN  --   --   --   --   --  1.9*  --    PROTTOTAL  --   --   --   --   --  6.9  --    BILITOTAL  --   --   --   --   --  0.2  --    ALKPHOS  --   --   --   --   --  72  --    ALT  --   --   --   --   --  22  --    AST  --   --   --   --   --  20  --    TROPI  --   --   --   --  <0.015 <0.015 <0.015     Medications     lactated ringers 100 mL/hr at 03/06/21 0823       acetaminophen  975 mg Oral Q8H     aspirin  81 mg Oral Daily     bisoprolol  5 mg Oral Daily     busPIRone  10 mg Oral 4x Daily     clomiPRAMINE  100 mg Oral BID     clopidogrel  75 mg Oral Daily     divalproex sodium delayed-release  1,000 mg Oral BID     enoxaparin ANTICOAGULANT  40 mg Subcutaneous Q24H     FLUoxetine  40 mg Oral Daily     [START ON 3/7/2021] insulin aspart   Subcutaneous QAM AC     insulin aspart   Subcutaneous Daily with lunch     insulin aspart   Subcutaneous Daily with supper     insulin aspart  1-7 Units Subcutaneous TID AC     insulin aspart  1-5 Units Subcutaneous At Bedtime     insulin glargine  30 Units Subcutaneous At Bedtime     isosorbide Dinitrate  40 mg Oral BID     lactated ringers  500 mL Intravenous Once     piperacillin-tazobactam  3.375 g Intravenous Q6H     polyethylene glycol  17 g Oral BID     pregabalin  150 mg Oral BID     QUEtiapine  600 mg Oral At Bedtime     rosuvastatin  20 mg Oral Daily

## 2021-03-07 ENCOUNTER — APPOINTMENT (OUTPATIENT)
Dept: OCCUPATIONAL THERAPY | Facility: CLINIC | Age: 61
End: 2021-03-07
Attending: HOSPITALIST
Payer: COMMERCIAL

## 2021-03-07 LAB
ABO + RH BLD: NORMAL
ABO + RH BLD: NORMAL
ANION GAP SERPL CALCULATED.3IONS-SCNC: 2 MMOL/L (ref 3–14)
BACTERIA SPEC CULT: ABNORMAL
BLD GP AB SCN SERPL QL: NORMAL
BLD PROD TYP BPU: NORMAL
BLD PROD TYP BPU: NORMAL
BLD UNIT ID BPU: 0
BLOOD BANK CMNT PATIENT-IMP: NORMAL
BLOOD PRODUCT CODE: NORMAL
BPU ID: NORMAL
BUN SERPL-MCNC: 17 MG/DL (ref 7–30)
CALCIUM SERPL-MCNC: 7.5 MG/DL (ref 8.5–10.1)
CHLORIDE SERPL-SCNC: 114 MMOL/L (ref 94–109)
CO2 SERPL-SCNC: 27 MMOL/L (ref 20–32)
CREAT SERPL-MCNC: 0.77 MG/DL (ref 0.66–1.25)
ERYTHROCYTE [DISTWIDTH] IN BLOOD BY AUTOMATED COUNT: 15 % (ref 10–15)
GFR SERPL CREATININE-BSD FRML MDRD: >90 ML/MIN/{1.73_M2}
GLUCOSE BLDC GLUCOMTR-MCNC: 119 MG/DL (ref 70–99)
GLUCOSE BLDC GLUCOMTR-MCNC: 125 MG/DL (ref 70–99)
GLUCOSE BLDC GLUCOMTR-MCNC: 147 MG/DL (ref 70–99)
GLUCOSE BLDC GLUCOMTR-MCNC: 163 MG/DL (ref 70–99)
GLUCOSE BLDC GLUCOMTR-MCNC: 202 MG/DL (ref 70–99)
GLUCOSE BLDC GLUCOMTR-MCNC: 78 MG/DL (ref 70–99)
GLUCOSE SERPL-MCNC: 80 MG/DL (ref 70–99)
HCT VFR BLD AUTO: 21.3 % (ref 40–53)
HGB BLD-MCNC: 6.9 G/DL (ref 13.3–17.7)
HGB BLD-MCNC: 8.5 G/DL (ref 13.3–17.7)
MCH RBC QN AUTO: 28.5 PG (ref 26.5–33)
MCHC RBC AUTO-ENTMCNC: 32.4 G/DL (ref 31.5–36.5)
MCV RBC AUTO: 88 FL (ref 78–100)
NUM BPU REQUESTED: 3
PLATELET # BLD AUTO: 303 10E9/L (ref 150–450)
POTASSIUM SERPL-SCNC: 4.2 MMOL/L (ref 3.4–5.3)
RBC # BLD AUTO: 2.42 10E12/L (ref 4.4–5.9)
SODIUM SERPL-SCNC: 143 MMOL/L (ref 133–144)
SPECIMEN EXP DATE BLD: NORMAL
SPECIMEN SOURCE: ABNORMAL
SPECIMEN SOURCE: ABNORMAL
TRANSFUSION STATUS PATIENT QL: NORMAL
TRANSFUSION STATUS PATIENT QL: NORMAL
WBC # BLD AUTO: 9 10E9/L (ref 4–11)

## 2021-03-07 PROCEDURE — 85018 HEMOGLOBIN: CPT | Performed by: HOSPITALIST

## 2021-03-07 PROCEDURE — 99233 SBSQ HOSP IP/OBS HIGH 50: CPT | Performed by: HOSPITALIST

## 2021-03-07 PROCEDURE — 85027 COMPLETE CBC AUTOMATED: CPT | Performed by: HOSPITALIST

## 2021-03-07 PROCEDURE — 250N000013 HC RX MED GY IP 250 OP 250 PS 637: Performed by: PODIATRIST

## 2021-03-07 PROCEDURE — 258N000003 HC RX IP 258 OP 636: Performed by: NURSE PRACTITIONER

## 2021-03-07 PROCEDURE — 250N000011 HC RX IP 250 OP 636: Performed by: HOSPITALIST

## 2021-03-07 PROCEDURE — P9016 RBC LEUKOCYTES REDUCED: HCPCS | Performed by: NURSE PRACTITIONER

## 2021-03-07 PROCEDURE — 250N000012 HC RX MED GY IP 250 OP 636 PS 637: Performed by: PODIATRIST

## 2021-03-07 PROCEDURE — 80048 BASIC METABOLIC PNL TOTAL CA: CPT | Performed by: HOSPITALIST

## 2021-03-07 PROCEDURE — 250N000011 HC RX IP 250 OP 636: Performed by: PODIATRIST

## 2021-03-07 PROCEDURE — 999N001017 HC STATISTIC GLUCOSE BY METER IP

## 2021-03-07 PROCEDURE — 97165 OT EVAL LOW COMPLEX 30 MIN: CPT | Mod: GO

## 2021-03-07 PROCEDURE — 36415 COLL VENOUS BLD VENIPUNCTURE: CPT | Performed by: HOSPITALIST

## 2021-03-07 PROCEDURE — 97530 THERAPEUTIC ACTIVITIES: CPT | Mod: GO

## 2021-03-07 PROCEDURE — 87040 BLOOD CULTURE FOR BACTERIA: CPT | Performed by: HOSPITALIST

## 2021-03-07 PROCEDURE — 120N000001 HC R&B MED SURG/OB

## 2021-03-07 RX ORDER — HYDROMORPHONE HYDROCHLORIDE 1 MG/ML
.5-1 INJECTION, SOLUTION INTRAMUSCULAR; INTRAVENOUS; SUBCUTANEOUS
Status: DISCONTINUED | OUTPATIENT
Start: 2021-03-07 | End: 2021-03-12

## 2021-03-07 RX ADMIN — PIPERACILLIN AND TAZOBACTAM 3.38 G: 3; .375 INJECTION, POWDER, FOR SOLUTION INTRAVENOUS at 19:49

## 2021-03-07 RX ADMIN — CLOMIPRAMINE HCL 100 MG: 50 CAPSULE ORAL at 20:32

## 2021-03-07 RX ADMIN — BUSPIRONE HYDROCHLORIDE 10 MG: 10 TABLET ORAL at 08:07

## 2021-03-07 RX ADMIN — ISOSORBIDE DINITRATE 40 MG: 20 TABLET ORAL at 08:07

## 2021-03-07 RX ADMIN — INSULIN GLARGINE 30 UNITS: 100 INJECTION, SOLUTION SUBCUTANEOUS at 22:34

## 2021-03-07 RX ADMIN — BUSPIRONE HYDROCHLORIDE 10 MG: 10 TABLET ORAL at 19:49

## 2021-03-07 RX ADMIN — INSULIN ASPART 1 UNITS: 100 INJECTION, SOLUTION INTRAVENOUS; SUBCUTANEOUS at 19:50

## 2021-03-07 RX ADMIN — HYDROMORPHONE HYDROCHLORIDE 0.5 MG: 1 INJECTION, SOLUTION INTRAMUSCULAR; INTRAVENOUS; SUBCUTANEOUS at 13:34

## 2021-03-07 RX ADMIN — PIPERACILLIN AND TAZOBACTAM 3.38 G: 3; .375 INJECTION, POWDER, FOR SOLUTION INTRAVENOUS at 02:41

## 2021-03-07 RX ADMIN — BISOPROLOL FUMARATE 5 MG: 5 TABLET ORAL at 08:07

## 2021-03-07 RX ADMIN — POLYETHYLENE GLYCOL 3350 17 G: 17 POWDER, FOR SOLUTION ORAL at 08:06

## 2021-03-07 RX ADMIN — PIPERACILLIN AND TAZOBACTAM 3.38 G: 3; .375 INJECTION, POWDER, FOR SOLUTION INTRAVENOUS at 08:15

## 2021-03-07 RX ADMIN — SODIUM CHLORIDE, POTASSIUM CHLORIDE, SODIUM LACTATE AND CALCIUM CHLORIDE: 600; 310; 30; 20 INJECTION, SOLUTION INTRAVENOUS at 00:53

## 2021-03-07 RX ADMIN — PREGABALIN 150 MG: 75 CAPSULE ORAL at 20:32

## 2021-03-07 RX ADMIN — ROSUVASTATIN CALCIUM 20 MG: 20 TABLET, FILM COATED ORAL at 08:07

## 2021-03-07 RX ADMIN — BUSPIRONE HYDROCHLORIDE 10 MG: 10 TABLET ORAL at 13:41

## 2021-03-07 RX ADMIN — HYDROMORPHONE HYDROCHLORIDE 0.5 MG: 1 INJECTION, SOLUTION INTRAMUSCULAR; INTRAVENOUS; SUBCUTANEOUS at 00:53

## 2021-03-07 RX ADMIN — CLOMIPRAMINE HCL 100 MG: 50 CAPSULE ORAL at 08:07

## 2021-03-07 RX ADMIN — FLUOXETINE 40 MG: 20 CAPSULE ORAL at 08:08

## 2021-03-07 RX ADMIN — PIPERACILLIN AND TAZOBACTAM 3.38 G: 3; .375 INJECTION, POWDER, FOR SOLUTION INTRAVENOUS at 14:40

## 2021-03-07 RX ADMIN — PREGABALIN 150 MG: 75 CAPSULE ORAL at 08:08

## 2021-03-07 RX ADMIN — CLOPIDOGREL BISULFATE 75 MG: 75 TABLET, FILM COATED ORAL at 08:07

## 2021-03-07 RX ADMIN — ACETAMINOPHEN 975 MG: 325 TABLET, FILM COATED ORAL at 16:39

## 2021-03-07 RX ADMIN — HYDROMORPHONE HYDROCHLORIDE 0.5 MG: 1 INJECTION, SOLUTION INTRAMUSCULAR; INTRAVENOUS; SUBCUTANEOUS at 09:02

## 2021-03-07 RX ADMIN — SODIUM CHLORIDE, POTASSIUM CHLORIDE, SODIUM LACTATE AND CALCIUM CHLORIDE: 600; 310; 30; 20 INJECTION, SOLUTION INTRAVENOUS at 11:04

## 2021-03-07 RX ADMIN — HYDROMORPHONE HYDROCHLORIDE 0.5 MG: 1 INJECTION, SOLUTION INTRAMUSCULAR; INTRAVENOUS; SUBCUTANEOUS at 20:39

## 2021-03-07 RX ADMIN — ASPIRIN 81 MG CHEWABLE TABLET 81 MG: 81 TABLET CHEWABLE at 08:07

## 2021-03-07 RX ADMIN — DIVALPROEX SODIUM 1000 MG: 500 TABLET, DELAYED RELEASE ORAL at 08:07

## 2021-03-07 RX ADMIN — DIVALPROEX SODIUM 1000 MG: 500 TABLET, DELAYED RELEASE ORAL at 20:32

## 2021-03-07 RX ADMIN — HYDROMORPHONE HYDROCHLORIDE 0.5 MG: 1 INJECTION, SOLUTION INTRAMUSCULAR; INTRAVENOUS; SUBCUTANEOUS at 11:04

## 2021-03-07 RX ADMIN — ISOSORBIDE DINITRATE 40 MG: 20 TABLET ORAL at 20:32

## 2021-03-07 RX ADMIN — HYDROMORPHONE HYDROCHLORIDE 0.5 MG: 1 INJECTION, SOLUTION INTRAMUSCULAR; INTRAVENOUS; SUBCUTANEOUS at 16:46

## 2021-03-07 RX ADMIN — HYDROMORPHONE HYDROCHLORIDE 0.5 MG: 1 INJECTION, SOLUTION INTRAMUSCULAR; INTRAVENOUS; SUBCUTANEOUS at 06:28

## 2021-03-07 RX ADMIN — ACETAMINOPHEN 975 MG: 325 TABLET, FILM COATED ORAL at 08:08

## 2021-03-07 RX ADMIN — HYDROMORPHONE HYDROCHLORIDE 0.5 MG: 1 INJECTION, SOLUTION INTRAMUSCULAR; INTRAVENOUS; SUBCUTANEOUS at 03:10

## 2021-03-07 ASSESSMENT — ACTIVITIES OF DAILY LIVING (ADL)
ADLS_ACUITY_SCORE: 15
ADLS_ACUITY_SCORE: 16
ADLS_ACUITY_SCORE: 15
PREVIOUS_RESPONSIBILITIES: FINANCES;MEDICATION MANAGEMENT
ADLS_ACUITY_SCORE: 16
ADLS_ACUITY_SCORE: 15
ADLS_ACUITY_SCORE: 16

## 2021-03-07 NOTE — PROGRESS NOTES
[facility]    Medicine Progress Note - Hospitalist Service       Date of Admission:  3/4/2021  Assessment & Plan        Huseyin Pettit is a 61 year old male w/ PMH of DM II, CAD w/ prior PCI in 2011, former smoker HTN, HLD, CKD, chronic pain, anxiety and depression, osteomyelitis of toe s/p amputation of right foot admitted on 03/04/21 w/ CP and possible septic arthritis or multilevel osteomyelitis.      Left foot wounds with septic arthritis and osteomyelitis  Multiple fractures of left foot  Staph aureus bacteremia    Vascular surgery, Podiatry, and ID consulted.    S/p left TMA on 3/5/21 by Dr. Barnes.    Continue Zosyn per ID.    1/2 blood cultures from admission positive for Staph aureus. Repeat blood cultures have been negative to date.    Podiatry planning return to the OR this admission, timing and type of procedure will depend on pathology results from first procedure.    Acute blood loss anemia  Chronic normocytic anemia with baseline 12-13g    Had an episode of hypotension early on the morning of 3/6/21. Labs were checked and hemoglobin was down to 6.1.    Transfused one unit PRBCs on 3/6/21.    Hemoglobin improved to about 8, however has come back down to 6.9 this morning.  Will transfuse 1 more unit of PRBCs today.     Recheck hemoglobin this evening and again in a.m.    Will hold prophylactic lovenox for now.    Hypotension    Likely hypovolemic.    Has been responsive to IV fluids and transfusion.    Blood pressure improved today.    Chest pain, +risk factors including HTN, HLD, DM2  CAD w/ PCI in 2011 to LAD and CX iin [place], last coronary angiogram was 3/2020 which did not require any mechanical revascularization and GDMT was recommended.    Pain much improved, seems to have resolved.    Serial troponins negative.    No significant dysrhythmias on telemetry.    EKG x 2 did not show any acute ischemic changes.    CT chest negative for PE on 3/4/21.    He is tender to  palpation in the area of discomfort.    Given negative work-up including negative serial troponins despite ongoing chest pain, suspicion for cardiac etiology of his pain is low.    Symptomatic management.    Continue PTA aspirin, plavix, bisoprolol, isosorbide dinitrate, and rosuvastatin.    Diabetes mellitus, type 2    Hemoglobin A1c 6.8 on 3/4/21.    Hold PTA glimepiride and metformin for now.    Blood sugars better controlled today.    Was on NovoLog Mix 70/30 at 28 units in AM and 38 units in PM prior to admission, currently on hold.    Currently on Lantus 30 units at bedtime, continue the same.    Continue accuchecks and medium dose sliding scale NovoLog.    Continue prandial Novolog at 1 unit per 15 grams of carbs.    Monitor for hypoglycemia.    CKD stage 2-3, baseline Cr 0.8-1.0 as of early 2020  Mild hyponatremia    Hyponatremia resolved.    Creatinine improved to 0.77 this morning.    Abdominal pain on palpation in LUQ and RLQ, although patient is requesting food and drink.  Constipation.    Lactic acid was within normal limits.    Epigastric tenderness has resolved.    Continue bowel regimen.    Depression  Anxiety    Continue PTA buspirone, clomipramine, fluoxetine, and quetiapine.    Mild basilar predominant bronchial wall thickening with some mucous plugging may be related to bronchitis, seen on CT    Encouraged smoking cessation.    Encourage incentive spirometer.    Extensive lower extremity erythematous macular rash, confluent in areas, concerning for vasculitic process    Since vascular surgery is already involved perhaps they would consider doing a biopsy.    Rash improved.    Vasculitis labs ordered, pending.    Covid-19 PCR NEGATIVE  Asymptomatic COVID-19 PCR testing was negative on 3/4/21.     Diet: Moderate Consistent CHO Diet    DVT Prophylaxis: Pneumatic Compression Devices  Rangel Catheter: not present  Code Status: Full Code           Disposition Plan   Expected discharge: Continue  inpatient care pending repeat trip to the OR with podiatry for intervention on his foot  Entered: Taras Newman MD 03/07/2021, 12:59 PM       The patient's care was discussed with the Bedside Nurse and Patient.    Taras Newman MD  Hospitalist Service  [facility]  Contact information available via [facility] Paging/Directory    ______________________________________________________________________    Interval History   Huseyin Pettit feels okay today.  Some pain in his foot, fairly well controlled with pain medication.  No other complaints or concerns.  Denies fevers, lightheadedness, chest pain, shortness of breath, nausea, abdominal pain, diarrhea, constipation, and urinary symptoms.    Data reviewed today: I reviewed all medications, new labs and imaging results over the last 24 hours. I personally reviewed no images or EKG's today.    Physical Exam   Vital Signs: Temp: 98.3  F (36.8  C) Temp src: Oral BP: 114/62 Pulse: 69   Resp: 18 SpO2: 94 % O2 Device: None (Room air)    Weight: 147 lbs 4.28 oz  Constitutional: awake, alert, cooperative, no apparent distress, laying in bed  Respiratory: clear to auscultation bilaterally, no crackles or wheezing  Cardiovascular: regular rate and rhythm, normal S1 and S2, no murmur noted  GI: normal bowel sounds, soft, non-distended, non-tender  Skin: warm, dry  Musculoskeletal: no lower extremity pitting edema present, dressing in place on left lower extremity  Neurologic: awake, alert, oriented to name, place and time, motor is 5 out of 5 bilaterally, sensory is intact    Data   Recent Labs   Lab 03/07/21  1108 03/06/21 2011 03/06/21  1408 03/06/21  0857 03/06/21  0047 03/04/21  2240 03/04/21  2240 03/04/21  1654 03/04/21  0942   WBC 9.0  --   --  13.9* 10.5   < >  --   --  13.3*   HGB 6.9* 7.7* 7.8* 10.1* 6.1*   < >  --   --  9.6*   MCV 88  --   --  86 86   < >  --   --  89     --   --  360 308   < >  --   --  449   INR  --   --   --   --    --   --   --   --  1.24*     --   --  139 132*   < >  --   --  133   POTASSIUM 4.2  --   --  4.1 4.3   < >  --   --  4.1   CHLORIDE 114*  --   --  108 103   < >  --   --  99   CO2 27  --   --  28 26   < >  --   --  25   BUN 17  --   --  16 21   < >  --   --  26   CR 0.77  --   --  0.70 0.81   < >  --   --  0.84   ANIONGAP 2*  --   --  3 3   < >  --   --  9   MYRON 7.5*  --   --  8.4* 7.4*   < >  --   --  8.9   GLC 80  --   --  219* 386*   < >  --   --  125*   ALBUMIN  --   --   --   --   --   --   --  1.9*  --    PROTTOTAL  --   --   --   --   --   --   --  6.9  --    BILITOTAL  --   --   --   --   --   --   --  0.2  --    ALKPHOS  --   --   --   --   --   --   --  72  --    ALT  --   --   --   --   --   --   --  22  --    AST  --   --   --   --   --   --   --  20  --    TROPI  --   --   --   --   --   --  <0.015 <0.015 <0.015    < > = values in this interval not displayed.     Medications     lactated ringers 100 mL/hr at 03/07/21 1104       acetaminophen  975 mg Oral Q8H     aspirin  81 mg Oral Daily     bisoprolol  5 mg Oral Daily     busPIRone  10 mg Oral 4x Daily     clomiPRAMINE  100 mg Oral BID     clopidogrel  75 mg Oral Daily     divalproex sodium delayed-release  1,000 mg Oral BID     FLUoxetine  40 mg Oral Daily     insulin aspart   Subcutaneous QAM AC     insulin aspart   Subcutaneous Daily with lunch     insulin aspart   Subcutaneous Daily with supper     insulin aspart  1-7 Units Subcutaneous TID AC     insulin aspart  1-5 Units Subcutaneous At Bedtime     insulin glargine  30 Units Subcutaneous At Bedtime     isosorbide Dinitrate  40 mg Oral BID     piperacillin-tazobactam  3.375 g Intravenous Q6H     polyethylene glycol  17 g Oral BID     pregabalin  150 mg Oral BID     QUEtiapine  600 mg Oral At Bedtime     rosuvastatin  20 mg Oral Daily

## 2021-03-07 NOTE — PLAN OF CARE
DATE & TIME: 3/07/2021 4302-2114  Cognitive Concerns/ Orientation: A&Ox4; Pleasant  BEHAVIOR & AGGRESSION TOOL COLOR: Green  ABNL VS/O2: VSS on RA. BP has improved 104-110/60s after 1 L bolus.  MOBILITY: NWB on L foot, SBA + walker pivot transfer to use urinal.  PAIN MANAGMENT: IV dilaudid given x3 for L foot pain 8/10 with relief  DIET: Mod CHO  BOWEL/BLADDER: Continent, voiding in urinal.  No BM this shift.   ABNL LAB/BG:  and 119.  Hgb 7.7- recheck this morning.  DRAIN/DEVICES: L PIV infusing LR at 100mL/h; R PIV SL  TELEMETRY RHYTHM: NSR  SKIN: Scattered bruising on upper extremity. Rash on bilateral legs/shins/ankles with 1+ Edema. R great toe previously amputated. L transmetatarsal amputation (3/05). Surgical dressing in place with small drainage.   TESTS/PROCEDURES: Anticipate at least one more surgery per Podiatry note.  D/C DAY/GOALS/PLACE: Discharge dependent on surgical procedures, pain management/ tolerating PO medications, antibiotic plan established and safe disposition plan/ TCU bed available  OTHER IMPORTANT INFO: CMS checks LLE q4 hours (2+ posterior tibial pulse, TAMY dorsalis pedis pulse due to surgical dressing). Vascular surgery, PT, OT, and Podiatry following. Pt anxious and wanted to go outside and smoke.  Offered to get Nicotine patches or gum ordered but pt refused and said he'd be ok.

## 2021-03-07 NOTE — PROGRESS NOTES
Foot & Ankle Surgery Progress Note  March 7, 2021    S:  Huseyin was seen at bedside today for continued monitoring of left foot POD#2 sp open TMA left foot for severe diabetic infection.  Pain levels elevated but controlled.  Hgb persistently low, but no symptoms.  PT for NWB left foot.    /64 (BP Location: Left arm)   Pulse 83   Temp 98  F (36.7  C) (Oral)   Resp 16   Wt 66.8 kg (147 lb 4.3 oz)   SpO2 98%   BMI 21.75 kg/m      ROS - Pos for CC.  Denies nausea, vomiting, chills, fever, belly pain, calf pain, chest pain or shortness of breath. Complete remainder of ROS is otherwise neg.      PE - retention sutures in place, skin margins stable without necrosis.  No active arterial pumpers, slight oozing noted.  Skin shows no trophic, color or temperature changes otherwise.  No calf redness, swelling or pain noted otherwise.    Labs:    Component      Latest Ref Rng & Units 3/6/2021 3/6/2021 3/6/2021           8:57 AM  2:08 PM  8:11 PM   WBC      4.0 - 11.0 10e9/L 13.9 (H)     RBC Count      4.4 - 5.9 10e12/L 3.57 (L)     Hemoglobin      13.3 - 17.7 g/dL 10.1 (L) 7.8 (L) 7.7 (L)   Hematocrit      40.0 - 53.0 % 30.7 (L)     MCV      78 - 100 fl 86     MCH      26.5 - 33.0 pg 28.3     MCHC      31.5 - 36.5 g/dL 32.9     RDW      10.0 - 15.0 % 14.5     Platelet Count      150 - 450 10e9/L 360       Imaging:  IMPRESSION: Interval transmetatarsal amputation. No radiographic  evidence of residual or recurrent osteomyelitis or foreign body.  Otherwise negative.    Cultures:    Specimen Description Left Foot Tissue    Culture Micro Abnormal   Heavy growth   Staphylococcus aureus   Susceptibility testing in progress   P          Pathology:  pending    Assessment:  61 year old male POD#2 sp open TMA left lower extremity for severe diabetic left foot infection    Plan:  Dressing change  -LVM with Pathology lab, to confirm specimens have been received and are in process  -Dr Arana to follow up tomorrow; at least 1  further surgery will be needed; timing/type based on exam and Path.  Clinically the foot is stable, no plan for revision until Path finalized     Activity -  NWB; PT following   Pain Management -  Percocet,  dilaudid    DVT Prophylaxis -  mechanical   Abx therapy -  Memo Barnes DPM FACFAS FACFAOM  Podiatric Foot & Ankle Surgeon  [facility]  [phone]

## 2021-03-07 NOTE — PLAN OF CARE
DATE & TIME: 3/07/51195832-3808  Cognitive Concerns/ Orientation: A&Ox4; Pleasant; confused at times (r/t dilaudid? Will monitor)  BEHAVIOR & AGGRESSION TOOL COLOR: Green  ABNL VS/O2: VSS on RA. BP has improved today  MOBILITY: NWB on L foot, SBA + walker pivot transfer to use urinal. Non compliant on NWB if uses walker to bathroom; advised he will not be allowed to walk to bathroom, will use commode or bring commode to bedside.  PAIN MANAGMENT: IV dilaudid given x3 for L foot pain with relief  DIET: Mod CHO; plus snacks; carb count and coverage for snacks with insulin.  BOWEL/BLADDER: Continent, voiding in urinal.  BM x 1  ABNL LAB/BG: , 78.  Hgb 6.9- currently transfusing 1 unit prbc's.  DRAIN/DEVICES: PIV infusing LR at 100mL/h; bilat PIV  TELEMETRY RHYTHM: NSR  SKIN: Scattered bruising on upper extremity. Rash on bilateral legs/shins/ankles with 1+ Edema. R great toe previously amputated. L transmetatarsal amputation (3/05). Surgical dressing in place with small drainage.   TESTS/PROCEDURES: Anticipate at least one more surgery per Podiatry note.  D/C DAY/GOALS/PLACE: Discharge dependent on surgical procedures, pain management/ tolerating PO medications, antibiotic plan established and safe disposition plan/ TCU bed available  OTHER IMPORTANT INFO: Continues on IV antibiotics, ID following for positive culture of staph aureus of foot.  Podiatry changed dressing today, drainage noted on dressing this afternoon, reinforced dressing.

## 2021-03-07 NOTE — PROVIDER NOTIFICATION
Brief update:    Paged re: hypotension    Patient with blood pressures in the mid 80s.  Has had blood pressures running low in the setting of hypovolemia and blood loss anemia.  Had improvement in blood pressure following transfusion and bolus during RRT yesterday.    Give additional 1 L bolus now, continue to trend hemoglobin and assess for need for blood transfusion    Carlitos Tidwell MD  10:51 PM

## 2021-03-07 NOTE — PLAN OF CARE
DATE & TIME: 3/06/21 8987-4541  Cognitive Concerns/ Orientation: A&Ox4; Pleasant  BEHAVIOR & AGGRESSION TOOL COLOR: Green  ABNL VS/O2: VSS ex hypotension (improving 90s/50s) Pt states that he always has low blood pressure.  MOBILITY: NWB on L foot, Ax1 standing at bedside to use urinal.   PAIN MANAGMENT: IV dilaudid given x3 for foot pain 8/10 with relief  DIET: Mod CHO- now on carb count  BOWEL/BLADDER: Continent, voiding in urinal.  No BM this shift.   ABNL LAB/BG: , 255.  Hgb 7.7- recheck in AM.   DRAIN/DEVICES: L PIV infusing LR at 100mL/h; R PIV SL  TELEMETRY RHYTHM: NSR  SKIN: Scattered bruising on upper extremity. Rash on bilateral legs/shins/ankles with 1+ Edema. R great toe previously amputated. L transmetatarsal amputation yesterday (3/05). Surgical dressing in place with small drainage.   TESTS/PROCEDURES: Anticipate at least one more surgery per Podiatry note.  D/C DAY/GOALS/PLACE: Discharge dependent on surgical procedures, pain management/ tolerating PO medications, antibiotic plan established and safe disposition plan/ TCU bed available  OTHER IMPORTANT INFO: CMS checks LLE q4 hours (2+ posterior tibial pulse, TAMY dorsalis pedis pulse due to surgical dressing). Vascular surgery, PT, OT, and Podiatry following.

## 2021-03-07 NOTE — PROGRESS NOTES
[facility]  Infectious Disease Progress Note          Assessment and Plan:   Assessment & Plan     Huseyin Pettit is a 61 year old male who was admitted on 3/4/2021.      Impression:  1. 61 y.o male with diabetes.   2. Neuropathy.   3. CAD  4. Admitted with left foot wound with spetic arthritis.   5. Previous admission last year for osteo s/p toe amputation that admission.   6. Blood cultures positive for MSSA.      Recommendations:   Continue zosyn alone , OK for the  MSSA + potential GNR/ anaerobe involved in the foot.   Stop vancomycin.   Daily blood cultures till clears so far Follow-up all neg  No long term IV lines yet   Another Episode of low BP  may be sepsis but not clear, confusion resolved  No clear secondary sites, no artificail material  Op findings noted cx pending further op needed        Interval History:   no new complaints and doing well; no cp, sob, n/v/d, or abd pain. Seems OK, T down Follow-up BC neg so far foot cx pending , note RRT for low BP              Medications:       acetaminophen  975 mg Oral Q8H     aspirin  81 mg Oral Daily     bisoprolol  5 mg Oral Daily     busPIRone  10 mg Oral 4x Daily     clomiPRAMINE  100 mg Oral BID     clopidogrel  75 mg Oral Daily     divalproex sodium delayed-release  1,000 mg Oral BID     FLUoxetine  40 mg Oral Daily     insulin aspart   Subcutaneous QAM AC     insulin aspart   Subcutaneous Daily with lunch     insulin aspart   Subcutaneous Daily with supper     insulin aspart  1-7 Units Subcutaneous TID AC     insulin aspart  1-5 Units Subcutaneous At Bedtime     insulin glargine  30 Units Subcutaneous At Bedtime     isosorbide Dinitrate  40 mg Oral BID     piperacillin-tazobactam  3.375 g Intravenous Q6H     polyethylene glycol  17 g Oral BID     pregabalin  150 mg Oral BID     QUEtiapine  600 mg Oral At Bedtime     rosuvastatin  20 mg Oral Daily                  Physical Exam:   Blood pressure 103/64, pulse 83, temperature 98  F  (36.7  C), temperature source Oral, resp. rate 16, weight 66.8 kg (147 lb 4.3 oz), SpO2 98 %.  Wt Readings from Last 2 Encounters:   03/05/21 66.8 kg (147 lb 4.3 oz)   03/04/21 61.2 kg (135 lb)     Vital Signs with Ranges  Temp:  [97.8  F (36.6  C)-98.2  F (36.8  C)] 98  F (36.7  C)  Pulse:  [83-97] 83  Resp:  [16-20] 16  BP: ()/(44-82) 103/64  SpO2:  [95 %-98 %] 98 %    Constitutional: Awake, alert, cooperative, no apparent distress   Lungs: Clear to auscultation bilaterally, no crackles or wheezing   Cardiovascular: Regular rate and rhythm, normal S1 and S2, and no murmur noted   Abdomen: Normal bowel sounds, soft, non-distended, non-tender   Skin: No rashes, no cyanosis, no edema   Other: Foot wrapped          Data:   All microbiology laboratory data reviewed.  Recent Labs   Lab Test 03/06/21 2011 03/06/21  1408 03/06/21  0857 03/06/21  0047 03/05/21  0947   WBC  --   --  13.9* 10.5 16.6*   HGB 7.7* 7.8* 10.1* 6.1* 10.3*   HCT  --   --  30.7* 19.0* 31.7*   MCV  --   --  86 86 86   PLT  --   --  360 308 512*     Recent Labs   Lab Test 03/06/21  0857 03/06/21  0047 03/05/21  0947   CR 0.70 0.81 0.73     Recent Labs   Lab Test 03/04/21  0942   SED 91*     Recent Labs   Lab Test 03/06/21  0907 03/06/21  0856 03/05/21  1847 03/05/21  1052 03/05/21  0947 03/04/21  1015 03/04/21  0942 03/03/20  1928 02/29/20  1104   CULT No growth after 18 hours No growth after 18 hours Heavy growth  Staphylococcus aureus  Susceptibility testing in progress  *  Heavy growth  Normal skin rm    Culture negative monitoring continues No growth after 2 days No growth after 2 days Cultured on the 1st day of incubation:  Staphylococcus aureus  *  Critical Value/Significant Value, preliminary result only, called to and read back by  Ashtyn Chicas RN at 0537 3/5/21 hg    (Note)  POSITIVE for STAPHYLOCOCCUS AUREUS and NEGATIVE for the mecA gene  (not MRSA) by [employer] multiplex nucleic acid test. The mecA gene was  not detected. Final  identification and antimicrobial susceptibility  testing will be verified by standard methods.    Specimen tested with [employer] multiplex, gram-positive blood culture  nucleic acid test for the following targets: Staph aureus, Staph  epidermidis, Staph lugdunensis, other Staph species, Enterococcus  faecalis, Enterococcus faecium, Streptococcus species, S. agalactiae,  S. anginosus grp., S. pneumoniae, S. pyogenes, Listeria sp., mecA  (methicillin resistance) and Lopez/B (vancomycin resistance).    Critical Value/Significant Value called to and read back by Sacha Del Rosario RN on 3.5.21 at 0830. bw   Cultured on the 1st day of incubation:  Staphylococcus aureus  Susceptibility testing done on previous specimen  *  Critical Value/Significant Value, preliminary result only, called to and read back by  Sacha Del Rosario RN on 3.5.21 at 0830. bw   No anaerobes isolated  Light growth  Coagulase negative Staphylococcus  *  Light growth  Alcaligenes faecalis  *  Light growth  Enterococcus faecalis  * No growth

## 2021-03-07 NOTE — PROGRESS NOTES
MD Notification    Notified Person: MD    Notified Person Name: Tidwell    Notification Date/Time: 3/6/21 9179    Notification Interaction: page    Purpose of Notification: pt's BP is trending down 81/46. had RRT for hypotension last night, and was bloused today as well. Should we do another bolus?    Orders Received: 1000mL LR bolus ordered    Comments:

## 2021-03-08 ENCOUNTER — TELEPHONE (OUTPATIENT)
Dept: FAMILY MEDICINE | Facility: CLINIC | Age: 61
End: 2021-03-08

## 2021-03-08 LAB
ANA SER QL IF: NEGATIVE
ANCA AB PATTERN SER IF-IMP: NORMAL
BACTERIA SPEC CULT: ABNORMAL
BACTERIA SPEC CULT: ABNORMAL
C-ANCA TITR SER IF: NORMAL {TITER}
C3 SERPL-MCNC: 135 MG/DL (ref 81–157)
C4 SERPL-MCNC: 48 MG/DL (ref 13–39)
CREAT SERPL-MCNC: 0.64 MG/DL (ref 0.66–1.25)
ERYTHROCYTE [DISTWIDTH] IN BLOOD BY AUTOMATED COUNT: 15.1 % (ref 10–15)
GFR SERPL CREATININE-BSD FRML MDRD: >90 ML/MIN/{1.73_M2}
GLUCOSE BLDC GLUCOMTR-MCNC: 104 MG/DL (ref 70–99)
GLUCOSE BLDC GLUCOMTR-MCNC: 106 MG/DL (ref 70–99)
GLUCOSE BLDC GLUCOMTR-MCNC: 142 MG/DL (ref 70–99)
GLUCOSE BLDC GLUCOMTR-MCNC: 153 MG/DL (ref 70–99)
GLUCOSE BLDC GLUCOMTR-MCNC: 95 MG/DL (ref 70–99)
HCT VFR BLD AUTO: 25.3 % (ref 40–53)
HGB BLD-MCNC: 8.4 G/DL (ref 13.3–17.7)
MCH RBC QN AUTO: 28.8 PG (ref 26.5–33)
MCHC RBC AUTO-ENTMCNC: 33.2 G/DL (ref 31.5–36.5)
MCV RBC AUTO: 87 FL (ref 78–100)
PLATELET # BLD AUTO: 270 10E9/L (ref 150–450)
POTASSIUM SERPL-SCNC: 4.2 MMOL/L (ref 3.4–5.3)
RBC # BLD AUTO: 2.92 10E12/L (ref 4.4–5.9)
SPECIMEN SOURCE: ABNORMAL
WBC # BLD AUTO: 12.1 10E9/L (ref 4–11)

## 2021-03-08 PROCEDURE — 258N000003 HC RX IP 258 OP 636: Performed by: NURSE PRACTITIONER

## 2021-03-08 PROCEDURE — 250N000013 HC RX MED GY IP 250 OP 250 PS 637: Performed by: PODIATRIST

## 2021-03-08 PROCEDURE — 250N000011 HC RX IP 250 OP 636: Performed by: HOSPITALIST

## 2021-03-08 PROCEDURE — 250N000011 HC RX IP 250 OP 636: Performed by: PODIATRIST

## 2021-03-08 PROCEDURE — 250N000013 HC RX MED GY IP 250 OP 250 PS 637: Performed by: HOSPITALIST

## 2021-03-08 PROCEDURE — 120N000001 HC R&B MED SURG/OB

## 2021-03-08 PROCEDURE — 84132 ASSAY OF SERUM POTASSIUM: CPT | Performed by: PODIATRIST

## 2021-03-08 PROCEDURE — 99232 SBSQ HOSP IP/OBS MODERATE 35: CPT | Performed by: HOSPITALIST

## 2021-03-08 PROCEDURE — 82565 ASSAY OF CREATININE: CPT | Performed by: PODIATRIST

## 2021-03-08 PROCEDURE — 250N000012 HC RX MED GY IP 250 OP 636 PS 637: Performed by: PODIATRIST

## 2021-03-08 PROCEDURE — 85027 COMPLETE CBC AUTOMATED: CPT | Performed by: PODIATRIST

## 2021-03-08 PROCEDURE — 36415 COLL VENOUS BLD VENIPUNCTURE: CPT | Performed by: PODIATRIST

## 2021-03-08 PROCEDURE — 999N001017 HC STATISTIC GLUCOSE BY METER IP

## 2021-03-08 RX ORDER — NICOTINE 21 MG/24HR
1 PATCH, TRANSDERMAL 24 HOURS TRANSDERMAL DAILY
Status: DISCONTINUED | OUTPATIENT
Start: 2021-03-08 | End: 2021-03-18 | Stop reason: HOSPADM

## 2021-03-08 RX ORDER — ESOMEPRAZOLE MAGNESIUM 40 MG/1
40 CAPSULE, DELAYED RELEASE ORAL DAILY PRN
COMMUNITY
End: 2021-04-29

## 2021-03-08 RX ORDER — ESOMEPRAZOLE MAGNESIUM 40 MG/1
40 CAPSULE, DELAYED RELEASE ORAL DAILY PRN
Status: DISCONTINUED | OUTPATIENT
Start: 2021-03-08 | End: 2021-03-18 | Stop reason: HOSPADM

## 2021-03-08 RX ADMIN — HYDROMORPHONE HYDROCHLORIDE 1 MG: 1 INJECTION, SOLUTION INTRAMUSCULAR; INTRAVENOUS; SUBCUTANEOUS at 05:40

## 2021-03-08 RX ADMIN — HYDROMORPHONE HYDROCHLORIDE 1 MG: 1 INJECTION, SOLUTION INTRAMUSCULAR; INTRAVENOUS; SUBCUTANEOUS at 11:47

## 2021-03-08 RX ADMIN — HYDROMORPHONE HYDROCHLORIDE 0.5 MG: 1 INJECTION, SOLUTION INTRAMUSCULAR; INTRAVENOUS; SUBCUTANEOUS at 18:58

## 2021-03-08 RX ADMIN — HYDROMORPHONE HYDROCHLORIDE 1 MG: 1 INJECTION, SOLUTION INTRAMUSCULAR; INTRAVENOUS; SUBCUTANEOUS at 09:44

## 2021-03-08 RX ADMIN — CLOPIDOGREL BISULFATE 75 MG: 75 TABLET, FILM COATED ORAL at 07:50

## 2021-03-08 RX ADMIN — Medication 1 MG: at 07:52

## 2021-03-08 RX ADMIN — BUSPIRONE HYDROCHLORIDE 10 MG: 10 TABLET ORAL at 18:23

## 2021-03-08 RX ADMIN — CLOMIPRAMINE HCL 100 MG: 50 CAPSULE ORAL at 07:53

## 2021-03-08 RX ADMIN — PREGABALIN 150 MG: 75 CAPSULE ORAL at 07:53

## 2021-03-08 RX ADMIN — BUSPIRONE HYDROCHLORIDE 10 MG: 10 TABLET ORAL at 07:52

## 2021-03-08 RX ADMIN — PIPERACILLIN AND TAZOBACTAM 3.38 G: 3; .375 INJECTION, POWDER, FOR SOLUTION INTRAVENOUS at 08:11

## 2021-03-08 RX ADMIN — QUETIAPINE FUMARATE 600 MG: 300 TABLET ORAL at 19:23

## 2021-03-08 RX ADMIN — ESOMEPRAZOLE MAGNESIUM 40 MG: 40 CAPSULE, DELAYED RELEASE ORAL at 20:07

## 2021-03-08 RX ADMIN — ROSUVASTATIN CALCIUM 20 MG: 20 TABLET, FILM COATED ORAL at 07:54

## 2021-03-08 RX ADMIN — SODIUM CHLORIDE, POTASSIUM CHLORIDE, SODIUM LACTATE AND CALCIUM CHLORIDE: 600; 310; 30; 20 INJECTION, SOLUTION INTRAVENOUS at 20:09

## 2021-03-08 RX ADMIN — HYDROMORPHONE HYDROCHLORIDE 0.5 MG: 1 INJECTION, SOLUTION INTRAMUSCULAR; INTRAVENOUS; SUBCUTANEOUS at 02:31

## 2021-03-08 RX ADMIN — ACETAMINOPHEN 975 MG: 325 TABLET, FILM COATED ORAL at 07:51

## 2021-03-08 RX ADMIN — FLUOXETINE 40 MG: 20 CAPSULE ORAL at 07:54

## 2021-03-08 RX ADMIN — ASPIRIN 81 MG CHEWABLE TABLET 81 MG: 81 TABLET CHEWABLE at 07:51

## 2021-03-08 RX ADMIN — DIVALPROEX SODIUM 1000 MG: 500 TABLET, DELAYED RELEASE ORAL at 19:23

## 2021-03-08 RX ADMIN — PREGABALIN 150 MG: 75 CAPSULE ORAL at 19:23

## 2021-03-08 RX ADMIN — NICOTINE 1 PATCH: 14 PATCH, EXTENDED RELEASE TRANSDERMAL at 09:44

## 2021-03-08 RX ADMIN — SODIUM CHLORIDE, POTASSIUM CHLORIDE, SODIUM LACTATE AND CALCIUM CHLORIDE: 600; 310; 30; 20 INJECTION, SOLUTION INTRAVENOUS at 00:44

## 2021-03-08 RX ADMIN — HYDROMORPHONE HYDROCHLORIDE 0.5 MG: 1 INJECTION, SOLUTION INTRAMUSCULAR; INTRAVENOUS; SUBCUTANEOUS at 02:10

## 2021-03-08 RX ADMIN — DIVALPROEX SODIUM 1000 MG: 500 TABLET, DELAYED RELEASE ORAL at 07:52

## 2021-03-08 RX ADMIN — CLOMIPRAMINE HCL 100 MG: 50 CAPSULE ORAL at 19:23

## 2021-03-08 RX ADMIN — ISOSORBIDE DINITRATE 40 MG: 20 TABLET ORAL at 08:00

## 2021-03-08 RX ADMIN — PIPERACILLIN AND TAZOBACTAM 3.38 G: 3; .375 INJECTION, POWDER, FOR SOLUTION INTRAVENOUS at 02:10

## 2021-03-08 RX ADMIN — INSULIN GLARGINE 30 UNITS: 100 INJECTION, SOLUTION SUBCUTANEOUS at 22:55

## 2021-03-08 RX ADMIN — HYDROMORPHONE HYDROCHLORIDE 1 MG: 1 INJECTION, SOLUTION INTRAMUSCULAR; INTRAVENOUS; SUBCUTANEOUS at 07:46

## 2021-03-08 RX ADMIN — BISOPROLOL FUMARATE 5 MG: 5 TABLET ORAL at 07:51

## 2021-03-08 RX ADMIN — HYDROMORPHONE HYDROCHLORIDE 1 MG: 1 INJECTION, SOLUTION INTRAMUSCULAR; INTRAVENOUS; SUBCUTANEOUS at 23:11

## 2021-03-08 RX ADMIN — BUSPIRONE HYDROCHLORIDE 10 MG: 10 TABLET ORAL at 13:13

## 2021-03-08 RX ADMIN — BUSPIRONE HYDROCHLORIDE 10 MG: 10 TABLET ORAL at 22:55

## 2021-03-08 RX ADMIN — PIPERACILLIN AND TAZOBACTAM 3.38 G: 3; .375 INJECTION, POWDER, FOR SOLUTION INTRAVENOUS at 19:30

## 2021-03-08 RX ADMIN — HYDROMORPHONE HYDROCHLORIDE 1 MG: 1 INJECTION, SOLUTION INTRAMUSCULAR; INTRAVENOUS; SUBCUTANEOUS at 16:43

## 2021-03-08 RX ADMIN — HYDROMORPHONE HYDROCHLORIDE 1 MG: 1 INJECTION, SOLUTION INTRAMUSCULAR; INTRAVENOUS; SUBCUTANEOUS at 14:24

## 2021-03-08 RX ADMIN — ISOSORBIDE DINITRATE 40 MG: 20 TABLET ORAL at 19:23

## 2021-03-08 ASSESSMENT — ACTIVITIES OF DAILY LIVING (ADL)
ADLS_ACUITY_SCORE: 17
ADLS_ACUITY_SCORE: 16
ADLS_ACUITY_SCORE: 16
ADLS_ACUITY_SCORE: 17
ADLS_ACUITY_SCORE: 15
ADLS_ACUITY_SCORE: 14

## 2021-03-08 NOTE — TELEPHONE ENCOUNTER
Patient calling and wanted to update provider that he is still in the hospital for removal of toes on the left foot due to acute osteomyelitis and that he will call provider when he is out to make a follow up appointment.     Merlene Davis RN Flex

## 2021-03-08 NOTE — PLAN OF CARE
DATE & TIME: 3/08/21 9441-4702  Cognitive Concerns/ Orientation: A&Ox4; Pleasant; forgetful at times- mostly upon waking up  BEHAVIOR & AGGRESSION TOOL COLOR: Green  ABNL VS/O2: VSS on RA. BP has improved   MOBILITY: NWB on L foot, SBA + walker pivot transfer to use urinal/BSC. Wants to walk to BR but non-compliant with NWB on L foot.  PAIN MANAGMENT: IV dilaudid 1mg given x2 for L foot pain; attempted to give 0.5 mg at first but pt stated no change in pain during reassessment.  DIET: Mod CHO; carb count and coverage with insulin  BOWEL/BLADDER: Continent, voiding in urinal. BM x 2  ABNL LAB/BG:   DRAIN/DEVICES: PIV infusing LR at 100mL/h, Second PIV SL  TELEMETRY RHYTHM: NSR  SKIN: Scattered bruising on upper extremity. Rash on bilateral legs/shins/ankles with 1+ Edema. R great toe previously amputated. L transmetatarsal amputation (3/05). Dressing in place CDI.    TESTS/PROCEDURES: Anticipate at least one more surgery per Podiatry note.  D/C DAY/GOALS/PLACE: Discharge dependent on surgical procedures, pain management/ tolerating PO medications, antibiotic plan established and safe disposition plan/ TCU bed available  OTHER IMPORTANT INFO: Continues on IV antibiotics, ID following for positive culture of staph aureus of foot.  Pt refused bedtime Seroquel.  Can be anxious at times.

## 2021-03-08 NOTE — PROGRESS NOTES
[facility]  Infectious Disease Progress Note          Assessment and Plan:   Assessment & Plan     Huseyin Pettit is a 61 year old male who was admitted on 3/4/2021.      Impression:  1. 61 y.o male with diabetes.   2. Neuropathy.   3. CAD  4. Admitted with left foot wound with spetic arthritis.   5. Previous admission last year for osteo s/p toe amputation that admission.   6. Blood cultures positive for MSSA.      Recommendations:   Continue zosyn alone , OK for the  MSSA + potential GNR/ anaerobe involved in the foot. So far only MSSA in the foot cultures.   Can stop Daily blood cultures , Follow-up all neg  No long term IV lines yet but will need at the least 4 weeks of IV antibiotics, if cultures stay negative can get picc as early as tomorrow.   No clear secondary sites, no artificail material  Op findings noted cx pending further op needed        Interval History:   no new complaints and doing well; no cp, sob, n/v/d, or abd pain. Seems OK, T down Follow-up BC neg so far foot cx with MSSA only so far               Medications:       acetaminophen  975 mg Oral Q8H     aspirin  81 mg Oral Daily     bisoprolol  5 mg Oral Daily     busPIRone  10 mg Oral 4x Daily     clomiPRAMINE  100 mg Oral BID     clopidogrel  75 mg Oral Daily     divalproex sodium delayed-release  1,000 mg Oral BID     FLUoxetine  40 mg Oral Daily     insulin aspart   Subcutaneous QAM AC     insulin aspart   Subcutaneous Daily with lunch     insulin aspart   Subcutaneous Daily with supper     insulin aspart  1-7 Units Subcutaneous TID AC     insulin aspart  1-5 Units Subcutaneous At Bedtime     insulin glargine  30 Units Subcutaneous At Bedtime     isosorbide Dinitrate  40 mg Oral BID     nicotine  1 patch Transdermal Daily     nicotine   Transdermal Q8H     piperacillin-tazobactam  3.375 g Intravenous Q6H     polyethylene glycol  17 g Oral BID     pregabalin  150 mg Oral BID     QUEtiapine  600 mg Oral At Bedtime      rosuvastatin  20 mg Oral Daily                  Physical Exam:   Blood pressure (!) 144/80, pulse 76, temperature 98.1  F (36.7  C), temperature source Oral, resp. rate 20, weight 66.8 kg (147 lb 4.3 oz), SpO2 97 %.  Wt Readings from Last 2 Encounters:   03/05/21 66.8 kg (147 lb 4.3 oz)   03/04/21 61.2 kg (135 lb)     Vital Signs with Ranges  Temp:  [97.6  F (36.4  C)-98.3  F (36.8  C)] 98.1  F (36.7  C)  Pulse:  [69-83] 76  Resp:  [16-20] 20  BP: (103-148)/(62-80) 144/80  SpO2:  [94 %-98 %] 97 %    Constitutional: Awake, alert, cooperative, no apparent distress   Lungs: Clear to auscultation bilaterally, no crackles or wheezing   Cardiovascular: Regular rate and rhythm, normal S1 and S2, and no murmur noted   Abdomen: Normal bowel sounds, soft, non-distended, non-tender   Skin: No rashes, no cyanosis, no edema   Other: Foot wrapped          Data:   All microbiology laboratory data reviewed.  Recent Labs   Lab Test 03/08/21  0756 03/07/21  1759 03/07/21  1108 03/06/21  0857 03/06/21  0857   WBC 12.1*  --  9.0  --  13.9*   HGB 8.4* 8.5* 6.9*   < > 10.1*   HCT 25.3*  --  21.3*  --  30.7*   MCV 87  --  88  --  86     --  303  --  360    < > = values in this interval not displayed.     Recent Labs   Lab Test 03/08/21  0756 03/07/21  1108 03/06/21  0857   CR 0.64* 0.77 0.70     Recent Labs   Lab Test 03/04/21  0942   SED 91*     Recent Labs   Lab Test 03/07/21  1109 03/07/21  1107 03/06/21  0907 03/06/21  0856 03/05/21  1847 03/05/21  1052 03/05/21  0947 03/04/21  1015 03/04/21  0942   CULT No growth after 15 hours No growth after 15 hours No growth after 2 days No growth after 2 days Heavy growth  Staphylococcus aureus  *  Heavy growth  Normal skin rm    Culture negative monitoring continues No growth after 3 days No growth after 3 days Cultured on the 1st day of incubation:  Staphylococcus aureus  *  Critical Value/Significant Value, preliminary result only, called to and read back by  Ashtyn Chicas RN at  0537 3/5/21 hg    (Note)  POSITIVE for STAPHYLOCOCCUS AUREUS and NEGATIVE for the mecA gene  (not MRSA) by [employer] multiplex nucleic acid test. The mecA gene was  not detected. Final identification and antimicrobial susceptibility  testing will be verified by standard methods.    Specimen tested with [employer] multiplex, gram-positive blood culture  nucleic acid test for the following targets: Staph aureus, Staph  epidermidis, Staph lugdunensis, other Staph species, Enterococcus  faecalis, Enterococcus faecium, Streptococcus species, S. agalactiae,  S. anginosus grp., S. pneumoniae, S. pyogenes, Listeria sp., mecA  (methicillin resistance) and Lopez/B (vancomycin resistance).    Critical Value/Significant Value called to and read back by Sacha Del Rosario RN on 3.5.21 at 0830. bw   Cultured on the 1st day of incubation:  Staphylococcus aureus  Susceptibility testing done on previous specimen  *  Critical Value/Significant Value, preliminary result only, called to and read back by  Sacha Del Rosario RN on 3.5.21 at 0830. bw

## 2021-03-08 NOTE — PROGRESS NOTES
"SP[facility]UAL [facility] Progress Note  [facility]    Length-of-Stay visit.    Ptnt Huseyin shared that he is simply waiting for test results and hopes to leave soon. When asked about coping with the health challenges, he responded with a shrug, \"Diabetes, what can you do?\"    Huseyin mentioned family, especially his brother, as part of his support system. He said he is doing fine and knows to ask for SH if he needs.    I offered reflective conversation and emotional support, and said a blessing.    SH continues to be available.    Maritza Vidal  Chaplain Resident  "

## 2021-03-08 NOTE — PROGRESS NOTES
[facility]    Medicine Progress Note - Hospitalist Service       Date of Admission:  3/4/2021  Assessment & Plan        Huseyin Pettit is a 61 year old male w/ PMH of DM II, CAD w/ prior PCI in 2011, former smoker HTN, HLD, CKD, chronic pain, anxiety and depression, osteomyelitis of toe s/p amputation of right foot admitted on 03/04/21 w/ CP and possible septic arthritis or multilevel osteomyelitis.       Left foot wounds with septic arthritis and osteomyelitis  Multiple fractures of left foot  Staph aureus bacteremia    Vascular surgery, Podiatry, and ID consulted.    S/p left TMA on 3/5/21 by Dr. Barnes.    Continue Zosyn per ID.    1/2 blood cultures from admission positive for Staph aureus. Repeat blood cultures have been negative to date.    Podiatry planning return to the OR this admission, timing and type of procedure will depend on pathology results from first procedure.     Acute blood loss anemia  Chronic normocytic anemia with baseline 12-13g    Had an episode of hypotension early on the morning of 3/6/21. Labs were checked and hemoglobin was down to 6.1.    Transfused one unit PRBCs on 3/6/21.    Hemoglobin improved to about 8, however dropped to 6.9.  Transfused 1 more unit of PRBCs 3/7.     hgb stable at 8.4 AM 3/8    Holding prophylactic lovenox for now.     Hypotension    Likely hypovolemic.    Has been responsive to IV fluids and transfusion.    Blood pressure improved today.     Chest pain, +risk factors including HTN, HLD, DM2  CAD w/ PCI in 2011 to LAD and CX iin [place], last coronary angiogram was 3/2020 which did not require any mechanical revascularization and GDMT was recommended.    Pain much improved, seems to have resolved.    Serial troponins negative.    No significant dysrhythmias on telemetry.    EKG x 2 did not show any acute ischemic changes.    CT chest negative for PE on 3/4/21.    He is tender to palpation in the area of discomfort.    Given negative  work-up including negative serial troponins despite ongoing chest pain, suspicion for cardiac etiology of his pain is low.    Symptomatic management.    Continue PTA aspirin, plavix, bisoprolol, isosorbide dinitrate, and rosuvastatin.     Diabetes mellitus, type 2    Hemoglobin A1c 6.8 on 3/4/21.    Hold PTA glimepiride and metformin for now (further surgery anticipated)    Blood sugars remain improved / at goal as of 3/8    Was on NovoLog Mix 70/30 at 28 units in AM and 38 units in PM prior to admission, currently on hold.    Currently on Lantus 30 units at bedtime    Continue accuchecks and medium dose sliding scale NovoLog.    Continue prandial Novolog at 1 unit per 15 grams of carbs.    Monitor for hypoglycemia.     CKD stage 2-3, baseline Cr 0.8-1.0 as of early 2020  Mild hyponatremia    Hyponatremia resolved.    Creatinine improved to 0.77 this morning.     Abdominal pain on palpation in LUQ and RLQ, although patient is requesting food and drink.  Constipation    Lactic acid was within normal limits.    Epigastric tenderness has resolved.    Continue bowel regimen.     Depression  Anxiety    Continue PTA buspirone, clomipramine, fluoxetine, and quetiapine.     Mild basilar predominant bronchial wall thickening with some mucous plugging may be related to bronchitis, seen on CT    Encouraged smoking cessation.    Encourage incentive spirometer.     Extensive lower extremity erythematous macular rash, confluent in areas, concerning for vasculitic process    Since vascular surgery is already involved perhaps they would consider doing a biopsy.    Rash improved.    Vasculitis labs ordered, pending.     Covid-19 PCR NEGATIVE  Asymptomatic COVID-19 PCR testing was negative on 3/4/21.      Diet: Moderate Consistent CHO Diet    DVT Prophylaxis: Pneumatic Compression Devices  Rangel Catheter: not present  Code Status: Full Code           Disposition Plan   Expected discharge: 2+ days pending podiatry and ID  plans/recs  Entered: Juan Aden MD 03/08/2021, 10:37 AM       The patient's care was discussed with the Bedside Nurse and Patient.    Juan Aden MD  Hospitalist Service  [facility]  Contact information available via [facility] Paging/Directory    ______________________________________________________________________    Interval History   Care assumed today. Podiatry and ID following. Another surgery anticipated per podiatry, continues on zosyn per ID. Denies new pain, fevers, or chills. No chest pain. No sob. L foot not hurting now.     Data reviewed today: I reviewed all medications, new labs and imaging results over the last 24 hours. I personally reviewed no images or EKG's today.    Physical Exam   Vital Signs: Temp: 98.1  F (36.7  C) Temp src: Oral BP: (!) 144/80 Pulse: 76   Resp: 20 SpO2: 97 % O2 Device: None (Room air)    Weight: 147 lbs 4.28 oz    Gen: NAD, pleasant  HEENT: Normocephalic, EOMI, MMM  Resp: no crackles,  no wheezes, no increased work of resp  CV: S1S2 heard, reg rhythm, reg rate, no pitting pedal edema  Abdo: soft, nontender, nondistended, bowel sounds present  Ext: calves nontender, well perfused  Neuro: AAOx3, CN grossly intact, no facial asymmetry      Data   Recent Labs   Lab 03/08/21  0756 03/07/21  1759 03/07/21  1108 03/06/21  0857 03/06/21  0857 03/06/21  0047 03/04/21  2240 03/04/21  2240 03/04/21  1654 03/04/21  0942   WBC 12.1*  --  9.0  --  13.9* 10.5   < >  --   --  13.3*   HGB 8.4* 8.5* 6.9*   < > 10.1* 6.1*   < >  --   --  9.6*   MCV 87  --  88  --  86 86   < >  --   --  89     --  303  --  360 308   < >  --   --  449   INR  --   --   --   --   --   --   --   --   --  1.24*   NA  --   --  143  --  139 132*   < >  --   --  133   POTASSIUM 4.2  --  4.2  --  4.1 4.3   < >  --   --  4.1   CHLORIDE  --   --  114*  --  108 103   < >  --   --  99   CO2  --   --  27  --  28 26   < >  --   --  25   BUN  --   --  17  --  16 21   < >  --   --  26    CR 0.64*  --  0.77  --  0.70 0.81   < >  --   --  0.84   ANIONGAP  --   --  2*  --  3 3   < >  --   --  9   MYRON  --   --  7.5*  --  8.4* 7.4*   < >  --   --  8.9   GLC  --   --  80  --  219* 386*   < >  --   --  125*   ALBUMIN  --   --   --   --   --   --   --   --  1.9*  --    PROTTOTAL  --   --   --   --   --   --   --   --  6.9  --    BILITOTAL  --   --   --   --   --   --   --   --  0.2  --    ALKPHOS  --   --   --   --   --   --   --   --  72  --    ALT  --   --   --   --   --   --   --   --  22  --    AST  --   --   --   --   --   --   --   --  20  --    TROPI  --   --   --   --   --   --   --  <0.015 <0.015 <0.015    < > = values in this interval not displayed.     No results found for this or any previous visit (from the past 24 hour(s)).

## 2021-03-08 NOTE — PLAN OF CARE
DATE & TIME: 3/07/2021 4600-8493  Cognitive Concerns/ Orientation: A&Ox4; Pleasant; confused at times- mostly upon waking up  BEHAVIOR & AGGRESSION TOOL COLOR: Green  ABNL VS/O2: VSS on RA. BP has improved today  MOBILITY: NWB on L foot, SBA + walker pivot transfer to use urinal/BSC. Wants to walk to BR but non-compliant with NWB on L foot.  PAIN MANAGMENT: IV dilaudid 0.5mg given x2 for L foot pain with relief. Pt states pain is 10/10 but falls asleep immediately- appears comfortable.   DIET: Mod CHO; carb count and coverage with insulin  BOWEL/BLADDER: Continent, voiding in urinal. BM x 1- small- refuses miralax  ABNL LAB/BG: , 163.  Hgb 8.5 after 1u PRBC  DRAIN/DEVICES: PIV infusing LR at 100mL/h, Second PIV SL  TELEMETRY RHYTHM: NSR  SKIN: Scattered bruising on upper extremity. Rash on bilateral legs/shins/ankles with 1+ Edema. R great toe previously amputated. L transmetatarsal amputation (3/05). Dressing in place with small drainage.   TESTS/PROCEDURES: Anticipate at least one more surgery per Podiatry note.  D/C DAY/GOALS/PLACE: Discharge dependent on surgical procedures, pain management/ tolerating PO medications, antibiotic plan established and safe disposition plan/ TCU bed available  OTHER IMPORTANT INFO: Continues on IV antibiotics, ID following for positive culture of staph aureus of foot.

## 2021-03-08 NOTE — PLAN OF CARE
DATE & TIME: 3/08/21 1003-3225  Cognitive Concerns/ Orientation: A&Ox4;  forgetful at times-  BEHAVIOR & AGGRESSION TOOL COLOR: Green  ABNL VS/O2: VSS on RA. BP has improved   MOBILITY: NWB on L foot, SBA + walker pivot transfer to use urinal/BSC. Wants to walk to BR but non-compliant with NWB on L foot.  PAIN MANAGMENT: IV dilaudid 1 mg given  x 4 (q2h) for L foot pain, states pain worse today despite increase in dilaudid dose.  DIET: Mod CHO; carb count and coverage with insulin  BOWEL/BLADDER: Continent, voiding in urinal.   ABNL LAB/BG: BG 95, 104  DRAIN/DEVICES: PIV infusing LR at 100mL/h, Second PIV SL is very positional.  TELEMETRY RHYTHM: NSR  SKIN: Scattered bruising on upper extremity. Rash on bilateral legs/shins/ankles. R great toe previously amputated. L transmetatarsal amputation (3/05). Dressing in place CDI, changed by Podiatry.  TESTS/PROCEDURES: Anticipate at least one more surgery per Podiatry note pending completion of pathology.  D/C DAY/GOALS/PLACE: Discharge dependent on surgical procedures, pain management/ tolerating PO medications, antibiotic plan established and safe disposition plan/ TCU bed available  OTHER IMPORTANT INFO: Continues on IV antibiotics, ID following for positive culture of staph aureus of foot.  Can be anxious at times.

## 2021-03-08 NOTE — PROGRESS NOTES
Foot & Ankle Surgery Progress Note  March 8, 2021    S:  Huseyin was seen at bedside today for continued monitoring of left foot POD#3 sp open TMA left foot for severe diabetic infection.  Feeling well.  No f/c.    BP (!) 144/80 (BP Location: Left arm)   Pulse 76   Temp 98.1  F (36.7  C) (Oral)   Resp 20   Wt 66.8 kg (147 lb 4.3 oz)   SpO2 97%   BMI 21.75 kg/m      PE - retention sutures in place, skin margins stable without necrosis.  Bleeding controlled.  Minimal local erythema, reactive appearing.    Imaging:  IMPRESSION: Interval transmetatarsal amputation. No radiographic  evidence of residual or recurrent osteomyelitis or foreign body.  Otherwise negative.    Cultures:    Specimen Description Left Foot Tissue    Culture Micro Abnormal   Heavy growth   Staphylococcus aureus   Susceptibility testing in progress   P          Pathology:  pending    Assessment:  61 year old male POD#3 sp open TMA left lower extremity for severe diabetic left foot infection    Plan:  Dressing change  -Pathology in progress.  -At least 1 further surgery will be needed; timing/type based on exam and Path.  Clinically the foot is stable, no plan for revision until Path finalized     Activity -  NWB; PT following   Pain Management -  Percocet, dilaudid    DVT Prophylaxis -  mechanical   Abx therapy -  Memo Arana DPM, FACFAS  Pager: [phone]

## 2021-03-08 NOTE — CONSULTS
Care Management Initial Consult    General Information  Assessment completed with: Patient, Family, (Otto Fontanez)  Type of CM/SW Visit: Initial Assessment    Primary Care Provider verified and updated as needed: Yes   Readmission within the last 30 days: unable to assess, no previous admission in last 30 days      Reason for Consult: discharge planning  Advance Care Planning: Advance Care Planning Reviewed: no concerns identified          Communication Assessment  Patient's communication style: spoken language (English or Bilingual)    Hearing Difficulty or Deaf: no   Wear Glasses or Blind: yes    Cognitive  Cognitive/Neuro/Behavioral: WDL  Level of Consciousness: alert  Arousal Level: opens eyes spontaneously  Orientation: oriented x 4(forgetful at times)  Mood/Behavior: anxious  Best Language: 0 - No aphasia  Speech: clear, logical    Living Environment:   People in home: sibling(s)  (Otto)  Current living Arrangements: house      Able to return to prior arrangements: yes       Family/Social Support:  Care provided by: self, other (see comments)(Brother assist)  Provides care for: no one  Marital Status: Single  Sibling(s)          Description of Support System: Supportive, Involved         Current Resources:   Patient receiving home care services: No     Community Resources: None  Equipment currently used at home: none  Supplies currently used at home: None    Employment/Financial:  Employment Status: unemployed        Financial Concerns: No concerns identified           Lifestyle & Psychosocial Needs:  Lifestyle     Physical activity     Days per week: 0 days     Minutes per session: 0 min     Stress: Very much     Social Needs     Financial resource strain: Not hard at all     Food insecurity     Worry: Never true     Inability: Never true     Transportation needs     Medical: No     Non-medical: No     Socioeconomic History     Marital status: Single     Spouse name: Not on file     Number of children: Not  on file     Years of education: Not on file     Highest education level: Not on file   Relationships     Social connections     Talks on phone: More than three times a week     Gets together: Not on file     Attends Confucianist service: Not on file     Active member of club or organization: Not on file     Attends meetings of clubs or organizations: Not on file     Relationship status: Not on file     Intimate partner violence     Fear of current or ex partner: No     Emotionally abused: No     Physically abused: No     Forced sexual activity: No     Tobacco Use     Smoking status: Current Every Day Smoker     Packs/day: 0.50     Years: 25.00     Pack years: 12.50     Smokeless tobacco: Never Used     Tobacco comment: trying to adela   Substance and Sexual Activity     Alcohol use: No     Drug use: No     Sexual activity: Never       Functional Status:  Prior to admission patient needed assistance:              Mental Health Status:          Chemical Dependency Status:                Values/Beliefs:  Spiritual, Cultural Beliefs, Zoroastrian Practices, Values that affect care: no               Additional Information:  Per care management consult, chart was reviewed. Patient was admitted on 3/4/3030 with Osteomyelitis, chest pain and rule out Myocardial infraction. Anticipated discharge date is 3/9/2021. Per therapy notes, patient is being recommended for a TCU after discharge to assist with mobility, independence and strength. Writer met with patient and his Brother, Otto to discuss discharge plans. Patient stated at baseline he was independent not utilizing any equipment and was independent prior to hospitalization but had some assistance with household tasks from his brother. Patient denied utilizing or needing any community resources. Patient, Otto and writer discussed the recommendation for TCU. Writer explained the process and purpose of a TCU. Writer explained that there is a 14 day quarantine period at TCU's due  "to COVID 19. Writer explained patient would work with therapy services to increase safety, mobility and independence. Writer explained length of stay can vary and stated that people can typically plan on a 7-20 day stay.Patient stated \" these are the only feet I have, so I have to be able to walk on them\". Writer acknowledge patient's concern and stated that the purpose of the TCU would to get him to a point where he could be safe and independent. Patient's brother reinforced the important of continued therapy services to maximize safety. Patient agreed to going to a TCU after talking it though with writer and his brother. Writer, patient and Serboby reviewed the medicare ratings. Patient and Serhan asked that they be sent to [place] and [place]. Writer placed a referral via DOD to [facility] and [facility] Writer will update patient and Leoboby as planning continues. Writer will continue to follow to facilitate a safe discharge plan.       LORRIE Ramesh, LSW   Social Work Intern   [phone]  [facility]  "

## 2021-03-09 ENCOUNTER — TELEPHONE (OUTPATIENT)
Dept: FAMILY MEDICINE | Facility: CLINIC | Age: 61
End: 2021-03-09

## 2021-03-09 ENCOUNTER — APPOINTMENT (OUTPATIENT)
Dept: OCCUPATIONAL THERAPY | Facility: CLINIC | Age: 61
End: 2021-03-09
Attending: INTERNAL MEDICINE
Payer: COMMERCIAL

## 2021-03-09 LAB
BACTERIA SPEC CULT: ABNORMAL
CREAT SERPL-MCNC: 0.77 MG/DL (ref 0.66–1.25)
ERYTHROCYTE [DISTWIDTH] IN BLOOD BY AUTOMATED COUNT: 15.5 % (ref 10–15)
GFR SERPL CREATININE-BSD FRML MDRD: >90 ML/MIN/{1.73_M2}
GLUCOSE BLDC GLUCOMTR-MCNC: 107 MG/DL (ref 70–99)
GLUCOSE BLDC GLUCOMTR-MCNC: 109 MG/DL (ref 70–99)
GLUCOSE BLDC GLUCOMTR-MCNC: 81 MG/DL (ref 70–99)
GLUCOSE BLDC GLUCOMTR-MCNC: 82 MG/DL (ref 70–99)
HCT VFR BLD AUTO: 27.2 % (ref 40–53)
HGB BLD-MCNC: 8.7 G/DL (ref 13.3–17.7)
Lab: ABNORMAL
MCH RBC QN AUTO: 28.3 PG (ref 26.5–33)
MCHC RBC AUTO-ENTMCNC: 32 G/DL (ref 31.5–36.5)
MCV RBC AUTO: 89 FL (ref 78–100)
PLATELET # BLD AUTO: 276 10E9/L (ref 150–450)
RBC # BLD AUTO: 3.07 10E12/L (ref 4.4–5.9)
SPECIMEN SOURCE: ABNORMAL
WBC # BLD AUTO: 10.5 10E9/L (ref 4–11)

## 2021-03-09 PROCEDURE — 250N000013 HC RX MED GY IP 250 OP 250 PS 637: Performed by: HOSPITALIST

## 2021-03-09 PROCEDURE — 250N000012 HC RX MED GY IP 250 OP 636 PS 637: Performed by: HOSPITALIST

## 2021-03-09 PROCEDURE — 36415 COLL VENOUS BLD VENIPUNCTURE: CPT | Performed by: PODIATRIST

## 2021-03-09 PROCEDURE — 97530 THERAPEUTIC ACTIVITIES: CPT | Mod: GO | Performed by: OCCUPATIONAL THERAPIST

## 2021-03-09 PROCEDURE — 250N000011 HC RX IP 250 OP 636: Performed by: INTERNAL MEDICINE

## 2021-03-09 PROCEDURE — 250N000011 HC RX IP 250 OP 636: Performed by: HOSPITALIST

## 2021-03-09 PROCEDURE — 250N000013 HC RX MED GY IP 250 OP 250 PS 637: Performed by: PODIATRIST

## 2021-03-09 PROCEDURE — 999N001017 HC STATISTIC GLUCOSE BY METER IP

## 2021-03-09 PROCEDURE — 99232 SBSQ HOSP IP/OBS MODERATE 35: CPT | Performed by: HOSPITALIST

## 2021-03-09 PROCEDURE — 258N000003 HC RX IP 258 OP 636: Performed by: NURSE PRACTITIONER

## 2021-03-09 PROCEDURE — 82565 ASSAY OF CREATININE: CPT | Performed by: PODIATRIST

## 2021-03-09 PROCEDURE — 97110 THERAPEUTIC EXERCISES: CPT | Mod: GO | Performed by: OCCUPATIONAL THERAPIST

## 2021-03-09 PROCEDURE — 250N000011 HC RX IP 250 OP 636: Performed by: PODIATRIST

## 2021-03-09 PROCEDURE — 85027 COMPLETE CBC AUTOMATED: CPT | Performed by: PODIATRIST

## 2021-03-09 PROCEDURE — 120N000001 HC R&B MED SURG/OB

## 2021-03-09 RX ORDER — CEFAZOLIN SODIUM 1 G/3ML
2 INJECTION, POWDER, FOR SOLUTION INTRAMUSCULAR; INTRAVENOUS EVERY 8 HOURS
Qty: 1 EACH | DISCHARGE
Start: 2021-03-09 | End: 2021-03-12

## 2021-03-09 RX ORDER — CEFAZOLIN SODIUM 2 G/100ML
2 INJECTION, SOLUTION INTRAVENOUS EVERY 8 HOURS
Status: DISCONTINUED | OUTPATIENT
Start: 2021-03-09 | End: 2021-03-18 | Stop reason: HOSPADM

## 2021-03-09 RX ADMIN — HYDROMORPHONE HYDROCHLORIDE 1 MG: 1 INJECTION, SOLUTION INTRAMUSCULAR; INTRAVENOUS; SUBCUTANEOUS at 08:46

## 2021-03-09 RX ADMIN — BUSPIRONE HYDROCHLORIDE 10 MG: 10 TABLET ORAL at 08:42

## 2021-03-09 RX ADMIN — PREGABALIN 150 MG: 75 CAPSULE ORAL at 20:03

## 2021-03-09 RX ADMIN — ISOSORBIDE DINITRATE 40 MG: 20 TABLET ORAL at 08:45

## 2021-03-09 RX ADMIN — FLUOXETINE 40 MG: 20 CAPSULE ORAL at 08:45

## 2021-03-09 RX ADMIN — HYDROMORPHONE HYDROCHLORIDE 1 MG: 1 INJECTION, SOLUTION INTRAMUSCULAR; INTRAVENOUS; SUBCUTANEOUS at 06:53

## 2021-03-09 RX ADMIN — ASPIRIN 81 MG CHEWABLE TABLET 81 MG: 81 TABLET CHEWABLE at 08:42

## 2021-03-09 RX ADMIN — HYDROMORPHONE HYDROCHLORIDE 1 MG: 1 INJECTION, SOLUTION INTRAMUSCULAR; INTRAVENOUS; SUBCUTANEOUS at 10:51

## 2021-03-09 RX ADMIN — INSULIN GLARGINE 25 UNITS: 100 INJECTION, SOLUTION SUBCUTANEOUS at 22:09

## 2021-03-09 RX ADMIN — HYDROMORPHONE HYDROCHLORIDE 1 MG: 1 INJECTION, SOLUTION INTRAMUSCULAR; INTRAVENOUS; SUBCUTANEOUS at 19:10

## 2021-03-09 RX ADMIN — ROSUVASTATIN CALCIUM 20 MG: 20 TABLET, FILM COATED ORAL at 08:43

## 2021-03-09 RX ADMIN — BISOPROLOL FUMARATE 5 MG: 5 TABLET ORAL at 08:43

## 2021-03-09 RX ADMIN — HYDROMORPHONE HYDROCHLORIDE 1 MG: 1 INJECTION, SOLUTION INTRAMUSCULAR; INTRAVENOUS; SUBCUTANEOUS at 04:46

## 2021-03-09 RX ADMIN — CLOMIPRAMINE HCL 100 MG: 50 CAPSULE ORAL at 08:44

## 2021-03-09 RX ADMIN — DIVALPROEX SODIUM 1000 MG: 500 TABLET, DELAYED RELEASE ORAL at 08:45

## 2021-03-09 RX ADMIN — CEFAZOLIN SODIUM 2 G: 2 INJECTION, SOLUTION INTRAVENOUS at 12:17

## 2021-03-09 RX ADMIN — DIVALPROEX SODIUM 1000 MG: 500 TABLET, DELAYED RELEASE ORAL at 20:04

## 2021-03-09 RX ADMIN — HYDROMORPHONE HYDROCHLORIDE 1 MG: 1 INJECTION, SOLUTION INTRAMUSCULAR; INTRAVENOUS; SUBCUTANEOUS at 15:07

## 2021-03-09 RX ADMIN — PREGABALIN 150 MG: 75 CAPSULE ORAL at 08:43

## 2021-03-09 RX ADMIN — HYDROMORPHONE HYDROCHLORIDE 1 MG: 1 INJECTION, SOLUTION INTRAMUSCULAR; INTRAVENOUS; SUBCUTANEOUS at 01:55

## 2021-03-09 RX ADMIN — HYDROMORPHONE HYDROCHLORIDE 1 MG: 1 INJECTION, SOLUTION INTRAMUSCULAR; INTRAVENOUS; SUBCUTANEOUS at 21:28

## 2021-03-09 RX ADMIN — CEFAZOLIN SODIUM 2 G: 2 INJECTION, SOLUTION INTRAVENOUS at 20:41

## 2021-03-09 RX ADMIN — NICOTINE 1 PATCH: 14 PATCH, EXTENDED RELEASE TRANSDERMAL at 08:51

## 2021-03-09 RX ADMIN — BUSPIRONE HYDROCHLORIDE 10 MG: 10 TABLET ORAL at 17:09

## 2021-03-09 RX ADMIN — CLOPIDOGREL BISULFATE 75 MG: 75 TABLET, FILM COATED ORAL at 08:43

## 2021-03-09 RX ADMIN — PIPERACILLIN AND TAZOBACTAM 3.38 G: 3; .375 INJECTION, POWDER, FOR SOLUTION INTRAVENOUS at 06:53

## 2021-03-09 RX ADMIN — PIPERACILLIN AND TAZOBACTAM 3.38 G: 3; .375 INJECTION, POWDER, FOR SOLUTION INTRAVENOUS at 00:26

## 2021-03-09 RX ADMIN — QUETIAPINE FUMARATE 600 MG: 300 TABLET ORAL at 20:04

## 2021-03-09 RX ADMIN — HYDROMORPHONE HYDROCHLORIDE 1 MG: 1 INJECTION, SOLUTION INTRAMUSCULAR; INTRAVENOUS; SUBCUTANEOUS at 12:52

## 2021-03-09 RX ADMIN — HYDROMORPHONE HYDROCHLORIDE 1 MG: 1 INJECTION, SOLUTION INTRAMUSCULAR; INTRAVENOUS; SUBCUTANEOUS at 17:04

## 2021-03-09 RX ADMIN — SODIUM CHLORIDE, POTASSIUM CHLORIDE, SODIUM LACTATE AND CALCIUM CHLORIDE: 600; 310; 30; 20 INJECTION, SOLUTION INTRAVENOUS at 07:42

## 2021-03-09 RX ADMIN — BUSPIRONE HYDROCHLORIDE 10 MG: 10 TABLET ORAL at 21:28

## 2021-03-09 RX ADMIN — BUSPIRONE HYDROCHLORIDE 10 MG: 10 TABLET ORAL at 12:52

## 2021-03-09 RX ADMIN — CLOMIPRAMINE HCL 100 MG: 50 CAPSULE ORAL at 20:04

## 2021-03-09 RX ADMIN — ISOSORBIDE DINITRATE 40 MG: 20 TABLET ORAL at 20:03

## 2021-03-09 ASSESSMENT — ACTIVITIES OF DAILY LIVING (ADL)
ADLS_ACUITY_SCORE: 16

## 2021-03-09 NOTE — PLAN OF CARE
DATE & TIME: 3/8 4553-6114    Cognitive Concerns/ Orientation : AOx4   BEHAVIOR & AGGRESSION TOOL COLOR: green  CIWA SCORE: n/a   ABNL VS/O2: VSS on RA  MOBILITY: NWB on L foot; SBA with walker to sit/stand at edge   PAIN MANAGMENT: IV dilaudid given x3 for L foot pain  DIET: mod CHO  BOWEL/BLADDER: continent; voiding in urinal  ABNL LAB/BG:    DRAIN/DEVICES: PIV LR @ 100  TELEMETRY RHYTHM:  NS  SKIN: scattered bruising; L foot wrapped by podiatry post L transmetatarsal amputation. Rash on BLE. Previous amputation on R toe.   TESTS/PROCEDURES: possible surgery pending  D/C DAY/GOALS/PLACE: pending  OTHER IMPORTANT INFO: no events overnight.

## 2021-03-09 NOTE — PLAN OF CARE
DATE & TIME: 3/08/21 3534-3323  Cognitive Concerns/ Orientation: A&Ox4;  forgetful at times-  BEHAVIOR & AGGRESSION TOOL COLOR: Green  ABNL VS/O2: VSS on RA. BP has improved   MOBILITY: NWB on L foot, SBA + walker pivot transfer to use urinal/BSC. Wants to walk to BR but non-compliant with NWB on L foot.  PAIN MANAGMENT: IV dilaudid 1 mg given  x 3 (q2h) for L foot pain,   DIET: Mod CHO; carb count and coverage with insulin  BOWEL/BLADDER: Continent, voiding in urinal.   ABNL LAB/BG: , 142  DRAIN/DEVICES: PIV infusing LR at 100mL/h, Second PIV SL is very positional.  TELEMETRY RHYTHM: NSR  SKIN: Scattered bruising on upper extremity. Rash on bilateral legs/shins/ankles. R great toe previously amputated. L transmetatarsal amputation (3/05). Dressing in place CDI, changed by Podiatry.  TESTS/PROCEDURES: Anticipate at least one more surgery per Podiatry note pending completion of pathology.  D/C DAY/GOALS/PLACE: Discharge dependent on surgical procedures, pain management and safe disposition plan/ TCU bed available  OTHER IMPORTANT INFO: Continues on IV antibiotics, ID following for positive culture of staph aureus of foot. Surgery following.

## 2021-03-09 NOTE — TELEPHONE ENCOUNTER
"Reason for Call:  Medication or medication refill:    Do you use a [facility]?  Name of the pharmacy and phone number for the current request:  [facility] [phone]    Name of the medication requested: Per patient \"all medications,\" aware he has over 50 medications listed. He is currently in the hospital for amputation of all 5 toes on left foot, still has one more surgery to go. Would like all of these medications filled for 3 months.     Other request:     Can we leave a detailed message on this number? YES    Phone number patient can be reached at: [phone]    Best Time:     Call taken on 3/9/2021 at 11:18 AM by Katherine Orozco      "

## 2021-03-09 NOTE — PROGRESS NOTES
[facility]  Infectious Disease Progress Note          Assessment and Plan:   Assessment & Plan     Huseyin Pettit is a 61 year old male who was admitted on 3/4/2021.      Impression:  1. 61 y.o male with diabetes.   2. Neuropathy.   3. CAD  4. Admitted with left foot wound with spetic arthritis.   5. Previous admission last year for osteo s/p toe amputation that admission.   6. Blood cultures positive for MSSA.      Recommendations:   So far only MSSA in the foot cultures. Will switch to ancef today  Can stop Daily blood cultures , Follow-up all neg  Will need  4 weeks of IV antibiotics, ican get picc today.   No clear secondary sites, no artificail material    Orders fro discharge antibiotics in the chart         Interval History:   no new complaints and doing well; no cp, sob, n/v/d, or abd pain. Seems OK, T down Follow-up BC neg so far foot cx with MSSA only so far               Medications:       aspirin  81 mg Oral Daily     bisoprolol  5 mg Oral Daily     busPIRone  10 mg Oral 4x Daily     ceFAZolin  2 g Intravenous Q8H     clomiPRAMINE  100 mg Oral BID     clopidogrel  75 mg Oral Daily     divalproex sodium delayed-release  1,000 mg Oral BID     FLUoxetine  40 mg Oral Daily     insulin aspart   Subcutaneous QAM AC     insulin aspart   Subcutaneous Daily with lunch     insulin aspart   Subcutaneous Daily with supper     insulin aspart  1-7 Units Subcutaneous TID AC     insulin aspart  1-5 Units Subcutaneous At Bedtime     insulin glargine  30 Units Subcutaneous At Bedtime     isosorbide Dinitrate  40 mg Oral BID     nicotine  1 patch Transdermal Daily     nicotine   Transdermal Q8H     polyethylene glycol  17 g Oral BID     pregabalin  150 mg Oral BID     QUEtiapine  600 mg Oral At Bedtime     rosuvastatin  20 mg Oral Daily                  Physical Exam:   Blood pressure 131/79, pulse 87, temperature 98.4  F (36.9  C), temperature source Oral, resp. rate 18, weight 66.8 kg (147 lb 4.3  oz), SpO2 95 %.  Wt Readings from Last 2 Encounters:   03/05/21 66.8 kg (147 lb 4.3 oz)   03/04/21 61.2 kg (135 lb)     Vital Signs with Ranges  Temp:  [98.4  F (36.9  C)-98.6  F (37  C)] 98.4  F (36.9  C)  Pulse:  [87-88] 87  Resp:  [16-18] 18  BP: (117-131)/(71-79) 131/79  SpO2:  [95 %-98 %] 95 %    Constitutional: Awake, alert, cooperative, no apparent distress   Lungs: Clear to auscultation bilaterally, no crackles or wheezing   Cardiovascular: Regular rate and rhythm, normal S1 and S2, and no murmur noted   Abdomen: Normal bowel sounds, soft, non-distended, non-tender   Skin: No rashes, no cyanosis, no edema   Other: Foot wrapped          Data:   All microbiology laboratory data reviewed.  Recent Labs   Lab Test 03/09/21  0741 03/08/21  0756 03/07/21  1759 03/07/21  1108   WBC 10.5 12.1*  --  9.0   HGB 8.7* 8.4* 8.5* 6.9*   HCT 27.2* 25.3*  --  21.3*   MCV 89 87  --  88    270  --  303     Recent Labs   Lab Test 03/09/21  0741 03/08/21  0756 03/07/21  1108   CR 0.77 0.64* 0.77     Recent Labs   Lab Test 03/04/21  0942   SED 91*     Recent Labs   Lab Test 03/07/21  1109 03/07/21  1107 03/06/21  0907 03/06/21  0856 03/05/21  1847 03/05/21  1052 03/05/21  0947 03/04/21  1015 03/04/21  0942   CULT No growth after 2 days No growth after 2 days No growth after 3 days No growth after 3 days Heavy growth  Staphylococcus aureus  *  Heavy growth  Normal skin rm    Culture negative monitoring continues No growth after 4 days No growth after 4 days Cultured on the 1st day of incubation:  Staphylococcus aureus  *  Critical Value/Significant Value, preliminary result only, called to and read back by  Ashtyn Chicas RN at 0537 3/5/21 hg    (Note)  POSITIVE for STAPHYLOCOCCUS AUREUS and NEGATIVE for the mecA gene  (not MRSA) by [employer] Censusigene multiplex nucleic acid test. The mecA gene was  not detected. Final identification and antimicrobial susceptibility  testing will be verified by standard methods.    Specimen tested  with Chattyigene multiplex, gram-positive blood culture  nucleic acid test for the following targets: Staph aureus, Staph  epidermidis, Staph lugdunensis, other Staph species, Enterococcus  faecalis, Enterococcus faecium, Streptococcus species, S. agalactiae,  S. anginosus grp., S. pneumoniae, S. pyogenes, Listeria sp., mecA  (methicillin resistance) and Lopez/B (vancomycin resistance).    Critical Value/Significant Value called to and read back by Sacha Del Rosario RN on 3.5.21 at 0830. bw   Cultured on the 1st day of incubation:  Staphylococcus aureus  Susceptibility testing done on previous specimen  *  Critical Value/Significant Value, preliminary result only, called to and read back by  Sacha Del Rosario RN on 3.5.21 at 0830. bw

## 2021-03-09 NOTE — TELEPHONE ENCOUNTER
RN placed call to patient - brother answers     Patient currently in the hospital and brother is aware of patient requesting refills     RN advised that at this time while patient is in the hospital it is hard to see what medications he would need refilled as inpatient medications are also on med list. We do not know what will continue when he discharges. RN advised the hospital will provide refills of medications that are needed upon discharge. We do not know if medications will be changed or doses changed so will be best to wait for discharge     Patient / brother will call when patient discharges to schedule an appointment with Dr. Carlton and further refills will be provided at that time.     Estela Serrano, Registered Nurse, PAL (Patient Advocate Liason)   [facility]   [phone]

## 2021-03-09 NOTE — PROGRESS NOTES
[facility]    Medicine Progress Note - Hospitalist Service       Date of Admission:  3/4/2021  Assessment & Plan       Huseyin Pettit is a 61 year old male w/ PMH of DM II, CAD w/ prior PCI in 2011, former smoker HTN, HLD, CKD, chronic pain, anxiety and depression, osteomyelitis of toe s/p amputation of right foot admitted on 03/04/21 w/ CP and possible septic arthritis or multilevel osteomyelitis.       Left foot wounds with septic arthritis and osteomyelitis  Multiple fractures of left foot  Staph aureus bacteremia    Vascular surgery (signed off 3/9), Podiatry, and ID consulted.    S/p left TMA on 3/5/21 by Dr. Barnes.    1/2 blood cultures from admission positive for Staph aureus. Repeat blood cultures have been negative to date.    Podiatry planning return to the OR this admission, timing and type of procedure will depend on pathology results from first procedure.    ID transitioned zosyn to ancef 3/9     Acute blood loss anemia  Chronic normocytic anemia with baseline 12-13g    Had an episode of hypotension early on the morning of 3/6/21. Labs were checked and hemoglobin was down to 6.1.    Transfused one unit PRBCs on 3/6/21.    Hemoglobin improved to about 8, however dropped to 6.9.  Transfused 1 more unit of PRBCs 3/7.     hgb stable at 8.4 AM 3/8  --> 8.7 3/9    Holding prophylactic lovenox for now.     Hypotension - resolved    Likely hypovolemic.    Has been responsive to IV fluids and transfusion.    Blood pressure improved today.     Chest pain, +risk factors including HTN, HLD, DM2  CAD w/ PCI in 2011 to LAD and CX iin [place], last coronary angiogram was 3/2020 which did not require any mechanical revascularization and GDMT was recommended.    Pain much improved, seems to have resolved.    Serial troponins negative.    No significant dysrhythmias on telemetry.    EKG x 2 did not show any acute ischemic changes.    CT chest negative for PE on 3/4/21.    He is tender to  palpation in the area of discomfort.    Given negative work-up including negative serial troponins despite ongoing chest pain, suspicion for cardiac etiology of his pain is low.    Symptomatic management.    Continue PTA aspirin, plavix, bisoprolol, isosorbide dinitrate, and rosuvastatin.     Diabetes mellitus, type 2    Hemoglobin A1c 6.8 on 3/4/21.    Hold PTA glimepiride and metformin for now (further surgery anticipated)    Blood sugars remain improved / at goal as of 3/8    Was on NovoLog Mix 70/30 at 28 units in AM and 38 units in PM prior to admission, currently on hold.    Lantus decreased to 25 units nightly given low sugars    Continue accuchecks and medium dose sliding scale NovoLog.    Continue prandial Novolog at 1 unit per 15 grams of carbs.    Monitor for hypoglycemia.     CKD stage 2-3, baseline Cr 0.8-1.0 as of early 2020  Mild hyponatremia    Hyponatremia resolved.    Creatinine stable 0.6 - 0.7     Abdominal pain on palpation in LUQ and RLQ, although patient is requesting food and drink.  Constipation    Lactic acid was within normal limits.    Epigastric tenderness has resolved.    Continue bowel regimen.     Depression  Anxiety    Continue PTA buspirone, clomipramine, fluoxetine, and quetiapine.     Mild basilar predominant bronchial wall thickening with some mucous plugging may be related to bronchitis, seen on CT    Encouraged smoking cessation.    Encourage incentive spirometer.     Extensive lower extremity erythematous macular rash, confluent in areas, concerning for vasculitic process    Since vascular surgery is already involved perhaps they would consider doing a biopsy.    Rash improved.    Vasculitis labs ordered, pending.     Covid-19 PCR NEGATIVE  Asymptomatic COVID-19 PCR testing was negative on 3/4/21.      Diet: Moderate Consistent CHO Diet    DVT Prophylaxis: lovenox held for now  Rangel Catheter: not present  Code Status: Full Code           Disposition Plan   Expected discharge:  pending podiatry plans, likely 3+ days in hospital  Entered: Juan Aden MD 03/09/2021, 8:28 AM       The patient's care was discussed with the Bedside Nurse and Patient.    Juan Aden MD  Hospitalist Service  [facility]  Contact information available via [facility] Paging/Directory    ______________________________________________________________________    Interval History   No acute events overnight.  Patient denies fevers, chills, shortness of breath or chest pain.  Some continued lower extremity pain improved with as needed regimen.  Pathology awaited.  ID and podiatry following.  Left lateral elbow with some nonpruritic erythema / rash, not hot or tender. Reports it being present prior to admission, then being gone yesterday, then back today. Not bothersome.     Data reviewed today: I reviewed all medications, new labs and imaging results over the last 24 hours. I personally reviewed no images or EKG's today.    Physical Exam   Vital Signs: Temp: 98.4  F (36.9  C) Temp src: Oral BP: 131/79 Pulse: 87   Resp: 18 SpO2: 95 % O2 Device: None (Room air)    Weight: 147 lbs 4.28 oz    Gen: NAD, pleasant  HEENT: Normocephalic, EOMI, MMM  Resp: no crackles,  no wheezes, no increased work of resp  CV: S1S2 heard, reg rhythm, reg rate, no pedal edema  Abdo: soft, nontender, nondistended, bowel sounds present  Ext: calves nontender, LLE ext dressing in place, clean and dry  Neuro: AAOx3, CN grossly intact, no facial asymmetry      Data   Recent Labs   Lab 03/09/21  0741 03/08/21  0756 03/07/21  1759 03/07/21  1108 03/06/21  0857 03/06/21  0857 03/06/21  0047 03/04/21  2240 03/04/21  2240 03/04/21  1654 03/04/21  0942   WBC 10.5 12.1*  --  9.0  --  13.9* 10.5   < >  --   --  13.3*   HGB 8.7* 8.4* 8.5* 6.9*   < > 10.1* 6.1*   < >  --   --  9.6*   MCV 89 87  --  88  --  86 86   < >  --   --  89    270  --  303  --  360 308   < >  --   --  449   INR  --   --   --   --   --   --   --    --   --   --  1.24*   NA  --   --   --  143  --  139 132*   < >  --   --  133   POTASSIUM  --  4.2  --  4.2  --  4.1 4.3   < >  --   --  4.1   CHLORIDE  --   --   --  114*  --  108 103   < >  --   --  99   CO2  --   --   --  27  --  28 26   < >  --   --  25   BUN  --   --   --  17  --  16 21   < >  --   --  26   CR 0.77 0.64*  --  0.77  --  0.70 0.81   < >  --   --  0.84   ANIONGAP  --   --   --  2*  --  3 3   < >  --   --  9   MYRON  --   --   --  7.5*  --  8.4* 7.4*   < >  --   --  8.9   GLC  --   --   --  80  --  219* 386*   < >  --   --  125*   ALBUMIN  --   --   --   --   --   --   --   --   --  1.9*  --    PROTTOTAL  --   --   --   --   --   --   --   --   --  6.9  --    BILITOTAL  --   --   --   --   --   --   --   --   --  0.2  --    ALKPHOS  --   --   --   --   --   --   --   --   --  72  --    ALT  --   --   --   --   --   --   --   --   --  22  --    AST  --   --   --   --   --   --   --   --   --  20  --    TROPI  --   --   --   --   --   --   --   --  <0.015 <0.015 <0.015    < > = values in this interval not displayed.     No results found for this or any previous visit (from the past 24 hour(s)).

## 2021-03-09 NOTE — PLAN OF CARE
-19    Cognitive Concerns/ Orientation : AOx4   BEHAVIOR & AGGRESSION TOOL COLOR: green  CIWA SCORE: n/a   ABNL VS/O2: VSS on RA  MOBILITY: NWB on L foot; SBA with walker to sit/stand at edge   PAIN MANAGMENT: IV dilaudid given x3 for L foot pain  DIET: mod CHO,eating well  BOWEL/BLADDER: continent; voiding in urinal  ABNL LAB/BG:  bgms 81 and 82  DRAIN/DEVICES: SL x2,  oder in for PICC placement for outpt IV antibiotics  TELEMETRY RHYTHM:    SKIN: scattered bruising; L foot wrapped by podiatry post L transmetatarsal amputation. Rash on LUE. Previous amputation on R toe.   TESTS/PROCEDURES: possible surgery pending  D/C DAY/GOALS/PLACE: pending to TCU  OTHER IMPORTANT INFO

## 2021-03-09 NOTE — PROGRESS NOTES
Foot & Ankle Surgery Progress Note  March 9, 2021    S:  Huseyin was seen at bedside today for continued monitoring of left foot POD#4 sp open TMA left foot for severe diabetic infection.  Feeling well.  No f/c.    /79 (BP Location: Left arm)   Pulse 87   Temp 98.4  F (36.9  C) (Oral)   Resp 18   Wt 66.8 kg (147 lb 4.3 oz)   SpO2 95%   BMI 21.75 kg/m      PE - retention sutures in place, skin margins stable without necrosis.  Bleeding controlled.  Minimal local erythema, reactive appearing.    Imaging:  IMPRESSION: Interval transmetatarsal amputation. No radiographic  evidence of residual or recurrent osteomyelitis or foreign body.  Otherwise negative.    Cultures:    Specimen Description Left Foot Tissue    Culture Micro Abnormal   Heavy growth   Staphylococcus aureus   Susceptibility testing in progress   P          Pathology:  pending    Assessment:  61 year old male POD#4 sp open TMA left lower extremity for severe diabetic left foot infection    Plan:  Dressing change  -Pathology in progress.  -At least 1 further surgery will be needed; timing/type based on exam and Path.  Clinically the foot is stable, no plan for revision until Path finalized.     Activity -  NWB; PT following   Pain Management -  Percocet, dilaudid    DVT Prophylaxis -  mechanical   Abx therapy -  Per ID     David Arana DPM, FACFAS  Pager: [phone]

## 2021-03-09 NOTE — PROGRESS NOTES
Care Management Follow Up    Length of Stay (days): 5    Expected Discharge Date: 03/10/21(tcu)     Concerns to be Addressed: compliance issue, medication, substance/tobacco abuse/use(Patient will need 4 weeks of IVAB will need teaching on administring the drug. Patient continues to smoke despite education given on his last admission. Not compliant with insulin mangement at home.)  TCU is being reccomended to help patient build independence, strength and mobility  Patient plan of care discussed at interdisciplinary rounds: Yes    Anticipated Discharge Disposition: Transitional Care  Disposition Comments: Discharge to TCU  Anticipated Discharge Services: Therapy Services   Anticipated Discharge DME:  N/A    Patient/family educated on Medicare website which has current facility and service quality ratings: yes  Education Provided on the Discharge Plan:    Patient/Family in Agreement with the Plan:      Referrals Placed by CM/SW: Post Acute Facilities  Private pay costs discussed: Not applicable    Additional Information:  Writer saw that patient was denied at [facility] for TCU. Writer called [facility] to determine if they will be able to accept patient. Writer spoke to Aranza in admissions who stated that they are still reviewing the patient and will get back to writer today regarding if they are able to accept patient. Writer will continue to follow for safe discharge planning.         Vicky Cardona, DORETHAW, LSW   Social Work Intern   [phone]  [facility]

## 2021-03-09 NOTE — PROGRESS NOTES
"Care Management Follow Up    Length of Stay (days): 5    Expected Discharge Date: 03/12/21     Concerns to be Addressed: compliance issue, medication, substance/tobacco abuse/use(Patient will need 4 weeks of IVAB will need teaching on administring the drug. Patient continues to smoke despite education given on his last admission. Not compliant with insulin mangement at home.)  TCU is being reccomended to help patient build independence, strength and mobility  Patient plan of care discussed at interdisciplinary rounds: Yes    Anticipated Discharge Disposition: Transitional Care  Disposition Comments: Discharge to TCU  Anticipated Discharge Services:    Anticipated Discharge DME:      Patient/family educated on Medicare website which has current facility and service quality ratings: yes  Education Provided on the Discharge Plan:    Patient/Family in Agreement with the Plan:      Referrals Placed by CM/SW: Post Acute Facilities  Private pay costs discussed: Not applicable    Additional Information:  Writer saw that patient was declined at [facility]. Writer called and spoke with patient's brother, Otto regarding additional places to send referrals. Otto stated writer could try [place] or [place] and then proceeded to state \"places near or close to [place] are fine\". Writer places referrals to TCU's in [place], [place] and [place] as that is the next closest cities with TCU's. Writer placed referrals via [facility] to [place] [address], [facility], [place], and [facility]. Writer will continue to follow for safe discharge planning.      LORRIE Ramesh, LSW   Social Work Intern   [phone]  [facility]  "

## 2021-03-09 NOTE — PROGRESS NOTES
VASCULAR SURGERY BRIEF FOLLOW-UP NOTE:    ASSESSMENT:  61 year old year old male who has a PMH significant for CAD s/p PCI, HTN, hyperlipidemia, DM II with neuropathy, tobacco dependence and medication non-compliance. Pt presents with left foot wounds and fractures. Vascular surgery  consulted for lower extremity vascular optimization prior to likely Podiatry interventions.    PLAN:  -Given normal ABIs, arterial duplex, exam, and history suspect that foot wounds are likely related to poorly controlled diabetes and any peripheral vascular disease is minimal and likely distal small vessel disease that is not amenable to repair.    -Vascular surgery will sign off  -Please call if there are concerns for poor wound healing due to peripheral vascular insufficiency      Melia Tucker PA-C   Division of Vascular Surgery   Pager: [phone]

## 2021-03-09 NOTE — PLAN OF CARE
PT: Attempted to see patient for PT session. Pt refusing to get out of bed stating he was comfortable in bed, refusing to do any exercises stating he is too tired. RN aware.

## 2021-03-10 ENCOUNTER — ANESTHESIA EVENT (OUTPATIENT)
Dept: SURGERY | Facility: CLINIC | Age: 61
End: 2021-03-10
Payer: COMMERCIAL

## 2021-03-10 LAB
COPATH REPORT: NORMAL
CREAT SERPL-MCNC: 0.91 MG/DL (ref 0.66–1.25)
ERYTHROCYTE [DISTWIDTH] IN BLOOD BY AUTOMATED COUNT: 15.9 % (ref 10–15)
GFR SERPL CREATININE-BSD FRML MDRD: >90 ML/MIN/{1.73_M2}
GLUCOSE BLDC GLUCOMTR-MCNC: 115 MG/DL (ref 70–99)
GLUCOSE BLDC GLUCOMTR-MCNC: 119 MG/DL (ref 70–99)
GLUCOSE BLDC GLUCOMTR-MCNC: 155 MG/DL (ref 70–99)
GLUCOSE BLDC GLUCOMTR-MCNC: 54 MG/DL (ref 70–99)
GLUCOSE BLDC GLUCOMTR-MCNC: 82 MG/DL (ref 70–99)
GLUCOSE BLDC GLUCOMTR-MCNC: 94 MG/DL (ref 70–99)
HCT VFR BLD AUTO: 25.5 % (ref 40–53)
HGB BLD-MCNC: 8.1 G/DL (ref 13.3–17.7)
MCH RBC QN AUTO: 27.7 PG (ref 26.5–33)
MCHC RBC AUTO-ENTMCNC: 31.8 G/DL (ref 31.5–36.5)
MCV RBC AUTO: 87 FL (ref 78–100)
PLATELET # BLD AUTO: 263 10E9/L (ref 150–450)
RBC # BLD AUTO: 2.92 10E12/L (ref 4.4–5.9)
WBC # BLD AUTO: 10.2 10E9/L (ref 4–11)

## 2021-03-10 PROCEDURE — 250N000011 HC RX IP 250 OP 636: Performed by: HOSPITALIST

## 2021-03-10 PROCEDURE — 85027 COMPLETE CBC AUTOMATED: CPT | Performed by: PODIATRIST

## 2021-03-10 PROCEDURE — 36415 COLL VENOUS BLD VENIPUNCTURE: CPT | Performed by: PODIATRIST

## 2021-03-10 PROCEDURE — 250N000013 HC RX MED GY IP 250 OP 250 PS 637: Performed by: PODIATRIST

## 2021-03-10 PROCEDURE — 250N000011 HC RX IP 250 OP 636: Performed by: INTERNAL MEDICINE

## 2021-03-10 PROCEDURE — G0008 ADMIN INFLUENZA VIRUS VAC: HCPCS | Performed by: HOSPITALIST

## 2021-03-10 PROCEDURE — 250N000013 HC RX MED GY IP 250 OP 250 PS 637: Performed by: HOSPITALIST

## 2021-03-10 PROCEDURE — 999N001017 HC STATISTIC GLUCOSE BY METER IP

## 2021-03-10 PROCEDURE — 90682 RIV4 VACC RECOMBINANT DNA IM: CPT | Performed by: HOSPITALIST

## 2021-03-10 PROCEDURE — 99232 SBSQ HOSP IP/OBS MODERATE 35: CPT | Performed by: HOSPITALIST

## 2021-03-10 PROCEDURE — 120N000001 HC R&B MED SURG/OB

## 2021-03-10 PROCEDURE — 82565 ASSAY OF CREATININE: CPT | Performed by: PODIATRIST

## 2021-03-10 RX ADMIN — DIVALPROEX SODIUM 1000 MG: 500 TABLET, DELAYED RELEASE ORAL at 20:35

## 2021-03-10 RX ADMIN — ROSUVASTATIN CALCIUM 20 MG: 20 TABLET, FILM COATED ORAL at 09:36

## 2021-03-10 RX ADMIN — HYDROMORPHONE HYDROCHLORIDE 1 MG: 1 INJECTION, SOLUTION INTRAMUSCULAR; INTRAVENOUS; SUBCUTANEOUS at 17:59

## 2021-03-10 RX ADMIN — HYDROMORPHONE HYDROCHLORIDE 1 MG: 1 INJECTION, SOLUTION INTRAMUSCULAR; INTRAVENOUS; SUBCUTANEOUS at 15:58

## 2021-03-10 RX ADMIN — BUSPIRONE HYDROCHLORIDE 10 MG: 10 TABLET ORAL at 22:08

## 2021-03-10 RX ADMIN — HYDROMORPHONE HYDROCHLORIDE 1 MG: 1 INJECTION, SOLUTION INTRAMUSCULAR; INTRAVENOUS; SUBCUTANEOUS at 03:27

## 2021-03-10 RX ADMIN — INSULIN ASPART 1 UNITS: 100 INJECTION, SOLUTION INTRAVENOUS; SUBCUTANEOUS at 14:42

## 2021-03-10 RX ADMIN — CLOMIPRAMINE HCL 100 MG: 50 CAPSULE ORAL at 20:35

## 2021-03-10 RX ADMIN — HYDROMORPHONE HYDROCHLORIDE 1 MG: 1 INJECTION, SOLUTION INTRAMUSCULAR; INTRAVENOUS; SUBCUTANEOUS at 07:46

## 2021-03-10 RX ADMIN — BUSPIRONE HYDROCHLORIDE 10 MG: 10 TABLET ORAL at 12:59

## 2021-03-10 RX ADMIN — CLOPIDOGREL BISULFATE 75 MG: 75 TABLET, FILM COATED ORAL at 09:36

## 2021-03-10 RX ADMIN — BISACODYL 10 MG: 5 TABLET, COATED ORAL at 18:52

## 2021-03-10 RX ADMIN — HYDROMORPHONE HYDROCHLORIDE 1 MG: 1 INJECTION, SOLUTION INTRAMUSCULAR; INTRAVENOUS; SUBCUTANEOUS at 05:43

## 2021-03-10 RX ADMIN — ISOSORBIDE DINITRATE 40 MG: 20 TABLET ORAL at 09:36

## 2021-03-10 RX ADMIN — HYDROMORPHONE HYDROCHLORIDE 1 MG: 1 INJECTION, SOLUTION INTRAMUSCULAR; INTRAVENOUS; SUBCUTANEOUS at 10:15

## 2021-03-10 RX ADMIN — ASPIRIN 81 MG CHEWABLE TABLET 81 MG: 81 TABLET CHEWABLE at 09:35

## 2021-03-10 RX ADMIN — INFLUENZA A VIRUS A/HAWAII/70/2019 (H1N1) RECOMBINANT HEMAGGLUTININ ANTIGEN, INFLUENZA A VIRUS A/MINNESOTA/41/2019 (H3N2) RECOMBINANT HEMAGGLUTININ ANTIGEN, INFLUENZA B VIRUS B/WASHINGTON/02/2019 RECOMBINANT HEMAGGLUTININ ANTIGEN, AND INFLUENZA B VIRUS B/PHUKET/3073/2013 RECOMBINANT HEMAGGLUTININ ANTIGEN 0.5 ML: 45; 45; 45; 45 INJECTION INTRAMUSCULAR at 12:36

## 2021-03-10 RX ADMIN — CEFAZOLIN SODIUM 2 G: 2 INJECTION, SOLUTION INTRAVENOUS at 20:38

## 2021-03-10 RX ADMIN — BISOPROLOL FUMARATE 5 MG: 5 TABLET ORAL at 09:36

## 2021-03-10 RX ADMIN — QUETIAPINE FUMARATE 600 MG: 300 TABLET ORAL at 20:35

## 2021-03-10 RX ADMIN — CLOMIPRAMINE HCL 100 MG: 50 CAPSULE ORAL at 09:35

## 2021-03-10 RX ADMIN — PREGABALIN 150 MG: 75 CAPSULE ORAL at 20:35

## 2021-03-10 RX ADMIN — BUSPIRONE HYDROCHLORIDE 10 MG: 10 TABLET ORAL at 09:36

## 2021-03-10 RX ADMIN — CEFAZOLIN SODIUM 2 G: 2 INJECTION, SOLUTION INTRAVENOUS at 12:37

## 2021-03-10 RX ADMIN — FLUOXETINE 40 MG: 20 CAPSULE ORAL at 09:35

## 2021-03-10 RX ADMIN — CEFAZOLIN SODIUM 2 G: 2 INJECTION, SOLUTION INTRAVENOUS at 05:45

## 2021-03-10 RX ADMIN — HYDROMORPHONE HYDROCHLORIDE 1 MG: 1 INJECTION, SOLUTION INTRAMUSCULAR; INTRAVENOUS; SUBCUTANEOUS at 01:20

## 2021-03-10 RX ADMIN — HYDROMORPHONE HYDROCHLORIDE 1 MG: 1 INJECTION, SOLUTION INTRAMUSCULAR; INTRAVENOUS; SUBCUTANEOUS at 19:59

## 2021-03-10 RX ADMIN — HYDROMORPHONE HYDROCHLORIDE 1 MG: 1 INJECTION, SOLUTION INTRAMUSCULAR; INTRAVENOUS; SUBCUTANEOUS at 12:10

## 2021-03-10 RX ADMIN — DIVALPROEX SODIUM 1000 MG: 500 TABLET, DELAYED RELEASE ORAL at 09:35

## 2021-03-10 RX ADMIN — HYDROMORPHONE HYDROCHLORIDE 1 MG: 1 INJECTION, SOLUTION INTRAMUSCULAR; INTRAVENOUS; SUBCUTANEOUS at 22:08

## 2021-03-10 RX ADMIN — ISOSORBIDE DINITRATE 40 MG: 20 TABLET ORAL at 20:35

## 2021-03-10 RX ADMIN — NICOTINE 1 PATCH: 14 PATCH, EXTENDED RELEASE TRANSDERMAL at 09:36

## 2021-03-10 RX ADMIN — HYDROMORPHONE HYDROCHLORIDE 1 MG: 1 INJECTION, SOLUTION INTRAMUSCULAR; INTRAVENOUS; SUBCUTANEOUS at 14:01

## 2021-03-10 RX ADMIN — BUSPIRONE HYDROCHLORIDE 10 MG: 10 TABLET ORAL at 18:00

## 2021-03-10 RX ADMIN — PREGABALIN 150 MG: 75 CAPSULE ORAL at 07:46

## 2021-03-10 ASSESSMENT — ACTIVITIES OF DAILY LIVING (ADL)
ADLS_ACUITY_SCORE: 14

## 2021-03-10 NOTE — PROGRESS NOTES
[facility]    Medicine Progress Note - Hospitalist Service       Date of Admission:  3/4/2021  Assessment & Plan       Huseyin Pettit is a 61 year old male w/ PMH of DM II, CAD w/ prior PCI in 2011, former smoker HTN, HLD, CKD, chronic pain, anxiety and depression, osteomyelitis of toe s/p amputation of right foot admitted on 03/04/21 w/ CP and possible septic arthritis or multilevel osteomyelitis.       Left foot wounds with septic arthritis and osteomyelitis  Multiple fractures of left foot  Staph aureus bacteremia    Vascular surgery (signed off 3/9), Podiatry, and ID consulted.    S/p left TMA on 3/5/21 by Dr. Barnes.    1/2 blood cultures from admission positive for Staph aureus. Repeat blood cultures have been negative to date.    Podiatry planning return to the OR this admission, timing and type of procedure will depend on pathology results from first procedure.    ID transitioned zosyn to anc 3/9    Podiatry planning OR 3/11 -- NPO at midnight, will only do 5 units glargine tonight as sugars have been low     Diabetes mellitus, type 2    Hemoglobin A1c 6.8 on 3/4/21.    Hold PTA glimepiride and metformin for now (further surgery anticipated)    Blood sugars remain improved / at goal as of 3/8    Was on NovoLog Mix 70/30 at 28 units in AM and 38 units in PM prior to admission, currently on hold.    Continue accuchecks and medium dose sliding scale NovoLog.    Continue prandial Novolog at 1 unit per 15 grams of carbs.    Monitor for hypoglycemia - asymptomatic hypoglycemic episodes noted - patient often eating at off times and not informing nursing. Given NPO at midnight, will decrease to 5 units glargine tonight 3/10.     Acute blood loss anemia  Chronic normocytic anemia with baseline 12-13g    Had an episode of hypotension early on the morning of 3/6/21. Labs were checked and hemoglobin was down to 6.1.    Transfused one unit PRBCs on 3/6/21.    Hemoglobin improved to about 8,  however dropped to 6.9.  Transfused 1 more unit of PRBCs 3/7.     hgb stable at 8.4 AM 3/8  --> 8.7 3/9 --> 8.1 3/10    Holding prophylactic lovenox for now (OR 3/11)     Hypotension - resolved    Likely hypovolemic - WNL after IVF given previously in hospitalization     Chest pain, +risk factors including HTN, HLD, DM2  CAD w/ PCI in 2011 to LAD and CX iin [place], last coronary angiogram was 3/2020 which did not require any mechanical revascularization and GDMT was recommended.    Pain much improved, seems to have resolved.    Serial troponins negative.    No significant dysrhythmias on telemetry.    EKG x 2 did not show any acute ischemic changes.    CT chest negative for PE on 3/4/21.    He is tender to palpation in the area of discomfort.    Given negative work-up including negative serial troponins despite ongoing chest pain, suspicion for cardiac etiology of his pain is low.    Symptomatic management.    Continue PTA aspirin, plavix, bisoprolol, isosorbide dinitrate, and rosuvastatin.     CKD stage 2-3, baseline Cr 0.8-1.0 as of early 2020  Mild hyponatremia    Hyponatremia resolved.    Creatinine stable 0.6 - 0.7     Abdominal pain on palpation in LUQ and RLQ, although patient is requesting food and drink.  Constipation    Lactic acid was within normal limits.    Epigastric tenderness has resolved.    Continue bowel regimen.     Depression  Anxiety    Continue PTA buspirone, clomipramine, fluoxetine, and quetiapine.     Mild basilar predominant bronchial wall thickening with some mucous plugging may be related to bronchitis, seen on CT    Encouraged smoking cessation.    Encourage incentive spirometer.     Extensive lower extremity erythematous macular rash, confluent in areas, concerning for vasculitic process    Since vascular surgery is already involved perhaps they would consider doing a biopsy.    Rash improved.    Vasculitis labs ordered, pending.     Covid-19 PCR NEGATIVE  Asymptomatic COVID-19 PCR  testing was negative on 3/4/21.      Diet: Moderate Consistent CHO Diet    DVT Prophylaxis: Pneumatic Compression Devices  Rangel Catheter: not present  Code Status: Full Code           Disposition Plan   Expected discharge: 2+ days, OR tomorrow, ongoing IV abx  Entered: Juan Aden MD 03/10/2021, 8:21 AM       The patient's care was discussed with the Bedside Nurse and Patient.    Juan Aden MD  Hospitalist Service  [facility]  Contact information available via [facility] Paging/Directory    ______________________________________________________________________    Interval History   Seem and examined late morning. No new issues - denies fevers, chills, sob or chest pain. L elbow stable/improving, not itchy or tender. OR tmrw per podiatry.  Intermittent hypoglycemia despite eating and decreasing insulin regimen, asymptomatic. Will decrease glargine all the way down to 5 units tonight.    Data reviewed today: I reviewed all medications, new labs and imaging results over the last 24 hours. I personally reviewed no images or EKG's today.    Physical Exam   Vital Signs: Temp: 98.1  F (36.7  C) Temp src: Oral BP: 124/78 Pulse: 102   Resp: 18 SpO2: 93 % O2 Device: None (Room air)    Weight: 147 lbs 4.28 oz    Gen: NAD, very pleasant  HEENT: Normocephalic, EOMI, MMM  Resp: no crackles,  no wheezes, no increased work of resp  CV: S1S2 heard, reg rhythm, reg rate, no pedal edema  Abdo: soft, nontender, nondistended, bowel sounds present  Ext: calves nontender, LLE ext dressing in place, clean and dry, left elbow with improved area of erythema, nontender, no drainage, distal hand neurovasc intact  Neuro: AAOx3, CN grossly intact, no facial asymmetry    Data   Recent Labs   Lab 03/10/21  0822 03/09/21  0741 03/08/21  0756 03/07/21  1108 03/07/21  1108 03/06/21  0857 03/06/21  0857 03/06/21  0047 03/04/21  2240 03/04/21  2240 03/04/21  1654 03/04/21  0942   WBC 10.2 10.5 12.1*  --  9.0  --   13.9* 10.5   < >  --   --  13.3*   HGB 8.1* 8.7* 8.4*   < > 6.9*   < > 10.1* 6.1*   < >  --   --  9.6*   MCV 87 89 87  --  88  --  86 86   < >  --   --  89    276 270  --  303  --  360 308   < >  --   --  449   INR  --   --   --   --   --   --   --   --   --   --   --  1.24*   NA  --   --   --   --  143  --  139 132*   < >  --   --  133   POTASSIUM  --   --  4.2  --  4.2  --  4.1 4.3   < >  --   --  4.1   CHLORIDE  --   --   --   --  114*  --  108 103   < >  --   --  99   CO2  --   --   --   --  27  --  28 26   < >  --   --  25   BUN  --   --   --   --  17  --  16 21   < >  --   --  26   CR 0.91 0.77 0.64*  --  0.77  --  0.70 0.81   < >  --   --  0.84   ANIONGAP  --   --   --   --  2*  --  3 3   < >  --   --  9   MYRON  --   --   --   --  7.5*  --  8.4* 7.4*   < >  --   --  8.9   GLC  --   --   --   --  80  --  219* 386*   < >  --   --  125*   ALBUMIN  --   --   --   --   --   --   --   --   --   --  1.9*  --    PROTTOTAL  --   --   --   --   --   --   --   --   --   --  6.9  --    BILITOTAL  --   --   --   --   --   --   --   --   --   --  0.2  --    ALKPHOS  --   --   --   --   --   --   --   --   --   --  72  --    ALT  --   --   --   --   --   --   --   --   --   --  22  --    AST  --   --   --   --   --   --   --   --   --   --  20  --    TROPI  --   --   --   --   --   --   --   --   --  <0.015 <0.015 <0.015    < > = values in this interval not displayed.     No results found for this or any previous visit (from the past 24 hour(s)).

## 2021-03-10 NOTE — PROGRESS NOTES
[facility]  Infectious Disease Progress Note          Assessment and Plan:   Assessment & Plan     Huseyin Pettit is a 61 year old male who was admitted on 3/4/2021.      Impression:  1. 61 y.o male with diabetes.   2. Neuropathy.   3. CAD  4. Admitted with left foot wound with spetic arthritis.   5. Previous admission last year for osteo s/p toe amputation that admission.   6. Blood cultures positive for MSSA.      Recommendations:   So far only MSSA in the foot cultures on ancef.   Noted plans for more surgery.     Will need  4 weeks of IV antibiotics, can get picc today.   No clear secondary sites, no artificail material    Orders fro discharge antibiotics in the chart         Interval History:   no new complaints and doing well; no cp, sob, n/v/d, or abd pain. Seems OK, T down Follow-up BC neg so far foot cx with MSSA only so far               Medications:       aspirin  81 mg Oral Daily     bisoprolol  5 mg Oral Daily     busPIRone  10 mg Oral 4x Daily     ceFAZolin  2 g Intravenous Q8H     clomiPRAMINE  100 mg Oral BID     clopidogrel  75 mg Oral Daily     divalproex sodium delayed-release  1,000 mg Oral BID     FLUoxetine  40 mg Oral Daily     influenza recomb quadrivalent PF  0.5 mL Intramuscular Prior to discharge     insulin aspart   Subcutaneous QAM AC     insulin aspart   Subcutaneous Daily with lunch     insulin aspart   Subcutaneous Daily with supper     insulin aspart  1-7 Units Subcutaneous TID AC     insulin aspart  1-5 Units Subcutaneous At Bedtime     insulin glargine  20 Units Subcutaneous At Bedtime     isosorbide Dinitrate  40 mg Oral BID     nicotine  1 patch Transdermal Daily     nicotine   Transdermal Q8H     polyethylene glycol  17 g Oral BID     pregabalin  150 mg Oral BID     QUEtiapine  600 mg Oral At Bedtime     rosuvastatin  20 mg Oral Daily     sodium chloride (PF)  10 mL Intracatheter Q8H                  Physical Exam:   Blood pressure 124/78, pulse 102,  temperature 98.1  F (36.7  C), temperature source Oral, resp. rate 18, weight 66.8 kg (147 lb 4.3 oz), SpO2 93 %.  Wt Readings from Last 2 Encounters:   03/05/21 66.8 kg (147 lb 4.3 oz)   03/04/21 61.2 kg (135 lb)     Vital Signs with Ranges  Temp:  [97.9  F (36.6  C)-99.5  F (37.5  C)] 98.1  F (36.7  C)  Pulse:  [] 102  Resp:  [16-20] 18  BP: (121-131)/(72-80) 124/78  SpO2:  [93 %-97 %] 93 %    Constitutional: Awake, alert, cooperative, no apparent distress   Lungs: Clear to auscultation bilaterally, no crackles or wheezing   Cardiovascular: Regular rate and rhythm, normal S1 and S2, and no murmur noted   Abdomen: Normal bowel sounds, soft, non-distended, non-tender   Skin: No rashes, no cyanosis, no edema   Other: Foot wrapped          Data:   All microbiology laboratory data reviewed.  Recent Labs   Lab Test 03/10/21  0822 03/09/21  0741 03/08/21  0756   WBC 10.2 10.5 12.1*   HGB 8.1* 8.7* 8.4*   HCT 25.5* 27.2* 25.3*   MCV 87 89 87    276 270     Recent Labs   Lab Test 03/10/21  0822 03/09/21  0741 03/08/21  0756   CR 0.91 0.77 0.64*     Recent Labs   Lab Test 03/04/21  0942   SED 91*     Recent Labs   Lab Test 03/07/21  1109 03/07/21  1107 03/06/21  0907 03/06/21  0856 03/05/21  1847 03/05/21  1052 03/05/21  0947 03/04/21  1015 03/04/21  0942   CULT No growth after 3 days No growth after 3 days No growth after 4 days No growth after 4 days Heavy growth  Mixed aerobic and anaerobic rm  No predominant anaerobes  *  Heavy growth  Staphylococcus aureus  *  Heavy growth  Normal skin rm   No growth after 5 days No growth after 5 days Cultured on the 1st day of incubation:  Staphylococcus aureus  *  Critical Value/Significant Value, preliminary result only, called to and read back by  Ashtyn Chicas RN at 0537 3/5/21 hg    (Note)  POSITIVE for STAPHYLOCOCCUS AUREUS and NEGATIVE for the mecA gene  (not MRSA) by [employer] multiplex nucleic acid test. The mecA gene was  not detected. Final  identification and antimicrobial susceptibility  testing will be verified by standard methods.    Specimen tested with [employer] multiplex, gram-positive blood culture  nucleic acid test for the following targets: Staph aureus, Staph  epidermidis, Staph lugdunensis, other Staph species, Enterococcus  faecalis, Enterococcus faecium, Streptococcus species, S. agalactiae,  S. anginosus grp., S. pneumoniae, S. pyogenes, Listeria sp., mecA  (methicillin resistance) and Lopez/B (vancomycin resistance).    Critical Value/Significant Value called to and read back by Sacha Del Rosario RN on 3.5.21 at 0830. bw   Cultured on the 1st day of incubation:  Staphylococcus aureus  Susceptibility testing done on previous specimen  *  Critical Value/Significant Value, preliminary result only, called to and read back by  Sacha Del Rosario RN on 3.5.21 at 0830. bw

## 2021-03-10 NOTE — PROGRESS NOTES
Foot & Ankle Surgery Progress Note  March 10, 2021    A/P:  61 year old male POD#5 sp open TMA left lower extremity for severe diabetic left foot infection    Discussed condition and treatment options including pros and cons.    Path pending, but result should be available at some point today per lab.  I advised we proceed with revision surgery tomorrow.  Plan would be for L foot irrigation and debridement, possible bone resection pending Pathology report, possible delayed primary closure.    Potential risks associated with surgery include but are not limited to infection, continued open wound at the surgical site, nonhealing wound, ulceration, bleeding, repeat surgery or amputation and blood clot.       Pt agrees to surgery.  Will schedule for tomorrow (hopefully AM pending OR availability).  NPO midnight.  May continue plavix at this point.    Continue NWB L foot.  Abx plan per ID.    Vascular Surgery has signed off.  Appreciate their input.     David Arana DPM, FACFAS  Pager: [phone]      S:  Huseyin was seen at bedside today for continued monitoring of left foot POD#5 sp open TMA left foot for severe diabetic infection.  Reports some intermittent L foot pain.    /78 (BP Location: Left arm)   Pulse 102   Temp 98.1  F (36.7  C) (Oral)   Resp 18   Wt 66.8 kg (147 lb 4.3 oz)   SpO2 93%   BMI 21.75 kg/m      PE - retention sutures in place, skin margins stable without necrosis.  Bleeding controlled.  No purulence, minimal reactive appearing erythema.    Cultures:    Specimen Description Left Foot Tissue    Culture Micro Abnormal   Heavy growth   Staphylococcus aureus   Susceptibility testing in progress   P          Pathology:  Pending -- I called path lab today and result should be available by end of day.

## 2021-03-10 NOTE — PLAN OF CARE
DATE & TIME: 3/9/21 5065-7519    Cognitive Concerns/ Orientation : AOx4   BEHAVIOR & AGGRESSION TOOL COLOR: green  CIWA SCORE: n/a   ABNL VS/O2: VSS on RA  MOBILITY: NWB on L foot; SBA with walker to sit/stand at edge   PAIN MANAGMENT: IV dilaudid given x1 for L foot pain, helpful. Reviewed pain management policy with pt. Offered ice/heat for pain as well, pt declined.   DIET: mod CHO, eating well  BOWEL/BLADDER: continent; voiding in urinal  ABNL LAB/BG: . Hgb 8.7  DRAIN/DEVICES: SL x2, order in for PICC placement for outpt IV antibiotics  TELEMETRY RHYTHM: discontinued this shift.   SKIN: scattered bruising; L foot wrapped by podiatry post L transmetatarsal amputation. Rash on LUE, outlined. Previous amputation on R toe.   TESTS/PROCEDURES: possible surgery pending  D/C DAY/GOALS/PLACE: pending to TCU  OTHER IMPORTANT INFO: Vascular, PT/OT following. IV zosyn change to ancef.

## 2021-03-10 NOTE — PROVIDER NOTIFICATION
MD Notification    Notified Person: MD    Notified Person Name:Markie    Notification Date/Time:3/10 at 0730    Notification Interaction:text    Purpose of Notification:blood sugar of 54    Orders Received:pending notification    Comments:Pt eating breakfast, given 8 ounces of orange juice

## 2021-03-10 NOTE — PROGRESS NOTES
I called pt to review surgical pathology results.  Proximal margins from metatarsals 1-3 are showing residual osteomyelitis.  Discussed surgery tomorrow .  I would plan to resect further bone from these metatarsals.  Further bone resection from metatarsals 4 and 5 may also be needed to maintain proper parabola.  Discussed pt already has a short TMA so I will not be able to resect much more bone.  He will likely need longer term IV antibiotics to treat any potential residual osteomyelitis.  Pt is at risk for more proximal amputation in the future.  Pt understands and agrees with plan.  Surgery scheduled at 11a tomorrow.    David Arana, CAROL, FACFAS

## 2021-03-10 NOTE — PLAN OF CARE
DATE & TIME: 3/9/21, 0216 - 5340   Cognitive Concerns/ Orientation : a&O x 4   BEHAVIOR & AGGRESSION TOOL COLOR: Green  CIWA SCORE: N/a   ABNL VS/O2: VSS on room air  MOBILITY: SBA, GB and walker. NWB to left foot  PAIN MANAGMENT: Paion managed with prn IV Dilaudid  DIET: Mod CHO  BOWEL/BLADDER: Voids in urinal at bedside  ABNL LAB/BG: N/a  DRAIN/DEVICES: PIV x 2 SL  TELEMETRY RHYTHM: N/a  SKIN: Scattered bruises. Dressing to left foot CDI. Rash to LUE  TESTS/PROCEDURES: Possible surgery pending  D/C DAY/GOALS/PLACE: Pending  OTHER IMPORTANT INFO: Vascular, OT/PT following

## 2021-03-11 ENCOUNTER — APPOINTMENT (OUTPATIENT)
Dept: GENERAL RADIOLOGY | Facility: CLINIC | Age: 61
End: 2021-03-11
Attending: PODIATRIST
Payer: COMMERCIAL

## 2021-03-11 ENCOUNTER — ANESTHESIA (OUTPATIENT)
Dept: SURGERY | Facility: CLINIC | Age: 61
End: 2021-03-11
Payer: COMMERCIAL

## 2021-03-11 PROBLEM — Z79.4 TYPE 2 DIABETES MELLITUS WITH OTHER CIRCULATORY COMPLICATION, WITH LONG-TERM CURRENT USE OF INSULIN (H): Status: ACTIVE | Noted: 2018-07-03

## 2021-03-11 PROBLEM — E11.59 TYPE 2 DIABETES MELLITUS WITH OTHER CIRCULATORY COMPLICATION, WITH LONG-TERM CURRENT USE OF INSULIN (H): Status: ACTIVE | Noted: 2018-07-03

## 2021-03-11 PROBLEM — D64.9 ANEMIA: Status: ACTIVE | Noted: 2021-03-11

## 2021-03-11 PROBLEM — M86.9 TOE OSTEOMYELITIS, RIGHT (H): Status: RESOLVED | Noted: 2020-03-01 | Resolved: 2021-03-11

## 2021-03-11 PROBLEM — M00.9: Status: ACTIVE | Noted: 2021-03-11

## 2021-03-11 LAB
ABO + RH BLD: NORMAL
ABO + RH BLD: NORMAL
BACTERIA SPEC CULT: NO GROWTH
BACTERIA SPEC CULT: NO GROWTH
BASOPHILS # BLD AUTO: 0 10E9/L (ref 0–0.2)
BASOPHILS NFR BLD AUTO: 0.3 %
BLD GP AB SCN SERPL QL: NORMAL
BLD PROD TYP BPU: NORMAL
BLD PROD TYP BPU: NORMAL
BLD UNIT ID BPU: 0
BLOOD BANK CMNT PATIENT-IMP: NORMAL
BLOOD PRODUCT CODE: NORMAL
BPU ID: NORMAL
CREAT SERPL-MCNC: 0.95 MG/DL (ref 0.66–1.25)
DIFFERENTIAL METHOD BLD: ABNORMAL
EOSINOPHIL # BLD AUTO: 0.3 10E9/L (ref 0–0.7)
EOSINOPHIL NFR BLD AUTO: 2.7 %
ERYTHROCYTE [DISTWIDTH] IN BLOOD BY AUTOMATED COUNT: 16 % (ref 10–15)
GFR SERPL CREATININE-BSD FRML MDRD: 87 ML/MIN/{1.73_M2}
GLUCOSE BLDC GLUCOMTR-MCNC: 116 MG/DL (ref 70–99)
GLUCOSE BLDC GLUCOMTR-MCNC: 159 MG/DL (ref 70–99)
GLUCOSE BLDC GLUCOMTR-MCNC: 208 MG/DL (ref 70–99)
GLUCOSE BLDC GLUCOMTR-MCNC: 267 MG/DL (ref 70–99)
GLUCOSE BLDC GLUCOMTR-MCNC: 91 MG/DL (ref 70–99)
HCT VFR BLD AUTO: 26.1 % (ref 40–53)
HGB BLD-MCNC: 8.3 G/DL (ref 13.3–17.7)
IMM GRANULOCYTES # BLD: 0.1 10E9/L (ref 0–0.4)
IMM GRANULOCYTES NFR BLD: 1 %
LYMPHOCYTES # BLD AUTO: 2 10E9/L (ref 0.8–5.3)
LYMPHOCYTES NFR BLD AUTO: 21.8 %
MCH RBC QN AUTO: 28.7 PG (ref 26.5–33)
MCHC RBC AUTO-ENTMCNC: 31.8 G/DL (ref 31.5–36.5)
MCV RBC AUTO: 90 FL (ref 78–100)
MONOCYTES # BLD AUTO: 0.7 10E9/L (ref 0–1.3)
MONOCYTES NFR BLD AUTO: 8 %
NEUTROPHILS # BLD AUTO: 6.1 10E9/L (ref 1.6–8.3)
NEUTROPHILS NFR BLD AUTO: 66.2 %
NRBC # BLD AUTO: 0 10*3/UL
NRBC BLD AUTO-RTO: 0 /100
NUM BPU REQUESTED: 1
PLATELET # BLD AUTO: 238 10E9/L (ref 150–450)
RBC # BLD AUTO: 2.89 10E12/L (ref 4.4–5.9)
SPECIMEN EXP DATE BLD: NORMAL
SPECIMEN SOURCE: NORMAL
SPECIMEN SOURCE: NORMAL
TRANSFUSION STATUS PATIENT QL: NORMAL
TRANSFUSION STATUS PATIENT QL: NORMAL
WBC # BLD AUTO: 9.3 10E9/L (ref 4–11)

## 2021-03-11 PROCEDURE — 250N000011 HC RX IP 250 OP 636: Performed by: HOSPITALIST

## 2021-03-11 PROCEDURE — 250N000013 HC RX MED GY IP 250 OP 250 PS 637: Performed by: PODIATRIST

## 2021-03-11 PROCEDURE — 250N000011 HC RX IP 250 OP 636: Performed by: STUDENT IN AN ORGANIZED HEALTH CARE EDUCATION/TRAINING PROGRAM

## 2021-03-11 PROCEDURE — 87070 CULTURE OTHR SPECIMN AEROBIC: CPT | Performed by: PODIATRIST

## 2021-03-11 PROCEDURE — 258N000003 HC RX IP 258 OP 636: Performed by: NURSE ANESTHETIST, CERTIFIED REGISTERED

## 2021-03-11 PROCEDURE — 85025 COMPLETE CBC W/AUTO DIFF WBC: CPT | Performed by: PODIATRIST

## 2021-03-11 PROCEDURE — 82565 ASSAY OF CREATININE: CPT | Performed by: PODIATRIST

## 2021-03-11 PROCEDURE — 0Y6N0Z9 DETACHMENT AT LEFT FOOT, PARTIAL 1ST RAY, OPEN APPROACH: ICD-10-PCS | Performed by: PODIATRIST

## 2021-03-11 PROCEDURE — 88311 DECALCIFY TISSUE: CPT | Mod: TC | Performed by: PODIATRIST

## 2021-03-11 PROCEDURE — 250N000011 HC RX IP 250 OP 636: Performed by: NURSE ANESTHETIST, CERTIFIED REGISTERED

## 2021-03-11 PROCEDURE — 85014 HEMATOCRIT: CPT

## 2021-03-11 PROCEDURE — 250N000011 HC RX IP 250 OP 636: Performed by: PODIATRIST

## 2021-03-11 PROCEDURE — 86901 BLOOD TYPING SEROLOGIC RH(D): CPT | Performed by: HOSPITALIST

## 2021-03-11 PROCEDURE — 0Y6N0ZB DETACHMENT AT LEFT FOOT, PARTIAL 2ND RAY, OPEN APPROACH: ICD-10-PCS | Performed by: PODIATRIST

## 2021-03-11 PROCEDURE — 0Y6N0ZC DETACHMENT AT LEFT FOOT, PARTIAL 3RD RAY, OPEN APPROACH: ICD-10-PCS | Performed by: PODIATRIST

## 2021-03-11 PROCEDURE — 87077 CULTURE AEROBIC IDENTIFY: CPT | Performed by: PODIATRIST

## 2021-03-11 PROCEDURE — 999N000141 HC STATISTIC PRE-PROCEDURE NURSING ASSESSMENT: Performed by: PODIATRIST

## 2021-03-11 PROCEDURE — 87186 SC STD MICRODIL/AGAR DIL: CPT | Performed by: PODIATRIST

## 2021-03-11 PROCEDURE — 250N000009 HC RX 250: Performed by: REGISTERED NURSE

## 2021-03-11 PROCEDURE — 120N000001 HC R&B MED SURG/OB

## 2021-03-11 PROCEDURE — 258N000003 HC RX IP 258 OP 636: Performed by: ANESTHESIOLOGY

## 2021-03-11 PROCEDURE — 250N000012 HC RX MED GY IP 250 OP 636 PS 637: Performed by: PODIATRIST

## 2021-03-11 PROCEDURE — 0QBP0ZZ EXCISION OF LEFT METATARSAL, OPEN APPROACH: ICD-10-PCS | Performed by: PODIATRIST

## 2021-03-11 PROCEDURE — 0Y6N0ZF DETACHMENT AT LEFT FOOT, PARTIAL 5TH RAY, OPEN APPROACH: ICD-10-PCS | Performed by: PODIATRIST

## 2021-03-11 PROCEDURE — 250N000025 HC SEVOFLURANE, PER MIN: Performed by: PODIATRIST

## 2021-03-11 PROCEDURE — 86900 BLOOD TYPING SEROLOGIC ABO: CPT | Performed by: HOSPITALIST

## 2021-03-11 PROCEDURE — 999N001017 HC STATISTIC GLUCOSE BY METER IP

## 2021-03-11 PROCEDURE — 84295 ASSAY OF SERUM SODIUM: CPT

## 2021-03-11 PROCEDURE — 88307 TISSUE EXAM BY PATHOLOGIST: CPT | Mod: TC | Performed by: PODIATRIST

## 2021-03-11 PROCEDURE — 250N000011 HC RX IP 250 OP 636: Performed by: INTERNAL MEDICINE

## 2021-03-11 PROCEDURE — 86923 COMPATIBILITY TEST ELECTRIC: CPT | Performed by: HOSPITALIST

## 2021-03-11 PROCEDURE — 86850 RBC ANTIBODY SCREEN: CPT | Performed by: HOSPITALIST

## 2021-03-11 PROCEDURE — 710N000009 HC RECOVERY PHASE 1, LEVEL 1, PER MIN: Performed by: PODIATRIST

## 2021-03-11 PROCEDURE — 36415 COLL VENOUS BLD VENIPUNCTURE: CPT | Performed by: PODIATRIST

## 2021-03-11 PROCEDURE — 88307 TISSUE EXAM BY PATHOLOGIST: CPT | Mod: 26 | Performed by: PATHOLOGY

## 2021-03-11 PROCEDURE — 250N000011 HC RX IP 250 OP 636: Performed by: ANESTHESIOLOGY

## 2021-03-11 PROCEDURE — 82803 BLOOD GASES ANY COMBINATION: CPT

## 2021-03-11 PROCEDURE — 250N000009 HC RX 250: Performed by: PODIATRIST

## 2021-03-11 PROCEDURE — 0Y6N0ZD DETACHMENT AT LEFT FOOT, PARTIAL 4TH RAY, OPEN APPROACH: ICD-10-PCS | Performed by: PODIATRIST

## 2021-03-11 PROCEDURE — 84132 ASSAY OF SERUM POTASSIUM: CPT

## 2021-03-11 PROCEDURE — 999N000065 XR FOOT PORT LT 2 VW: Mod: LT

## 2021-03-11 PROCEDURE — 250N000013 HC RX MED GY IP 250 OP 250 PS 637: Performed by: HOSPITALIST

## 2021-03-11 PROCEDURE — 999N000179 XR SURGERY CARM FLUORO LESS THAN 5 MIN W STILLS

## 2021-03-11 PROCEDURE — 360N000078 HC SURGERY LEVEL 5, PER MIN: Performed by: PODIATRIST

## 2021-03-11 PROCEDURE — 28122 PARTIAL REMOVAL OF FOOT BONE: CPT | Mod: 58 | Performed by: PODIATRIST

## 2021-03-11 PROCEDURE — 99232 SBSQ HOSP IP/OBS MODERATE 35: CPT | Performed by: INTERNAL MEDICINE

## 2021-03-11 PROCEDURE — P9016 RBC LEUKOCYTES REDUCED: HCPCS | Performed by: HOSPITALIST

## 2021-03-11 PROCEDURE — 87075 CULTR BACTERIA EXCEPT BLOOD: CPT | Performed by: PODIATRIST

## 2021-03-11 PROCEDURE — 88311 DECALCIFY TISSUE: CPT | Mod: 26 | Performed by: PATHOLOGY

## 2021-03-11 PROCEDURE — 250N000009 HC RX 250: Performed by: NURSE ANESTHETIST, CERTIFIED REGISTERED

## 2021-03-11 PROCEDURE — 272N000001 HC OR GENERAL SUPPLY STERILE: Performed by: PODIATRIST

## 2021-03-11 PROCEDURE — 370N000017 HC ANESTHESIA TECHNICAL FEE, PER MIN: Performed by: PODIATRIST

## 2021-03-11 RX ORDER — NALOXONE HYDROCHLORIDE 0.4 MG/ML
0.2 INJECTION, SOLUTION INTRAMUSCULAR; INTRAVENOUS; SUBCUTANEOUS
Status: DISCONTINUED | OUTPATIENT
Start: 2021-03-11 | End: 2021-03-11

## 2021-03-11 RX ORDER — NALOXONE HYDROCHLORIDE 0.4 MG/ML
0.4 INJECTION, SOLUTION INTRAMUSCULAR; INTRAVENOUS; SUBCUTANEOUS
Status: DISCONTINUED | OUTPATIENT
Start: 2021-03-11 | End: 2021-03-11 | Stop reason: HOSPADM

## 2021-03-11 RX ORDER — ONDANSETRON 2 MG/ML
4 INJECTION INTRAMUSCULAR; INTRAVENOUS EVERY 30 MIN PRN
Status: DISCONTINUED | OUTPATIENT
Start: 2021-03-11 | End: 2021-03-11 | Stop reason: HOSPADM

## 2021-03-11 RX ORDER — NALOXONE HYDROCHLORIDE 0.4 MG/ML
0.4 INJECTION, SOLUTION INTRAMUSCULAR; INTRAVENOUS; SUBCUTANEOUS
Status: DISCONTINUED | OUTPATIENT
Start: 2021-03-11 | End: 2021-03-11

## 2021-03-11 RX ORDER — FENTANYL CITRATE 50 UG/ML
25-50 INJECTION, SOLUTION INTRAMUSCULAR; INTRAVENOUS
Status: DISCONTINUED | OUTPATIENT
Start: 2021-03-11 | End: 2021-03-11 | Stop reason: HOSPADM

## 2021-03-11 RX ORDER — ONDANSETRON 4 MG/1
4 TABLET, ORALLY DISINTEGRATING ORAL EVERY 30 MIN PRN
Status: DISCONTINUED | OUTPATIENT
Start: 2021-03-11 | End: 2021-03-11 | Stop reason: HOSPADM

## 2021-03-11 RX ORDER — BUPIVACAINE HYDROCHLORIDE 5 MG/ML
INJECTION, SOLUTION EPIDURAL; INTRACAUDAL
Status: DISCONTINUED
Start: 2021-03-11 | End: 2021-03-11 | Stop reason: HOSPADM

## 2021-03-11 RX ORDER — ONDANSETRON 2 MG/ML
INJECTION INTRAMUSCULAR; INTRAVENOUS PRN
Status: DISCONTINUED | OUTPATIENT
Start: 2021-03-11 | End: 2021-03-11

## 2021-03-11 RX ORDER — HYDROMORPHONE HYDROCHLORIDE 1 MG/ML
.3-.5 INJECTION, SOLUTION INTRAMUSCULAR; INTRAVENOUS; SUBCUTANEOUS EVERY 10 MIN PRN
Status: DISCONTINUED | OUTPATIENT
Start: 2021-03-11 | End: 2021-03-11 | Stop reason: HOSPADM

## 2021-03-11 RX ORDER — SODIUM CHLORIDE, SODIUM LACTATE, POTASSIUM CHLORIDE, CALCIUM CHLORIDE 600; 310; 30; 20 MG/100ML; MG/100ML; MG/100ML; MG/100ML
INJECTION, SOLUTION INTRAVENOUS CONTINUOUS
Status: DISCONTINUED | OUTPATIENT
Start: 2021-03-11 | End: 2021-03-11 | Stop reason: HOSPADM

## 2021-03-11 RX ORDER — HYDROMORPHONE HYDROCHLORIDE 1 MG/ML
.3-.5 INJECTION, SOLUTION INTRAMUSCULAR; INTRAVENOUS; SUBCUTANEOUS EVERY 5 MIN PRN
Status: DISCONTINUED | OUTPATIENT
Start: 2021-03-11 | End: 2021-03-11 | Stop reason: HOSPADM

## 2021-03-11 RX ORDER — PROPOFOL 10 MG/ML
INJECTION, EMULSION INTRAVENOUS PRN
Status: DISCONTINUED | OUTPATIENT
Start: 2021-03-11 | End: 2021-03-11

## 2021-03-11 RX ORDER — HALOPERIDOL 5 MG/ML
2 INJECTION INTRAMUSCULAR ONCE
Status: COMPLETED | OUTPATIENT
Start: 2021-03-11 | End: 2021-03-11

## 2021-03-11 RX ORDER — HYDROXYZINE HYDROCHLORIDE 25 MG/1
25 TABLET, FILM COATED ORAL EVERY 4 HOURS PRN
Status: DISCONTINUED | OUTPATIENT
Start: 2021-03-11 | End: 2021-03-15

## 2021-03-11 RX ORDER — MAGNESIUM HYDROXIDE 1200 MG/15ML
LIQUID ORAL PRN
Status: DISCONTINUED | OUTPATIENT
Start: 2021-03-11 | End: 2021-03-11 | Stop reason: HOSPADM

## 2021-03-11 RX ORDER — NALOXONE HYDROCHLORIDE 0.4 MG/ML
0.2 INJECTION, SOLUTION INTRAMUSCULAR; INTRAVENOUS; SUBCUTANEOUS
Status: DISCONTINUED | OUTPATIENT
Start: 2021-03-11 | End: 2021-03-11 | Stop reason: HOSPADM

## 2021-03-11 RX ORDER — MEPERIDINE HYDROCHLORIDE 25 MG/ML
12.5 INJECTION INTRAMUSCULAR; INTRAVENOUS; SUBCUTANEOUS
Status: DISCONTINUED | OUTPATIENT
Start: 2021-03-11 | End: 2021-03-11 | Stop reason: HOSPADM

## 2021-03-11 RX ORDER — LIDOCAINE HYDROCHLORIDE 20 MG/ML
INJECTION, SOLUTION INFILTRATION; PERINEURAL PRN
Status: DISCONTINUED | OUTPATIENT
Start: 2021-03-11 | End: 2021-03-11

## 2021-03-11 RX ORDER — HYDROXYZINE HYDROCHLORIDE 25 MG/1
50 TABLET, FILM COATED ORAL EVERY 4 HOURS PRN
Status: DISCONTINUED | OUTPATIENT
Start: 2021-03-11 | End: 2021-03-15

## 2021-03-11 RX ORDER — BUPIVACAINE HYDROCHLORIDE 5 MG/ML
INJECTION, SOLUTION EPIDURAL; INTRACAUDAL PRN
Status: DISCONTINUED | OUTPATIENT
Start: 2021-03-11 | End: 2021-03-11 | Stop reason: HOSPADM

## 2021-03-11 RX ORDER — EPHEDRINE SULFATE 50 MG/ML
INJECTION, SOLUTION INTRAMUSCULAR; INTRAVENOUS; SUBCUTANEOUS PRN
Status: DISCONTINUED | OUTPATIENT
Start: 2021-03-11 | End: 2021-03-11

## 2021-03-11 RX ORDER — POLYETHYLENE GLYCOL 3350 17 G/17G
34 POWDER, FOR SOLUTION ORAL ONCE
Status: COMPLETED | OUTPATIENT
Start: 2021-03-11 | End: 2021-03-11

## 2021-03-11 RX ADMIN — SODIUM CHLORIDE, POTASSIUM CHLORIDE, SODIUM LACTATE AND CALCIUM CHLORIDE: 600; 310; 30; 20 INJECTION, SOLUTION INTRAVENOUS at 11:08

## 2021-03-11 RX ADMIN — CLOMIPRAMINE HCL 100 MG: 50 CAPSULE ORAL at 21:01

## 2021-03-11 RX ADMIN — MIDAZOLAM 2 MG: 1 INJECTION INTRAMUSCULAR; INTRAVENOUS at 11:08

## 2021-03-11 RX ADMIN — HYDROMORPHONE HYDROCHLORIDE 1 MG: 1 INJECTION, SOLUTION INTRAMUSCULAR; INTRAVENOUS; SUBCUTANEOUS at 07:51

## 2021-03-11 RX ADMIN — CLOMIPRAMINE HCL 100 MG: 50 CAPSULE ORAL at 08:35

## 2021-03-11 RX ADMIN — Medication 5 MG: at 12:06

## 2021-03-11 RX ADMIN — Medication 5 MG: at 12:14

## 2021-03-11 RX ADMIN — BISOPROLOL FUMARATE 5 MG: 5 TABLET ORAL at 08:36

## 2021-03-11 RX ADMIN — INSULIN ASPART 2 UNITS: 100 INJECTION, SOLUTION INTRAVENOUS; SUBCUTANEOUS at 18:55

## 2021-03-11 RX ADMIN — HALOPERIDOL LACTATE 2 MG: 5 INJECTION, SOLUTION INTRAMUSCULAR at 03:38

## 2021-03-11 RX ADMIN — CEFAZOLIN SODIUM 2 G: 2 INJECTION, SOLUTION INTRAVENOUS at 21:11

## 2021-03-11 RX ADMIN — HYDROMORPHONE HYDROCHLORIDE 1 MG: 1 INJECTION, SOLUTION INTRAMUSCULAR; INTRAVENOUS; SUBCUTANEOUS at 23:13

## 2021-03-11 RX ADMIN — HYDROMORPHONE HYDROCHLORIDE 1 MG: 1 INJECTION, SOLUTION INTRAMUSCULAR; INTRAVENOUS; SUBCUTANEOUS at 19:04

## 2021-03-11 RX ADMIN — ISOSORBIDE DINITRATE 40 MG: 20 TABLET ORAL at 21:02

## 2021-03-11 RX ADMIN — CEFAZOLIN SODIUM 2 G: 2 INJECTION, SOLUTION INTRAVENOUS at 05:05

## 2021-03-11 RX ADMIN — FLUOXETINE 40 MG: 20 CAPSULE ORAL at 08:33

## 2021-03-11 RX ADMIN — LIDOCAINE HYDROCHLORIDE 50 MG: 20 INJECTION, SOLUTION INFILTRATION; PERINEURAL at 11:14

## 2021-03-11 RX ADMIN — INSULIN GLARGINE 5 UNITS: 100 INJECTION, SOLUTION SUBCUTANEOUS at 21:17

## 2021-03-11 RX ADMIN — DIVALPROEX SODIUM 1000 MG: 500 TABLET, DELAYED RELEASE ORAL at 21:02

## 2021-03-11 RX ADMIN — QUETIAPINE FUMARATE 600 MG: 300 TABLET ORAL at 21:02

## 2021-03-11 RX ADMIN — HYDROMORPHONE HYDROCHLORIDE 1 MG: 1 INJECTION, SOLUTION INTRAMUSCULAR; INTRAVENOUS; SUBCUTANEOUS at 21:06

## 2021-03-11 RX ADMIN — PREGABALIN 150 MG: 75 CAPSULE ORAL at 21:02

## 2021-03-11 RX ADMIN — ROSUVASTATIN CALCIUM 20 MG: 20 TABLET, FILM COATED ORAL at 08:33

## 2021-03-11 RX ADMIN — HYDROMORPHONE HYDROCHLORIDE 1 MG: 1 INJECTION, SOLUTION INTRAMUSCULAR; INTRAVENOUS; SUBCUTANEOUS at 02:49

## 2021-03-11 RX ADMIN — DIVALPROEX SODIUM 1000 MG: 500 TABLET, DELAYED RELEASE ORAL at 08:33

## 2021-03-11 RX ADMIN — PROPOFOL 130 MG: 10 INJECTION, EMULSION INTRAVENOUS at 11:14

## 2021-03-11 RX ADMIN — HYDROMORPHONE HYDROCHLORIDE 0.3 MG: 1 INJECTION, SOLUTION INTRAMUSCULAR; INTRAVENOUS; SUBCUTANEOUS at 14:54

## 2021-03-11 RX ADMIN — BUSPIRONE HYDROCHLORIDE 10 MG: 10 TABLET ORAL at 15:58

## 2021-03-11 RX ADMIN — CLOPIDOGREL BISULFATE 75 MG: 75 TABLET, FILM COATED ORAL at 08:35

## 2021-03-11 RX ADMIN — BUSPIRONE HYDROCHLORIDE 10 MG: 10 TABLET ORAL at 21:02

## 2021-03-11 RX ADMIN — BUSPIRONE HYDROCHLORIDE 10 MG: 10 TABLET ORAL at 08:35

## 2021-03-11 RX ADMIN — ISOSORBIDE DINITRATE 40 MG: 20 TABLET ORAL at 08:34

## 2021-03-11 RX ADMIN — HYDROMORPHONE HYDROCHLORIDE 1 MG: 1 INJECTION, SOLUTION INTRAMUSCULAR; INTRAVENOUS; SUBCUTANEOUS at 15:58

## 2021-03-11 RX ADMIN — ONDANSETRON 4 MG: 2 INJECTION INTRAMUSCULAR; INTRAVENOUS at 13:15

## 2021-03-11 RX ADMIN — PHENYLEPHRINE HYDROCHLORIDE 100 MCG: 10 INJECTION INTRAVENOUS at 12:29

## 2021-03-11 RX ADMIN — POLYETHYLENE GLYCOL 3350 17 G: 17 POWDER, FOR SOLUTION ORAL at 15:59

## 2021-03-11 RX ADMIN — HYDROMORPHONE HYDROCHLORIDE 1 MG: 1 INJECTION, SOLUTION INTRAMUSCULAR; INTRAVENOUS; SUBCUTANEOUS at 00:13

## 2021-03-11 RX ADMIN — HYDROMORPHONE HYDROCHLORIDE 0.5 MG: 1 INJECTION, SOLUTION INTRAMUSCULAR; INTRAVENOUS; SUBCUTANEOUS at 10:29

## 2021-03-11 RX ADMIN — PREGABALIN 150 MG: 75 CAPSULE ORAL at 07:51

## 2021-03-11 RX ADMIN — ASPIRIN 81 MG CHEWABLE TABLET 81 MG: 81 TABLET CHEWABLE at 08:35

## 2021-03-11 RX ADMIN — CEFAZOLIN SODIUM 2 G: 2 INJECTION, SOLUTION INTRAVENOUS at 11:19

## 2021-03-11 RX ADMIN — NICOTINE 1 PATCH: 14 PATCH, EXTENDED RELEASE TRANSDERMAL at 08:35

## 2021-03-11 ASSESSMENT — ACTIVITIES OF DAILY LIVING (ADL)
ADLS_ACUITY_SCORE: 12
ADLS_ACUITY_SCORE: 15
ADLS_ACUITY_SCORE: 14
ADLS_ACUITY_SCORE: 14
ADLS_ACUITY_SCORE: 15

## 2021-03-11 ASSESSMENT — LIFESTYLE VARIABLES: TOBACCO_USE: 1

## 2021-03-11 NOTE — OP NOTE
Procedure Date: 03/11/2021      STAFF SURGEON:  David Arana DPM      PREOPERATIVE DIAGNOSES:   1.  Left foot osteomyelitis.   2.  Status post open transmetatarsal amputation.   3.  Diabetes mellitus with polyneuropathy.      POSTOPERATIVE DIAGNOSES:   1.  Left foot osteomyelitis.   2.  Status post open transmetatarsal amputation.   3.  Diabetes mellitus with polyneuropathy.      PROCEDURE:  Revision of left transmetatarsal amputation with bone resection and delayed primary closure.      ANESTHESIA:  General with preoperative ankle block.       HEMOSTASIS:  Pneumatic ankle tourniquet and electrocautery.      ESTIMATED BLOOD LOSS:  200 mL      SPECIMENS:     1.  Left second metatarsal bone for culture, aerobic and anaerobic.   2.  Left first metatarsal proximal margin for pathology to evaluate for osteomyelitis.   3.  Left second metatarsal proximal margin for pathology to evaluate for osteomyelitis.   4.  Left third metatarsal proximal margin for pathology to evaluate for osteomyelitis.      INDICATIONS:  This is a 61-year-old diabetic male who underwent open left transmetatarsal amputation by my colleague, Dr. Barnes on 03/05/2021.  This was for a severe left diabetic foot infection.  The patient overall is improved clinically; however, proximal margin pathology from the first surgery revealed evidence of residual osteomyelitis of metatarsals 1, 2 and 3.  Return to the operating room was recommended for repeat irrigation and debridement, further bone resection and hopeful delayed primary closure pending intraoperative findings.  The risks versus benefits were discussed at length prior.  Risks include but are not limited to infection, osteomyelitis, delayed wound healing, open wound, bleeding, need for additional surgery, further amputation, loss of function, blood clot.      DESCRIPTION OF PROCEDURE:  All questions were answered to the patient's satisfaction.  Consent form was signed and placed in chart.   The patient was brought to the operating room by the Anesthesia team and placed supine on the table.  After general sedation was administered, local anesthetic block was placed around the left ankle with 0.5% Marcaine plain.  Retention sutures were removed prior to the prep.  Foot was prepped and draped in the usual sterile fashion.      Attention was directed to the left foot amputation site.  This area was bluntly reopened and inspected.  There were some areas of nonviable tissue noted within the wound.  These were excisionally debrided up to and including underlying fascia with a 15-blade and rongeur.  This was to healthy bleeding tissue.  Further dissection carried out around the distal portion of the metatarsals 1 through 5.  The patient already has a fairly short transmetatarsal amputation, making further bone resection challenging.  Utilizing a sagittal saw, metatarsals 4 and 5 were shortened to help match the anticipated parabola that would be created by further resection of metatarsals 1 through 3.  The saw was then utilized to further resect metatarsals 1 through 3.  These were resected back to a level of what appeared to be healthy bleeding bone.  Further localized bone debridement was undertaken as needed with a rongeur and curette to what appeared to be healthy bleeding bone.  The area was copiously lavaged with sterile saline.  The patient bled quite profusely during the course of the procedure.  I did use the tourniquet for a portion of the procedure to control bleeding, but this was of minimal assistance.  Hemostasis was achieved with electrocautery, Vicryl ties and thrombin Gelfoam.  After copious saline irrigation, tissues were again inspected.  No further necrotic or infected-appearing tissue remained.  The remaining bones appeared to be clinically healthy and bleeding.  Proximal margin pathology specimens were taken from metatarsals 1, 2 and 3 with separate clean rongeurs and sent as above.  Bone  culture was also taken from the second metatarsal from a portion of the metatarsal was resected.  After the final saline irrigation, the soft tissue flaps were reapproximated under minimal tension with nonabsorbable suture.  A Penrose drain was placed to facilitate drainage to avoid sub flap hematoma.  Sterile dressing was applied.      The patient was awakened from anesthesia and returned to the PACU with vital signs stable and vascular status intact.  He tolerated the procedure and anesthesia well.      PLAN:  The patient will be returned to the inpatient floor.  The patient was noted to have a decrease in hemoglobin intraoperatively.  I did order 1 unit of blood.  Patient will be monitored closely.  I am concerned about the possibility of residual osteomyelitis given the fact that he has a very short TMA, and further extensive bone resection would violate tendon insertions and compromise function of his foot.  Again, the remaining bone appeared healthy, but I would recommend a longer course of IV antibiotics to treat any potential residual osteomyelitis.  This will be coordinated with Infectious Disease.  He is at risk for future BKA.        LEENA NAVARRO DPM             D: 2021   T: 2021   MT:       Name:     MORALES SHERMAN   MRN:      [id number]        Account:        [id number]   :      1960           Procedure Date: 2021      Document: [id number]

## 2021-03-11 NOTE — PLAN OF CARE
/21 07-19    Cognitive Concerns/ Orientation : AOx4   BEHAVIOR & AGGRESSION TOOL COLOR: green  CIWA SCORE: n/a   ABNL VS/O2: VSS on RA  MOBILITY: NWB on L foot; SBA with walker to sit/stand at edge .Pt refuses to follow NWB restrictions. Refuses to use walker or gait  PAIN MANAGMENT: IV dilaudid given x6 for L foot pain, helpful. Reviewed pain management policy with pt. Offered ice/heat for pain as well, pt declined.   DIET: mod CHO, eating well  BOWEL/BLADDER: continent; voiding in urinal  ABNL LAB/BG: . Hgb 8.7  DRAIN/DEVICES: SL x2, order in for PICC placement for outpt IV antibiotics  TELEMETRY RHYTHM: discontinued this shift.   SKIN: scattered bruising; L foot wrapped by podiatry post L transmetatarsal amputation. Rash on LUE, outlined. Previous amputation on R toe.   TESTS/PROCEDURES:  surgery pending 3  D/C DAY/GOALS/PLACE: pending to TCU  OTHER IMPORTANT INFO: Vascular, PT/OT following. IV zosyn change to ancef.

## 2021-03-11 NOTE — BRIEF OP NOTE
[facility]    Brief Operative Note    Pre-operative diagnosis: Other acute osteomyelitis of left foot (H) [M86.172]  Post-operative diagnosis Same as pre-operative diagnosis    Procedure: Procedure(s):  Left foot revision transmetatarsal amputation with irrigation and debridement,  bone resection, delayed primary closure  Surgeon: Surgeon(s) and Role:     * David Arana DPM - Primary  Anesthesia: General   Estimated blood loss: 200 ml  Drains: Penrose  Specimens:   ID Type Source Tests Collected by Time Destination   A : left first metetarsal proximal margine evaluate for osteomylitis #1 Tissue Foot, Left SURGICAL PATHOLOGY EXAM David Arana, CAROL 3/11/2021 12:19 PM    B : left first metetarsal proximal margine evaluate for osteomylitis #2 Tissue Foot, Left SURGICAL PATHOLOGY EXAM David Arana, CAROL 3/11/2021 12:23 PM    C : left first metetarsal proximal margine evaluate for osteomylitis #3 Tissue Foot, Left SURGICAL PATHOLOGY EXAM David Arana, CAROL 3/11/2021 12:23 PM    D : right second metetarsal toe anerobic/aerobic Bone Foot, Left BONE CULTURE AEROBIC BACTERIAL, SURGICAL PATHOLOGY EXAM David Arana, CAROL 3/11/2021  1:10 PM      Findings:   Atypical bleeding inherent to the operation that was recognized and controlled.  Complications: None.  Implants: * No implants in log *     Short TMA.  I would recommend longer term IV antibiotics to cover for potential residual osteomyelitis.

## 2021-03-11 NOTE — PLAN OF CARE
DATE & TIME: 3/10/21 1155-2834                Cognitive Concerns/ Orientation : A/Ox3-4, D/O to time @ times. Forgetful.  BEHAVIOR & AGGRESSION TOOL COLOR: green  CIWA SCORE: n/a        ABNL VS/O2: VSS on 2 L NC  MOBILITY: NWB on L foot; SBA with walker to sit/stand at edge. Pt refusing to follow NWB restrictions or use walker & gait belt. Reinforced importance of calling before ambulating, bed/chair alarm activated   PAIN MANAGMENT: IV dilaudid given q2h for L foot pain, helpful. Pain management reviewed w/ pt. PRN atarax ordered. Offered ice/heat for pain as well, pt declined.   DIET: Mod CHO diet  BOWEL/BLADDER: continent; voiding in urinal  ABNL LAB/BG: , 91. Hgb 8.3  DRAIN/DEVICES: SL x2, order in for PICC placement for outpt IV antibiotics  TELEMETRY RHYTHM: N/A  SKIN: scattered bruising; L foot surgical dressing post I&D CDI. Rash on LUE, outlined. Rash to RLE. Previous amputation of R toe.   TESTS/PROCEDURES:  Revision of L transmetatarsal amputation  D/C DAY/GOALS/PLACE: Plan pending, possible TCU  OTHER IMPORTANT INFO: 1 unit of PRBCs currently infusing

## 2021-03-11 NOTE — PROGRESS NOTES
[facility] [place]    Medicine Progress Note - Hospitalist Service       Date of Admission:  3/4/2021  Assessment & Plan       Huseyin Pettit is a 61 year old male w/ PMH of DM II, CAD w/ prior PCI in 2011, former smoker HTN, HLD, CKD, chronic pain, anxiety and depression, osteomyelitis of toe s/p amputation of right foot admitted on 03/04/21 w/ CP and possible septic arthritis or multilevel osteomyelitis.       Left foot wounds with septic arthritis and osteomyelitis  Multiple fractures of left foot  Staph aureus bacteremia 3/4/21    Vascular surgery (signed off 3/9), Podiatry, and ID consulted.    S/p left TMA on 3/5/21 by Dr. Barnes.    1/2 blood cultures from admission positive for Staph aureus. Repeat blood cultures have been negative to date.    Podiatry planning return to the OR this admission, timing and type of procedure will depend on pathology results from first procedure.    ID transitioned zosyn to anc 3/9    Podiatry planning OR 3/11 -- NPO at midnight, will only do 5 units glargine tonight as sugars have been low     Diabetes mellitus, type 2    Hemoglobin A1c 6.8 on 3/4/21.    Hold PTA glimepiride and metformin for now (further surgery anticipated)    Blood sugars remain improved / at goal as of 3/8    Was on NovoLog Mix 70/30 at 28 units in AM and 38 units in PM prior to admission, currently on hold.    Continue accuchecks and medium dose sliding scale NovoLog.    Continue prandial Novolog at 1 unit per 15 grams of carbs.    Monitor for hypoglycemia - asymptomatic hypoglycemic episodes noted - patient often eating at off times and not informing nursing. Given NPO at midnight, will decrease to 5 units glargine tonight 3/10.     Acute blood loss anemia -- 8.3 today (up from 8.1), Stable   Chronic normocytic anemia with baseline 12-13g    Had an episode of hypotension early on the morning of 3/6/21. Labs were checked and hemoglobin was down to 6.1.    Transfused one unit PRBCs on  3/6/21.    Hemoglobin improved to about 8, however dropped to 6.9.  Transfused 1 more unit of PRBCs 3/7.     hgb stable at 8.4 AM 3/8  --> 8.7 3/9 --> 8.1 3/10    Holding prophylactic lovenox for now (OR 3/11)     Hypotension - resolved    Likely hypovolemic - WNL after IVF given previously in hospitalization     Chest pain, +risk factors including HTN, HLD, DM2  CAD w/ PCI in 2011 to LAD and CX iin [place], last coronary angiogram was 3/2020 which did not require any mechanical revascularization and GDMT was recommended.    Pain much improved, seems to have resolved.    Serial troponins negative.    No significant dysrhythmias on telemetry.    EKG x 2 did not show any acute ischemic changes.    CT chest negative for PE on 3/4/21.    He is tender to palpation in the area of discomfort.    Given negative work-up including negative serial troponins despite ongoing chest pain, suspicion for cardiac etiology of his pain is low.    Symptomatic management.    Continue PTA aspirin, plavix, bisoprolol, isosorbide dinitrate, and rosuvastatin.     CKD stage 2-3, baseline Cr 0.8-1.0 as of early 2020  Mild hyponatremia    Hyponatremia resolved.    Creatinine stable 0.6 - 0.7     Abdominal pain on palpation in LUQ and RLQ, although patient is requesting food and drink.  Constipation    Lactic acid was within normal limits.    Epigastric tenderness has resolved.    Continue bowel regimen.     Depression  Anxiety    Continue PTA buspirone, clomipramine, fluoxetine, and quetiapine.     Mild basilar predominant bronchial wall thickening with some mucous plugging may be related to bronchitis, seen on CT    Encouraged smoking cessation.    Encourage incentive spirometer.     Extensive lower extremity erythematous macular rash, confluent in areas, concerning for vasculitic process    Since vascular surgery is already involved perhaps they would consider doing a biopsy.    Rash improved.    Vasculitis labs ordered, pending.     Covid-19  PCR NEGATIVE  Asymptomatic COVID-19 PCR testing was negative on 3/4/21.      Diet: NPO per Anesthesia Guidelines for Procedure/Surgery Except for: Meds    DVT Prophylaxis: Pneumatic Compression Devices  Rangel Catheter: not present  Code Status: Full Code           Disposition Plan   Expected discharge: ?2 days, OR today, ongoing IV abx, may need TCU at discharge depending on wound care.    Bill Rogers MD  Hospitalist Service  [facility]  Contact information available via [facility] Paging/Directory    ______________________________________________________________________    Interval History   Reports left foot pain is 9.5/10 despite being on IV Dilaudid 0.5-1 mg q2hr prn.      Physical Exam   Vital Signs: Temp: 98.8  F (37.1  C) Temp src: Oral BP: 137/76 Pulse: 91   Resp: 18 SpO2: 97 % O2 Device: None (Room air)    Weight: 147 lbs 4.28 oz    Gen: NAD, very pleasant  HEENT: Normocephalic, EOMI, MMM  Resp: no crackles,  no wheezes, no increased work of resp  CV: S1S2 heard, reg rhythm, reg rate, no pedal edema  Abdo: soft, nontender, nondistended, bowel sounds present  Ext: calves nontender, LLE ext dressing in place, clean and dry, left elbow with improved area of erythema, nontender, no drainage, distal hand neurovasc intact  Neuro: AAOx3, CN grossly intact, no facial asymmetry    Data   Recent Labs   Lab 03/11/21  0739 03/10/21  0822 03/09/21  0741 03/08/21  0756 03/07/21  1108 03/07/21  1108 03/06/21  0857 03/06/21  0857 03/06/21  0047 03/04/21  2240 03/04/21  2240 03/04/21  1654 03/04/21  0942   WBC 9.3 10.2 10.5 12.1*  --  9.0  --  13.9* 10.5   < >  --   --  13.3*   HGB 8.3* 8.1* 8.7* 8.4*   < > 6.9*   < > 10.1* 6.1*   < >  --   --  9.6*   MCV 90 87 89 87  --  88  --  86 86   < >  --   --  89    263 276 270  --  303  --  360 308   < >  --   --  449   INR  --   --   --   --   --   --   --   --   --   --   --   --  1.24*   NA  --   --   --   --   --  143  --  139 132*   < >   --   --  133   POTASSIUM  --   --   --  4.2  --  4.2  --  4.1 4.3   < >  --   --  4.1   CHLORIDE  --   --   --   --   --  114*  --  108 103   < >  --   --  99   CO2  --   --   --   --   --  27  --  28 26   < >  --   --  25   BUN  --   --   --   --   --  17  --  16 21   < >  --   --  26   CR 0.95 0.91 0.77 0.64*  --  0.77  --  0.70 0.81   < >  --   --  0.84   ANIONGAP  --   --   --   --   --  2*  --  3 3   < >  --   --  9   MYRON  --   --   --   --   --  7.5*  --  8.4* 7.4*   < >  --   --  8.9   GLC  --   --   --   --   --  80  --  219* 386*   < >  --   --  125*   ALBUMIN  --   --   --   --   --   --   --   --   --   --   --  1.9*  --    PROTTOTAL  --   --   --   --   --   --   --   --   --   --   --  6.9  --    BILITOTAL  --   --   --   --   --   --   --   --   --   --   --  0.2  --    ALKPHOS  --   --   --   --   --   --   --   --   --   --   --  72  --    ALT  --   --   --   --   --   --   --   --   --   --   --  22  --    AST  --   --   --   --   --   --   --   --   --   --   --  20  --    TROPI  --   --   --   --   --   --   --   --   --   --  <0.015 <0.015 <0.015    < > = values in this interval not displayed.     No results found for this or any previous visit (from the past 24 hour(s)).

## 2021-03-11 NOTE — PROGRESS NOTES
[facility]  Infectious Disease Progress Note          Assessment and Plan:   Assessment & Plan     Huseyin Pettit is a 61 year old male who was admitted on 3/4/2021.      Impression:  1. 61 y.o male with diabetes.   2. Neuropathy.   3. CAD  4. Admitted with left foot wound with spetic arthritis.   5. Previous admission last year for osteo s/p toe amputation that admission.   6. Blood cultures positive for MSSA.   7. S/P Left transmetatarsal amputation followed by Left foot revision transmetatarsal amputation with irrigation and debridement,  bone resection, delayed primary closure     Recommendations:   On Ancef after initially  being on zosyn Will need  4 weeks of IV antibiotics   No clear secondary sites, no artificail material    Orders for discharge antibiotics in the chart         Interval History:   no new complaints and doing well; no cp, sob, n/v/d, or abd pain. Seems OK, T down Follow-up BC neg so far foot cx with MSSA                Medications:       [Auto Hold] aspirin  81 mg Oral Daily     [Auto Hold] bisoprolol  5 mg Oral Daily     bupivacaine (PF)         [Auto Hold] busPIRone  10 mg Oral 4x Daily     [Auto Hold] ceFAZolin  2 g Intravenous Q8H     [Auto Hold] clomiPRAMINE  100 mg Oral BID     [Auto Hold] clopidogrel  75 mg Oral Daily     [Auto Hold] divalproex sodium delayed-release  1,000 mg Oral BID     [Auto Hold] FLUoxetine  40 mg Oral Daily     [Auto Hold] insulin aspart   Subcutaneous QAM AC     [Auto Hold] insulin aspart   Subcutaneous Daily with lunch     [Auto Hold] insulin aspart   Subcutaneous Daily with supper     [Auto Hold] insulin aspart  1-7 Units Subcutaneous TID AC     [Auto Hold] insulin aspart  1-5 Units Subcutaneous At Bedtime     [Auto Hold] insulin glargine  5 Units Subcutaneous At Bedtime     [Auto Hold] isosorbide Dinitrate  40 mg Oral BID     [Auto Hold] nicotine  1 patch Transdermal Daily     [Auto Hold] nicotine   Transdermal Q8H     [Auto Hold]  polyethylene glycol  17 g Oral BID     [Auto Hold] polyethylene glycol  34 g Oral Once     [Auto Hold] pregabalin  150 mg Oral BID     [Auto Hold] QUEtiapine  600 mg Oral At Bedtime     [Auto Hold] rosuvastatin  20 mg Oral Daily     [Auto Hold] sodium chloride (PF)  10 mL Intracatheter Q8H                  Physical Exam:   Blood pressure 137/76, pulse 91, temperature 98.8  F (37.1  C), temperature source Oral, resp. rate 18, weight 66.8 kg (147 lb 4.3 oz), SpO2 97 %.  Wt Readings from Last 2 Encounters:   03/05/21 66.8 kg (147 lb 4.3 oz)   03/04/21 61.2 kg (135 lb)     Vital Signs with Ranges  Temp:  [97.2  F (36.2  C)-98.8  F (37.1  C)] 98.8  F (37.1  C)  Pulse:  [85-91] 91  Resp:  [16-18] 18  BP: (103-137)/(65-80) 137/76  SpO2:  [94 %-97 %] 97 %    Constitutional: Awake, alert, cooperative, no apparent distress   Lungs: Clear to auscultation bilaterally, no crackles or wheezing   Cardiovascular: Regular rate and rhythm, normal S1 and S2, and no murmur noted   Abdomen: Normal bowel sounds, soft, non-distended, non-tender   Skin: No rashes, no cyanosis, no edema   Other: Foot wrapped          Data:   All microbiology laboratory data reviewed.  Recent Labs   Lab Test 03/11/21  0739 03/10/21  0822 03/09/21  0741   WBC 9.3 10.2 10.5   HGB 8.3* 8.1* 8.7*   HCT 26.1* 25.5* 27.2*   MCV 90 87 89    263 276     Recent Labs   Lab Test 03/11/21  0739 03/10/21  0822 03/09/21  0741   CR 0.95 0.91 0.77     Recent Labs   Lab Test 03/04/21  0942   SED 91*     Recent Labs   Lab Test 03/07/21  1109 03/07/21  1107 03/06/21  0907 03/06/21  0856 03/05/21  1847 03/05/21  1052 03/05/21  0947 03/04/21  1015 03/04/21  0942   CULT No growth after 4 days No growth after 4 days No growth after 5 days No growth after 5 days Heavy growth  Mixed aerobic and anaerobic rm  No predominant anaerobes  *  Heavy growth  Staphylococcus aureus  *  Heavy growth  Normal skin rm   No growth No growth Cultured on the 1st day of  incubation:  Staphylococcus aureus  *  Critical Value/Significant Value, preliminary result only, called to and read back by  Ashtyn Chicas RN at 0537 3/5/21 hg    (Note)  POSITIVE for STAPHYLOCOCCUS AUREUS and NEGATIVE for the mecA gene  (not MRSA) by [employer] multiplex nucleic acid test. The mecA gene was  not detected. Final identification and antimicrobial susceptibility  testing will be verified by standard methods.    Specimen tested with [employer] multiplex, gram-positive blood culture  nucleic acid test for the following targets: Staph aureus, Staph  epidermidis, Staph lugdunensis, other Staph species, Enterococcus  faecalis, Enterococcus faecium, Streptococcus species, S. agalactiae,  S. anginosus grp., S. pneumoniae, S. pyogenes, Listeria sp., mecA  (methicillin resistance) and Lopez/B (vancomycin resistance).    Critical Value/Significant Value called to and read back by Sacha Del Rosario RN on 3.5.21 at 0830. bw   Cultured on the 1st day of incubation:  Staphylococcus aureus  Susceptibility testing done on previous specimen  *  Critical Value/Significant Value, preliminary result only, called to and read back by  Sacha Del Rosario RN on 3.5.21 at 0830. bw

## 2021-03-11 NOTE — ANESTHESIA PROCEDURE NOTES
Airway   Date/Time: 3/11/2021 11:20 AM   Patient location during procedure: OR  Staff -   Anesthesiologist:  Lamine Larsen MD  CRNA: Ashley Mary  Performed By: CRNA    Consent for Airway   Urgency: elective    Indications and Patient Condition  Indications for airway management: jamila-procedural  Induction type:intravenousMask difficulty assessment: 0 - not attempted    Final Airway Details  Final airway type: supraglottic airway    Endotracheal Airway Details   Secured with: pink tape    Post intubation assessment   Placement verified by: capnometry, equal breath sounds and chest rise   Number of attempts at approach: 1  Secured with:pink tape  Ease of procedure: easy  Dentition: Intact and Unchanged

## 2021-03-11 NOTE — PROGRESS NOTES
"CLINICAL NUTRITION SERVICES  -  ASSESSMENT NOTE      Recommendations Ordered by Registered Dietitian (RD):   ADAT.   Malnutrition:   % Weight Loss:  Up to 20% in 1 year (non-severe malnutrition)  % Intake: No decreased intake noted  Subcutaneous Fat Loss: Unable to assess  Muscle Loss: Unable to assess  Fluid Retention:  Mild - lower extremities (not likely nutrition related)    Malnutrition Diagnosis: Unable to fully assess        REASON FOR ASSESSMENT  Huseyin Pettit is a 61 year old male seen by Registered Dietitian for LOS    DX: Left foot wounds with septic arthritis and osteomyelitis    PMH:  CAD  Closed fracture of multiple ribs of left side with routine healing  Depressive disorder  T2DM  HTN    NUTRITION HISTORY  Unable to obtain - pt in procedure      CURRENT NUTRITION ORDERS  Diet Order:     ADAT, mod consistent CHO prior        Current Intake/Tolerance:  % intakes and good appetite documented in flow sheets and pt eating well per RN. Pt has been ordering 3 meals per day averaging 2350 kcals and 108 g protein daily.       NUTRITION FOCUSED PHYSICAL ASSESSMENT FOR DIAGNOSING MALNUTRITION)  No:  Patient not available           Obtained from Chart/Interdisciplinary Team:  Edema - Trace to mild (lower extremities)     -3/11: Left foot revision transmetatarsal amputation with irrigation and debridement,  bone resection, delayed primary closure     ANTHROPOMETRICS  Height: 5'9\"  Weight:  147 lbs 4.28 oz(66.8 kg)  Body mass index is 21.75 kg/m .   Weight Status: Normal BMI  IBW: 73 kg  %IBW: 92%  Weight History: 12% weight loss in one year   Wt Readings from Last 10 Encounters:   03/05/21 66.8 kg (147 lb 4.3 oz)   03/04/21 61.2 kg (135 lb)   06/04/20 74.8 kg (165 lb)   05/19/20 74.8 kg (165 lb)   04/20/20 74.8 kg (165 lb)   04/06/20 74.8 kg (165 lb)   03/30/20 74.8 kg (165 lb)   03/24/20 74.8 kg (165 lb)   03/17/20 74.8 kg (165 lb)   3/1/20 76 kg (167 lbs 12.8 oz)    LABS  Labs " reviewed    MEDICATIONS  Medications reviewed      ASSESSED NUTRITION NEEDS PER APPROVED PRACTICE GUIDELINES:    Dosing Weight 67 kg - current weight  Estimated Energy Needs: 1675 - 2010 kcals (25-30 Kcal/Kg)  Justification: maintenance  Estimated Protein Needs: 80 - 101grams protein (1.2-1.5 g pro/Kg)  Justification: wound healing  Estimated Fluid Needs: 1675 - 2010 mL (1 mL/Kcal)  Justification: maintenance    MALNUTRITION:  % Weight Loss:  Up to 20% in 1 year (non-severe malnutrition)  % Intake: No decreased intake noted  Subcutaneous Fat Loss: Unable to assess  Muscle Loss: Unable to assess  Fluid Retention:  Mild - lower extremities (not likely nutrition related)    Malnutrition Diagnosis: Unable to fully assess       NUTRITION DIAGNOSIS:  Increased nutrient needs (protein) related to wound healing s/p amputation as evidenced by pt needing 1.2-1.5 g/kg pro daily.       NUTRITION INTERVENTIONS  Recommendations / Nutrition Prescription  ADAT.      Implementation  Nutrition education: Per Provider order if indicated       Nutrition Goals  Pt to consume >75% nutritionally adequate meals TID.      MONITORING AND EVALUATION:  Progress towards goals will be monitored and evaluated per protocol and Practice Guidelines    Chari Carlson  Dietetic Intern

## 2021-03-11 NOTE — ANESTHESIA PREPROCEDURE EVALUATION
Anesthesia Pre-Procedure Evaluation    Patient: Huseyin Pettit   MRN: [id number] : 1960        Preoperative Diagnosis: Other acute osteomyelitis of left foot (H) [M86.172]   Procedure : Procedure(s):  Left foot revision transmetatarsal amputation with irrigation and debridement, possible bone resection, possible delayed primary closure     Past Medical History:   Diagnosis Date     CAD (coronary artery disease) 2015    S/p 3 stents in 2011 In [place]     Closed fracture of multiple ribs of left side with routine healing 7/10/2018     Coronary artery disease      Coronary artery disease involving native coronary artery of native heart without angina pectoris 7/3/2018    S/p 3 stents put in [place] in .     Depressive disorder      Diabetes (H)      Hypertension      Severe episode of recurrent major depressive disorder, without psychotic features (H) 7/3/2018     Type 2 diabetes mellitus with other circulatory complication, with long-term current use of insulin (H) 7/3/2018      Past Surgical History:   Procedure Laterality Date     AMPUTATE FOOT Left 3/5/2021    Procedure: TRANSMETATARSAL AMPUTATION LEFT FOOT;  Surgeon: Sergey Barnes DPM;  Location:  OR     AMPUTATE TOE(S) Right 3/3/2020    Procedure: AMPUTATION RIGHT GREAT TOE.;  Surgeon: Sergey Barnes DPM;  Location:  OR     CHOLECYSTECTOMY       COLONOSCOPY       CV CORONARY ANGIOGRAM N/A 3/2/2020    Procedure: Coronary Angiogram;  Surgeon: Thaddeus Sun MD;  Location:  [facility]     GALLBLADDER SURGERY        No Known Allergies   Social History     Tobacco Use     Smoking status: Current Every Day Smoker     Packs/day: 0.50     Years: 25.00     Pack years: 12.50     Smokeless tobacco: Never Used     Tobacco comment: trying to adela   Substance Use Topics     Alcohol use: No      Wt Readings from Last 1 Encounters:   21 66.8 kg (147 lb 4.3 oz)        Anesthesia Evaluation   Pt has had prior anesthetic.          ROS/MED HX  ENT/Pulmonary:     (+) tobacco use, Current use,     Neurologic:     (+) peripheral neuropathy,     Cardiovascular:     (+) hypertension--CAD --stent-3 in [place].     METS/Exercise Tolerance:     Hematologic:       Musculoskeletal:   (+) arthritis,     GI/Hepatic:  - neg GI/hepatic ROS     Renal/Genitourinary:     (+) renal disease, type: CRI,     Endo:     (+) type II DM,     Psychiatric/Substance Use:     (+) psychiatric history depression     Infectious Disease:       Malignancy:       Other:            Physical Exam    Airway        Mallampati: II   TM distance: > 3 FB   Neck ROM: full   Mouth opening: > 3 cm    Respiratory Devices and Support         Dental  no notable dental history         Cardiovascular   cardiovascular exam normal          Pulmonary   pulmonary exam normal                OUTSIDE LABS:  CBC:   Lab Results   Component Value Date    WBC 9.3 03/11/2021    WBC 10.2 03/10/2021    HGB 8.3 (L) 03/11/2021    HGB 8.1 (L) 03/10/2021    HCT 26.1 (L) 03/11/2021    HCT 25.5 (L) 03/10/2021     03/11/2021     03/10/2021     BMP:   Lab Results   Component Value Date     03/07/2021     03/06/2021    POTASSIUM 4.2 03/08/2021    POTASSIUM 4.2 03/07/2021    CHLORIDE 114 (H) 03/07/2021    CHLORIDE 108 03/06/2021    CO2 27 03/07/2021    CO2 28 03/06/2021    BUN 17 03/07/2021    BUN 16 03/06/2021    CR 0.95 03/11/2021    CR 0.91 03/10/2021    GLC 80 03/07/2021     (H) 03/06/2021     COAGS:   Lab Results   Component Value Date    INR 1.24 (H) 03/04/2021     POC:   Lab Results   Component Value Date     (H) 03/11/2021     HEPATIC:   Lab Results   Component Value Date    ALBUMIN 1.9 (L) 03/04/2021    PROTTOTAL 6.9 03/04/2021    ALT 22 03/04/2021    AST 20 03/04/2021    ALKPHOS 72 03/04/2021    BILITOTAL 0.2 03/04/2021     OTHER:   Lab Results   Component Value Date    LACT 1.3 03/06/2021    A1C 6.8 (H) 03/04/2021    MYRON 7.5 (L) 03/07/2021    LIPASE 245  03/30/2020    TSH 0.66 07/03/2018    .0 (H) 03/04/2021    SED 91 (H) 03/04/2021       Anesthesia Plan    ASA Status:  3      Anesthesia Type: General.     - Airway: LMA   Induction: Intravenous, Propofol.           Consents    Anesthesia Plan(s) and associated risks, benefits, and realistic alternatives discussed. Questions answered and patient/representative(s) expressed understanding.     - Discussed with:  Patient         Postoperative Care       PONV prophylaxis: Ondansetron (or other 5HT-3), Background Propofol Infusion     Comments:                Lamine Larsen MD

## 2021-03-11 NOTE — ANESTHESIA CARE TRANSFER NOTE
Patient: Huseyin Pettit    Procedure(s):  Left foot revision transmetatarsal amputation with irrigation and debridement,  bone resection, delayed primary closure    Diagnosis: Other acute osteomyelitis of left foot (H) [M86.172]  Diagnosis Additional Information: No value filed.    Anesthesia Type:   General     Note:    Oropharynx: oropharynx clear of all foreign objects  Level of Consciousness: drowsy  Oxygen Supplementation: face mask  Level of Supplemental Oxygen (L/min / FiO2): 6  Independent Airway: airway patency satisfactory and stable  Dentition: dentition unchanged  Vital Signs Stable: post-procedure vital signs reviewed and stable  Report to RN Given: handoff report given  Patient transferred to: PACU    Handoff Report: Identifed the Patient, Identified the Reponsible Provider, Reviewed the pertinent medical history, Discussed the surgical course, Reviewed Intra-OP anesthesia mangement and issues during anesthesia, Set expectations for post-procedure period and Allowed opportunity for questions and acknowledgement of understanding      Vitals: (Last set prior to Anesthesia Care Transfer)  CRNA VITALS  3/11/2021 1303 - 3/11/2021 1333      3/11/2021             Resp Rate (set):  10        Electronically Signed By: MC Varner CRNA  March 11, 2021  1:33 PM

## 2021-03-11 NOTE — PLAN OF CARE
DATE & TIME: 3/10/21 [id number]    Cognitive Concerns/ Orientation : AOx4   BEHAVIOR & AGGRESSION TOOL COLOR: green  CIWA SCORE: n/a   ABNL VS/O2: VSS on RA  MOBILITY: NWB on L foot; SBA with walker to sit/stand at edge .Pt refuses to follow NWB restrictions. Refuses to use walker or gait  PAIN MANAGMENT: IV dilaudid given q2h for L foot pain, helpful. Reviewed pain management policy with pt. Offered ice/heat for pain as well, pt declined.   DIET: mod CHO, eating well  BOWEL/BLADDER: continent; voiding in urinal  ABNL LAB/BG:  and 159. Hgb 8.7  DRAIN/DEVICES: SL x2, order in for PICC placement for outpt IV antibiotics  TELEMETRY RHYTHM: discontinued this shift.   SKIN: scattered bruising; L foot wrapped by podiatry post L transmetatarsal amputation. Rash on LUE, outlined. Previous amputation on R toe.   TESTS/PROCEDURES:  surgery pending 3  D/C DAY/GOALS/PLACE: pending to TCU  OTHER IMPORTANT INFO: Vascular, PT/OT following. IV zosyn change to ancef. Pt bled through dressing placed by podiatry today. Reinforced overnight with kerlix and another ACE bandage. Prn haldol given due to increased restlessness/agitation.

## 2021-03-12 ENCOUNTER — APPOINTMENT (OUTPATIENT)
Dept: OCCUPATIONAL THERAPY | Facility: CLINIC | Age: 61
End: 2021-03-12
Attending: INTERNAL MEDICINE
Payer: COMMERCIAL

## 2021-03-12 ENCOUNTER — APPOINTMENT (OUTPATIENT)
Dept: PHYSICAL THERAPY | Facility: CLINIC | Age: 61
End: 2021-03-12
Attending: INTERNAL MEDICINE
Payer: COMMERCIAL

## 2021-03-12 LAB
ANION GAP SERPL CALCULATED.3IONS-SCNC: 4 MMOL/L (ref 3–14)
BACTERIA SPEC CULT: NO GROWTH
BACTERIA SPEC CULT: NO GROWTH
BUN SERPL-MCNC: 20 MG/DL (ref 7–30)
CALCIUM SERPL-MCNC: 7.5 MG/DL (ref 8.5–10.1)
CHLORIDE SERPL-SCNC: 110 MMOL/L (ref 94–109)
CO2 BLDCOV-SCNC: 25 MMOL/L (ref 21–28)
CO2 SERPL-SCNC: 25 MMOL/L (ref 20–32)
CREAT SERPL-MCNC: 0.94 MG/DL (ref 0.66–1.25)
ERYTHROCYTE [DISTWIDTH] IN BLOOD BY AUTOMATED COUNT: 15.6 % (ref 10–15)
GFR SERPL CREATININE-BSD FRML MDRD: 87 ML/MIN/{1.73_M2}
GLUCOSE BLDC GLUCOMTR-MCNC: 103 MG/DL (ref 70–99)
GLUCOSE BLDC GLUCOMTR-MCNC: 116 MG/DL (ref 70–99)
GLUCOSE BLDC GLUCOMTR-MCNC: 124 MG/DL (ref 70–99)
GLUCOSE BLDC GLUCOMTR-MCNC: 171 MG/DL (ref 70–99)
GLUCOSE BLDC GLUCOMTR-MCNC: 196 MG/DL (ref 70–99)
GLUCOSE SERPL-MCNC: 119 MG/DL (ref 70–99)
HCT VFR BLD AUTO: 25.8 % (ref 40–53)
HCT VFR BLD CALC: 20 %PCV (ref 40–53)
HGB BLD CALC-MCNC: 6.8 G/DL (ref 13.3–17.7)
HGB BLD-MCNC: 8.4 G/DL (ref 13.3–17.7)
Lab: NORMAL
MCH RBC QN AUTO: 28.8 PG (ref 26.5–33)
MCHC RBC AUTO-ENTMCNC: 32.6 G/DL (ref 31.5–36.5)
MCV RBC AUTO: 88 FL (ref 78–100)
PCO2 BLDV: 38 MM HG (ref 40–50)
PH BLDV: 7.44 PH (ref 7.32–7.43)
PLATELET # BLD AUTO: 214 10E9/L (ref 150–450)
PO2 BLDV: 93 MM HG (ref 25–47)
POTASSIUM BLD-SCNC: 4.5 MMOL/L (ref 3.4–5.3)
POTASSIUM SERPL-SCNC: 4.4 MMOL/L (ref 3.4–5.3)
RBC # BLD AUTO: 2.92 10E12/L (ref 4.4–5.9)
SAO2 % BLDV FROM PO2: 98 %
SODIUM BLD-SCNC: 140 MMOL/L (ref 133–144)
SODIUM SERPL-SCNC: 139 MMOL/L (ref 133–144)
SPECIMEN SOURCE: NORMAL
SPECIMEN SOURCE: NORMAL
WBC # BLD AUTO: 13.9 10E9/L (ref 4–11)

## 2021-03-12 PROCEDURE — 97110 THERAPEUTIC EXERCISES: CPT | Mod: GO

## 2021-03-12 PROCEDURE — 80048 BASIC METABOLIC PNL TOTAL CA: CPT | Performed by: INTERNAL MEDICINE

## 2021-03-12 PROCEDURE — 85027 COMPLETE CBC AUTOMATED: CPT | Performed by: INTERNAL MEDICINE

## 2021-03-12 PROCEDURE — 97530 THERAPEUTIC ACTIVITIES: CPT | Mod: GP | Performed by: PHYSICAL THERAPIST

## 2021-03-12 PROCEDURE — 250N000011 HC RX IP 250 OP 636: Performed by: PODIATRIST

## 2021-03-12 PROCEDURE — 250N000012 HC RX MED GY IP 250 OP 636 PS 637: Performed by: PODIATRIST

## 2021-03-12 PROCEDURE — 99232 SBSQ HOSP IP/OBS MODERATE 35: CPT | Performed by: HOSPITALIST

## 2021-03-12 PROCEDURE — 250N000013 HC RX MED GY IP 250 OP 250 PS 637: Performed by: PODIATRIST

## 2021-03-12 PROCEDURE — 999N001017 HC STATISTIC GLUCOSE BY METER IP

## 2021-03-12 PROCEDURE — 250N000011 HC RX IP 250 OP 636: Performed by: HOSPITALIST

## 2021-03-12 PROCEDURE — 250N000013 HC RX MED GY IP 250 OP 250 PS 637: Performed by: HOSPITALIST

## 2021-03-12 PROCEDURE — 36415 COLL VENOUS BLD VENIPUNCTURE: CPT | Performed by: INTERNAL MEDICINE

## 2021-03-12 PROCEDURE — 120N000001 HC R&B MED SURG/OB

## 2021-03-12 RX ORDER — HYDROMORPHONE HYDROCHLORIDE 1 MG/ML
0.5 INJECTION, SOLUTION INTRAMUSCULAR; INTRAVENOUS; SUBCUTANEOUS
Status: DISCONTINUED | OUTPATIENT
Start: 2021-03-12 | End: 2021-03-13

## 2021-03-12 RX ORDER — HYDROCODONE BITARTRATE AND ACETAMINOPHEN 7.5; 325 MG/1; MG/1
1 TABLET ORAL EVERY 4 HOURS PRN
Status: DISCONTINUED | OUTPATIENT
Start: 2021-03-12 | End: 2021-03-13

## 2021-03-12 RX ORDER — HALOPERIDOL 5 MG/ML
2 INJECTION INTRAMUSCULAR ONCE
Status: DISCONTINUED | OUTPATIENT
Start: 2021-03-12 | End: 2021-03-15

## 2021-03-12 RX ORDER — CEFAZOLIN SODIUM 1 G/3ML
2 INJECTION, POWDER, FOR SOLUTION INTRAMUSCULAR; INTRAVENOUS EVERY 8 HOURS
Qty: 198 G | Refills: 0 | Status: SHIPPED | OUTPATIENT
Start: 2021-03-12 | End: 2021-04-14

## 2021-03-12 RX ADMIN — CEFAZOLIN SODIUM 2 G: 2 INJECTION, SOLUTION INTRAVENOUS at 04:44

## 2021-03-12 RX ADMIN — POLYETHYLENE GLYCOL 3350 17 G: 17 POWDER, FOR SOLUTION ORAL at 09:23

## 2021-03-12 RX ADMIN — BUSPIRONE HYDROCHLORIDE 10 MG: 10 TABLET ORAL at 22:06

## 2021-03-12 RX ADMIN — Medication 1 MG: at 00:25

## 2021-03-12 RX ADMIN — PREGABALIN 150 MG: 75 CAPSULE ORAL at 09:20

## 2021-03-12 RX ADMIN — ISOSORBIDE DINITRATE 40 MG: 20 TABLET ORAL at 20:33

## 2021-03-12 RX ADMIN — HYDROMORPHONE HYDROCHLORIDE 0.5 MG: 1 INJECTION, SOLUTION INTRAMUSCULAR; INTRAVENOUS; SUBCUTANEOUS at 13:55

## 2021-03-12 RX ADMIN — BUSPIRONE HYDROCHLORIDE 10 MG: 10 TABLET ORAL at 09:21

## 2021-03-12 RX ADMIN — HYDROCODONE BITARTRATE AND ACETAMINOPHEN 1 TABLET: 7.5; 325 TABLET ORAL at 17:19

## 2021-03-12 RX ADMIN — INSULIN GLARGINE 5 UNITS: 100 INJECTION, SOLUTION SUBCUTANEOUS at 22:07

## 2021-03-12 RX ADMIN — CEFAZOLIN SODIUM 2 G: 2 INJECTION, SOLUTION INTRAVENOUS at 14:07

## 2021-03-12 RX ADMIN — CLOMIPRAMINE HCL 100 MG: 50 CAPSULE ORAL at 20:33

## 2021-03-12 RX ADMIN — BUSPIRONE HYDROCHLORIDE 10 MG: 10 TABLET ORAL at 13:55

## 2021-03-12 RX ADMIN — CLOPIDOGREL BISULFATE 75 MG: 75 TABLET, FILM COATED ORAL at 09:20

## 2021-03-12 RX ADMIN — HYDROMORPHONE HYDROCHLORIDE 1 MG: 1 INJECTION, SOLUTION INTRAMUSCULAR; INTRAVENOUS; SUBCUTANEOUS at 06:28

## 2021-03-12 RX ADMIN — HYDROXYZINE HYDROCHLORIDE 25 MG: 25 TABLET, FILM COATED ORAL at 10:44

## 2021-03-12 RX ADMIN — BUSPIRONE HYDROCHLORIDE 10 MG: 10 TABLET ORAL at 17:18

## 2021-03-12 RX ADMIN — DIVALPROEX SODIUM 1000 MG: 500 TABLET, DELAYED RELEASE ORAL at 20:34

## 2021-03-12 RX ADMIN — ISOSORBIDE DINITRATE 40 MG: 20 TABLET ORAL at 09:21

## 2021-03-12 RX ADMIN — FLUOXETINE 40 MG: 20 CAPSULE ORAL at 09:19

## 2021-03-12 RX ADMIN — HYDROMORPHONE HYDROCHLORIDE 0.5 MG: 1 INJECTION, SOLUTION INTRAMUSCULAR; INTRAVENOUS; SUBCUTANEOUS at 11:15

## 2021-03-12 RX ADMIN — HYDROXYZINE HYDROCHLORIDE 25 MG: 25 TABLET, FILM COATED ORAL at 00:24

## 2021-03-12 RX ADMIN — PREGABALIN 150 MG: 75 CAPSULE ORAL at 20:33

## 2021-03-12 RX ADMIN — NICOTINE 1 PATCH: 14 PATCH, EXTENDED RELEASE TRANSDERMAL at 09:19

## 2021-03-12 RX ADMIN — CEFAZOLIN SODIUM 2 G: 2 INJECTION, SOLUTION INTRAVENOUS at 23:18

## 2021-03-12 RX ADMIN — ASPIRIN 81 MG CHEWABLE TABLET 81 MG: 81 TABLET CHEWABLE at 09:19

## 2021-03-12 RX ADMIN — ACETAMINOPHEN 650 MG: 325 TABLET, FILM COATED ORAL at 19:03

## 2021-03-12 RX ADMIN — BISOPROLOL FUMARATE 5 MG: 5 TABLET ORAL at 09:20

## 2021-03-12 RX ADMIN — DIVALPROEX SODIUM 1000 MG: 500 TABLET, DELAYED RELEASE ORAL at 09:21

## 2021-03-12 RX ADMIN — QUETIAPINE FUMARATE 600 MG: 300 TABLET ORAL at 20:33

## 2021-03-12 RX ADMIN — INSULIN ASPART 1 UNITS: 100 INJECTION, SOLUTION INTRAVENOUS; SUBCUTANEOUS at 13:55

## 2021-03-12 RX ADMIN — INSULIN ASPART 2 UNITS: 100 INJECTION, SOLUTION INTRAVENOUS; SUBCUTANEOUS at 17:44

## 2021-03-12 RX ADMIN — ROSUVASTATIN CALCIUM 20 MG: 20 TABLET, FILM COATED ORAL at 09:19

## 2021-03-12 RX ADMIN — ACETAMINOPHEN 650 MG: 325 TABLET, FILM COATED ORAL at 00:28

## 2021-03-12 RX ADMIN — HYDROMORPHONE HYDROCHLORIDE 1 MG: 1 INJECTION, SOLUTION INTRAMUSCULAR; INTRAVENOUS; SUBCUTANEOUS at 09:17

## 2021-03-12 RX ADMIN — CLOMIPRAMINE HCL 100 MG: 50 CAPSULE ORAL at 09:21

## 2021-03-12 ASSESSMENT — ACTIVITIES OF DAILY LIVING (ADL)
ADLS_ACUITY_SCORE: 15
ADLS_ACUITY_SCORE: 13
ADLS_ACUITY_SCORE: 15

## 2021-03-12 NOTE — PROGRESS NOTES
[facility]  Infectious Disease Progress Note          Assessment and Plan:   Assessment & Plan     Huseyin Pettit is a 61 year old male who was admitted on 3/4/2021.      Impression:  1. 61 y.o male with diabetes.   2. Neuropathy.   3. CAD  4. Admitted with left foot wound with spetic arthritis.   5. Previous admission last year for osteo s/p toe amputation that admission.   6. Blood cultures positive for MSSA.   7. S/P Left transmetatarsal amputation followed by Left foot revision transmetatarsal amputation with irrigation and debridement,  bone resection, delayed primary closure     Recommendations:   On Ancef after initially  being on zosyn Will need  4 weeks of IV antibiotics for bacteremia alone but given the concern for some residual osteo will increase the course to 6 weeks day 9/ 42 today.   No clear secondary sites, no artificail material    Orders for discharge antibiotics in the chart         Interval History:   no new complaints and doing well; no cp, sob, n/v/d, or abd pain. Seems OK, T down Follow-up BC neg so far foot cx with MSSA                Medications:       aspirin  81 mg Oral Daily     bisoprolol  5 mg Oral Daily     busPIRone  10 mg Oral 4x Daily     ceFAZolin  2 g Intravenous Q8H     clomiPRAMINE  100 mg Oral BID     clopidogrel  75 mg Oral Daily     divalproex sodium delayed-release  1,000 mg Oral BID     FLUoxetine  40 mg Oral Daily     haloperidol lactate  2 mg Intravenous Once     insulin aspart   Subcutaneous QAM AC     insulin aspart   Subcutaneous Daily with lunch     insulin aspart   Subcutaneous Daily with supper     insulin aspart  1-7 Units Subcutaneous TID AC     insulin aspart  1-5 Units Subcutaneous At Bedtime     insulin glargine  5 Units Subcutaneous At Bedtime     isosorbide Dinitrate  40 mg Oral BID     nicotine  1 patch Transdermal Daily     nicotine   Transdermal Q8H     polyethylene glycol  17 g Oral BID     pregabalin  150 mg Oral BID      QUEtiapine  600 mg Oral At Bedtime     rosuvastatin  20 mg Oral Daily     sodium chloride (PF)  10 mL Intracatheter Q8H                  Physical Exam:   Blood pressure 131/77, pulse 90, temperature 97.7  F (36.5  C), temperature source Oral, resp. rate 16, weight 82 kg (180 lb 12.4 oz), SpO2 98 %.  Wt Readings from Last 2 Encounters:   03/12/21 82 kg (180 lb 12.4 oz)   03/04/21 61.2 kg (135 lb)     Vital Signs with Ranges  Temp:  [97.2  F (36.2  C)-99.3  F (37.4  C)] 97.7  F (36.5  C)  Pulse:  [] 90  Resp:  [10-24] 16  BP: ()/(61-88) 131/77  SpO2:  [94 %-100 %] 98 %    Constitutional: Awake, alert, cooperative, no apparent distress   Lungs: Clear to auscultation bilaterally, no crackles or wheezing   Cardiovascular: Regular rate and rhythm, normal S1 and S2, and no murmur noted   Abdomen: Normal bowel sounds, soft, non-distended, non-tender   Skin: No rashes, no cyanosis, no edema   Other: Foot wrapped          Data:   All microbiology laboratory data reviewed.  Recent Labs   Lab Test 03/12/21  0849 03/11/21  1253 03/11/21  0739 03/10/21  0822   WBC 13.9*  --  9.3 10.2   HGB 8.4* 6.8* 8.3* 8.1*   HCT 25.8*  --  26.1* 25.5*   MCV 88  --  90 87     --  238 263     Recent Labs   Lab Test 03/12/21  0849 03/11/21  0739 03/10/21  0822   CR 0.94 0.95 0.91     Recent Labs   Lab Test 03/04/21  0942   SED 91*     Recent Labs   Lab Test 03/11/21  1310 03/07/21  1109 03/07/21  1107 03/06/21  0907 03/06/21  0856 03/05/21  1847 03/05/21  1052 03/05/21  0947 03/04/21  1015   CULT Culture negative monitoring continues  PENDING No growth after 5 days No growth after 5 days No growth No growth Heavy growth  Mixed aerobic and anaerobic rm  No predominant anaerobes  *  Heavy growth  Staphylococcus aureus  *  Heavy growth  Normal skin rm   No growth No growth Cultured on the 1st day of incubation:  Staphylococcus aureus  *  Critical Value/Significant Value, preliminary result only, called to and read back  by  Ashtyn Chicas RN at 0537 3/5/21 hg    (Note)  POSITIVE for STAPHYLOCOCCUS AUREUS and NEGATIVE for the mecA gene  (not MRSA) by [employer] multiplex nucleic acid test. The mecA gene was  not detected. Final identification and antimicrobial susceptibility  testing will be verified by standard methods.    Specimen tested with [employer] multiplex, gram-positive blood culture  nucleic acid test for the following targets: Staph aureus, Staph  epidermidis, Staph lugdunensis, other Staph species, Enterococcus  faecalis, Enterococcus faecium, Streptococcus species, S. agalactiae,  S. anginosus grp., S. pneumoniae, S. pyogenes, Listeria sp., mecA  (methicillin resistance) and Lopez/B (vancomycin resistance).    Critical Value/Significant Value called to and read back by Sacha Del Rosario RN on 3.5.21 at 0830. bw

## 2021-03-12 NOTE — PLAN OF CARE
BEHAVIOR & AGGRESSION TOOL COLOR: green  CIWA SCORE: n/a   ABNL VS/O2: VSS except tachy at times on RA  MOBILITY: NWB on L foot; SBA with walker to sit/stand at edge. Pt refuses to follow NWB restrictions.   PAIN MANAGMENT: IV dilaudid given x2 and PRN Tylenol given x1 for L foot pain-with  relief.   DIET: mod CHO  BOWEL/BLADDER: continent; voiding in urinal  ABNL LAB/BG:   DRAIN/DEVICES: SL x2, order in for PICC placement for outpt IV antibiotics  TELEMETRY RHYTHM: N/a  SKIN: scattered bruising; L foot wrapped by podiatry post L transmetatarsal amputation yesterday. Rash on LUE and  BLE Previous amputation on R toe.   TESTS/PROCEDURES:  Surgery done 3/11-closed incision L foot.  D/C DAY/GOALS/PLACE: Discharge pending to TCU  OTHER IMPORTANT INFO: Vascular, PT/OT following. Continues on IV Ancef. Pt anxious at beginning of shift, PRN Atarax given x1 with good results.

## 2021-03-12 NOTE — ANESTHESIA POSTPROCEDURE EVALUATION
Patient: Huseyin Pettit    Procedure(s):  Left foot revision transmetatarsal amputation with irrigation and debridement,  bone resection, delayed primary closure    Diagnosis:Other acute osteomyelitis of left foot (H) [M86.172]  Diagnosis Additional Information: No value filed.    Anesthesia Type:  General    Note:     Postop Pain Control: Uneventful            Sign Out: Well controlled pain   PONV: No   Neuro/Psych: Uneventful            Sign Out: Acceptable/Baseline neuro status   Airway/Respiratory: Uneventful            Sign Out: Acceptable/Baseline resp. status   CV/Hemodynamics: Uneventful            Sign Out: Acceptable CV status   Other NRE: NONE   DID A NON-ROUTINE EVENT OCCUR? No         Last vitals:  Vitals:    03/11/21 1630 03/11/21 1715 03/11/21 1915   BP: 124/78 127/74 125/80   Pulse: 86 83 80   Resp: 20 16 16   Temp: 36.3  C (97.4  F) 36.7  C (98  F) 36.7  C (98.1  F)   SpO2: 98% 96% 95%       Last vitals prior to Anesthesia Care Transfer:  CRNA VITALS  3/11/2021 1257 - 3/11/2021 1357      3/11/2021             Resp Rate (set):  10          Electronically Signed By: Taras Agee MD  March 11, 2021  7:54 PM

## 2021-03-12 NOTE — PLAN OF CARE
3/12/[phone]  BEHAVIOR & AGGRESSION TOOL COLOR: green  CIWA SCORE: n/a     A&Ox2-3: at times disoriented to date/time & situation.     ABNL VS/O2: VSS RA  MOBILITY: NWB on LLE; SBA with walker & gait belt to sit/stand at edge. Pt refusing to follow NWB restriction to LLE. Fall precautions; bed/chair alarm activated  PAIN MANAGMENT: IV dilaudid given x3 & PO Atarax x1, refused PRN Tylenol. Pt called frequently for pain medication rating pain in LLE 8-9/10; slept between cares. MD discussed pain management protocol with patient. MD ordered PO [place] PRN for pain.management.  DIET: tolerating mod CHO  BOWEL/BLADDER: continent; voiding in urinal  ABNL LAB/BG: , 171; Hgb 8.4  DRAIN/DEVICES: PIV SL x2, order in for PICC placement for outpt IV antibiotics  TELEMETRY RHYTHM: N/a  SKIN: scattered bruising; L foot wrapped by podiatry post L transmetatarsal amputation revision on 3/11/2021. Rash on LUE & RLE. Previous amputation on R toe.   TESTS/PROCEDURES:  Surgery done 3/11-closed incision L foot.  D/C DAY/GOALS/PLACE: Discharge pending to TCU  OTHER IMPORTANT INFO: Vascular, PT/OT following. Continues on IV Ancef. Pt forgetful & increasingly anxious about pain medication today; MD was notified & met with pt to discuss pain management. Pt redirected & reassured.

## 2021-03-12 NOTE — PLAN OF CARE
DATE & TIME: 3/10/21 [id number]  Cognitive Concerns/ Orientation : AOx4   BEHAVIOR & AGGRESSION TOOL COLOR: green  CIWA SCORE: n/a   ABNL VS/O2: VSS on RA  MOBILITY: NWB on L foot; SBA with walker to sit/stand at edge .Pt refuses to follow NWB restrictions.   PAIN MANAGMENT: IV dilaudid given x3 (q2h for L foot pain)-with some relief.   DIET: mod CHO, eating well  BOWEL/BLADDER: continent; voiding in urinal  ABNL LAB/BG: , 267. Hgb 8.7 (surgeon ordered 1 unit PRBC,completed @1700)   DRAIN/DEVICES: SL x2, order in for PICC placement for outpt IV antibiotics  TELEMETRY RHYTHM: N/a  SKIN: scattered bruising; L foot wrapped by podiatry post L transmetatarsal amputation. Rash on LUE and  BLE Previous amputation on R toe.   TESTS/PROCEDURES:  surgery done 3/11-closed incision  D/C DAY/GOALS/PLACE: pending to TCU  OTHER IMPORTANT INFO: Vascular, PT/OT following. IV zosyn change to ancef. XR of foot- surgical drain in place and XR shows no evidence of osteomyelitis

## 2021-03-12 NOTE — PROGRESS NOTES
[place] [facility]  March 12, 2021    A/P:  S/p L TMA revision with closure POD #1  S/p L open TMA for DFI 3/5/21    Discussed condition and treatment options including pros and cons.    L foot dressing changed.  Drain removed, as no drainage noted.      NWB L foot.    Will recheck foot again tomorrow, and if all looks well and abx/rehab plan in place, would be ok for discharge from my standpoint.  No further surgery planned at this time.    New proximal margin pathology from metatarsals 1-3 pending.  I do have concerns for possible residual osteomyelitis given short TMA, limited options for further bone resection.  I would recommend longer term IV abx to cover for osteomyelitis, plan per ID.  Pt is at risk for more proximal amputation.    Anticipate need for TCU upon discharge.    David Arana DPM, FACFAS  Pager: [phone]    S:  Pt seen at bedside.  Pt complaining his nurses have not been quick enough to bring his pain meds.  He notes his pain is under control however and declines pain team consult.  RN preparing meds during my visit.    O:  /77 (BP Location: Left arm)   Pulse 90   Temp 97.7  F (36.5  C) (Oral)   Resp 16   Wt 82 kg (180 lb 12.4 oz)   SpO2 98%   BMI 26.70 kg/m    NAD.  L foot dressing with bloody strikethrough.  Removed.  Drain w/o acitve drainage noted.  This was removed.  Incision coapted.  No signs of infection.    All cultures:  Recent Labs   Lab 03/11/21  1310 03/07/21  1109 03/07/21  1107 03/06/21  0907 03/06/21  0856 03/05/21  1847 03/05/21  1052 03/05/21  0947   CULT Culture negative monitoring continues  PENDING No growth after 5 days No growth after 5 days No growth No growth Heavy growth  Mixed aerobic and anaerobic rm  No predominant anaerobes  *  Heavy growth  Staphylococcus aureus  *  Heavy growth  Normal skin rm   No growth No growth     Path pending from yesterday's surgery.    Lab Results   Component Value Date    WBC 13.9  03/12/2021     Lab Results   Component Value Date    RBC 2.92 03/12/2021     Lab Results   Component Value Date    HGB 8.4 03/12/2021     Lab Results   Component Value Date    HCT 25.8 03/12/2021     No components found for: MCT  Lab Results   Component Value Date    MCV 88 03/12/2021     Lab Results   Component Value Date    MCH 28.8 03/12/2021     Lab Results   Component Value Date    MCHC 32.6 03/12/2021     Lab Results   Component Value Date    RDW 15.6 03/12/2021     Lab Results   Component Value Date     03/12/2021

## 2021-03-12 NOTE — PROVIDER NOTIFICATION
Cross Cover     Paged regarding agitation. Pt quite agitated overnight last night as well.     IV Haldol 2mg x1 dose ordered    Candi Rodgers MD

## 2021-03-12 NOTE — PROGRESS NOTES
[facility]    Medicine Progress Note - Hospitalist Service       Date of Admission:  3/4/2021  Assessment & Plan       Huseyin Pettit is a 61 year old male w/ PMH of DM II, CAD w/ prior PCI in 2011, former smoker HTN, HLD, CKD, chronic pain, anxiety and depression, osteomyelitis of toe s/p amputation of right foot admitted on 03/04/21 w/ CP and possible septic arthritis or multilevel osteomyelitis.       Left foot wounds with septic arthritis and osteomyelitis  S/P Revision L TMA, bone resection, delayed primary closure 3/11/21  Multiple fractures of left foot  Staph aureus bacteremia 3/4/21    Vascular surgery (signed off 3/9), Podiatry, and ID consulted.    S/p left TMA on 3/5/21 by Dr. Barnes.    1/2 blood cultures from admission positive for Staph aureus. Repeat blood cultures have been negative to date.    ID transitioned zosyn to ancef 3/9    Podiatry planning OR 3/11  - revision of L TMA with bone resection and delayed primary closure    Surgical path pending; no further surgery anticipated, per Podiatry, possible ok for discharge 3/13, await abx plans per ID.    Decrease dilaudid, de-escalate and use alternative means    Per podiatry, nonweightbearing left lower extremity.  Therapies consulted.     Diabetes mellitus, type 2    Hemoglobin A1c 6.8 on 3/4/21.    Hold PTA glimepiride and metformin for now (further surgery anticipated)    Blood sugars remain improved / at goal as of 3/8    Was on NovoLog Mix 70/30 at 28 units in AM and 38 units in PM prior to admission, currently on hold.    Continue accuchecks and medium dose sliding scale NovoLog.    Continue prandial Novolog at 1 unit per 15 grams of carbs.    Monitor for hypoglycemia - asymptomatic hypoglycemic episodes noted previously, sugars more at goal 3/12. Continue 5 units nightly glargine and titrate tomorrow if needed.     Acute blood loss anemia, secondary to surgery 3/11  Chronic normocytic anemia with baseline  12-13g    Had an episode of hypotension early on the morning of 3/6/21. Labs were checked and hemoglobin was down to 6.1.    Transfused one unit PRBCs on 3/6/21.    Hemoglobin improved to about 8, however dropped to 6.9.  Transfused 1 more unit of PRBCs 3/7.     hgb stable at 8.4 AM 3/8     Post op Hgb 6.8 3/11 - transfused 1 uPRBC    3/12 Hgb 8.4, stable and improved     Hypotension - resolved    Likely hypovolemic - WNL after IVF given previously in hospitalization     Chest pain, +risk factors including HTN, HLD, DM2  CAD w/ PCI in 2011 to LAD and CX iin [place], last coronary angiogram was 3/2020 which did not require any mechanical revascularization and GDMT was recommended.    Pain much improved, seems to have resolved.    Serial troponins negative.    No significant dysrhythmias on telemetry.    EKG x 2 did not show any acute ischemic changes.    CT chest negative for PE on 3/4/21.    He is tender to palpation in the area of discomfort.    Given negative work-up including negative serial troponins despite ongoing chest pain, suspicion for cardiac etiology of his pain is low.    Symptomatic management.    Continue PTA aspirin, plavix, bisoprolol, isosorbide dinitrate, and rosuvastatin.     CKD stage 2-3, baseline Cr 0.8-1.0 as of early 2020  Mild hyponatremia    Hyponatremia resolved.    Creatinine stable 0.6 - 0.7     Abdominal pain on palpation in LUQ and RLQ, although patient is requesting food and drink.  Constipation    Lactic acid was within normal limits.    Epigastric tenderness has resolved.    Continue bowel regimen.     Depression  Anxiety    Continue PTA buspirone, clomipramine, fluoxetine, and quetiapine.     Mild basilar predominant bronchial wall thickening with some mucous plugging may be related to bronchitis, seen on CT    Encouraged smoking cessation.    Encourage incentive spirometer.     Lower extremity petechiae and purpura, confluent in areas, concerning for vasculitic process vs platelet  destruction with infection, also he is on DAPT. Plt count remains normal range as of 3/12.     Vascular surgery previously following    Rash improved variable.    Vasculitis labs ordered, pending.     Covid-19 PCR NEGATIVE  Asymptomatic COVID-19 PCR testing was negative on 3/4/21.      Diet: Moderate Consistent CHO Diet    DVT Prophylaxis: Pneumatic Compression Devices  Rangel Catheter: not present  Code Status: Full Code           Disposition Plan   Expected discharge: 1-2 days pending abx plan, therapy eval, ok from Podiatry, TCU anticipated  Entered: Juan Aden MD 03/12/2021, 8:11 AM       The patient's care was discussed with the Bedside Nurse and Patient.    Juan Aden MD  Hospitalist Service  [facility]  Contact information available via [facility] Paging/Directory    ______________________________________________________________________    Interval History   Patient seen and examined.  No acute events overnight.  Denies fevers, chills, shortness of breath or chest pain.  Still with distal lower extremity petechial nonblanching rash.  Of note left elbow has cleared up.  Will de-escalate Dilaudid as able.  Podiatry will reevaluate 3/13 and anticipate possible discharge with antibiotic plan according to ID when available.  Therapies consulted and TCU is anticipated.    Data reviewed today: I reviewed all medications, new labs and imaging results over the last 24 hours. I personally reviewed no images or EKG's today.    Physical Exam   Vital Signs: Temp: 97.7  F (36.5  C) Temp src: Oral BP: 131/77 Pulse: 90   Resp: 16 SpO2: 98 % O2 Device: None (Room air) Oxygen Delivery: 2 LPM  Weight: 180 lbs 12.44 oz    Gen: NAD, pleasant  HEENT: Normocephalic, EOMI, MMM  Resp: no crackles, no wheezes, no increased work of resp  CV: S1S2 heard,  Reg rhythm, reg rate  Abdo: soft, nontender, nondistended, bowel sounds present  Ext: calves nontender, well perfused, mid shin and distal  nonblanching petechiae and purpura noted  Neuro: AAOx3, CN grossly intact, no facial asymmetry      Data   Recent Labs   Lab 03/12/21  0849 03/11/21  1253 03/11/21  0739 03/10/21  0822 03/08/21  0756 03/08/21  0756 03/07/21  1108 03/07/21  1108 03/06/21  0857 03/06/21  0857   WBC 13.9*  --  9.3 10.2   < > 12.1*  --  9.0  --  13.9*   HGB 8.4* 6.8* 8.3* 8.1*   < > 8.4*   < > 6.9*   < > 10.1*   MCV 88  --  90 87   < > 87  --  88  --  86     --  238 263   < > 270  --  303  --  360    140  --   --   --   --   --  143  --  139   POTASSIUM 4.4 4.5  --   --   --  4.2  --  4.2  --  4.1   CHLORIDE 110*  --   --   --   --   --   --  114*  --  108   CO2 25  --   --   --   --   --   --  27  --  28   BUN 20  --   --   --   --   --   --  17  --  16   CR 0.94  --  0.95 0.91   < > 0.64*  --  0.77  --  0.70   ANIONGAP 4  --   --   --   --   --   --  2*  --  3   MYRON 7.5*  --   --   --   --   --   --  7.5*  --  8.4*   *  --   --   --   --   --   --  80  --  219*    < > = values in this interval not displayed.     Recent Results (from the past 24 hour(s))   XR Foot Port Left 2 Views    Narrative    EXAM: XR FOOT PORT LT 2 VW  LOCATION: [facility]  DATE/TIME: 3/11/2021 6:37 PM    INDICATION: Follow-up amputation/surgery  COMPARISON: None.      Impression    IMPRESSION: Postoperative changes of transmetatarsal amputation. Surgical drain in place. No radiographic evidence for osteomyelitis.

## 2021-03-13 ENCOUNTER — APPOINTMENT (OUTPATIENT)
Dept: PHYSICAL THERAPY | Facility: CLINIC | Age: 61
End: 2021-03-13
Attending: PODIATRIST
Payer: COMMERCIAL

## 2021-03-13 LAB
BACTERIA SPEC CULT: NO GROWTH
BACTERIA SPEC CULT: NO GROWTH
CREAT SERPL-MCNC: 0.96 MG/DL (ref 0.66–1.25)
GFR SERPL CREATININE-BSD FRML MDRD: 85 ML/MIN/{1.73_M2}
GLUCOSE BLDC GLUCOMTR-MCNC: 107 MG/DL (ref 70–99)
GLUCOSE BLDC GLUCOMTR-MCNC: 181 MG/DL (ref 70–99)
GLUCOSE BLDC GLUCOMTR-MCNC: 181 MG/DL (ref 70–99)
GLUCOSE BLDC GLUCOMTR-MCNC: 89 MG/DL (ref 70–99)
GLUCOSE BLDC GLUCOMTR-MCNC: 95 MG/DL (ref 70–99)
PLATELET # BLD AUTO: 229 10E9/L (ref 150–450)
SPECIMEN SOURCE: NORMAL
SPECIMEN SOURCE: NORMAL

## 2021-03-13 PROCEDURE — 999N000040 HC STATISTIC CONSULT NO CHARGE VASC ACCESS

## 2021-03-13 PROCEDURE — 36415 COLL VENOUS BLD VENIPUNCTURE: CPT | Performed by: PODIATRIST

## 2021-03-13 PROCEDURE — 250N000013 HC RX MED GY IP 250 OP 250 PS 637: Performed by: PODIATRIST

## 2021-03-13 PROCEDURE — 272N000450 HC KIT 4FR POWER PICC SINGLE LUMEN

## 2021-03-13 PROCEDURE — 250N000011 HC RX IP 250 OP 636: Performed by: HOSPITALIST

## 2021-03-13 PROCEDURE — 85049 AUTOMATED PLATELET COUNT: CPT | Performed by: PODIATRIST

## 2021-03-13 PROCEDURE — 99232 SBSQ HOSP IP/OBS MODERATE 35: CPT | Performed by: HOSPITALIST

## 2021-03-13 PROCEDURE — 97530 THERAPEUTIC ACTIVITIES: CPT | Mod: GP | Performed by: PHYSICAL THERAPIST

## 2021-03-13 PROCEDURE — 36569 INSJ PICC 5 YR+ W/O IMAGING: CPT

## 2021-03-13 PROCEDURE — 250N000009 HC RX 250: Performed by: PODIATRIST

## 2021-03-13 PROCEDURE — 120N000001 HC R&B MED SURG/OB

## 2021-03-13 PROCEDURE — 999N001017 HC STATISTIC GLUCOSE BY METER IP

## 2021-03-13 PROCEDURE — 250N000012 HC RX MED GY IP 250 OP 636 PS 637: Performed by: PODIATRIST

## 2021-03-13 PROCEDURE — 250N000013 HC RX MED GY IP 250 OP 250 PS 637: Performed by: HOSPITALIST

## 2021-03-13 PROCEDURE — 250N000011 HC RX IP 250 OP 636: Performed by: PODIATRIST

## 2021-03-13 PROCEDURE — 82565 ASSAY OF CREATININE: CPT | Performed by: PODIATRIST

## 2021-03-13 RX ORDER — OLANZAPINE 5 MG/1
5 TABLET, ORALLY DISINTEGRATING ORAL 2 TIMES DAILY PRN
Status: DISCONTINUED | OUTPATIENT
Start: 2021-03-13 | End: 2021-03-15

## 2021-03-13 RX ORDER — HYDROMORPHONE HYDROCHLORIDE 1 MG/ML
0.5 INJECTION, SOLUTION INTRAMUSCULAR; INTRAVENOUS; SUBCUTANEOUS
Status: DISCONTINUED | OUTPATIENT
Start: 2021-03-13 | End: 2021-03-14

## 2021-03-13 RX ORDER — HYDROCODONE BITARTRATE AND ACETAMINOPHEN 10; 325 MG/1; MG/1
1 TABLET ORAL EVERY 6 HOURS PRN
Status: DISCONTINUED | OUTPATIENT
Start: 2021-03-13 | End: 2021-03-15

## 2021-03-13 RX ADMIN — BUSPIRONE HYDROCHLORIDE 10 MG: 10 TABLET ORAL at 17:10

## 2021-03-13 RX ADMIN — OLANZAPINE 5 MG: 5 TABLET, ORALLY DISINTEGRATING ORAL at 13:55

## 2021-03-13 RX ADMIN — ROSUVASTATIN CALCIUM 20 MG: 20 TABLET, FILM COATED ORAL at 08:31

## 2021-03-13 RX ADMIN — PREGABALIN 150 MG: 75 CAPSULE ORAL at 20:36

## 2021-03-13 RX ADMIN — INSULIN ASPART 1 UNITS: 100 INJECTION, SOLUTION INTRAVENOUS; SUBCUTANEOUS at 17:52

## 2021-03-13 RX ADMIN — CLOMIPRAMINE HCL 100 MG: 50 CAPSULE ORAL at 20:36

## 2021-03-13 RX ADMIN — INSULIN GLARGINE 5 UNITS: 100 INJECTION, SOLUTION SUBCUTANEOUS at 21:20

## 2021-03-13 RX ADMIN — HYDROMORPHONE HYDROCHLORIDE 0.5 MG: 1 INJECTION, SOLUTION INTRAMUSCULAR; INTRAVENOUS; SUBCUTANEOUS at 07:02

## 2021-03-13 RX ADMIN — HYDROMORPHONE HYDROCHLORIDE 0.5 MG: 1 INJECTION, SOLUTION INTRAMUSCULAR; INTRAVENOUS; SUBCUTANEOUS at 04:59

## 2021-03-13 RX ADMIN — DIVALPROEX SODIUM 1000 MG: 500 TABLET, DELAYED RELEASE ORAL at 08:32

## 2021-03-13 RX ADMIN — BUSPIRONE HYDROCHLORIDE 10 MG: 10 TABLET ORAL at 12:01

## 2021-03-13 RX ADMIN — FLUOXETINE 40 MG: 20 CAPSULE ORAL at 08:32

## 2021-03-13 RX ADMIN — HYDROCODONE BITARTRATE AND ACETAMINOPHEN 1 TABLET: 10; 325 TABLET ORAL at 18:11

## 2021-03-13 RX ADMIN — ACETAMINOPHEN 650 MG: 325 TABLET, FILM COATED ORAL at 17:10

## 2021-03-13 RX ADMIN — BUSPIRONE HYDROCHLORIDE 10 MG: 10 TABLET ORAL at 21:20

## 2021-03-13 RX ADMIN — LIDOCAINE HYDROCHLORIDE 1 ML: 10 INJECTION, SOLUTION EPIDURAL; INFILTRATION; INTRACAUDAL; PERINEURAL at 10:25

## 2021-03-13 RX ADMIN — HYDROXYZINE HYDROCHLORIDE 25 MG: 25 TABLET, FILM COATED ORAL at 08:32

## 2021-03-13 RX ADMIN — BUSPIRONE HYDROCHLORIDE 10 MG: 10 TABLET ORAL at 08:32

## 2021-03-13 RX ADMIN — CEFAZOLIN SODIUM 2 G: 2 INJECTION, SOLUTION INTRAVENOUS at 08:48

## 2021-03-13 RX ADMIN — NICOTINE 1 PATCH: 14 PATCH, EXTENDED RELEASE TRANSDERMAL at 08:35

## 2021-03-13 RX ADMIN — HYDROCODONE BITARTRATE AND ACETAMINOPHEN 1 TABLET: 7.5; 325 TABLET ORAL at 12:00

## 2021-03-13 RX ADMIN — CLOPIDOGREL BISULFATE 75 MG: 75 TABLET, FILM COATED ORAL at 08:32

## 2021-03-13 RX ADMIN — ISOSORBIDE DINITRATE 40 MG: 20 TABLET ORAL at 20:36

## 2021-03-13 RX ADMIN — CLOMIPRAMINE HCL 100 MG: 50 CAPSULE ORAL at 08:33

## 2021-03-13 RX ADMIN — BISOPROLOL FUMARATE 5 MG: 5 TABLET ORAL at 08:32

## 2021-03-13 RX ADMIN — PREGABALIN 150 MG: 75 CAPSULE ORAL at 08:31

## 2021-03-13 RX ADMIN — HYDROCODONE BITARTRATE AND ACETAMINOPHEN 1 TABLET: 7.5; 325 TABLET ORAL at 06:22

## 2021-03-13 RX ADMIN — HYDROCODONE BITARTRATE AND ACETAMINOPHEN 1 TABLET: 7.5; 325 TABLET ORAL at 02:12

## 2021-03-13 RX ADMIN — ISOSORBIDE DINITRATE 40 MG: 20 TABLET ORAL at 08:32

## 2021-03-13 RX ADMIN — ASPIRIN 81 MG CHEWABLE TABLET 81 MG: 81 TABLET CHEWABLE at 08:31

## 2021-03-13 RX ADMIN — QUETIAPINE FUMARATE 600 MG: 300 TABLET ORAL at 20:36

## 2021-03-13 RX ADMIN — DIVALPROEX SODIUM 1000 MG: 500 TABLET, DELAYED RELEASE ORAL at 20:36

## 2021-03-13 RX ADMIN — CEFAZOLIN SODIUM 2 G: 2 INJECTION, SOLUTION INTRAVENOUS at 16:14

## 2021-03-13 ASSESSMENT — ACTIVITIES OF DAILY LIVING (ADL)
ADLS_ACUITY_SCORE: 14
ADLS_ACUITY_SCORE: 16

## 2021-03-13 NOTE — PROGRESS NOTES
Pt dislodged dressing to L foot. Ace wrap undone and gauze padding dislodged from surgical site. Dressing has moderate amount of blood to upper lateral portion of foot. Pt not compliant with non-weight bearing status for L foot. Writer attempted to add some clean gauze fluff and re-wrapped and reinforced ace wrap.

## 2021-03-13 NOTE — PLAN OF CARE
DATE & TIME: 3/12/2021; [id number]   Cognitive Concerns/ Orientation : A & O x 3 (situation); calm, forgetful, needing re-orientation, less anxious about pain medication this evening compared to day shift  BEHAVIOR & AGGRESSION TOOL COLOR: Green   CIWA SCORE: N/A   ABNL VS/O2: VSS on RA; vitals q4h   MOBILITY: x 1 assist Gb/walker; non-weight bearing L. Foot (not compliant with recommendation)   PAIN MANAGMENT: reports constant left LE foot pain, managed well with tylenol and norco, repositioning and elevation of LLE encouraged  DIET: mod carb; fair appetite   BOWEL/BLADDER: BS active x 4; continent   ABNL LAB/BG: Na=312, 124: Hgb=8.4; Wbc=13.9   DRAIN/DEVICES: 2 PIV/SL , order placed for PICC line placement   TELEMETRY RHYTHM: N/A  SKIN: Scattered bruising; Rash LE bilaterally and abdomen; wound Left foot wrapped by podiatry, small amount of bloody drainage, encouraged elevation and offered ice packs; previous R. Toe amputation   TESTS/PROCEDURES: N/A  D/C DAY/GOALS/PLACE: pending discharge to TCU   OTHER IMPORTANT INFO: CMS intact Lower left extremity; long arm at bedside due to intermittent confusion and impulsiveness, easily re-directable, PT, OT following

## 2021-03-13 NOTE — PROGRESS NOTES
[facility]    Medicine Progress Note - Hospitalist Service       Date of Admission:  3/4/2021  Assessment & Plan       Huseyin Pettit is a 61 year old male w/ PMH of DM II, CAD w/ prior PCI in 2011, former smoker HTN, HLD, CKD, chronic pain, anxiety and depression, osteomyelitis of toe s/p amputation of right foot admitted on 03/04/21 w/ CP and possible septic arthritis or multilevel osteomyelitis.       Left foot wounds with septic arthritis and osteomyelitis  S/P Revision L TMA, bone resection, delayed primary closure 3/11/21  Multiple fractures of left foot  Staph aureus bacteremia 3/4/21    Vascular surgery (signed off 3/9), Podiatry, and ID consulted.    S/p left TMA on 3/5/21 by Dr. Barnes.    1/2 blood cultures from admission positive for Staph aureus. Repeat blood cultures have been negative to date.    ID transitioned zosyn to ancef 3/9    Podiatry planning OR 3/11  - revision of L TMA with bone resection and delayed primary closure    Surgical path pending; no further surgery anticipated, per Podiatry, possible ok for discharge 3/13, await abx plans per ID. Some concern of residual osteo per Podiatry.     Decrease dilaudid, de-escalate and use alternative means    Per podiatry, nonweightbearing left lower extremity - discussed and confirmed this with patient 3/13. Therapies consulted. TCU anticipated.     Working on pain regimen, explained no IV dilaudid when he discharges, hence went up to [place]. Explained again the concerns with over medicating.      Diabetes mellitus, type 2    Hemoglobin A1c 6.8 on 3/4/21.    Hold PTA glimepiride and metformin for now (further surgery anticipated)    Blood sugars remain improved / at goal as of 3/8    Was on NovoLog Mix 70/30 at 28 units in AM and 38 units in PM prior to admission, currently on hold.    Continue accuchecks and medium dose sliding scale NovoLog.    Continue prandial Novolog at 1 unit per 15 grams of carbs.    Monitor for  hypoglycemia - asymptomatic hypoglycemic episodes noted previously, sugars more at goal 3/12. Continue 5 units nightly glargine and titrate. Sugars at goal overall. If lower than 90s, will cancel/hold glargine for tonight.     Acute blood loss anemia, secondary to surgery 3/11  Chronic normocytic anemia with baseline 12-13g    Had an episode of hypotension early on the morning of 3/6/21. Labs were checked and hemoglobin was down to 6.1.    Transfused one unit PRBCs on 3/6/21.    Hemoglobin improved to about 8, however dropped to 6.9.  Transfused 1 more unit of PRBCs 3/7.     hgb stable at 8.4 AM 3/8     Post op Hgb 6.8 3/11 - transfused 1 uPRBC    3/12 Hgb 8.4, stable and improved     Hypotension - resolved    Likely hypovolemic - WNL after IVF given previously in hospitalization     Chest pain, +risk factors including HTN, HLD, DM2  CAD w/ PCI in 2011 to LAD and CX iin [place], last coronary angiogram was 3/2020 which did not require any mechanical revascularization and GDMT was recommended.    Pain much improved, seems to have resolved.    Serial troponins negative.    No significant dysrhythmias on telemetry.    EKG x 2 did not show any acute ischemic changes.    CT chest negative for PE on 3/4/21.    He is tender to palpation in the area of discomfort.    Given negative work-up including negative serial troponins despite ongoing chest pain, suspicion for cardiac etiology of his pain is low.    Symptomatic management.    Continue PTA aspirin, plavix, bisoprolol, isosorbide dinitrate, and rosuvastatin.     CKD stage 2-3, baseline Cr 0.8-1.0 as of early 2020  Mild hyponatremia    Hyponatremia resolved.    Creatinine stable 0.9     Abdominal pain on palpation in LUQ and RLQ, although patient is requesting food and drink.  Constipation    Lactic acid was within normal limits.    Epigastric tenderness has resolved.    Continue bowel regimen.     Depression  Anxiety    Continue PTA buspirone, clomipramine, fluoxetine,  and quetiapine.     Mild basilar predominant bronchial wall thickening with some mucous plugging may be related to bronchitis, seen on CT    Encouraged smoking cessation.    Encourage incentive spirometer.     Lower extremity petechiae and purpura, confluent in areas, concerning for vasculitic process vs platelet destruction with infection, also he is on DAPT. Plt count remains normal range as of 3/12.     Vascular surgery previously following    Rash improved / variable. PCP follow up after infection and hospitalization.    ANCA IgG by IFA negative, DENISSE negative, C3 C4 not low     Covid-19 PCR NEGATIVE  Asymptomatic COVID-19 PCR testing was negative on 3/4/21.      Diet: Moderate Consistent CHO Diet    DVT Prophylaxis: Defer to surgery  Rangel Catheter: not present  Code Status: Full Code           Disposition Plan   Expected discharge: awaiting TCU acceptance, ok per Podiatry, ID abx in epic  Entered: Juan Aden MD 03/13/2021, 12:54 PM       The patient's care was discussed with the Bedside Nurse and Patient.    Juan Aden MD  Hospitalist Service  [facility]  Contact information available via [facility] Paging/Directory    ______________________________________________________________________    Interval History   Seen and examined by morning.  Overall nothing new.  No fevers, chills, shortness of breath or chest pain.  Patient continues to reports postoperative pain and states that Dilaudid helps it.  As with prior days, we have been trying to explain protocol for analgesia.  I.e.  If someone is sleeping or generally not in excruciating pain etc.  Explained to patient that weaning IV pain medication and establishing an oral regimen is imperative as he will not be receiving IV pain medication upon discharge.  He reports understanding this.  Discussed with nursing.  I have increased Norco and continued Dilaudid but with decreased dose and frequency.    Data reviewed today: I  reviewed all medications, new labs and imaging results over the last 24 hours. I personally reviewed no images or EKG's today.    Physical Exam   Vital Signs: Temp: 98.1  F (36.7  C) Temp src: Oral BP: 125/74 Pulse: 83   Resp: 20 SpO2: 97 % O2 Device: None (Room air)    Weight: 180 lbs 12.44 oz    Gen: NAD, pleasant  HEENT: Normocephalic, EOMI, MMM  Resp: no crackles,  no wheezes, no increased work of resp  CV: S1S2 heard, reg rhythm, reg rate  Abdo: soft, nontender, nondistended, bowel sounds present  Ext: calves nontender, LLE dressing cdi  Neuro: AAOx3, CN grossly intact, no facial asymmetry      Data   Recent Labs   Lab 03/13/21  0941 03/12/21  0849 03/11/21  1253 03/11/21  0739 03/10/21  0822 03/08/21  0756 03/08/21  0756 03/07/21  1108 03/07/21  1108   WBC  --  13.9*  --  9.3 10.2   < > 12.1*  --  9.0   HGB  --  8.4* 6.8* 8.3* 8.1*   < > 8.4*   < > 6.9*   MCV  --  88  --  90 87   < > 87  --  88    214  --  238 263   < > 270  --  303   NA  --  139 140  --   --   --   --   --  143   POTASSIUM  --  4.4 4.5  --   --   --  4.2  --  4.2   CHLORIDE  --  110*  --   --   --   --   --   --  114*   CO2  --  25  --   --   --   --   --   --  27   BUN  --  20  --   --   --   --   --   --  17   CR 0.96 0.94  --  0.95 0.91   < > 0.64*  --  0.77   ANIONGAP  --  4  --   --   --   --   --   --  2*   MYRON  --  7.5*  --   --   --   --   --   --  7.5*   GLC  --  119*  --   --   --   --   --   --  80    < > = values in this interval not displayed.     No results found for this or any previous visit (from the past 24 hour(s)).

## 2021-03-13 NOTE — PROCEDURES
[facility]    Single Lumen PICC Placement    Date/Time: 3/13/2021 10:00 AM  Performed by: Leah Tracy RN  Authorized by: Juan Aden MD   Indications: vascular access (IV long term antibiotics)    UNIVERSAL PROTOCOL   Site Marked: Yes  Prior Images Obtained and Reviewed:  Yes  Required items: Required blood products, implants, devices and special equipment available    Patient identity confirmed:  Verbally with patient, arm band and hospital-assigned identification number  NA - No sedation, light sedation, or local anesthesia  Confirmation Checklist:  Patient's identity using two indicators, relevant allergies, procedure was appropriate and matched the consent or emergent situation and correct equipment/implants were available  Time out: Immediately prior to the procedure a time out was called    Universal Protocol: the Joint Commission Universal Protocol was followed    Preparation: Patient was prepped and draped in usual sterile fashion           ANESTHESIA    Anesthesia: See MAR for details  Local Anesthetic:  Lidocaine 1% without epinephrine  Anesthetic Total (mL):  1      SEDATION    Patient Sedated: No        Preparation: skin prepped with ChloraPrep  Skin prep agent: skin prep agent completely dried prior to procedure  Sterile barriers: maximum sterile barriers were used: cap, mask, sterile gown, sterile gloves, and large sterile sheet  Hand hygiene: hand hygiene performed prior to central venous catheter insertion  Type of line used: PICC and Power PICC  Catheter type: single lumen  Lumen type: valved  Catheter size: 4 Fr  Brand: iJoule  Lot number: [id number]  Placement method: venipuncture  Number of attempts: 1  Successful placement: yes  Orientation: right  Location: basilic vein  Arm circumference: adults 10 cm  Visible catheter length: 4  Internal length: 40 cm  Total catheter length: 44  Dressing and securement: blood cleaned with CHG, thrombin hemostasis  patch applied, gauze, securement device and sterile dressing applied  Post procedure assessment: blood return through all ports (ECG Tip Confirmation/OK to use)  PROCEDURE   Patient Tolerance:  Patient tolerated the procedure well with no immediate complications

## 2021-03-13 NOTE — PROGRESS NOTES
DATE & TIME: 03/13/2021. 0700HRS-1500HRS             Cognitive Concerns/ Orientation: Disoriented to situation, needs reminders to call for assistance; Impulsive and restless this shift. Frequently setting off bed alarm. MD notified, Zyprexa started, given w/ some relief.  BEHAVIOR & AGGRESSION TOOL COLOR: Green  ABNL VS/O2: VSS, room air  MOBILITY: Assist-1 gait belt, walker; Non-weight bearing on L foot but non-compliant. Pt gets up and starts ambulating independently.   PAIN MANAGMENT: Norco given x 1 for foot pain  DIET: Moderate Carbohydrate with carb count  BOWEL/BLADDER: Using urinal at bedside and getting up to bathroom.  ABNL LAB/BG: Labs ok today.  DRAIN/DEVICES: L PIVs saline locked. New PICC line on RUE.  SKIN: Bruising; L foot wound wrapped by podiatry.  TESTS/PROCEDURES: n/a  D/C DAY/GOALS/PLACE:TCU  OTHER IMPORTANT INFO: Long arm sitter due to confusion and impulsiveness. PT/OT/Podiatry/ID following

## 2021-03-13 NOTE — PROGRESS NOTES
[facility]  March 13, 2021    A/P:  S/p L TMA revision with closure POD #2  S/p L open TMA for DFI 3/5/21    Discussed condition and treatment options including pros and cons.    L foot dressing changed.      NWB L foot.  Reinforced importance of this.  Discussed increased risk of dehiscence, infection, partial limb amputation with wt bearing.    No further foot surgery planned at this time.    OK for discharge from our standpoint once TCU and abx plan arranged.    New proximal margin pathology from metatarsals 1-3 pending.  I do have concerns for possible residual osteomyelitis given short TMA, limited options for further bone resection.  I would recommend longer term IV abx to cover for osteomyelitis, plan per ID.  Pt is at risk for more proximal amputation.    F/u in Podiatry clinic 1-2 wks post discharge.    Will sign off.    David Arana DPM, FACFAS  Pager: [phone]    S:  Pt seen in room.  Pt sitting in chair with foot on floor.  Pt proceeded to get up and walk to bathroom bearing weight on surgical foot.  Dressing appears disheveled.  No acute complaints from pt.    O:  /74 (BP Location: Left arm)   Pulse 83   Temp 98.1  F (36.7  C) (Oral)   Resp 20   Wt 82 kg (180 lb 12.4 oz)   SpO2 97%   BMI 26.70 kg/m    NAD.  L foot dressing with bloody strikethrough. Incision coapted.  No signs of infection.    All cultures:  Recent Labs   Lab 03/11/21  1310 03/07/21  1109 03/07/21  1107   CULT On day 1, isolated in broth only:  Gram positive cocci  *  Culture in progress  Culture negative monitoring continues No growth after 5 days No growth after 5 days     Path pending from yesterday's surgery.    Lab Results   Component Value Date    WBC 13.9 03/12/2021     Lab Results   Component Value Date    RBC 2.92 03/12/2021     Lab Results   Component Value Date    HGB 8.4 03/12/2021     Lab Results   Component Value Date    HCT 25.8 03/12/2021     No components found  for: MCT  Lab Results   Component Value Date    MCV 88 03/12/2021     Lab Results   Component Value Date    MCH 28.8 03/12/2021     Lab Results   Component Value Date    MCHC 32.6 03/12/2021     Lab Results   Component Value Date    RDW 15.6 03/12/2021     Lab Results   Component Value Date     03/12/2021

## 2021-03-13 NOTE — PROGRESS NOTES
Care Management Follow Up    Length of Stay (days): 9    Expected Discharge Date: 03/14/21(tcu)     Concerns to be Addressed: compliance issue, medication, substance/tobacco abuse/use(Patient will need 4 weeks of IVAB will need teaching on administring the drug. Patient continues to smoke despite education given on his last admission. Not compliant with insulin mangement at home.)  TCU is being reccomended to help patient build independence, strength and mobility  Patient plan of care discussed at interdisciplinary rounds: Yes    Anticipated Discharge Disposition: Transitional Care  Disposition Comments: Discharge to TCU  Anticipated Discharge Services:    Anticipated Discharge DME:      Patient/family educated on Medicare website which has current facility and service quality ratings: yes  Education Provided on the Discharge Plan:    Patient/Family in Agreement with the Plan:      Referrals Placed by CM/SW: Post Acute Facilities  Private pay costs discussed: Not applicable    Additional Information:  Dr Aden reports patient is ready for discharge to TCU.  Writer put out additional TCU referrals today and will follow up with the referrals on Sunday morning.      Zoila Cast, SERENITYSW

## 2021-03-13 NOTE — PROGRESS NOTES
DATE & TIME: 03/12/2021 Night    Cognitive Concerns/ Orientation: Disoriented to situation, needs reminders to call for assistance; Impulsive and restless this shift. Frequently setting off bed alarm.   BEHAVIOR & AGGRESSION TOOL COLOR: Green  ABNL VS/O2: VSS, room air  MOBILITY: Assist-1 gait belt, walker; Non-weight bearing on L foot but non-compliant. Wriggles in bed though L foot to be elevated  PAIN MANAGMENT: Norco given x 2 and dilaudid x 1 for foot pain  DIET: Moderate Carbohydrate with carb count  BOWEL/BLADDER: Using urinal at bedside and getting up to bathroom.  ABNL LAB/BG: WBC 13.9; Hgb 8.4; hematocrit 25.8;   DRAIN/DEVICES: L/R PIVs saline locked. PICC line to be placed for antibiotics  SKIN: Bruising; Rash to BLE and abdomen open to air; L foot wound wrapped by podiatry, some bloody drainage; Prior R toe amputation  TESTS/PROCEDURES: n/a  D/C DAY/GOALS/PLACE: Pending PICC placement to TCU  OTHER IMPORTANT INFO: Long arm sitter due to confusion and impulsiveness. PT/OT/Podiatry/ID following

## 2021-03-13 NOTE — PLAN OF CARE
"OT Cx.  Patient stating \"I did therapy yesterday, today I am not doing therapy\". Pt educated on benefits of daily activity and he stated he would do more tomorrow, but not today.  "

## 2021-03-13 NOTE — PROGRESS NOTES
MD Notification    Notified Person: MD    Notified Person Name: Dr. Aden     Notification Date/Time: 3/13/2021; 1630     Notification Interaction: web-based paging     Purpose of Notification: Patient combative and agitated. Reporting severe pain. Insisting norco dose, however not available until 1800. Any recommendations?     Orders Received:  -Norco order not changed   -Call rapid response if patient becomes more combative   -zyprexa available If continued agitation     Comments:

## 2021-03-14 ENCOUNTER — APPOINTMENT (OUTPATIENT)
Dept: GENERAL RADIOLOGY | Facility: CLINIC | Age: 61
End: 2021-03-14
Attending: HOSPITALIST
Payer: COMMERCIAL

## 2021-03-14 LAB
ALBUMIN SERPL-MCNC: 1.4 G/DL (ref 3.4–5)
ALP SERPL-CCNC: 47 U/L (ref 40–150)
ALT SERPL W P-5'-P-CCNC: <6 U/L (ref 0–70)
AMMONIA PLAS-SCNC: 46 UMOL/L (ref 10–50)
ANION GAP SERPL CALCULATED.3IONS-SCNC: 4 MMOL/L (ref 3–14)
AST SERPL W P-5'-P-CCNC: 12 U/L (ref 0–45)
BACTERIA SPEC CULT: ABNORMAL
BASOPHILS # BLD AUTO: 0.1 10E9/L (ref 0–0.2)
BASOPHILS NFR BLD AUTO: 0.5 %
BILIRUB SERPL-MCNC: 0.2 MG/DL (ref 0.2–1.3)
BUN SERPL-MCNC: 24 MG/DL (ref 7–30)
CALCIUM SERPL-MCNC: 7.8 MG/DL (ref 8.5–10.1)
CHLORIDE SERPL-SCNC: 113 MMOL/L (ref 94–109)
CO2 SERPL-SCNC: 25 MMOL/L (ref 20–32)
CREAT SERPL-MCNC: 0.99 MG/DL (ref 0.66–1.25)
CRP SERPL-MCNC: 106 MG/L (ref 0–8)
DIFFERENTIAL METHOD BLD: ABNORMAL
EOSINOPHIL # BLD AUTO: 0.4 10E9/L (ref 0–0.7)
EOSINOPHIL NFR BLD AUTO: 3.3 %
ERYTHROCYTE [DISTWIDTH] IN BLOOD BY AUTOMATED COUNT: 15.4 % (ref 10–15)
GFR SERPL CREATININE-BSD FRML MDRD: 83 ML/MIN/{1.73_M2}
GLUCOSE BLDC GLUCOMTR-MCNC: 105 MG/DL (ref 70–99)
GLUCOSE BLDC GLUCOMTR-MCNC: 119 MG/DL (ref 70–99)
GLUCOSE BLDC GLUCOMTR-MCNC: 132 MG/DL (ref 70–99)
GLUCOSE BLDC GLUCOMTR-MCNC: 141 MG/DL (ref 70–99)
GLUCOSE BLDC GLUCOMTR-MCNC: 172 MG/DL (ref 70–99)
GLUCOSE SERPL-MCNC: 141 MG/DL (ref 70–99)
HCT VFR BLD AUTO: 23.3 % (ref 40–53)
HGB BLD-MCNC: 7.3 G/DL (ref 13.3–17.7)
HGB BLD-MCNC: 7.5 G/DL (ref 13.3–17.7)
IMM GRANULOCYTES # BLD: 0.1 10E9/L (ref 0–0.4)
IMM GRANULOCYTES NFR BLD: 0.6 %
LYMPHOCYTES # BLD AUTO: 1.9 10E9/L (ref 0.8–5.3)
LYMPHOCYTES NFR BLD AUTO: 17.2 %
MCH RBC QN AUTO: 28.6 PG (ref 26.5–33)
MCHC RBC AUTO-ENTMCNC: 32.2 G/DL (ref 31.5–36.5)
MCV RBC AUTO: 89 FL (ref 78–100)
MONOCYTES # BLD AUTO: 0.9 10E9/L (ref 0–1.3)
MONOCYTES NFR BLD AUTO: 7.7 %
NEUTROPHILS # BLD AUTO: 7.8 10E9/L (ref 1.6–8.3)
NEUTROPHILS NFR BLD AUTO: 70.7 %
NRBC # BLD AUTO: 0 10*3/UL
NRBC BLD AUTO-RTO: 0 /100
PLATELET # BLD AUTO: 232 10E9/L (ref 150–450)
POTASSIUM SERPL-SCNC: 4 MMOL/L (ref 3.4–5.3)
PROCALCITONIN SERPL-MCNC: 0.12 NG/ML
PROT SERPL-MCNC: 5.7 G/DL (ref 6.8–8.8)
RBC # BLD AUTO: 2.62 10E12/L (ref 4.4–5.9)
SODIUM SERPL-SCNC: 142 MMOL/L (ref 133–144)
SPECIMEN SOURCE: ABNORMAL
VALPROATE SERPL-MCNC: 73 MG/L (ref 50–100)
WBC # BLD AUTO: 11 10E9/L (ref 4–11)

## 2021-03-14 PROCEDURE — 120N000001 HC R&B MED SURG/OB

## 2021-03-14 PROCEDURE — 250N000013 HC RX MED GY IP 250 OP 250 PS 637: Performed by: HOSPITALIST

## 2021-03-14 PROCEDURE — 85018 HEMOGLOBIN: CPT | Performed by: HOSPITALIST

## 2021-03-14 PROCEDURE — 82140 ASSAY OF AMMONIA: CPT | Performed by: HOSPITALIST

## 2021-03-14 PROCEDURE — 80053 COMPREHEN METABOLIC PANEL: CPT | Performed by: HOSPITALIST

## 2021-03-14 PROCEDURE — 999N001017 HC STATISTIC GLUCOSE BY METER IP

## 2021-03-14 PROCEDURE — 999N000190 HC STATISTIC VAT ROUNDS

## 2021-03-14 PROCEDURE — 999N000040 HC STATISTIC CONSULT NO CHARGE VASC ACCESS

## 2021-03-14 PROCEDURE — 250N000011 HC RX IP 250 OP 636: Performed by: HOSPITALIST

## 2021-03-14 PROCEDURE — 250N000013 HC RX MED GY IP 250 OP 250 PS 637: Performed by: PODIATRIST

## 2021-03-14 PROCEDURE — 80164 ASSAY DIPROPYLACETIC ACD TOT: CPT | Performed by: HOSPITALIST

## 2021-03-14 PROCEDURE — 85025 COMPLETE CBC W/AUTO DIFF WBC: CPT | Performed by: HOSPITALIST

## 2021-03-14 PROCEDURE — 86140 C-REACTIVE PROTEIN: CPT | Performed by: HOSPITALIST

## 2021-03-14 PROCEDURE — 71045 X-RAY EXAM CHEST 1 VIEW: CPT

## 2021-03-14 PROCEDURE — 84145 PROCALCITONIN (PCT): CPT | Performed by: HOSPITALIST

## 2021-03-14 PROCEDURE — 99232 SBSQ HOSP IP/OBS MODERATE 35: CPT | Performed by: HOSPITALIST

## 2021-03-14 PROCEDURE — 250N000011 HC RX IP 250 OP 636: Performed by: PODIATRIST

## 2021-03-14 PROCEDURE — 36415 COLL VENOUS BLD VENIPUNCTURE: CPT | Performed by: HOSPITALIST

## 2021-03-14 PROCEDURE — 250N000012 HC RX MED GY IP 250 OP 636 PS 637: Performed by: PODIATRIST

## 2021-03-14 RX ORDER — FUROSEMIDE 10 MG/ML
20 INJECTION INTRAMUSCULAR; INTRAVENOUS ONCE
Status: COMPLETED | OUTPATIENT
Start: 2021-03-14 | End: 2021-03-14

## 2021-03-14 RX ADMIN — PREGABALIN 150 MG: 75 CAPSULE ORAL at 08:41

## 2021-03-14 RX ADMIN — DIVALPROEX SODIUM 1000 MG: 500 TABLET, DELAYED RELEASE ORAL at 21:52

## 2021-03-14 RX ADMIN — BISOPROLOL FUMARATE 5 MG: 5 TABLET ORAL at 08:41

## 2021-03-14 RX ADMIN — FUROSEMIDE 20 MG: 10 INJECTION, SOLUTION INTRAVENOUS at 16:06

## 2021-03-14 RX ADMIN — DIVALPROEX SODIUM 1000 MG: 500 TABLET, DELAYED RELEASE ORAL at 08:41

## 2021-03-14 RX ADMIN — BUSPIRONE HYDROCHLORIDE 10 MG: 10 TABLET ORAL at 08:41

## 2021-03-14 RX ADMIN — BUSPIRONE HYDROCHLORIDE 10 MG: 10 TABLET ORAL at 18:24

## 2021-03-14 RX ADMIN — BUSPIRONE HYDROCHLORIDE 10 MG: 10 TABLET ORAL at 21:53

## 2021-03-14 RX ADMIN — CLOMIPRAMINE HCL 100 MG: 50 CAPSULE ORAL at 21:51

## 2021-03-14 RX ADMIN — ROSUVASTATIN CALCIUM 20 MG: 20 TABLET, FILM COATED ORAL at 08:41

## 2021-03-14 RX ADMIN — ACETAMINOPHEN 650 MG: 325 TABLET, FILM COATED ORAL at 18:24

## 2021-03-14 RX ADMIN — BUSPIRONE HYDROCHLORIDE 10 MG: 10 TABLET ORAL at 12:39

## 2021-03-14 RX ADMIN — CEFAZOLIN SODIUM 2 G: 2 INJECTION, SOLUTION INTRAVENOUS at 16:11

## 2021-03-14 RX ADMIN — CEFAZOLIN SODIUM 2 G: 2 INJECTION, SOLUTION INTRAVENOUS at 08:51

## 2021-03-14 RX ADMIN — ISOSORBIDE DINITRATE 40 MG: 20 TABLET ORAL at 08:41

## 2021-03-14 RX ADMIN — FLUOXETINE 40 MG: 20 CAPSULE ORAL at 08:41

## 2021-03-14 RX ADMIN — CLOMIPRAMINE HCL 100 MG: 50 CAPSULE ORAL at 08:41

## 2021-03-14 RX ADMIN — ASPIRIN 81 MG CHEWABLE TABLET 81 MG: 81 TABLET CHEWABLE at 08:41

## 2021-03-14 RX ADMIN — PREGABALIN 150 MG: 75 CAPSULE ORAL at 21:52

## 2021-03-14 RX ADMIN — ISOSORBIDE DINITRATE 40 MG: 20 TABLET ORAL at 21:52

## 2021-03-14 RX ADMIN — ACETAMINOPHEN 650 MG: 325 TABLET, FILM COATED ORAL at 12:39

## 2021-03-14 RX ADMIN — NICOTINE 1 PATCH: 14 PATCH, EXTENDED RELEASE TRANSDERMAL at 08:41

## 2021-03-14 RX ADMIN — INSULIN GLARGINE 5 UNITS: 100 INJECTION, SOLUTION SUBCUTANEOUS at 21:49

## 2021-03-14 RX ADMIN — QUETIAPINE FUMARATE 600 MG: 300 TABLET ORAL at 21:52

## 2021-03-14 RX ADMIN — HYDROCODONE BITARTRATE AND ACETAMINOPHEN 1 TABLET: 10; 325 TABLET ORAL at 00:29

## 2021-03-14 RX ADMIN — CEFAZOLIN SODIUM 2 G: 2 INJECTION, SOLUTION INTRAVENOUS at 00:19

## 2021-03-14 RX ADMIN — CLOPIDOGREL BISULFATE 75 MG: 75 TABLET, FILM COATED ORAL at 08:41

## 2021-03-14 ASSESSMENT — ACTIVITIES OF DAILY LIVING (ADL)
ADLS_ACUITY_SCORE: 16

## 2021-03-14 NOTE — PROGRESS NOTES
Care Management Follow Up    Length of Stay (days): 10    Expected Discharge Date: 03/14/21(tcu)     Concerns to be Addressed: compliance issue, medication, substance/tobacco abuse/use(Patient will need 4 weeks of IVAB will need teaching on administring the drug. Patient continues to smoke despite education given on his last admission. Not compliant with insulin mangement at home.)  TCU is being reccomended to help patient build independence, strength and mobility  Patient plan of care discussed at interdisciplinary rounds: Yes    Anticipated Discharge Disposition: Transitional Care  Disposition Comments: Discharge to TCU  Anticipated Discharge Services:    Anticipated Discharge DME:      Patient/family educated on Medicare website which has current facility and service quality ratings: yes  Education Provided on the Discharge Plan:    Patient/Family in Agreement with the Plan:      Referrals Placed by CM/SW: Post Acute Facilities  Private pay costs discussed: Not applicable    Additional Information:  Additional referrals made today to [facility] system who has a liaison working today.  Patient has been declined by some facilities due to non compliance.  Discussed with Dr Aden.  Zoila Cast, SERENITYSW

## 2021-03-14 NOTE — PLAN OF CARE
DATE & TIME: 3/13/2021; [id number]    Cognitive Concerns/ Orientation : A & O x 3 (disoriented to situation), frequently setting off chair/bed alarm, needs reminders to call before getting  up   BEHAVIOR & AGGRESSION TOOL COLOR: Yellow, combative and irritable at start of shift. Patient was requesting pain medication, but too soon to give. Patient attempted to get out of bed and wanted to leave hospital. Calming measures provided, patient re-oriented. Patient did settle down around 1700 without medication intervention. MD notified about situation, see additional note   CIWA SCORE: N/A   ABNL VS/O2:VSS on RA, q4 vitals   MOBILITY: x 1 assist Gb/walker, non-bearing on L. Foot (not compliant, impulsive at times, needs to be re-educated); up to chair with dinner   PAIN MANAGMENT: reports 10/10 pain when awake, sleeping comfortable in-between cares; tylenol given x 1, norco given x 1 for pain management  DIET: Mod carb; carb count  BOWEL/BLADDER: BS active x 4; continent, urinal at bedside   ABNL LAB/BG: Vl=862, 181   DRAIN/DEVICES: RUE single lumen PICC/SL; PIV/SL; IV antibiotics   TELEMETRY RHYTHM: N/A  SKIN: scattered bruising; Rash LE bilaterally and abdomen; Wound L. Foot (dressing changed by podiatry today, CDI); Encouraged elevation and offered ice packs; previous R. Toe amputation   TESTS/PROCEDURES: N/A  D/C DAY/GOALS/PLACE: pending discharge TCU tomorrow   OTHER IMPORTANT INFO: POD # 2 of Left foot TMA revision per MD note; CMS intact lower left extremity; Long arm at bedside due to intermittent confusion and impulsiveness; PT, OT following

## 2021-03-14 NOTE — PROGRESS NOTES
DATE & TIME: 03/13/2021Night    Cognitive Concerns/ Orientation : Alert, disoriented to situation. Setting off bed alarm frequently despite reminders to use call light. Pt's speech is garbled at times and frequently switches between Frisian and English in same sentence so difficult to understand overnight.  Impulsive and restless   BEHAVIOR & AGGRESSION TOOL COLOR:    ABNL VS/O2: VSS, room air  MOBILITY: SBA/Assist-1 gait belt, walker; Non-weight bearing on L foot-pt inconsistent with compliance   PAIN MANAGMENT: Norco (Q6 hours) given x 1 for L foot pain   DIET: Moderate carbohydrate with carbohydrate count  BOWEL/BLADDER: Urinal at bedside  ABNL LAB/BG:   DRAIN/DEVICES: Pt removed PIV; R upper arm PICC placed 3/13 during day shift. Gauze under dressing has small amount of blood.   SKIN: Bruising; rash to lower extremities and abdomen; L foot surgical wound (post-op day 2), dressing clean, dry, intact; Previous amputation to R toe  TESTS/PROCEDURES: n/a  D/C DAY/GOALS/PLACE: Pending, to TCU  OTHER IMPORTANT INFO: Long arm sit for confusion/impulsiveness. SW/PT/OT/ID/Podiatry following

## 2021-03-14 NOTE — PLAN OF CARE
DATE & TIME: 03/14/21 Day shift    Cognitive Concerns/ Orientation : Alert to self, place and disoriented to situation/time. Setting off bed alarm frequently despite reminders to use call light. Pt's speech is garbled at times and frequently switches between Nepali and English in same sentence so difficult to understand. Impulsive and restless.    BEHAVIOR & AGGRESSION TOOL COLOR: GREEN   ABNL VS/O2: VSS, RA  MOBILITY: SBA/Assist-1 gait belt, walker; Non-weight bearing on L foot-pt is not compliant   PAIN MANAGMENT: Tylenol and ice given x1 for pain.  DIET: Moderate carbohydrate with carbohydrate count- fair appetite  BOWEL/BLADDER: Urinal at bedside  ABNL LAB/BG: /136- no coverage needed  DRAIN/DEVICES:  R upper arm PICC placed 3/13 during day shift. Gauze under dressing has small amount of blood.   SKIN: Bruising; rash to lower extremities and abdomen; L foot surgical wound (post-op day 3), changed dressing 2 times- mildly saturated with blood. Incision looks healthy.   TESTS/PROCEDURES: PODx3 amputation of all toes on L foot  D/C DAY/GOALS/PLACE: Pending, to TCU  OTHER IMPORTANT INFO: Patient's brother visited and stated patient is not himself. Patient more confused and garbled speech than usual. Labs ok. CXR done for some wheezing/SOB, shows some infiltrates- IV lasix ordered x1. Abdomen mildly distended and firm, BS active. Had BM this AM. Will continue to monitor closely. SW/PT/OT/ID/Podiatry following

## 2021-03-14 NOTE — PROGRESS NOTES
[facility]    Medicine Progress Note - Hospitalist Service       Date of Admission:  3/4/2021  Assessment & Plan       Huseyin Pettit is a 61 year old male w/ PMH of DM II, CAD w/ prior PCI in 2011, former smoker HTN, HLD, CKD, chronic pain, anxiety and depression, osteomyelitis of toe s/p amputation of right foot admitted on 03/04/21 w/ CP and possible septic arthritis or multilevel osteomyelitis.       Left foot wounds with septic arthritis and osteomyelitis  S/P Revision L TMA, bone resection, delayed primary closure 3/11/21  Multiple fractures of left foot  Staph aureus bacteremia 3/4/21    Vascular surgery (signed off 3/9), Podiatry, and ID consulted.    S/p left TMA on 3/5/21 by Dr. Barnes.    1/2 blood cultures from admission positive for Staph aureus. Repeat blood cultures have been negative to date.    ID transitioned zosyn to ancef 3/9    Podiatry planning OR 3/11 - revision of L TMA with bone resection and delayed primary closure    Surgical path pending; no further surgery anticipated, per Podiatry, possible ok for discharge 3/13, await abx plans per ID. Some concern of residual osteo per Podiatry.     Per podiatry, nonweightbearing left lower extremity - discussed and confirmed this with patient 3/13. Therapies consulted. TCU anticipated.      Working on pain regimen, explained no IV dilaudid when he discharges, hence went up to [place]. Explained again the concerns with over medicating.      Acute toxic metabolic encephalopathy / delirium  Unclear etiology at this point and there are multiple differentials.  Patient is on a number of psychiatry medications as below.  Will check Depakote level.  Nothing focal on neurological exam 3/13 or 3/14.  Patient seems to intermittently be oriented appropriately shortly thereafter he seems to be speaking tangentially and occasionally speaking in another language.  No new fevers or complaints of pain or shortness of breath.  - Add on CMP,  ammonia, Depakote level  - Psychiatry consult appreciated    ADDENDUM - cmp, ammonia, depakote level fairly unremarkable. CXR showed likely pulm edema vs infiltrates - given 20 iv lasix x 1 - will evaluate response. Likely fluid up after IVF during/after procedures. Will monitor response and consider repeat diuresis in AM 3/15.  Monitor for possible pneumonia, will not broaden coverage as of now given afebrile and procalc 0.12.     Diabetes mellitus, type 2    Hemoglobin A1c 6.8 on 3/4/21.    Hold PTA glimepiride and metformin for now (further surgery anticipated)    Was on NovoLog Mix 70/30 at 28 units in AM and 38 units in PM prior to admission, currently on hold.    Continue accuchecks and medium dose sliding scale NovoLog.    Continue prandial Novolog at 1 unit per 15 grams of carbs.    Monitor for hypoglycemia - asymptomatic hypoglycemic episodes noted previously, sugars more at goal 3/12. Continue 5 units nightly glargine and titrate. Sugars at goal overall. Continue to monitor.     Acute blood loss anemia, secondary to surgery 3/11  Chronic normocytic anemia with baseline 12-13g    Had an episode of hypotension early on the morning of 3/6/21. Labs were checked and hemoglobin was down to 6.1.    Transfused one unit PRBCs on 3/6/21.    Hemoglobin improved to about 8, however dropped to 6.9.  Transfused 1 more unit of PRBCs 3/7.     hgb stable at 8.4 AM 3/8     Post op Hgb 6.8 3/11 - transfused 1 uPRBC    3/12 Hgb 8.4  --> 7.5 3/14     Hypotension - resolved    Likely hypovolemic - WNL after IVF given previously in hospitalization     Chest pain, +risk factors including HTN, HLD, DM2  CAD w/ PCI in 2011 to LAD and CX iin [place], last coronary angiogram was 3/2020 which did not require any mechanical revascularization and GDMT was recommended.    Serial troponins negative.    No significant dysrhythmias on telemetry.    EKG x 2 did not show any acute ischemic changes.    CT chest negative for PE on  3/4/21.    Given negative work-up including negative serial troponins despite ongoing chest pain, suspicion for cardiac etiology of his pain is low.    Continue PTA aspirin, plavix, bisoprolol, isosorbide dinitrate, and rosuvastatin.    No further chest pain reported to hospitalist 3/10-3/14     CKD stage 2-3, baseline Cr 0.8-1.0 as of early 2020  Mild hyponatremia    Hyponatremia resolved.    Creatinine stable 0.9     Abdominal pain on palpation in LUQ and RLQ, resolved   Constipation    Lactic acid was within normal limits.    Epigastric tenderness has resolved.    Continue bowel regimen.     Depression  Anxiety    Continue PTA buspirone, clomipramine, fluoxetine, and quetiapine.    3/14 will check depakote level    Psychiatry requested given agitation/delirium increasing 3/13 - 3/14     Mild basilar predominant bronchial wall thickening with some mucous plugging may be related to bronchitis, seen on CT    Encouraged smoking cessation.    Encourage incentive spirometer.     Lower extremity petechiae and purpura, confluent in areas, concerning for vasculitic process vs platelet destruction with infection, also he is on DAPT. Plt count remains normal range as of 3/12.     Vascular surgery previously following    Rash improved / variable. PCP follow up after infection and hospitalization.    ANCA IgG by IFA negative, DENISSE negative, C3 C4 not low     Covid-19 PCR NEGATIVE  Asymptomatic COVID-19 PCR testing was negative on 3/4/21.      Diet: Moderate Consistent CHO Diet    DVT Prophylaxis: Defer to surgery  Rangel Catheter: not present  Code Status: Full Code           Disposition Plan   Expected discharge: pending TCU acceptance, and clearance of intermittent ?delirium  Entered: Juan Aden MD 03/14/2021, 11:13 AM       The patient's care was discussed with the Bedside Nurse and Patient.    Juan Aden MD  Hospitalist Service  [facility]  Contact information available via [facility]  Trace/y    ______________________________________________________________________    Interval History   Seen and examined late morning.  Occasional agitation and intermittent delirium seems to be increasing in the last day and a half or so.  Patient briefly oriented at times and subsequently speaking in another language and nonconversant.  Does not seem in distress or pain.  Denies fevers, chills, or shortness of breath.  We have been working on decreasing opioid use.  Patient is also on number of psychiatric medications.  Nothing focal on exam.  He has been requiring a sitter.    Data reviewed today: I reviewed all medications, new labs and imaging results over the last 24 hours. I personally reviewed no images or EKG's today.    Physical Exam   Vital Signs: Temp: 98.1  F (36.7  C) Temp src: Axillary BP: (!) 142/83 Pulse: 90   Resp: 16 SpO2: 95 % O2 Device: None (Room air)    Weight: 182 lbs 3.2 oz    Gen: NAD, resting, intermittently agitated, speaking in other language  HEENT: Normocephalic, EOMI, MMM  Resp: no crackles,  no wheezes, no increased work of resp  CV: S1S2 heard, reg rhythm, reg rate  Abdo: soft, nontender, nondistended, bowel sounds present  Ext: calves nontender, LLE dressed, intact  Neuro: AAOxself, DTA, speaking in other language intermittently, CN grossly intact, no facial asymmetry, moving all extremities, not following simple commands persistently      Data   Recent Labs   Lab 03/14/21  0644 03/13/21  0941 03/12/21  0849 03/11/21  1253 03/11/21  0739 03/08/21  0756 03/08/21  0756 03/07/21  1108 03/07/21  1108   WBC 11.0  --  13.9*  --  9.3   < > 12.1*  --  9.0   HGB 7.5*  --  8.4* 6.8* 8.3*   < > 8.4*   < > 6.9*   MCV 89  --  88  --  90   < > 87  --  88    229 214  --  238   < > 270  --  303   NA  --   --  139 140  --   --   --   --  143   POTASSIUM  --   --  4.4 4.5  --   --  4.2  --  4.2   CHLORIDE  --   --  110*  --   --   --   --   --  114*   CO2  --   --  25  --   --   --    --   --  27   BUN  --   --  20  --   --   --   --   --  17   CR  --  0.96 0.94  --  0.95   < > 0.64*  --  0.77   ANIONGAP  --   --  4  --   --   --   --   --  2*   MYRON  --   --  7.5*  --   --   --   --   --  7.5*   GLC  --   --  119*  --   --   --   --   --  80    < > = values in this interval not displayed.     No results found for this or any previous visit (from the past 24 hour(s)).

## 2021-03-15 ENCOUNTER — APPOINTMENT (OUTPATIENT)
Dept: PHYSICAL THERAPY | Facility: CLINIC | Age: 61
End: 2021-03-15
Attending: INTERNAL MEDICINE
Payer: COMMERCIAL

## 2021-03-15 LAB
ANION GAP SERPL CALCULATED.3IONS-SCNC: 4 MMOL/L (ref 3–14)
BASOPHILS # BLD AUTO: 0 10E9/L (ref 0–0.2)
BASOPHILS NFR BLD AUTO: 0.4 %
BUN SERPL-MCNC: 23 MG/DL (ref 7–30)
CALCIUM SERPL-MCNC: 7.7 MG/DL (ref 8.5–10.1)
CHLORIDE SERPL-SCNC: 113 MMOL/L (ref 94–109)
CO2 SERPL-SCNC: 25 MMOL/L (ref 20–32)
COPATH REPORT: NORMAL
CREAT SERPL-MCNC: 0.98 MG/DL (ref 0.66–1.25)
DIFFERENTIAL METHOD BLD: ABNORMAL
EOSINOPHIL # BLD AUTO: 0.3 10E9/L (ref 0–0.7)
EOSINOPHIL NFR BLD AUTO: 3.1 %
ERYTHROCYTE [DISTWIDTH] IN BLOOD BY AUTOMATED COUNT: 15.6 % (ref 10–15)
GFR SERPL CREATININE-BSD FRML MDRD: 83 ML/MIN/{1.73_M2}
GLUCOSE BLDC GLUCOMTR-MCNC: 148 MG/DL (ref 70–99)
GLUCOSE BLDC GLUCOMTR-MCNC: 199 MG/DL (ref 70–99)
GLUCOSE BLDC GLUCOMTR-MCNC: 96 MG/DL (ref 70–99)
GLUCOSE SERPL-MCNC: 81 MG/DL (ref 70–99)
HCT VFR BLD AUTO: 22.5 % (ref 40–53)
HGB BLD-MCNC: 7.3 G/DL (ref 13.3–17.7)
IMM GRANULOCYTES # BLD: 0.1 10E9/L (ref 0–0.4)
IMM GRANULOCYTES NFR BLD: 0.5 %
LABORATORY COMMENT REPORT: NORMAL
LYMPHOCYTES # BLD AUTO: 1.8 10E9/L (ref 0.8–5.3)
LYMPHOCYTES NFR BLD AUTO: 17.1 %
MCH RBC QN AUTO: 28.6 PG (ref 26.5–33)
MCHC RBC AUTO-ENTMCNC: 32.4 G/DL (ref 31.5–36.5)
MCV RBC AUTO: 88 FL (ref 78–100)
MONOCYTES # BLD AUTO: 0.7 10E9/L (ref 0–1.3)
MONOCYTES NFR BLD AUTO: 7 %
NEUTROPHILS # BLD AUTO: 7.5 10E9/L (ref 1.6–8.3)
NEUTROPHILS NFR BLD AUTO: 71.9 %
NRBC # BLD AUTO: 0 10*3/UL
NRBC BLD AUTO-RTO: 0 /100
PLATELET # BLD AUTO: 237 10E9/L (ref 150–450)
POTASSIUM SERPL-SCNC: 3.6 MMOL/L (ref 3.4–5.3)
RBC # BLD AUTO: 2.55 10E12/L (ref 4.4–5.9)
SARS-COV-2 RNA RESP QL NAA+PROBE: NEGATIVE
SODIUM SERPL-SCNC: 142 MMOL/L (ref 133–144)
SPECIMEN SOURCE: NORMAL
WBC # BLD AUTO: 10.4 10E9/L (ref 4–11)

## 2021-03-15 PROCEDURE — 250N000013 HC RX MED GY IP 250 OP 250 PS 637: Performed by: PODIATRIST

## 2021-03-15 PROCEDURE — 97530 THERAPEUTIC ACTIVITIES: CPT | Mod: GP

## 2021-03-15 PROCEDURE — 120N000001 HC R&B MED SURG/OB

## 2021-03-15 PROCEDURE — 999N000190 HC STATISTIC VAT ROUNDS

## 2021-03-15 PROCEDURE — 99232 SBSQ HOSP IP/OBS MODERATE 35: CPT | Performed by: INTERNAL MEDICINE

## 2021-03-15 PROCEDURE — U0005 INFEC AGEN DETEC AMPLI PROBE: HCPCS | Performed by: INTERNAL MEDICINE

## 2021-03-15 PROCEDURE — 80048 BASIC METABOLIC PNL TOTAL CA: CPT | Performed by: HOSPITALIST

## 2021-03-15 PROCEDURE — 250N000011 HC RX IP 250 OP 636: Performed by: PODIATRIST

## 2021-03-15 PROCEDURE — 250N000013 HC RX MED GY IP 250 OP 250 PS 637: Performed by: HOSPITALIST

## 2021-03-15 PROCEDURE — 999N001017 HC STATISTIC GLUCOSE BY METER IP

## 2021-03-15 PROCEDURE — U0003 INFECTIOUS AGENT DETECTION BY NUCLEIC ACID (DNA OR RNA); SEVERE ACUTE RESPIRATORY SYNDROME CORONAVIRUS 2 (SARS-COV-2) (CORONAVIRUS DISEASE [COVID-19]), AMPLIFIED PROBE TECHNIQUE, MAKING USE OF HIGH THROUGHPUT TECHNOLOGIES AS DESCRIBED BY CMS-2020-01-R: HCPCS | Performed by: INTERNAL MEDICINE

## 2021-03-15 PROCEDURE — 250N000012 HC RX MED GY IP 250 OP 636 PS 637: Performed by: PODIATRIST

## 2021-03-15 PROCEDURE — 250N000013 HC RX MED GY IP 250 OP 250 PS 637: Performed by: INTERNAL MEDICINE

## 2021-03-15 PROCEDURE — 85025 COMPLETE CBC W/AUTO DIFF WBC: CPT | Performed by: HOSPITALIST

## 2021-03-15 PROCEDURE — 250N000013 HC RX MED GY IP 250 OP 250 PS 637: Performed by: PSYCHIATRY & NEUROLOGY

## 2021-03-15 PROCEDURE — 99255 IP/OBS CONSLTJ NEW/EST HI 80: CPT | Performed by: PSYCHIATRY & NEUROLOGY

## 2021-03-15 RX ORDER — BUSPIRONE HYDROCHLORIDE 10 MG/1
10 TABLET ORAL 3 TIMES DAILY
Status: DISCONTINUED | OUTPATIENT
Start: 2021-03-15 | End: 2021-03-18 | Stop reason: HOSPADM

## 2021-03-15 RX ORDER — POTASSIUM CHLORIDE 1500 MG/1
20 TABLET, EXTENDED RELEASE ORAL ONCE
Status: COMPLETED | OUTPATIENT
Start: 2021-03-15 | End: 2021-03-15

## 2021-03-15 RX ORDER — QUETIAPINE FUMARATE 25 MG/1
25-50 TABLET, FILM COATED ORAL EVERY 6 HOURS PRN
Status: DISCONTINUED | OUTPATIENT
Start: 2021-03-15 | End: 2021-03-18 | Stop reason: HOSPADM

## 2021-03-15 RX ORDER — CLOMIPRAMINE HYDROCHLORIDE 50 MG/1
100 CAPSULE ORAL AT BEDTIME
Status: DISCONTINUED | OUTPATIENT
Start: 2021-03-15 | End: 2021-03-18 | Stop reason: HOSPADM

## 2021-03-15 RX ORDER — ESOMEPRAZOLE MAGNESIUM 40 MG/1
40 CAPSULE, DELAYED RELEASE ORAL
Status: DISCONTINUED | OUTPATIENT
Start: 2021-03-16 | End: 2021-03-18 | Stop reason: HOSPADM

## 2021-03-15 RX ORDER — IBUPROFEN 600 MG/1
600 TABLET, FILM COATED ORAL 3 TIMES DAILY
Status: DISCONTINUED | OUTPATIENT
Start: 2021-03-15 | End: 2021-03-18 | Stop reason: HOSPADM

## 2021-03-15 RX ORDER — OXYCODONE HYDROCHLORIDE 5 MG/1
5-10 TABLET ORAL EVERY 4 HOURS PRN
Status: DISCONTINUED | OUTPATIENT
Start: 2021-03-15 | End: 2021-03-18 | Stop reason: HOSPADM

## 2021-03-15 RX ADMIN — ESOMEPRAZOLE MAGNESIUM 40 MG: 40 CAPSULE, DELAYED RELEASE ORAL at 10:30

## 2021-03-15 RX ADMIN — ASPIRIN 81 MG CHEWABLE TABLET 81 MG: 81 TABLET CHEWABLE at 08:44

## 2021-03-15 RX ADMIN — OXYCODONE HYDROCHLORIDE 10 MG: 5 TABLET ORAL at 10:38

## 2021-03-15 RX ADMIN — PREGABALIN 150 MG: 75 CAPSULE ORAL at 08:44

## 2021-03-15 RX ADMIN — DIVALPROEX SODIUM 1000 MG: 500 TABLET, DELAYED RELEASE ORAL at 08:44

## 2021-03-15 RX ADMIN — BUSPIRONE HYDROCHLORIDE 10 MG: 10 TABLET ORAL at 21:48

## 2021-03-15 RX ADMIN — IBUPROFEN 600 MG: 600 TABLET, FILM COATED ORAL at 10:19

## 2021-03-15 RX ADMIN — PREGABALIN 150 MG: 75 CAPSULE ORAL at 20:08

## 2021-03-15 RX ADMIN — NICOTINE 1 PATCH: 14 PATCH, EXTENDED RELEASE TRANSDERMAL at 08:45

## 2021-03-15 RX ADMIN — HYDROCODONE BITARTRATE AND ACETAMINOPHEN 1 TABLET: 10; 325 TABLET ORAL at 06:01

## 2021-03-15 RX ADMIN — FLUOXETINE 40 MG: 20 CAPSULE ORAL at 08:44

## 2021-03-15 RX ADMIN — CLOPIDOGREL BISULFATE 75 MG: 75 TABLET, FILM COATED ORAL at 08:45

## 2021-03-15 RX ADMIN — OXYCODONE HYDROCHLORIDE 10 MG: 5 TABLET ORAL at 23:22

## 2021-03-15 RX ADMIN — BUSPIRONE HYDROCHLORIDE 10 MG: 10 TABLET ORAL at 15:36

## 2021-03-15 RX ADMIN — BUSPIRONE HYDROCHLORIDE 10 MG: 10 TABLET ORAL at 08:45

## 2021-03-15 RX ADMIN — CEFAZOLIN SODIUM 2 G: 2 INJECTION, SOLUTION INTRAVENOUS at 23:30

## 2021-03-15 RX ADMIN — IBUPROFEN 600 MG: 600 TABLET, FILM COATED ORAL at 15:36

## 2021-03-15 RX ADMIN — OXYCODONE HYDROCHLORIDE 10 MG: 5 TABLET ORAL at 17:58

## 2021-03-15 RX ADMIN — ACETAMINOPHEN 650 MG: 325 TABLET, FILM COATED ORAL at 08:55

## 2021-03-15 RX ADMIN — Medication 1 MG: at 00:14

## 2021-03-15 RX ADMIN — ROSUVASTATIN CALCIUM 20 MG: 20 TABLET, FILM COATED ORAL at 08:45

## 2021-03-15 RX ADMIN — CEFAZOLIN SODIUM 2 G: 2 INJECTION, SOLUTION INTRAVENOUS at 15:35

## 2021-03-15 RX ADMIN — BISOPROLOL FUMARATE 5 MG: 5 TABLET ORAL at 08:44

## 2021-03-15 RX ADMIN — POTASSIUM CHLORIDE 20 MEQ: 1500 TABLET, EXTENDED RELEASE ORAL at 18:44

## 2021-03-15 RX ADMIN — IBUPROFEN 600 MG: 600 TABLET, FILM COATED ORAL at 21:48

## 2021-03-15 RX ADMIN — OXYCODONE HYDROCHLORIDE 10 MG: 5 TABLET ORAL at 13:45

## 2021-03-15 RX ADMIN — ISOSORBIDE DINITRATE 40 MG: 20 TABLET ORAL at 20:08

## 2021-03-15 RX ADMIN — QUETIAPINE FUMARATE 600 MG: 300 TABLET ORAL at 20:08

## 2021-03-15 RX ADMIN — CEFAZOLIN SODIUM 2 G: 2 INJECTION, SOLUTION INTRAVENOUS at 08:55

## 2021-03-15 RX ADMIN — INSULIN GLARGINE 5 UNITS: 100 INJECTION, SOLUTION SUBCUTANEOUS at 21:48

## 2021-03-15 RX ADMIN — INSULIN ASPART 1 UNITS: 100 INJECTION, SOLUTION INTRAVENOUS; SUBCUTANEOUS at 17:34

## 2021-03-15 RX ADMIN — ACETAMINOPHEN 650 MG: 325 TABLET, FILM COATED ORAL at 00:14

## 2021-03-15 RX ADMIN — CEFAZOLIN SODIUM 2 G: 2 INJECTION, SOLUTION INTRAVENOUS at 02:01

## 2021-03-15 RX ADMIN — ISOSORBIDE DINITRATE 40 MG: 20 TABLET ORAL at 08:44

## 2021-03-15 RX ADMIN — DIVALPROEX SODIUM 1000 MG: 500 TABLET, DELAYED RELEASE ORAL at 20:08

## 2021-03-15 RX ADMIN — CLOMIPRAMINE HCL 100 MG: 50 CAPSULE ORAL at 21:48

## 2021-03-15 ASSESSMENT — ACTIVITIES OF DAILY LIVING (ADL)
ADLS_ACUITY_SCORE: 15
ADLS_ACUITY_SCORE: 16

## 2021-03-15 NOTE — PLAN OF CARE
DATE & TIME: 3/15/2021 7656-7576    Cognitive Concerns/ Orientation : disoriented to situation.    BEHAVIOR & AGGRESSION TOOL COLOR: green, follows command. Pleasant with care.   CIWA SCORE: na    ABNL VS/O2: vss, on RA, lungs clear.   MOBILITY: SBA x1, NWB to left foot due to recent amputation.   PAIN MANAGMENT: pain regiment changed this am, added ibuprofen TID and oxycodone 5-10 mg Q 4 hours PRN. Pain rates pain to left foot 7-10/10. Oxycodone given twiece.   DIET: mod cho.   BOWEL/BLADDER: continent.   ABNL LAB/BG: BG 81/96, hgb 7.3.   DRAIN/DEVICES: SL PICC on right arm, SL.   TELEMETRY RHYTHM: na   SKIN: intact, slight rash noted on BLE, improving. Dressing on left foot, surgery team to change. Dressing CDI.   TESTS/PROCEDURES: na   D/C DAY/GOALS/PLACE: pending placement and resolution of delirium.   OTHER IMPORTANT INFO: seen by psych this am.   MD/RN ROUNDING SIGNED OFF D/E SHIFT: na   COMMIT TO SIT DONE AND SIGNED OFF na   IS THE PATIENT ON REMDESIVIR? DATE OF LAST SCHEDULED DOSE: na

## 2021-03-15 NOTE — PROGRESS NOTES
[facility]  Infectious Disease Progress Note          Assessment and Plan:   Assessment & Plan     Huseyin Pettit is a 61 year old male who was admitted on 3/4/2021.      Impression:  1. 61 y.o male with diabetes.   2. Neuropathy.   3. CAD  4. Admitted with left foot wound with spetic arthritis.   5. Previous admission last year for osteo s/p toe amputation that admission.   6. Blood cultures positive for MSSA.   7. S/P Left transmetatarsal amputation followed by Left foot revision transmetatarsal amputation with irrigation and debridement,  bone resection, delayed primary closure     Recommendations:   On Ancef after initially  being on zosyn Will need  4 weeks of IV antibiotics for bacteremia alone but given the concern for some residual osteo will increase the course to 6 weeks day 12/ 42 today.   No clear secondary sites, no artificail material    Orders for discharge antibiotics in the chart but has been dealing with confusion and delirium off and on         Interval History:   no new complaints and doing well; no cp, sob, n/v/d, or abd pain. Seems OK, T down Follow-up BC neg so far foot cx with MSSA                Medications:       aspirin  81 mg Oral Daily     bisoprolol  5 mg Oral Daily     busPIRone  10 mg Oral TID     ceFAZolin  2 g Intravenous Q8H     clomiPRAMINE  100 mg Oral At Bedtime     clopidogrel  75 mg Oral Daily     divalproex sodium delayed-release  1,000 mg Oral BID     FLUoxetine  40 mg Oral Daily     ibuprofen  600 mg Oral TID     insulin aspart   Subcutaneous QAM AC     insulin aspart   Subcutaneous Daily with lunch     insulin aspart   Subcutaneous Daily with supper     insulin aspart  1-7 Units Subcutaneous TID AC     insulin aspart  1-5 Units Subcutaneous At Bedtime     insulin glargine  5 Units Subcutaneous At Bedtime     isosorbide Dinitrate  40 mg Oral BID     nicotine  1 patch Transdermal Daily     nicotine   Transdermal Q8H     polyethylene glycol  17 g Oral  BID     pregabalin  150 mg Oral BID     QUEtiapine  600 mg Oral At Bedtime     rosuvastatin  20 mg Oral Daily     sodium chloride (PF)  10 mL Intracatheter Q8H                  Physical Exam:   Blood pressure 127/71, pulse 89, temperature 98  F (36.7  C), temperature source Oral, resp. rate 18, weight 82.6 kg (182 lb 3.2 oz), SpO2 93 %.  Wt Readings from Last 2 Encounters:   03/14/21 82.6 kg (182 lb 3.2 oz)   03/04/21 61.2 kg (135 lb)     Vital Signs with Ranges  Temp:  [98  F (36.7  C)-98.5  F (36.9  C)] 98  F (36.7  C)  Pulse:  [74-92] 89  Resp:  [16-20] 18  BP: (117-139)/(71-83) 127/71  SpO2:  [90 %-94 %] 93 %    Constitutional: Awake, alert, cooperative, no apparent distress   Lungs: Clear to auscultation bilaterally, no crackles or wheezing   Cardiovascular: Regular rate and rhythm, normal S1 and S2, and no murmur noted   Abdomen: Normal bowel sounds, soft, non-distended, non-tender   Skin: No rashes, no cyanosis, no edema   Other: Foot wrapped          Data:   All microbiology laboratory data reviewed.  Recent Labs   Lab Test 03/15/21  0556 03/14/21  1250 03/14/21  0644 03/13/21  0941 03/12/21  0849   WBC 10.4  --  11.0  --  13.9*   HGB 7.3* 7.3* 7.5*  --  8.4*   HCT 22.5*  --  23.3*  --  25.8*   MCV 88  --  89  --  88     --  232 229 214     Recent Labs   Lab Test 03/15/21  0556 03/14/21  1250 03/13/21  0941   CR 0.98 0.99 0.96     Recent Labs   Lab Test 03/04/21  0942   SED 91*     Recent Labs   Lab Test 03/11/21  1310 03/07/21  1109 03/07/21  1107 03/06/21  0907 03/06/21  0856 03/05/21  1847 03/05/21  1052 03/05/21  0947 03/04/21  1015   CULT On day 1, isolated in broth only:  Staphylococcus epidermidis  *  Culture negative monitoring continues No growth No growth No growth No growth Heavy growth  Mixed aerobic and anaerobic rm  No predominant anaerobes  *  Heavy growth  Staphylococcus aureus  *  Heavy growth  Normal skin rm   No growth No growth Cultured on the 1st day of  incubation:  Staphylococcus aureus  *  Critical Value/Significant Value, preliminary result only, called to and read back by  Ashtyn Chicas RN at 0537 3/5/21 hg    (Note)  POSITIVE for STAPHYLOCOCCUS AUREUS and NEGATIVE for the mecA gene  (not MRSA) by Zmandaigene multiplex nucleic acid test. The mecA gene was  not detected. Final identification and antimicrobial susceptibility  testing will be verified by standard methods.    Specimen tested with Zmandaigene multiplex, gram-positive blood culture  nucleic acid test for the following targets: Staph aureus, Staph  epidermidis, Staph lugdunensis, other Staph species, Enterococcus  faecalis, Enterococcus faecium, Streptococcus species, S. agalactiae,  S. anginosus grp., S. pneumoniae, S. pyogenes, Listeria sp., mecA  (methicillin resistance) and Lopez/B (vancomycin resistance).    Critical Value/Significant Value called to and read back by Sacha Del Rosario RN on 3.5.21 at 0830. bw

## 2021-03-15 NOTE — PROGRESS NOTES
MD Notification    Notified Person: MD    Notified Person Name: Dr. Rodgers     Notification Date/Time:March 15, 2021  12:03 AM    Notification Interaction:Phone call    Purpose of Notification:Evening nurse reported that pharmacy called and expressed concern about Ancef for infection. Would you please look over bone culture report to verify appropriateness? Thank you.    Orders Received:    Comments:

## 2021-03-15 NOTE — PLAN OF CARE
DATE & TIME: 3/14/2021; [id number]   Cognitive Concerns/ Orientation : A & O x 3 (situation); forgetful, confused, needs reminders to call before getting up , easily re-directable; garbled speech, switches from English to Estonian frequently, restless and impulsive at times   BEHAVIOR & AGGRESSION TOOL COLOR: Green   CIWA SCORE: N/A    ABNL VS/O2: VSS on RA   MOBILITY: x 1 assist Gb/walker, non-weight bearing Lower left foot (not compliant with instructions)   PAIN MANAGMENT: reports constant 10/10 pain in left lower extremity when awake, sleeps comfortable in-between cares, managed well with PRN tylenol   DIET: mod carb, carb count   BOWEL/BLADDER: BS active x 4; continent, monitoring I/Os, IV lasix given at start of shift; abdomin mildly distended/firm, BM on day shift   ABNL LAB/BG: Lq=185, 141;hgb=7.3; albumin= 1.4   DRAIN/DEVICES: RUE single lumen PICC/SL; dried blood; IV antibiotics   TELEMETRY RHYTHM: N/A  SKIN: Bruising, rash to LE and abdomen; L. Foot surgical wound (POD x 3), dressing changed on day shift (CDI on this shift)  TESTS/PROCEDURES: POD x 3 amputation; Chest x-ray completed today   D/C DAY/GOALS/PLACE: pending discharge to TCU   OTHER IMPORTANT INFO: reported numbness to L. Foot; restless most of shift; ID following

## 2021-03-15 NOTE — PROGRESS NOTES
DATE & TIME: 03/14/2021 Night    Cognitive Concerns/ Orientation : Confused, irritable, hallucinating, picking and gesturing as though eating; difficult to determine orientation. Pt redirectable. Long arm sit. Speech is often garbled and switches from Persian to English. Impulsive, restless.  BEHAVIOR & AGGRESSION TOOL COLOR: Green/yellow (at times)  ABNL VS/O2:VSS, room air  MOBILITY: Assist 1, gait belt, walker; Non-weight bearing on L foot, non-compliant  PAIN MANAGMENT: Tylenol given x 1; Norco given x 1 for foot pain  DIET: Moderate carbohydrate with carbohydrate count  BOWEL/BLADDER: Using urinal at bedside  ABNL LAB/BG: Alb 1.4; Pro 5.7; Hgb 7.3 (trending down); hematocrit 23.3  DRAIN/DEVICES: RUE PICC saline locked, dressing changed during day 3/14, Intermittent antibiotics  SKIN: Bruising; rash to lower extremities and abdomen; L foot surgical wound (post-op day 3), dressing clean, dry, intact-covered with sock to prevent pt from removing dressing; Previous amputation to R toe  TESTS/PROCEDURES: chest xray complete, infiltrates  D/C DAY/GOALS/PLACE: Pending, TCU placement and resolution of intermittent delirium  OTHER IMPORTANT INFO:  Long arm sit for confusion/impulsiveness. SW/PT/OT/ID/Podiatry following

## 2021-03-15 NOTE — PROGRESS NOTES
Care Management Follow Up    Length of Stay (days): 11    Expected Discharge Date: 03/17/21     Concerns to be Addressed: compliance issue, medication, substance/tobacco abuse/use(Patient will need 4 weeks of IVAB will need teaching on administring the drug. Patient continues to smoke despite education given on his last admission. Not compliant with insulin mangement at home.)  TCU is being reccomended to help patient build independence, strength and mobility  Patient plan of care discussed at interdisciplinary rounds: Yes    Anticipated Discharge Disposition: Transitional Care  Disposition Comments: Discharge to TCU  Anticipated Discharge Services:    Anticipated Discharge DME:      Patient/family educated on Medicare website which has current facility and service quality ratings: yes  Education Provided on the Discharge Plan:    Patient/Family in Agreement with the Plan:      Referrals Placed by CM/SW: Post Acute Facilities  Private pay costs discussed: Not applicable    Additional Information:  [facility]  The facilities are aware discharge is pending medication adjustment over the next two days.  PLAN:  SW needs to review these options with patient.      SERENITY ByrnesSW

## 2021-03-15 NOTE — CONSULTS
"Consult Date:  03/15/2021      PSYCHIATRY CONSULTATION      REQUESTING PHYSICIAN:  Dr. Aden      REASON FOR CONSULTATION:  Confusion and delirium.      IDENTIFYING DATA:  The patient is a 61-year-old [place] male with very complex medical issues convalescing on [address].  He has multiple medical comorbidities, including peripheral vascular disease with osteomyelitis, status post amputation of the right foot, who came into the [facility] with pain, nonhealing wounds and chest pain.  He has been intermittently agitated.  He has been diagnosed with depression and anxiety and is on a high dose of clomipramine along with BuSpar, Prozac and Seroquel.      CHIEF COMPLAINT:  \"I have pain, I need something for pain.\"      HISTORY OF PRESENT ILLNESS:  The patient is a 61-year-old [place] male who lives locally.  He was sent to us with the above history.  He has been in the hospital for several days.  He is on a lengthy list of medications and indicates a history of having anxiety for 6 years.  He used to see a psychiatrist that he cannot remember the name of.  He has been on a high dose of clomipramine 200 mg daily along with 600 mg of Seroquel at bedtime, 40 mg of Prozac and 10 mg of BuSpar 4 times daily.  He has been intermittently agitated.  They gave him some p.r.n. Zyprexa.  Notes reflect that at one point he may have been hallucinating.  He has been impulsive.  They have a sitter with him.  They are looking at placing him in a TCU.  Currently, he is oriented.  He is a bit hypervigilant, wide wide-eyed and hyperverbal.  He is perseverating on pain and wanting more pain medication.  He was clearly confused last night, hallucinating, irritable, picking at things, gesturing as though he was eating.  He is oriented to the hospital.  He knows it is Monday, but he tails off into a very repetitive conversation about wanting pain medications.  His speech is pressured to the point where it is hard to understand what he is " trying to tell me.      On further questioning, he does not endorse a history of jayna, psychosis, generalized anxiety, panic, eating disorder history, trauma history or ADHD.  He mentions something about having OCD, but says he has only been treated for depression for 6 years.  Chart review through the [employer] system does not delineate any convincing psychiatric history or previous psychiatric consultations.  I could not find anything through the [facility] system.  He is not a particularly good historian.  He is currently denying hallucinations and denying thoughts of wanting to hurt self or others.      PAST PSYCHIATRIC HISTORY:  As above, limited details.      PAST CHEMICAL DEPENDENCY HISTORY:  None reported.      PAST MEDICAL HISTORY:  Per chart review includes foot amputation with history of septic arthritis, chronic kidney disease, type 2 diabetes, abdominal pain, chest pain, constipation, chest pain with atherosclerotic coronary vascular disease, hypertension, rib fractures.      PRIOR TO ADMISSION MEDICATIONS:  Please see Epic, notable for current psychotropic meds which include 40 mg of Prozac daily, 100 mg of Anafranil b.i.d., 600 mg of Seroquel at bedtime, 10 mg of BuSpar q.i.d.      FAMILY HISTORY:  He denies mental illness, chemical dependency or suicide.      SOCIAL HISTORY:  The patient lives with his brother.  He used to be , but does not have children.  He used to work at a gas station.  Came from a family of 8 children and is [place].  He has been in the [place] long term.      REVIEW OF SYSTEMS:  A 10-point review of systems is notable for complaints of lower extremity pain, otherwise negative.      MOST RECENT VITAL SIGNS:  Blood pressure 117/77, temp 98.5, pulse 92, respiratory rate 20, oxygen saturation 91%.      MENTAL STATUS EXAMINATION:  Appearance:  The patient is a dark complected gentleman.  He is lying in bed.  He is a fair historian.  Speech is pressured with a  thick [place] accent.  Use of language fair.  Motor exam is fidgety.  Affect is mildly agitated, irritable, anxious.  Mood:  Anxious and somewhat perseverative.  Thought process:  Perseverative with no loosening of associations, flight of ideas or formal thought disorder.  Thought content:  Positive for fixation on pain.  Negative for current hallucinations, delusions, paranoia, suicidal or homicidal ideation.  Insight and judgment are fair.  Cognitive exam:  The patient is alert and oriented to person and place.  He had a difficult time with the exact date and month, but knew it was Monday.  Concentration is poor.  Recent memory is poor.  He has no recollection of hallucinating and being agitated last night.  Remote memory grossly intact.  General fund of knowledge fair.      IMPRESSION:  The patient is a 61-year-old gentleman presenting with multiple medical comorbidities and a high risk for delirium.  At baseline, it looks like he has been diagnosed with depression and anxiety.  Given his level of agitation, difficulty with sleep, I think moderating his Anafranil dose is appropriate along with discontinuing hydroxyzine due to the anticholinergic effects.  It looks like he has probably had a history of falling, so I think there needs to be consideration for polypharmacy concerns.  Utilizing p.r.n. Seroquel is appropriate.      1.  Delirium, probably related to polypharmacy.   2.  Major depressive disorder by history.   3.  Rule out obsessive compulsive disorder by history.   4.  Multiple medical comorbidities.      PLAN:   1.  Reduce clomipramine to 100 mg at bedtime, reduce BuSpar to 10 mg t.i.d. to bring this into an appropriate dosing range.   2.  Continue 40 mg of Prozac along with the current scheduled dose of Seroquel at bedtime.  I did add 25-50 mg q.6 hours p.r.n. for agitation.   3.  Monitor mood regulation and impulsivity.  Further reductions in his antidepressant load may be appropriate.  Clinically,  he has an almost manic flavor.   4.  Judicious use of pain medications as appropriate for obvious reasons, given risk of delirium and current presentation.         ZOHAIB HUERTA MD             D: 03/15/2021   T: 03/15/2021   MT: SHRADDHA      Name:     MORALES SHERMAN   MRN:      [id number]        Account:       [id number]   :      1960           Consult Date:  03/15/2021      Document: [id number]

## 2021-03-15 NOTE — PROGRESS NOTES
[facility]    Medicine Progress Note - Hospitalist Service       Date of Admission:  3/4/2021  Assessment & Plan       61 year old male w/ PMH of DM II, CAD w/ prior PCI in 2011, former smoker HTN, HLD, CKD, chronic pain, anxiety and depression, osteomyelitis of toe s/p amputation of right foot admitted on 03/04/21 w/ CP and left foot Osteomyelitis and MSSA bacteremia:      Left foot Septic arthritis and osteomyelitis  S/P Revision L TMA, bone resection, delayed primary closure 3/11/21  Multiple fractures of left foot  Staph aureus bacteremia 3/4/21    Vascular surgery (signed off 3/9), Podiatry, and ID consulted.    S/p left TMA on 3/5/21 by Dr. Barnes.    1/2 blood cultures from admission positive for Staph aureus. Repeat blood cultures have been negative to date.    Continues on Ancef 2 gm IV q8hr, and will treat for 6 wks total      Acute toxic metabolic encephalopathy / delirium     -- Some agitation, partly related to pain control, will modify pain meds in hopes of improved control (Oxycodone, Ibuprofen)    Diabetes mellitus, type 2    Hemoglobin A1c 6.8 on 3/4/21.    Hold PTA glimepiride and metformin for now (further surgery anticipated)    Was on NovoLog Mix 70/30 at 28 units in AM and 38 units in PM prior to admission, currently on hold.    Acute blood loss anemia, secondary to surgery 3/11  Chronic normocytic anemia with baseline 12-13g    Had an episode of hypotension early on the morning of 3/6/21. Labs were checked and hemoglobin was down to 6.1.    Transfused one unit PRBCs on 3/6/21.    Hemoglobin improved to about 8, however dropped to 6.9.  Transfused 1 more unit of PRBCs 3/7.     hgb stable at 8.4 AM 3/8     Post op Hgb 6.8 3/11 - transfused 1 uPRBC    3/12 Hgb 8.4  --> 7.5 3/14     Hypotension - resolved    Likely hypovolemic - WNL after IVF given previously in hospitalization     Chest pain, +risk factors including HTN, HLD, DM2  CAD w/ PCI in 2011 to LAD and CX  kelly Morales, last coronary angiogram was 3/2020 which did not require any mechanical revascularization and GDMT was recommended.    Serial troponins negative.    No significant dysrhythmias on telemetry.    EKG x 2 did not show any acute ischemic changes.    CT chest negative for PE on 3/4/21.    Given negative work-up including negative serial troponins despite ongoing chest pain, suspicion for cardiac etiology of his pain is low.     CKD stage 2-3, baseline Cr 0.8-1.0 as of early 2020  Mild hyponatremia    Hyponatremia resolved.    Creatinine stable 0.9     Abdominal pain on palpation in LUQ and RLQ, resolved   Constipation    Lactic acid was within normal limits.    Epigastric tenderness has resolved.    Continue bowel regimen.     Depression  Anxiety    Continue PTA buspirone, clomipramine, fluoxetine, and quetiapine.    3/14 will check depakote level    Psychiatry requested given agitation/delirium increasing 3/13 - 3/14     Mild basilar predominant bronchial wall thickening with some mucous plugging may be related to bronchitis, seen on CT    Encouraged smoking cessation.    Encourage incentive spirometer.     Lower extremity petechiae and purpura, confluent in areas, concerning for vasculitic process vs platelet destruction with infection, also he is on DAPT. Plt count remains normal range as of 3/12.     Vascular surgery previously following    Rash improved / variable. PCP follow up after infection and hospitalization.    ANCA IgG by IFA negative, DENISSE negative, C3 C4 not low     Covid-19 PCR NEGATIVE  Asymptomatic COVID-19 PCR testing was negative on 3/4/21.      Diet: Moderate Consistent CHO Diet    DVT Prophylaxis: Defer to surgery  Rangel Catheter: not present  Code Status: Full Code           Disposition Plan   Expected discharge: pending TCU ?Wed.    Bill Rogers MD  Hospitalist Service  [facility]  Contact information available via [facility]  Trace/Bk    ______________________________________________________________________    Interval History   Some agitation last night, but pain meds reduced, reports 10/10 pain.      Physical Exam   Vital Signs: Temp: 98  F (36.7  C) Temp src: Oral BP: 127/71 Pulse: 89   Resp: 18 SpO2: 93 % O2 Device: None (Room air)    Weight: 182 lbs 3.2 oz    Gen: NAD, resting, intermittently agitated, speaking in other language  HEENT: Normocephalic, EOMI, MMM  Resp: no crackles,  no wheezes, no increased work of resp  CV: S1S2 heard, reg rhythm, reg rate  Abdo: soft, nontender, nondistended, bowel sounds present  Ext: calves nontender, LLE dressed, intact, right first toe with partial amp  Neuro: AAOx3 but did not know what floor he is on, CN grossly intact, no facial asymmetry, moving all extremities, not following simple commands persistently      Data   Recent Labs   Lab 03/15/21  0556 03/14/21  1250 03/14/21  0644 03/13/21  0941 03/12/21  0849   WBC 10.4  --  11.0  --  13.9*   HGB 7.3* 7.3* 7.5*  --  8.4*   MCV 88  --  89  --  88     --  232 229 214    142  --   --  139   POTASSIUM 3.6 4.0  --   --  4.4   CHLORIDE 113* 113*  --   --  110*   CO2 25 25  --   --  25   BUN 23 24  --   --  20   CR 0.98 0.99  --  0.96 0.94   ANIONGAP 4 4  --   --  4   MYRON 7.7* 7.8*  --   --  7.5*   GLC 81 141*  --   --  119*   ALBUMIN  --  1.4*  --   --   --    PROTTOTAL  --  5.7*  --   --   --    BILITOTAL  --  0.2  --   --   --    ALKPHOS  --  47  --   --   --    ALT  --  <6  --   --   --    AST  --  12  --   --   --      Recent Results (from the past 24 hour(s))   XR Chest Port 1 View    Narrative    CHEST PORTABLE ONE VIEW   3/14/2021 1:46 PM     HISTORY: SOB.    COMPARISON: CT chest dated 3/4/2021, chest x-rays dated 5/15/2020.    FINDINGS:  Infiltrates throughout bilateral lungs are worst in the  right lung. Right-sided PICC line is now noted with tip in the mid to  upper right atrium. Chronic lateral right rib fracture is  noted. Heart  appears grossly normal in size. No pneumothorax or obvious pleural  effusion.      Impression    IMPRESSION:   1. Bilateral pulmonary infiltrates/pulmonary edema, worse in the right  lung. These are new or significantly worsened since the prior CT chest  dated 3/4/2021.  2. PICC line tip is projected over the mid right atrium.  3. There appears be some volume loss in right hemithorax as compared  to the left.    DERRICK ESPINOSA MD

## 2021-03-16 ENCOUNTER — APPOINTMENT (OUTPATIENT)
Dept: OCCUPATIONAL THERAPY | Facility: CLINIC | Age: 61
End: 2021-03-16
Attending: INTERNAL MEDICINE
Payer: COMMERCIAL

## 2021-03-16 LAB
GLUCOSE BLDC GLUCOMTR-MCNC: 126 MG/DL (ref 70–99)
GLUCOSE BLDC GLUCOMTR-MCNC: 130 MG/DL (ref 70–99)
GLUCOSE BLDC GLUCOMTR-MCNC: 135 MG/DL (ref 70–99)
GLUCOSE BLDC GLUCOMTR-MCNC: 55 MG/DL (ref 70–99)
GLUCOSE BLDC GLUCOMTR-MCNC: 71 MG/DL (ref 70–99)
GLUCOSE BLDC GLUCOMTR-MCNC: 72 MG/DL (ref 70–99)
GLUCOSE BLDC GLUCOMTR-MCNC: 91 MG/DL (ref 70–99)
POTASSIUM SERPL-SCNC: 4.1 MMOL/L (ref 3.4–5.3)

## 2021-03-16 PROCEDURE — 120N000001 HC R&B MED SURG/OB

## 2021-03-16 PROCEDURE — 250N000013 HC RX MED GY IP 250 OP 250 PS 637: Performed by: PODIATRIST

## 2021-03-16 PROCEDURE — 250N000013 HC RX MED GY IP 250 OP 250 PS 637: Performed by: INTERNAL MEDICINE

## 2021-03-16 PROCEDURE — 250N000012 HC RX MED GY IP 250 OP 636 PS 637: Performed by: PODIATRIST

## 2021-03-16 PROCEDURE — 999N001017 HC STATISTIC GLUCOSE BY METER IP

## 2021-03-16 PROCEDURE — 999N000190 HC STATISTIC VAT ROUNDS

## 2021-03-16 PROCEDURE — 99232 SBSQ HOSP IP/OBS MODERATE 35: CPT | Performed by: INTERNAL MEDICINE

## 2021-03-16 PROCEDURE — 250N000013 HC RX MED GY IP 250 OP 250 PS 637: Performed by: PSYCHIATRY & NEUROLOGY

## 2021-03-16 PROCEDURE — 97530 THERAPEUTIC ACTIVITIES: CPT | Mod: GO

## 2021-03-16 PROCEDURE — 250N000011 HC RX IP 250 OP 636: Performed by: PODIATRIST

## 2021-03-16 PROCEDURE — 84132 ASSAY OF SERUM POTASSIUM: CPT | Performed by: INTERNAL MEDICINE

## 2021-03-16 PROCEDURE — 97535 SELF CARE MNGMENT TRAINING: CPT | Mod: GO

## 2021-03-16 RX ADMIN — BISOPROLOL FUMARATE 5 MG: 5 TABLET ORAL at 09:46

## 2021-03-16 RX ADMIN — BUSPIRONE HYDROCHLORIDE 10 MG: 10 TABLET ORAL at 09:47

## 2021-03-16 RX ADMIN — DIVALPROEX SODIUM 1000 MG: 500 TABLET, DELAYED RELEASE ORAL at 20:25

## 2021-03-16 RX ADMIN — BUSPIRONE HYDROCHLORIDE 10 MG: 10 TABLET ORAL at 21:33

## 2021-03-16 RX ADMIN — QUETIAPINE FUMARATE 400 MG: 300 TABLET ORAL at 20:25

## 2021-03-16 RX ADMIN — NICOTINE 1 PATCH: 14 PATCH, EXTENDED RELEASE TRANSDERMAL at 09:45

## 2021-03-16 RX ADMIN — CLOPIDOGREL BISULFATE 75 MG: 75 TABLET, FILM COATED ORAL at 09:45

## 2021-03-16 RX ADMIN — PREGABALIN 150 MG: 75 CAPSULE ORAL at 20:25

## 2021-03-16 RX ADMIN — ESOMEPRAZOLE MAGNESIUM 40 MG: 40 CAPSULE, DELAYED RELEASE ORAL at 09:46

## 2021-03-16 RX ADMIN — CEFAZOLIN SODIUM 2 G: 2 INJECTION, SOLUTION INTRAVENOUS at 09:44

## 2021-03-16 RX ADMIN — CLOMIPRAMINE HCL 100 MG: 50 CAPSULE ORAL at 21:33

## 2021-03-16 RX ADMIN — ACETAMINOPHEN 650 MG: 325 TABLET, FILM COATED ORAL at 00:39

## 2021-03-16 RX ADMIN — ISOSORBIDE DINITRATE 40 MG: 20 TABLET ORAL at 20:25

## 2021-03-16 RX ADMIN — FLUOXETINE 40 MG: 20 CAPSULE ORAL at 09:47

## 2021-03-16 RX ADMIN — DIVALPROEX SODIUM 1000 MG: 500 TABLET, DELAYED RELEASE ORAL at 09:46

## 2021-03-16 RX ADMIN — OXYCODONE HYDROCHLORIDE 10 MG: 5 TABLET ORAL at 10:00

## 2021-03-16 RX ADMIN — CEFAZOLIN SODIUM 2 G: 2 INJECTION, SOLUTION INTRAVENOUS at 16:12

## 2021-03-16 RX ADMIN — ASPIRIN 81 MG CHEWABLE TABLET 81 MG: 81 TABLET CHEWABLE at 09:46

## 2021-03-16 RX ADMIN — IBUPROFEN 600 MG: 600 TABLET, FILM COATED ORAL at 09:46

## 2021-03-16 RX ADMIN — ISOSORBIDE DINITRATE 40 MG: 20 TABLET ORAL at 09:45

## 2021-03-16 RX ADMIN — BUSPIRONE HYDROCHLORIDE 10 MG: 10 TABLET ORAL at 16:12

## 2021-03-16 RX ADMIN — OXYCODONE HYDROCHLORIDE 10 MG: 5 TABLET ORAL at 15:03

## 2021-03-16 RX ADMIN — OXYCODONE HYDROCHLORIDE 5 MG: 5 TABLET ORAL at 03:23

## 2021-03-16 RX ADMIN — OXYCODONE HYDROCHLORIDE 10 MG: 5 TABLET ORAL at 19:34

## 2021-03-16 RX ADMIN — IBUPROFEN 600 MG: 600 TABLET, FILM COATED ORAL at 16:12

## 2021-03-16 RX ADMIN — PREGABALIN 150 MG: 75 CAPSULE ORAL at 09:46

## 2021-03-16 RX ADMIN — INSULIN GLARGINE 5 UNITS: 100 INJECTION, SOLUTION SUBCUTANEOUS at 21:33

## 2021-03-16 RX ADMIN — ROSUVASTATIN CALCIUM 20 MG: 20 TABLET, FILM COATED ORAL at 09:45

## 2021-03-16 RX ADMIN — IBUPROFEN 600 MG: 600 TABLET, FILM COATED ORAL at 21:33

## 2021-03-16 ASSESSMENT — ACTIVITIES OF DAILY LIVING (ADL)
ADLS_ACUITY_SCORE: 16
ADLS_ACUITY_SCORE: 15
ADLS_ACUITY_SCORE: 16

## 2021-03-16 NOTE — PROGRESS NOTES
[facility]    Medicine Progress Note - Hospitalist Service       Date of Admission:  3/4/2021  Assessment & Plan       61 year old male w/ PMH of DM II, CAD w/ prior PCI in 2011, former smoker HTN, HLD, CKD, chronic pain, anxiety and depression, osteomyelitis of toe s/p amputation of right foot admitted on 03/04/21 w/ CP and left foot Osteomyelitis and MSSA bacteremia:      Left foot Septic arthritis and osteomyelitis  S/P Revision L TMA, bone resection, delayed primary closure 3/11/21  Multiple fractures of left foot  Staph aureus bacteremia 3/4/21    Vascular surgery (signed off 3/9), Podiatry, and ID consulted.    S/p left TMA on 3/5/21 by Dr. Barnes.    1/2 blood cultures from admission positive for Staph aureus. Repeat blood cultures have been negative to date.    Continues on Ancef 2 gm IV q8hr, and will treat for 6 wks total      Acute toxic metabolic encephalopathy / delirium -- bettter     -- will reduce Seroquel to 400 mg qhs    Diabetes mellitus, type 2    Hemoglobin A1c 6.8 on 3/4/21.    Hold PTA glimepiride and metformin for now (further surgery anticipated)    Was on NovoLog Mix 70/30 at 28 units in AM and 38 units in PM prior to admission, currently on hold.    Acute blood loss anemia, secondary to surgery 3/11  Chronic normocytic anemia with baseline 12-13g    Had an episode of hypotension early on the morning of 3/6/21. Labs were checked and hemoglobin was down to 6.1.    Transfused one unit PRBCs on 3/6/21.    Hemoglobin improved to about 8, however dropped to 6.9.  Transfused 1 more unit of PRBCs 3/7.     hgb stable at 8.4 AM 3/8     Post op Hgb 6.8 3/11 - transfused 1 uPRBC, now stable      Hypotension - resolved    Likely hypovolemic - WNL after IVF given previously in hospitalization     Chest pain, +risk factors including HTN, HLD, DM2  CAD w/ PCI in 2011 to LAD and CX iin [place], last coronary angiogram was 3/2020 which did not require any mechanical  revascularization and GDMT was recommended.    Serial troponins negative.    No significant dysrhythmias on telemetry.    EKG x 2 did not show any acute ischemic changes.    CT chest negative for PE on 3/4/21.    Given negative work-up including negative serial troponins despite ongoing chest pain, suspicion for cardiac etiology of his pain is low.     CKD stage 2-3, baseline Cr 0.8-1.0 as of early 2020  Mild hyponatremia    Hyponatremia resolved.    Creatinine stable 0.9     Abdominal pain on palpation in LUQ and RLQ, resolved   Constipation    Lactic acid was within normal limits.    Epigastric tenderness has resolved.    Continue bowel regimen.     Depression  Anxiety    Continue PTA buspirone, clomipramine, fluoxetine, and quetiapine.    3/14 will check depakote level    Psychiatry requested given agitation/delirium increasing 3/13 - 3/14     Mild basilar predominant bronchial wall thickening with some mucous plugging may be related to bronchitis, seen on CT    Encouraged smoking cessation.    Encourage incentive spirometer.     Lower extremity petechiae and purpura, confluent in areas, concerning for vasculitic process vs platelet destruction with infection, also he is on DAPT. Plt count remains normal range as of 3/12.     Vascular surgery previously following    Rash improved / variable. PCP follow up after infection and hospitalization.    ANCA IgG by IFA negative, DENISSE negative, C3 C4 not low     Covid-19 PCR NEGATIVE  Asymptomatic COVID-19 PCR testing was negative on 3/4/21.      Diet: Moderate Consistent CHO Diet    DVT Prophylaxis: Defer to surgery  Rangel Catheter: not present  Code Status: Full Code           Disposition Plan   Expected discharge: pending TCU ?Wed.    Bill Rogers MD  Hospitalist Service  [facility]  Contact information available via [facility] Paging/Directory    ______________________________________________________________________    Interval History    More comfortable today, pain was 10, now 6-7 out of 10 and more cooperative.       Physical Exam   Vital Signs: Temp: 97.3  F (36.3  C) Temp src: Oral BP: 132/77 Pulse: 82   Resp: 16 SpO2: 94 % O2 Device: None (Room air)    Weight: 182 lbs 3.2 oz    Gen: NAD, cooperative, more comfortable   Resp: no crackles,  no wheezes, no increased work of resp  CV: S1S2 heard, reg rhythm, reg rate  Abdo: soft, nontender, nondistended, bowel sounds present  Ext: calves nontender, LLE dressed, intact, right first toe with partial amp  Neuro: AAOx3 but slightly forgetful, CN grossly intact, no facial asymmetry, moving all extremities      Data   Recent Labs   Lab 03/16/21  0610 03/15/21  0556 03/14/21  1250 03/14/21  0644 03/13/21  0941 03/12/21  0849   WBC  --  10.4  --  11.0  --  13.9*   HGB  --  7.3* 7.3* 7.5*  --  8.4*   MCV  --  88  --  89  --  88   PLT  --  237  --  232 229 214   NA  --  142 142  --   --  139   POTASSIUM 4.1 3.6 4.0  --   --  4.4   CHLORIDE  --  113* 113*  --   --  110*   CO2  --  25 25  --   --  25   BUN  --  23 24  --   --  20   CR  --  0.98 0.99  --  0.96 0.94   ANIONGAP  --  4 4  --   --  4   MYRON  --  7.7* 7.8*  --   --  7.5*   GLC  --  81 141*  --   --  119*   ALBUMIN  --   --  1.4*  --   --   --    PROTTOTAL  --   --  5.7*  --   --   --    BILITOTAL  --   --  0.2  --   --   --    ALKPHOS  --   --  47  --   --   --    ALT  --   --  <6  --   --   --    AST  --   --  12  --   --   --      No results found for this or any previous visit (from the past 24 hour(s)).

## 2021-03-16 NOTE — PLAN OF CARE
DATE & TIME: 3/16/2021 9314-4504                        Cognitive Concerns/ Orientation : A&O x4  BEHAVIOR & AGGRESSION TOOL COLOR: green, follows command. Pleasant with care.   CIWA SCORE: na      ABNL VS/O2: vss, on RA, lungs clear.   MOBILITY: SBA x1, NWB to left foot due to recent amputation. Noncompliant with wt bearing restriction.   PAIN MANAGMENT: Oxycodone 10 mg PRN once.   DIET: mod cho.   BOWEL/BLADDER: continent.   ABNL LAB/BG: BG 73/55, MD notified.   DRAIN/DEVICES: SL PICC on right arm, SL.   TELEMETRY RHYTHM: na   SKIN: intact, slight rash noted on LLE, improving. Increased rash on RLE, pt denied itchiness. Dressing on left foot changed, moderate serosanguinous drainage noted. Pedal pulses by doppler. Sites marked.  TESTS/PROCEDURES: na   D/C DAY/GOALS/PLACE: pending placement and resolution of delirium.   OTHER IMPORTANT INFO: Seroquel decreased to 400 mg at bedtime.   MD/RN ROUNDING SIGNED OFF D/E SHIFT: na   COMMIT TO SIT DONE AND SIGNED OFF na   IS THE PATIENT ON REMDESIVIR? DATE OF LAST SCHEDULED DOSE: na

## 2021-03-16 NOTE — PLAN OF CARE
DATE & TIME: 3/15/2021 4503-3847    Cognitive Concerns/ Orientation: A&Ox2-3; forgetful more of shift after 2300.    BEHAVIOR & AGGRESSION TOOL COLOR: Yellow; started getting more restless/agitated after 2300.   CIWA SCORE: NA   ABNL VS/O2: VSS on RA. Remains afebile.   MOBILITY: SBA x1, NWB to left foot due to recent amputation.   PAIN MANAGMENT: pain regiment changed this am, added ibuprofen TID and oxycodone 5-10 mg Q 4 hours PRN. PRN Oxycodone given x2. PRN Tylenol given x 1. Pt restless when pain not controlled.   DIET: Mod cho.   BOWEL/BLADDER: Continent.   ABNL LAB/BG:  and 130, hgb 7.3.   DRAIN/DEVICES: SL PICC on right arm, SL.   TELEMETRY RHYTHM: na   SKIN: Intact, slight rash on BLE, improving. Dressing on left foot; dressing CDI.   TESTS/PROCEDURES: NA  D/C DAY/GOALS/PLACE: Pending placement and resolution of delirium.   OTHER IMPORTANT INFO: seen by psych this am; meds adjusted. Pt restless after 2300; intermittently setting off bed alarm, disoriented and confused. Hallucinating; grabbing for things in the air and seeing medications on the table at times. Intermittently wakes up and asks where he is and where his brother is.

## 2021-03-17 ENCOUNTER — APPOINTMENT (OUTPATIENT)
Dept: OCCUPATIONAL THERAPY | Facility: CLINIC | Age: 61
End: 2021-03-17
Attending: INTERNAL MEDICINE
Payer: COMMERCIAL

## 2021-03-17 LAB
ANION GAP SERPL CALCULATED.3IONS-SCNC: 5 MMOL/L (ref 3–14)
BUN SERPL-MCNC: 37 MG/DL (ref 7–30)
CALCIUM SERPL-MCNC: 7.3 MG/DL (ref 8.5–10.1)
CHLORIDE SERPL-SCNC: 114 MMOL/L (ref 94–109)
CO2 SERPL-SCNC: 23 MMOL/L (ref 20–32)
CREAT SERPL-MCNC: 1.25 MG/DL (ref 0.66–1.25)
ERYTHROCYTE [DISTWIDTH] IN BLOOD BY AUTOMATED COUNT: 16.1 % (ref 10–15)
GFR SERPL CREATININE-BSD FRML MDRD: 62 ML/MIN/{1.73_M2}
GLUCOSE BLDC GLUCOMTR-MCNC: 105 MG/DL (ref 70–99)
GLUCOSE BLDC GLUCOMTR-MCNC: 114 MG/DL (ref 70–99)
GLUCOSE BLDC GLUCOMTR-MCNC: 120 MG/DL (ref 70–99)
GLUCOSE BLDC GLUCOMTR-MCNC: 130 MG/DL (ref 70–99)
GLUCOSE BLDC GLUCOMTR-MCNC: 137 MG/DL (ref 70–99)
GLUCOSE SERPL-MCNC: 104 MG/DL (ref 70–99)
HCT VFR BLD AUTO: 23.7 % (ref 40–53)
HGB BLD-MCNC: 7.6 G/DL (ref 13.3–17.7)
MCH RBC QN AUTO: 28.4 PG (ref 26.5–33)
MCHC RBC AUTO-ENTMCNC: 32.1 G/DL (ref 31.5–36.5)
MCV RBC AUTO: 88 FL (ref 78–100)
PLATELET # BLD AUTO: 287 10E9/L (ref 150–450)
POTASSIUM SERPL-SCNC: 4.4 MMOL/L (ref 3.4–5.3)
RBC # BLD AUTO: 2.68 10E12/L (ref 4.4–5.9)
SODIUM SERPL-SCNC: 142 MMOL/L (ref 133–144)
WBC # BLD AUTO: 8.9 10E9/L (ref 4–11)

## 2021-03-17 PROCEDURE — 120N000001 HC R&B MED SURG/OB

## 2021-03-17 PROCEDURE — 97535 SELF CARE MNGMENT TRAINING: CPT | Mod: GO

## 2021-03-17 PROCEDURE — 999N001017 HC STATISTIC GLUCOSE BY METER IP

## 2021-03-17 PROCEDURE — 80048 BASIC METABOLIC PNL TOTAL CA: CPT | Performed by: INTERNAL MEDICINE

## 2021-03-17 PROCEDURE — 250N000013 HC RX MED GY IP 250 OP 250 PS 637: Performed by: PODIATRIST

## 2021-03-17 PROCEDURE — 250N000013 HC RX MED GY IP 250 OP 250 PS 637: Performed by: INTERNAL MEDICINE

## 2021-03-17 PROCEDURE — 250N000011 HC RX IP 250 OP 636: Performed by: PODIATRIST

## 2021-03-17 PROCEDURE — 85027 COMPLETE CBC AUTOMATED: CPT | Performed by: INTERNAL MEDICINE

## 2021-03-17 PROCEDURE — 250N000013 HC RX MED GY IP 250 OP 250 PS 637: Performed by: PSYCHIATRY & NEUROLOGY

## 2021-03-17 PROCEDURE — 99232 SBSQ HOSP IP/OBS MODERATE 35: CPT | Performed by: INTERNAL MEDICINE

## 2021-03-17 PROCEDURE — 250N000012 HC RX MED GY IP 250 OP 636 PS 637: Performed by: PODIATRIST

## 2021-03-17 PROCEDURE — 999N000190 HC STATISTIC VAT ROUNDS

## 2021-03-17 RX ORDER — VARENICLINE TARTRATE 0.5 MG/1
0.5 TABLET, FILM COATED ORAL 2 TIMES DAILY
Status: DISCONTINUED | OUTPATIENT
Start: 2021-03-17 | End: 2021-03-18 | Stop reason: HOSPADM

## 2021-03-17 RX ADMIN — ESOMEPRAZOLE MAGNESIUM 40 MG: 40 CAPSULE, DELAYED RELEASE ORAL at 15:27

## 2021-03-17 RX ADMIN — CEFAZOLIN SODIUM 2 G: 2 INJECTION, SOLUTION INTRAVENOUS at 00:28

## 2021-03-17 RX ADMIN — DIVALPROEX SODIUM 1000 MG: 500 TABLET, DELAYED RELEASE ORAL at 07:49

## 2021-03-17 RX ADMIN — ASPIRIN 81 MG CHEWABLE TABLET 81 MG: 81 TABLET CHEWABLE at 07:48

## 2021-03-17 RX ADMIN — OXYCODONE HYDROCHLORIDE 10 MG: 5 TABLET ORAL at 03:07

## 2021-03-17 RX ADMIN — ISOSORBIDE DINITRATE 40 MG: 20 TABLET ORAL at 20:16

## 2021-03-17 RX ADMIN — OXYCODONE HYDROCHLORIDE 10 MG: 5 TABLET ORAL at 20:20

## 2021-03-17 RX ADMIN — QUETIAPINE FUMARATE 400 MG: 300 TABLET ORAL at 20:15

## 2021-03-17 RX ADMIN — VARENICLINE TARTRATE 0.5 MG: 0.5 TABLET, FILM COATED ORAL at 22:23

## 2021-03-17 RX ADMIN — CLOPIDOGREL BISULFATE 75 MG: 75 TABLET, FILM COATED ORAL at 07:50

## 2021-03-17 RX ADMIN — IBUPROFEN 600 MG: 600 TABLET, FILM COATED ORAL at 07:50

## 2021-03-17 RX ADMIN — CEFAZOLIN SODIUM 2 G: 2 INJECTION, SOLUTION INTRAVENOUS at 07:50

## 2021-03-17 RX ADMIN — CLOMIPRAMINE HCL 100 MG: 50 CAPSULE ORAL at 22:23

## 2021-03-17 RX ADMIN — BUSPIRONE HYDROCHLORIDE 10 MG: 10 TABLET ORAL at 22:23

## 2021-03-17 RX ADMIN — IBUPROFEN 600 MG: 600 TABLET, FILM COATED ORAL at 15:24

## 2021-03-17 RX ADMIN — CEFAZOLIN SODIUM 2 G: 2 INJECTION, SOLUTION INTRAVENOUS at 15:24

## 2021-03-17 RX ADMIN — IBUPROFEN 600 MG: 600 TABLET, FILM COATED ORAL at 22:23

## 2021-03-17 RX ADMIN — ESOMEPRAZOLE MAGNESIUM 40 MG: 40 CAPSULE, DELAYED RELEASE ORAL at 07:50

## 2021-03-17 RX ADMIN — OXYCODONE HYDROCHLORIDE 10 MG: 5 TABLET ORAL at 07:49

## 2021-03-17 RX ADMIN — ISOSORBIDE DINITRATE 40 MG: 20 TABLET ORAL at 07:49

## 2021-03-17 RX ADMIN — INSULIN GLARGINE 5 UNITS: 100 INJECTION, SOLUTION SUBCUTANEOUS at 22:26

## 2021-03-17 RX ADMIN — OXYCODONE HYDROCHLORIDE 10 MG: 5 TABLET ORAL at 12:11

## 2021-03-17 RX ADMIN — BISOPROLOL FUMARATE 5 MG: 5 TABLET ORAL at 07:48

## 2021-03-17 RX ADMIN — PREGABALIN 150 MG: 75 CAPSULE ORAL at 20:16

## 2021-03-17 RX ADMIN — DIVALPROEX SODIUM 1000 MG: 500 TABLET, DELAYED RELEASE ORAL at 20:15

## 2021-03-17 RX ADMIN — FLUOXETINE 40 MG: 20 CAPSULE ORAL at 07:49

## 2021-03-17 RX ADMIN — ROSUVASTATIN CALCIUM 20 MG: 20 TABLET, FILM COATED ORAL at 07:50

## 2021-03-17 RX ADMIN — NICOTINE 1 PATCH: 14 PATCH, EXTENDED RELEASE TRANSDERMAL at 07:54

## 2021-03-17 RX ADMIN — BUSPIRONE HYDROCHLORIDE 10 MG: 10 TABLET ORAL at 15:24

## 2021-03-17 RX ADMIN — PREGABALIN 150 MG: 75 CAPSULE ORAL at 07:48

## 2021-03-17 RX ADMIN — OXYCODONE HYDROCHLORIDE 10 MG: 5 TABLET ORAL at 16:15

## 2021-03-17 RX ADMIN — BUSPIRONE HYDROCHLORIDE 10 MG: 10 TABLET ORAL at 07:49

## 2021-03-17 ASSESSMENT — ACTIVITIES OF DAILY LIVING (ADL)
ADLS_ACUITY_SCORE: 16

## 2021-03-17 NOTE — PROGRESS NOTES
"[facility]    I was contacted by my partner Dr. Arana today.  He tried to  reach out to this patient to review pathology results from his last surgery. Dr. Arana performed a revision left foot transmetatarsal amputation for treatment of osteomyelitis on 3/11/21. The phone call was answered by the patient's brother who said Huseyin is still in the hospital awaiting TCU placement.    Pathology is showing chronic osteomyelitis of the proximal aspect, left third metatarsal. This was not expected, since the patient had underwent revision transmetatarsal amputation due to initial positive pathology results.     I discussed the next steps/ approach with Dr. Arana who asked if I could see Huseyin today, since he is still in the hospital awaiting TCU placement. I am covering the hospital this week and visited Huseyin earlier this afternoon.    I reviewed the pathology results and recommendations. I explained that there is not much more bone to remove, he required a very proximal transmetatarsal amputation.  I explained that we can return to surgery, open up the large incision and take more bone from the third metatarsal. This would disrupt any healing that has occurred to date.     The other option is a wait-and-watch approach.  He is scheduled to  receive 6 weeks of IV antibiotics.  Serial x-rays can be done to evaluate for changes at the distal aspect of this metatarsal.  Should there be findings concerning for ongoing osteomyelitis, same-day surgery is reasonable.  This would allow for a smaller dorsal incision and removal of more of this metatarsal, rather than reopening the entire transmetatarsal amputation skin flaps. Dr. Arana and  I think the wait-and- watch approach is the least risky.    In terms of his left foot, he said \"I will go with whatever you recommend.\"     Although the proximal pathology specimen shows findings consistent with ostomyelitis, it is important to note that this " is bone that was removed.     Dr. Macario, Infectious Disease was notified of the pathology result and the plan proposed by Dr. Navarro and myself. Dr. Macario thinks this is a very reasonable approach and that any residual osteomyelitis might be cured by the long-term antibiotics that are planned.     15 min was spent with the patient, more than 50% spent counseling/coordinating care regarding the pathology results and care plan.       Thaddeus Bynum DPM, FACFAS, [facility] Department of Podiatry/Foot & Ankle Surgery  [phone]        Patient Name: MORALES SHERMAN   MR#: [id number]   Specimen #: [id number]   Collected: 3/11/2021   Received: 3/11/2021   Reported: 3/15/2021 18:11   Ordering Phy(s): LEENA NAVARRO     For improved result formatting, select 'View Enhanced Report Format' under    Linked Documents section.     SPECIMEN(S):   A: Left first metatarsal, proximal margin   B: Left second metatarsal, proximal margin   C: Left third metatarsal, proximal margin     FINAL DIAGNOSIS:   A. Left first metatarsal, proximal margin:   - Negative for inflammation or atypical cellular proliferations, fragments    of non-hematopoietically active   bone.     B. Left second metatarsal, proximal margin:   - Negative for inflammation or atypical cellular proliferations, fragments    of cortical bone.     C. Left third metatarsal, proximal margin:   - Chronic osteomyelitis with moderately dense lymphoplasmacytic   inflammation involving benign bone without   appreciable acute inflammation, no atypical cellular proliferations seen.     Electronically signed out by:     DEVANTE Grewal M.D.

## 2021-03-17 NOTE — PLAN OF CARE
PT: Attempted to see patient for PT session but pt adamantly declining, states he is very frustrated with the infection that he has and he does not want to get out of bed. Refused any in bed activity as well.

## 2021-03-17 NOTE — PROGRESS NOTES
CLINICAL NUTRITION SERVICES - REASSESSMENT NOTE      Malnutrition: % Weight Loss:  None noted  % Intake:  No decreased intake noted  Subcutaneous Fat Loss:  None observed  Muscle Loss:  None observed  Fluid Retention:  Mild - likely not nutritionally related     Malnutrition Diagnosis: Patient does not meet two of the above criteria necessary for diagnosing malnutrition       EVALUATION OF PROGRESS TOWARD GOALS   Diet:  Moderate CHO     Intake/Tolerance:  Visited with patient this morning.  He states that his appetite and intake are good.  Noted per flowsheets that he has been consuming 100% of meals.  Breakfast this morning was an omelet, fries, rios x 2, and diet cola.  Dinner last night consisted of a rios cheeseburger, fries, and a diet cola.   Lunch yesterday was stir cortes, gunnar food cake, and coffee.   Patient's only complaint today is that he needs some medication to regular his bowels (constipation <--> diarrhea).  Noted patient has scheduled Miralax and PRN Dulcolax.  Last recorded stool was 3/15 - appears that Miralax has not been given since 3/12 as patient has been refusing it.       NEW FINDINGS:   3/11: Left foot revision transmetatarsal amputation with irrigation and debridement,  bone resection, delayed primary closure     Brief nutrition history obtained --> Patient notes that he eats well at home and denies any unintentional weight loss.  Visual Nutrition Focused Physical Exam --> No fat or muscle wasting noted    Noted patient with 2+ mild edema in LE --> Likely not nutritionally related     ASSESSED NUTRITION NEEDS PER APPROVED PRACTICE GUIDELINES:     Dosing Weight 67 kg - current weight  Estimated Energy Needs: 1675 - 2010 kcals (25-30 Kcal/Kg)  Justification: maintenance  Estimated Protein Needs: 80 - 101grams protein (1.2-1.5 g pro/Kg)  Justification: wound healing    Previous Goals (3/11):   Pt to consume >75% nutritionally adequate meals TID  Evaluation: Met    Previous Nutrition  Diagnosis (3/11):   Increased nutrient needs (protein) related to wound healing s/p amputation as evidenced by pt needing 1.2-1.5 g/kg pro daily  Evaluation: No change      MALNUTRITION  % Weight Loss:  None noted  % Intake:  No decreased intake noted  Subcutaneous Fat Loss:  None observed  Muscle Loss:  None observed  Fluid Retention:  Mild - likely not nutritionally related     Malnutrition Diagnosis: Patient does not meet two of the above criteria necessary for diagnosing malnutrition    CURRENT NUTRITION DIAGNOSIS  Increased nutrient needs (protein) related to wound healing s/p amputation as evidenced by pt needing 1.2-1.5 g/kg pro daily    INTERVENTIONS  Recommendations / Nutrition Prescription  Continue Moderate CHO diet as tolerated     Implementation  None     Goals  Patient will continue to consume >/= 75% meals TID     MONITORING AND EVALUATION:  Progress towards goals will be monitored and evaluated per protocol and Practice Guidelines    Nelda Smith RD, LD, CNSC   Clinical Dietitian - [facility]

## 2021-03-17 NOTE — PROGRESS NOTES
DATE & TIME: 03/16/2021 Night    Cognitive Concerns/ Orientation : Alert/Oriented x 4, much more understandable this night   BEHAVIOR & AGGRESSION TOOL COLOR: Green; less impulsive, occasionally sets off bed alarm   ABNL VS/O2: VSS, room air  MOBILITY: SBA-non-weight bearing to L foot   PAIN MANAGMENT: Oxycodone given x 1 for foot pain  DIET: Moderate carbohydrate with carbohydrate count  BOWEL/BLADDER: Up to bathroom, continent  ABNL LAB/BG:   DRAIN/DEVICES: R PICC saline locked, IV ancef (q8 hours)  SKIN: Bruising; rash to lower extremities and abdomen; L foot surgical wound (post-op day 3), dressing clean, dry, intact-covered with sock to prevent pt from removing dressing; Previous amputation to R toe  TESTS/PROCEDURES: n/a  D/C DAY/GOALS/PLACE: Pending TCU placement  OTHER IMPORTANT INFO: Seroquel decreased to 400 mg at bedtime

## 2021-03-17 NOTE — PLAN OF CARE
DATE & TIME: 3/17/2021 [id number]    Cognitive Concerns/ Orientation : A&O x4   BEHAVIOR & AGGRESSION TOOL COLOR: green, pleasant with care.   CIWA SCORE: na    ABNL VS/O2: vss, on RA, lungs clear.   MOBILITY: SBA, NWB on LLE. Non compliant with NWB --pt said he had not been putting much wt on the left foot. It was not observed by writer.   PAIN MANAGMENT: oxycodone 10 mg Q 4 hours PRN x2.   DIET: mod cho  BOWEL/BLADDER: continent. Pt c/o having loose stools x3 during shift. C/o abd cramps.  Told to show writer when he had the next BM. Pt afebrile, WBC normal. Monitor.    ABNL LAB/BG: na   DRAIN/DEVICES: SL PICC, SL.   TELEMETRY RHYTHM: na   SKIN: dressing to LLE done, moderate serosanguinous drainage noted. 2-3 + BLE edema, encourage to elevate BLE, ace wrap applied. Petechiae/rash on BLE noted again, MD aware. Pt denied itchiness. Monitor. Platelet count normal.   TESTS/PROCEDURES: na   D/C DAY/GOALS/PLACE: possible in am if pt continue to be lucid and not requiring sitter.   OTHER IMPORTANT INFO: had been accepted into two TCU,  following. Brother is unable to provide transportation to TCU. We need to arrange ride. Brother would like to be updated regarding the TCU choices.  MD/RN ROUNDING SIGNED OFF D/E SHIFT: na   COMMIT TO SIT DONE AND SIGNED OFF na   IS THE PATIENT ON REMDESIVIR? DATE OF LAST SCHEDULED DOSE: na

## 2021-03-17 NOTE — PROGRESS NOTES
[facility]    Medicine Progress Note - Hospitalist Service       Date of Admission:  3/4/2021  Assessment & Plan       61 year old male w/ PMH of DM II, CAD w/ prior PCI in 2011, former smoker HTN, HLD, CKD, chronic pain, anxiety and depression, osteomyelitis of toe s/p amputation of right foot admitted on 03/04/21 w/ CP and left foot Osteomyelitis and MSSA bacteremia:      Left foot Septic arthritis and osteomyelitis  S/P Revision L TMA, bone resection, delayed primary closure 3/11/21  Multiple fractures of left foot  Staph aureus bacteremia 3/4/21    Vascular surgery (signed off 3/9), Podiatry, and ID consulted.    S/p left TMA on 3/5/21 by Dr. Barnes.    1/2 blood cultures from admission positive for Staph aureus. Repeat blood cultures have been negative to date.    Continues on Ancef 2 gm IV q8hr, and will treat for 6 wks total      Acute toxic metabolic encephalopathy / delirium -- bettter     -- better last night with lower Seroquel dose of 400 mg qhs    Diabetes mellitus, type 2    Hemoglobin A1c 6.8 on 3/4/21.    Hold PTA glimepiride and metformin for now (further surgery anticipated)    Was on NovoLog Mix 70/30 at 28 units in AM and 38 units in PM prior to admission, currently on hold.    Acute blood loss anemia, secondary to surgery 3/11  Chronic normocytic anemia with baseline 12-13g    Had an episode of hypotension early on the morning of 3/6/21. Labs were checked and hemoglobin was down to 6.1.    Transfused one unit PRBCs on 3/6/21.    Hemoglobin improved to about 8, however dropped to 6.9.  Transfused 1 more unit of PRBCs 3/7.     hgb stable at 8.4 AM 3/8     Post op Hgb 6.8 3/11 - transfused 1 uPRBC, now stable      Hypotension - resolved    Likely hypovolemic - WNL after IVF given previously in hospitalization     Chest pain, +risk factors including HTN, HLD, DM2  CAD w/ PCI in 2011 to LAD and CX iin [place], last coronary angiogram was 3/2020 which did not require any  mechanical revascularization and GDMT was recommended.    Serial troponins negative.    No significant dysrhythmias on telemetry.    EKG x 2 did not show any acute ischemic changes.    CT chest negative for PE on 3/4/21.    Given negative work-up including negative serial troponins despite ongoing chest pain, suspicion for cardiac etiology of his pain is low.     CKD stage 2-3, baseline Cr 0.8-1.0 as of early 2020  Mild hyponatremia    Hyponatremia resolved.    Creatinine stable 0.9     Abdominal pain on palpation in LUQ and RLQ, resolved   Constipation    Lactic acid was within normal limits.    Epigastric tenderness has resolved.    Continue bowel regimen.     Depression  Anxiety    Continue PTA buspirone, clomipramine, fluoxetine, and quetiapine.    3/14 will check depakote level    Psychiatry requested given agitation/delirium increasing 3/13 - 3/14     Mild basilar predominant bronchial wall thickening with some mucous plugging may be related to bronchitis, seen on CT    Encouraged smoking cessation.    Encourage incentive spirometer.     Lower extremity petechiae and purpura, confluent in areas, concerning for vasculitic process vs platelet destruction with infection, also he is on DAPT. Plt count remains normal range as of 3/12.     Vascular surgery previously following    Rash improved / variable. PCP follow up after infection and hospitalization.    ANCA IgG by IFA negative, DENISSE negative, C3 C4 not low     Covid-19 PCR NEGATIVE  Asymptomatic COVID-19 PCR testing was negative on 3/4/21.      Diet: Moderate Consistent CHO Diet    DVT Prophylaxis: Defer to surgery  Rangel Catheter: not present  Code Status: Full Code           Disposition Plan   Expected discharge: to TCU tomorrow if remains stable, he is agreeable to TCU    Bill Rogers MD  Hospitalist Service  [facility]  Contact information available via [facility]  Trace/Bk    ______________________________________________________________________    Interval History   Pain adequate at  6-7 out of 10 -- still asking for IV Dilaudid, and would like to smoke.  He is agreeable to trying Chantix.       Physical Exam   Vital Signs: Temp: 97.9  F (36.6  C) Temp src: Oral BP: (!) 140/83 Pulse: 84   Resp: 16 SpO2: 97 % O2 Device: None (Room air)    Weight: 176 lbs 1.6 oz    Gen: NAD, cooperative, more comfortable   Resp: no crackles,  no wheezes, no increased work of resp  CV: S1S2 heard, reg rhythm, reg rate  Abdo: soft, nontender, nondistended, bowel sounds present  Ext: calves nontender, LLE dressed, intact, right first toe with partial amp  Neuro: AAOx3 but slightly forgetful, CN grossly intact, no facial asymmetry, moving all extremities      Data   Recent Labs   Lab 03/17/21  0635 03/16/21  0610 03/15/21  0556 03/14/21  1250 03/14/21  0644   WBC 8.9  --  10.4  --  11.0   HGB 7.6*  --  7.3* 7.3* 7.5*   MCV 88  --  88  --  89     --  237  --  232     --  142 142  --    POTASSIUM 4.4 4.1 3.6 4.0  --    CHLORIDE 114*  --  113* 113*  --    CO2 23  --  25 25  --    BUN 37*  --  23 24  --    CR 1.25  --  0.98 0.99  --    ANIONGAP 5  --  4 4  --    MYRON 7.3*  --  7.7* 7.8*  --    *  --  81 141*  --    ALBUMIN  --   --   --  1.4*  --    PROTTOTAL  --   --   --  5.7*  --    BILITOTAL  --   --   --  0.2  --    ALKPHOS  --   --   --  47  --    ALT  --   --   --  <6  --    AST  --   --   --  12  --      No results found for this or any previous visit (from the past 24 hour(s)).

## 2021-03-17 NOTE — PLAN OF CARE
DATE & TIME: 3/16/2021 [id number]                     Cognitive Concerns/ Orientation : A&O x4  BEHAVIOR & AGGRESSION TOOL COLOR: green, follows command. Pleasant with care.   CIWA SCORE: na      ABNL VS/O2: vss, on RA, lungs clear.   MOBILITY: SBA x1, NWB to left foot due to recent amputation.   PAIN MANAGMENT: Oxycodone 10 mg PRN once.   DIET: mod cho.   BOWEL/BLADDER: continent.   ABNL LAB/BG:  @bedtime  DRAIN/DEVICES: SL PICC on right arm, SL.   TELEMETRY RHYTHM: na   SKIN: intact, slight rash noted on LLE, improving. Increased rash on RLE, pt denied itchiness.  TESTS/PROCEDURES: na   D/C DAY/GOALS/PLACE: pending placement to TCU  OTHER IMPORTANT INFO: Seroquel decreased to 400 mg at bedtime.

## 2021-03-18 ENCOUNTER — APPOINTMENT (OUTPATIENT)
Dept: PHYSICAL THERAPY | Facility: CLINIC | Age: 61
End: 2021-03-18
Attending: INTERNAL MEDICINE
Payer: COMMERCIAL

## 2021-03-18 VITALS
SYSTOLIC BLOOD PRESSURE: 155 MMHG | BODY MASS INDEX: 26.37 KG/M2 | DIASTOLIC BLOOD PRESSURE: 85 MMHG | HEART RATE: 80 BPM | RESPIRATION RATE: 18 BRPM | WEIGHT: 178.57 LBS | OXYGEN SATURATION: 95 % | TEMPERATURE: 97.7 F

## 2021-03-18 LAB
ANION GAP SERPL CALCULATED.3IONS-SCNC: 4 MMOL/L (ref 3–14)
BACTERIA SPEC CULT: ABNORMAL
BUN SERPL-MCNC: 37 MG/DL (ref 7–30)
CALCIUM SERPL-MCNC: 7.2 MG/DL (ref 8.5–10.1)
CHLORIDE SERPL-SCNC: 118 MMOL/L (ref 94–109)
CO2 SERPL-SCNC: 24 MMOL/L (ref 20–32)
CREAT SERPL-MCNC: 1.06 MG/DL (ref 0.66–1.25)
ERYTHROCYTE [DISTWIDTH] IN BLOOD BY AUTOMATED COUNT: 16.1 % (ref 10–15)
GFR SERPL CREATININE-BSD FRML MDRD: 76 ML/MIN/{1.73_M2}
GLUCOSE BLDC GLUCOMTR-MCNC: 115 MG/DL (ref 70–99)
GLUCOSE BLDC GLUCOMTR-MCNC: 130 MG/DL (ref 70–99)
GLUCOSE BLDC GLUCOMTR-MCNC: 136 MG/DL (ref 70–99)
GLUCOSE SERPL-MCNC: 84 MG/DL (ref 70–99)
HCT VFR BLD AUTO: 21.9 % (ref 40–53)
HGB BLD-MCNC: 7.1 G/DL (ref 13.3–17.7)
Lab: ABNORMAL
MCH RBC QN AUTO: 28.7 PG (ref 26.5–33)
MCHC RBC AUTO-ENTMCNC: 32.4 G/DL (ref 31.5–36.5)
MCV RBC AUTO: 89 FL (ref 78–100)
PLATELET # BLD AUTO: 216 10E9/L (ref 150–450)
POTASSIUM SERPL-SCNC: 4.2 MMOL/L (ref 3.4–5.3)
RBC # BLD AUTO: 2.47 10E12/L (ref 4.4–5.9)
SODIUM SERPL-SCNC: 146 MMOL/L (ref 133–144)
SPECIMEN SOURCE: ABNORMAL
WBC # BLD AUTO: 7 10E9/L (ref 4–11)

## 2021-03-18 PROCEDURE — 85027 COMPLETE CBC AUTOMATED: CPT | Performed by: INTERNAL MEDICINE

## 2021-03-18 PROCEDURE — 250N000013 HC RX MED GY IP 250 OP 250 PS 637: Performed by: PODIATRIST

## 2021-03-18 PROCEDURE — 250N000013 HC RX MED GY IP 250 OP 250 PS 637: Performed by: PSYCHIATRY & NEUROLOGY

## 2021-03-18 PROCEDURE — 250N000013 HC RX MED GY IP 250 OP 250 PS 637: Performed by: INTERNAL MEDICINE

## 2021-03-18 PROCEDURE — 999N000190 HC STATISTIC VAT ROUNDS

## 2021-03-18 PROCEDURE — 97530 THERAPEUTIC ACTIVITIES: CPT | Mod: GP

## 2021-03-18 PROCEDURE — 80048 BASIC METABOLIC PNL TOTAL CA: CPT | Performed by: INTERNAL MEDICINE

## 2021-03-18 PROCEDURE — 999N001017 HC STATISTIC GLUCOSE BY METER IP

## 2021-03-18 PROCEDURE — 250N000011 HC RX IP 250 OP 636: Performed by: PODIATRIST

## 2021-03-18 PROCEDURE — 97116 GAIT TRAINING THERAPY: CPT | Mod: GP

## 2021-03-18 PROCEDURE — 99239 HOSP IP/OBS DSCHRG MGMT >30: CPT | Performed by: INTERNAL MEDICINE

## 2021-03-18 RX ORDER — CLOMIPRAMINE HYDROCHLORIDE 50 MG/1
100 CAPSULE ORAL 2 TIMES DAILY
Qty: 20 CAPSULE | Refills: 0 | Status: SHIPPED | OUTPATIENT
Start: 2021-03-18 | End: 2021-04-29

## 2021-03-18 RX ORDER — ACETAMINOPHEN 325 MG/1
650 TABLET ORAL EVERY 4 HOURS PRN
Refills: 0
Start: 2021-03-18 | End: 2021-04-29

## 2021-03-18 RX ORDER — IBUPROFEN 600 MG/1
600 TABLET, FILM COATED ORAL 3 TIMES DAILY PRN
Refills: 0
Start: 2021-03-18 | End: 2021-03-18

## 2021-03-18 RX ORDER — OXYCODONE HYDROCHLORIDE 5 MG/1
TABLET ORAL
Qty: 30 TABLET | Refills: 0 | Status: SHIPPED | OUTPATIENT
Start: 2021-03-18 | End: 2021-04-15

## 2021-03-18 RX ORDER — HYDROXYZINE HYDROCHLORIDE 25 MG/1
25 TABLET, FILM COATED ORAL EVERY 4 HOURS PRN
Refills: 0
Start: 2021-03-18 | End: 2021-05-10

## 2021-03-18 RX ORDER — BUSPIRONE HYDROCHLORIDE 10 MG/1
10 TABLET ORAL 3 TIMES DAILY
Qty: 360 TABLET | Refills: 3
Start: 2021-03-18 | End: 2021-04-29

## 2021-03-18 RX ORDER — POLYETHYLENE GLYCOL 3350 17 G/17G
17 POWDER, FOR SOLUTION ORAL DAILY
Qty: 510 G | Refills: 0
Start: 2021-03-18 | End: 2021-04-29

## 2021-03-18 RX ORDER — CALCIUM CARBONATE 500 MG/1
1 TABLET, CHEWABLE ORAL EVERY 4 HOURS PRN
Refills: 0
Start: 2021-03-18 | End: 2021-04-29

## 2021-03-18 RX ORDER — NICOTINE 21 MG/24HR
1 PATCH, TRANSDERMAL 24 HOURS TRANSDERMAL DAILY
Refills: 0
Start: 2021-03-19 | End: 2021-04-18

## 2021-03-18 RX ORDER — VALSARTAN 160 MG/1
160 TABLET ORAL DAILY
Refills: 0
Start: 2021-03-18 | End: 2021-04-29

## 2021-03-18 RX ORDER — QUETIAPINE FUMARATE 400 MG/1
400 TABLET, FILM COATED ORAL AT BEDTIME
Refills: 0
Start: 2021-03-18 | End: 2021-04-15

## 2021-03-18 RX ADMIN — DIVALPROEX SODIUM 1000 MG: 500 TABLET, DELAYED RELEASE ORAL at 09:10

## 2021-03-18 RX ADMIN — NICOTINE 1 PATCH: 14 PATCH, EXTENDED RELEASE TRANSDERMAL at 09:13

## 2021-03-18 RX ADMIN — OXYCODONE HYDROCHLORIDE 10 MG: 5 TABLET ORAL at 06:15

## 2021-03-18 RX ADMIN — IBUPROFEN 600 MG: 600 TABLET, FILM COATED ORAL at 16:22

## 2021-03-18 RX ADMIN — ESOMEPRAZOLE MAGNESIUM 40 MG: 40 CAPSULE, DELAYED RELEASE ORAL at 06:15

## 2021-03-18 RX ADMIN — CEFAZOLIN SODIUM 2 G: 2 INJECTION, SOLUTION INTRAVENOUS at 09:10

## 2021-03-18 RX ADMIN — ISOSORBIDE DINITRATE 40 MG: 20 TABLET ORAL at 09:10

## 2021-03-18 RX ADMIN — CEFAZOLIN SODIUM 2 G: 2 INJECTION, SOLUTION INTRAVENOUS at 00:59

## 2021-03-18 RX ADMIN — OXYCODONE HYDROCHLORIDE 10 MG: 5 TABLET ORAL at 14:07

## 2021-03-18 RX ADMIN — BUSPIRONE HYDROCHLORIDE 10 MG: 10 TABLET ORAL at 09:11

## 2021-03-18 RX ADMIN — CLOPIDOGREL BISULFATE 75 MG: 75 TABLET, FILM COATED ORAL at 09:11

## 2021-03-18 RX ADMIN — CEFAZOLIN SODIUM 2 G: 2 INJECTION, SOLUTION INTRAVENOUS at 16:22

## 2021-03-18 RX ADMIN — OXYCODONE HYDROCHLORIDE 10 MG: 5 TABLET ORAL at 18:29

## 2021-03-18 RX ADMIN — ASPIRIN 81 MG CHEWABLE TABLET 81 MG: 81 TABLET CHEWABLE at 09:10

## 2021-03-18 RX ADMIN — PREGABALIN 150 MG: 75 CAPSULE ORAL at 09:10

## 2021-03-18 RX ADMIN — OXYCODONE HYDROCHLORIDE 10 MG: 5 TABLET ORAL at 10:12

## 2021-03-18 RX ADMIN — VARENICLINE TARTRATE 0.5 MG: 0.5 TABLET, FILM COATED ORAL at 09:11

## 2021-03-18 RX ADMIN — BISOPROLOL FUMARATE 5 MG: 5 TABLET ORAL at 09:12

## 2021-03-18 RX ADMIN — FLUOXETINE 40 MG: 20 CAPSULE ORAL at 09:10

## 2021-03-18 RX ADMIN — BUSPIRONE HYDROCHLORIDE 10 MG: 10 TABLET ORAL at 16:22

## 2021-03-18 RX ADMIN — ROSUVASTATIN CALCIUM 20 MG: 20 TABLET, FILM COATED ORAL at 09:11

## 2021-03-18 ASSESSMENT — ACTIVITIES OF DAILY LIVING (ADL)
ADLS_ACUITY_SCORE: 16
ADLS_ACUITY_SCORE: 15
ADLS_ACUITY_SCORE: 15

## 2021-03-18 NOTE — PROGRESS NOTES
Discharge    Patient discharged to [facility]  via [facility] transport.  Care plan note done    Listed belongings gathered and returned to patient. Yes  Care Plan and Patient education resolved: Yes  Prescriptions if needed, hard copies sent with patient  Yes  Home and hospital acquired medications returned to patient: NA  Medication Bin checked and emptied on discharge Yes  Follow up appointment made for patient: No

## 2021-03-18 NOTE — DISCHARGE SUMMARY
[facility]    Discharge Summary  Hospitalist    Date of Admission:  3/4/2021  Date of Discharge:  3/18/2021  Discharging Provider: Bill Rogers MD    Discharge Diagnoses   Principal Problem:    Septic arthritis Left 1st MTP -- MSSA      Neuropathy      Narcotic Tolerance       Smoker -- needs to quit, using Nicotine patches    Active Problems:    Benign essential hypertension      DM 2 w PVD on Insuline, Hgb  A1C 6.8 on 3/4/21      Chest pain, non-cardiac        Anemia, probable anemia of chronic disease      History of Present Illness   61 year old male w/ PMH of H/o DM II, CAD w/ prior PCI to LAD and CX in 2011 in [place] (coronary angiogram 3/2020 which did not require any mechanical revascularization and GDMT was recommended), active smoker, HTN, HLD, CKD, osteomyelitis of toe s/p amputation of right foot, who presented to [facility] ED with chest pain and wounds to the left foot.  CP onset was approximately 8 PM on the evening of 3/3/2021.  It occurred while at rest.  He is never had any prior similar chest pain.  Also noted left foot ulcer with infection -- reports has been present for 2 months, and transferred to [facility] for vascular and podiatry surgery.     In ER, trop normal, CRP elevated at 160, WBC 13.3, hgb 9.6, D-dimer 3.1, Covid negative.     Hospital Course   Admitted to medical floor, blood culture grew MSSA, and repeat cultures negative.  Treated with IV Zosyn, seen by ID -- recommended 6 wks IV treatment.      Seen by vascular surgery -- arterial US of leg showed:  Right Digital brachial index: 0.69.  Left Digital Brachial index: 0.44                                                           IMPRESSION:   1. Normal ABIs bilaterally without evidence of arterial insufficiency.  2. Nearly absent digital waveforms in the left second and third  digits.    Venous US showed no DVT.    Seen by Podiatry, and left foot X-ray showed:  1. First MTP joint space  narrowing; this is new compared with the  previous exam and, in light of the adjacent ulcer, is very suggestive  of septic arthritis. There is a comminuted displaced fracture of the  distal first metatarsal. There is also a fracture at the base of the  first proximal phalanx. These may well represent pathologic fractures  with underlying osteomyelitis.  2. Prominently displaced fractures of the second and third metatarsal  necks.  3. Fracture involving the distal portion of the fifth proximal  phalanx, including mild distraction and depression at the articular  surface.  4. I suspect a fracture at the medial aspect of the great toe distal  phalanx base. Clinical correlation recommended.    On 3/5/21 he underwent surgery by podiatry which included:  PROCEDURES:   1.  Left transmetatarsal amputation.   2.  Elevation of an 11 x 4 cm dorsal soft tissue flap.      PATHOLOGY:   1.  Deep tissue was sent off for aerobic and anaerobic cultures and Gram stain.   2.  Proximal left first metatarsal clean margin biopsy.   3.  Left second metatarsal clean margin biopsy.   4.  Left third metatarsal clean margin biopsy.     On 3/11/21 he underwent revision of wound with left transmetatarsal amputation with bone resection and delayed primary closure.     Postop course complicated by persistent request for pain meds, did use Ibuprofen 600 mg tid for 3 days but stopped because of mild GI upset.  Repeated discussed need to reduce narcotic use -- he preferred IV dilaudid and said drug tolerance was not a problem for him -- and up until the day he discharge he was asking if he could smoke.     PICC placed, has IV Ancef 2 gm IV until 6 week course finished, and anticipate manually tapering his Oxycodone in anther 2-3 days as he he will not do so on his own.     He was seen by Psych, meds adjusted -- his Seroquel 600 mg at bedtime was reduced to 400 mg at bedtime and he was actually less agitated, and Buspar 10 mg qid was reduced to 10 mg  fidelina.     Bill Rogers MD  Pager: [phone]  Cell Phone:  [phone]       Significant Results and Procedures   As above    Pending Results   These results will be followed up by Dr. Rogers  Unresulted Labs Ordered in the Past 30 Days of this Admission     No orders found from 2/2/2021 to 3/5/2021.          Code Status   Full Code       Primary Care Physician   Hunter Carlton    Physical Exam   Temp: 97.9  F (36.6  C) Temp src: Oral BP: (!) 142/84 Pulse: 79   Resp: 16 SpO2: 94 % O2 Device: None (Room air)    Vitals:    03/14/21 0635 03/17/21 0658 03/18/21 0200   Weight: 82.6 kg (182 lb 3.2 oz) 79.9 kg (176 lb 1.6 oz) 81 kg (178 lb 9.2 oz)     Vital Signs with Ranges  Temp:  [97.9  F (36.6  C)-98.1  F (36.7  C)] 97.9  F (36.6  C)  Pulse:  [78-79] 79  Resp:  [16-18] 16  BP: (132-142)/(61-84) 142/84  SpO2:  [93 %-94 %] 94 %  I/O last 3 completed shifts:  In: 480 [P.O.:480]  Out: -     Exam on discharge:   Left foot wrapped     Discharge Disposition   Discharged to short-term care facility  Condition at discharge: Fair    Consultations This Hospital Stay   PHYSICAL THERAPY ADULT IP CONSULT  VASCULAR SURGERY IP CONSULT  PODIATRY IP CONSULT  PHARMACY TO DOSE VANCO  INFECTIOUS DISEASES IP CONSULT  PHYSICAL THERAPY ADULT IP CONSULT  OCCUPATIONAL THERAPY ADULT IP CONSULT  CARE MANAGEMENT / SOCIAL WORK IP CONSULT  VASCULAR ACCESS ADULT IP CONSULT  PHYSICAL THERAPY ADULT IP CONSULT  CARE MANAGEMENT / SOCIAL WORK IP CONSULT  PSYCHIATRY IP CONSULT  PHYSICAL THERAPY ADULT IP CONSULT  OCCUPATIONAL THERAPY ADULT IP CONSULT  PHYSICAL THERAPY ADULT IP CONSULT  OCCUPATIONAL THERAPY ADULT IP CONSULT    Time Spent on this Encounter   I spent a total of 35 minutes discharging this patient.     Discharge Orders      Follow-up and recommended labs and tests     Follow up with [facility] 1-2 weeks after discharge.    *PLEASE CALL [phone] TO SCHEDULE*  Location openings are subject to change due to  COVID-19  --------------------------------------------------------------------------  [facility]  [address]  [place] [address] [phone]    [facility]  [address]  [place], [place] [address] [phone]  --------------------------------------------------------------------------  DR. VICTOR HUGO ORTIZ  [place]  [address], [place]     [phone]        [facility]  [address]  [place] [address] [phone]  --------------------------------------------------------------------------  [facility]. [facility]NA [facility]O  [facility]  [address], [place]     [phone]     [facility]  [address]  [place] [address]  [phone]  ---------------------------------------------------------------------------  DR. SONIA DUFFY  [address]  [place] [address] [phone]     Activity    No weight bearing, left foot  Elevate surgical foot  Perform leg raises, knee bends, light ankle range of motion 4-5x/day for blood clot prevention     Wound care and dressings    At TCU, change L foot dressing every other day with sterile gauze 4x4s, kerlix roll, ACE.  Seek immediate care for worsening redness, drainage, swelling, pain, fever, chills, nausea, vomiting.     General info for SNF    Length of Stay Estimate: Short Term Care: Estimated # of Days <30  Condition at Discharge: Improving  Level of care:skilled   Rehabilitation Potential: Fair  Admission H&P remains valid and up-to-date: Yes  Recent Chemotherapy: N/A  Use Nursing Home Standing Orders: Yes     Mantoux instructions    Give two-step Mantoux (PPD) Per Facility Policy Yes     Glucose monitor nursing POCT    Before meals 3 times a day     Reason for your hospital stay    Toe and bone infection left foot     Additional Discharge Instructions    .Call Dr. Melvin if any medical questions at Cell Phone [phone].     IV access    [email or web address] with routine care      Full Code     Physical Therapy Adult Consult    Evaluate and treat as clinically indicated.    Reason:  Weakness     Occupational Therapy Adult Consult    Evaluate and treat as clinically indicated.    Reason:  Assist with ADL's     Physical Therapy Adult Consult    Evaluate and treat as clinically indicated.    Reason:  weakness     Occupational Therapy Adult Consult    Evaluate and treat as clinically indicated.    Reason:  Assist with ADL's     Advance Diet as Tolerated    Follow this diet upon discharge: Orders Placed This Encounter      Moderate Consistent CHO Diet     Discharge Medications   Current Discharge Medication List      START taking these medications    Details   acetaminophen (TYLENOL) 325 MG tablet Take 2 tablets (650 mg) by mouth every 4 hours as needed for other (multimodal surgical pain management along with NSAIDS and opioid medication as indicated based on pain control and physical function.)  Qty:  , Refills: 0    Associated Diagnoses: Staphylococcal arthritis of left foot (H)      aspirin (ASA) 81 MG EC tablet Take 1 tablet (81 mg) by mouth daily  Qty:  , Refills: 0    Associated Diagnoses: Coronary artery disease involving native coronary artery of native heart without angina pectoris      calcium carbonate (TUMS) 500 MG chewable tablet Take 1 tablet (500 mg) by mouth every 4 hours as needed for heartburn  Refills: 0    Associated Diagnoses: Heartburn      ceFAZolin (ANCEF) 1 GM vial Inject 2 g into the vein every 8 hours CBC with differential, creatinine, SGOT weekly while on this medication to be faxed to Dr. Macario office.  Qty: 198 g, Refills: 0    Associated Diagnoses: Bacteremia; Toe osteomyelitis, right (H)      hydrOXYzine (ATARAX) 25 MG tablet Take 1 tablet (25 mg) by mouth every 4 hours as needed for other (pain)  Refills: 0    Associated Diagnoses: Staphylococcal arthritis of left foot (H)      insulin aspart (NOVOLOG PEN) 100 UNIT/ML pen 3 times a day with meals, for  glucometer 150-200 give 2 units, 201-250 4 units, >250 6 units.  Refills: 0    Associated Diagnoses: Type 2 diabetes mellitus with other circulatory complication, with long-term current use of insulin (H)      magnesium hydroxide (MILK OF MAGNESIA) 400 MG/5ML suspension Take 30 mLs by mouth daily as needed for constipation  Qty:  , Refills: 0    Associated Diagnoses: Constipation, unspecified constipation type      nicotine (NICODERM CQ) 14 MG/24HR 24 hr patch Place 1 patch onto the skin daily  Refills: 0    Associated Diagnoses: Tobacco abuse      oxyCODONE (ROXICODONE) 5 MG tablet Every 4 hours as needed for pain, 1 pill for pain score 4-7, 2 pills for pain score 8-10.  Qty: 30 tablet, Refills: 0    Associated Diagnoses: Staphylococcal arthritis of left foot (H)      polyethylene glycol (MIRALAX) 17 GM/Dose powder Take 17 g by mouth daily  Qty: 510 g, Refills: 0    Associated Diagnoses: Constipation, unspecified constipation type      sodium chloride, PF, 0.9% PF flush 10 mLs by Intracatheter route every 8 hours  Qty:  , Refills: 0    Associated Diagnoses: Staphylococcal arthritis of left foot (H)      valsartan (DIOVAN) 160 MG tablet Take 1 tablet (160 mg) by mouth daily  Qty:  , Refills: 0    Comments: For hypertension and diabetes  Associated Diagnoses: Benign essential hypertension         CONTINUE these medications which have CHANGED    Details   busPIRone (BUSPAR) 10 MG tablet Take 1 tablet (10 mg) by mouth 3 times daily  Qty: 360 tablet, Refills: 3    Associated Diagnoses: MILES (generalized anxiety disorder)      clomiPRAMINE (ANAFRANIL) 50 MG capsule Take 2 capsules (100 mg) by mouth 2 times daily  Qty: 20 capsule, Refills: 0    Associated Diagnoses: Severe episode of recurrent major depressive disorder, without psychotic features (H)      QUEtiapine (SEROQUEL) 400 MG tablet Take 1 tablet (400 mg) by mouth At Bedtime  Qty:  , Refills: 0    Associated Diagnoses: MILES (generalized anxiety disorder)          CONTINUE these medications which have NOT CHANGED    Details   bisoprolol (ZEBETA) 5 MG tablet Take 1 tablet (5 mg) by mouth daily  Qty: 90 tablet, Refills: 3    Associated Diagnoses: Coronary artery disease involving native coronary artery of native heart without angina pectoris      clopidogrel (PLAVIX) 75 MG tablet Take 1 tablet (75 mg) by mouth daily  Qty: 90 tablet, Refills: 3    Associated Diagnoses: Coronary artery disease involving native coronary artery of native heart without angina pectoris      divalproex sodium delayed-release (DEPAKOTE) 500 MG DR tablet Take 2 tablets (1,000 mg) by mouth 2 times daily  Qty: 180 tablet, Refills: 3    Associated Diagnoses: Severe episode of recurrent major depressive disorder, without psychotic features (H)      esomeprazole (NEXIUM) 40 MG DR capsule Take 40 mg by mouth daily as needed Take 30-60 minutes before eating.      FLUoxetine (PROZAC) 20 MG capsule Take 2 capsules (40 mg) by mouth daily  Qty: 180 capsule, Refills: 3    Associated Diagnoses: Severe episode of recurrent major depressive disorder, without psychotic features (H)      isosorbide Dinitrate (ISORDIL) 40 MG TABS Take 1 tablet (40 mg) by mouth 2 times daily  Qty: 180 tablet, Refills: 1    Associated Diagnoses: Coronary artery disease involving native coronary artery of native heart without angina pectoris      metFORMIN (GLUCOPHAGE) 850 MG tablet Take 1 tablet (850 mg) by mouth 2 times daily (with meals) (Need A1c for further refills)  Qty: 180 tablet, Refills: 3    Associated Diagnoses: Type 2 diabetes mellitus with other circulatory complication, with long-term current use of insulin (H)      pregabalin (LYRICA) 150 MG capsule TAKE ONE CAPSULE BY MOUTH TWICE A DAY  Qty: 60 capsule, Refills: 0    Associated Diagnoses: Toe osteomyelitis, right (H)      rosuvastatin (CRESTOR) 20 MG tablet Take 1 tablet (20 mg) by mouth daily  Qty: 90 tablet, Refills: 3    Associated Diagnoses: Coronary artery disease  involving native coronary artery of native heart without angina pectoris      alcohol swab prep pads Use to swab area of injection/angelic as directed.  Qty: 100 each, Refills: 3    Associated Diagnoses: Type 2 diabetes mellitus with other circulatory complication, with long-term current use of insulin (H)      blood glucose (NO BRAND SPECIFIED) test strip Use to test blood sugar 1 to 3 times daily or as directed. To accompany: Blood Glucose Monitor Brands: per insurance.  Qty: 100 strip, Refills: 6    Associated Diagnoses: Type 2 diabetes mellitus with other circulatory complication, with long-term current use of insulin (H)      blood glucose calibration (NO BRAND SPECIFIED) solution To accompany: Blood Glucose Monitor Brands: per insurance.  Qty: 1 Bottle, Refills: 3    Associated Diagnoses: Type 2 diabetes mellitus with other circulatory complication, with long-term current use of insulin (H)      blood glucose monitoring (NO BRAND SPECIFIED) meter device kit Use to test blood sugar 1 to 2 times daily or as directed.  Qty: 1 kit, Refills: 0    Associated Diagnoses: Type 2 diabetes mellitus with other circulatory complication, with long-term current use of insulin (H)      insulin pen needle (31G X 8 MM) 31G X 8 MM miscellaneous Use 2 pen needles daily or as directed.  Qty: 180 each, Refills: 1    Comments: Anna, could not find 30 g 8 mm pen needle in system  Associated Diagnoses: Type 2 diabetes mellitus with other circulatory complication, with long-term current use of insulin (H)      !! order for DME Equipment being ordered: surgical shoe  Qty: 1 Device, Refills: 0    Associated Diagnoses: Wound dehiscence; Type 2 diabetes mellitus with other circulatory complication, with long-term current use of insulin (H)      !! order for DME Equipment being ordered: blood pressure monitor.  Qty: 1 Device, Refills: 0    Associated Diagnoses: Benign essential hypertension      thin (NO BRAND SPECIFIED) lancets Use with  lanceting device. To accompany: Blood Glucose Monitor Brands: per insurance.  Qty: 100 each, Refills: 6    Associated Diagnoses: Type 2 diabetes mellitus with other circulatory complication, with long-term current use of insulin (H)       !! - Potential duplicate medications found. Please discuss with provider.      STOP taking these medications       amLODIPine-valsartan (EXFORGE)  MG tablet Comments:   Reason for Stopping:         aspirin 81 MG tablet Comments:   Reason for Stopping:         glimepiride (AMARYL) 2 MG tablet Comments:   Reason for Stopping:         insulin aspart prot & aspart (NOVOLOG MIX 70/30 PEN) (70-30) 100 UNIT/ML pen Comments:   Reason for Stopping:             Allergies   No Known Allergies  Data   Most Recent 3 CBC's:  Recent Labs   Lab Test 03/18/21  0615 03/17/21  0635 03/15/21  0556   WBC 7.0 8.9 10.4   HGB 7.1* 7.6* 7.3*   MCV 89 88 88    287 237      Most Recent 3 BMP's:  Recent Labs   Lab Test 03/18/21  0615 03/17/21  0635 03/16/21  0610 03/15/21  0556   * 142  --  142   POTASSIUM 4.2 4.4 4.1 3.6   CHLORIDE 118* 114*  --  113*   CO2 24 23  --  25   BUN 37* 37*  --  23   CR 1.06 1.25  --  0.98   ANIONGAP 4 5  --  4   MYRON 7.2* 7.3*  --  7.7*   GLC 84 104*  --  81     Most Recent 2 LFT's:  Recent Labs   Lab Test 03/14/21  1250 03/04/21  1654   AST 12 20   ALT <6 22   ALKPHOS 47 72   BILITOTAL 0.2 0.2     Most Recent INR's and Anticoagulation Dosing History:  Anticoagulation Dose History     Recent Dosing and Labs Latest Ref Rng & Units 3/4/2021    INR 0.86 - 1.14 1.24(H)        Most Recent 3 Troponin's:  Recent Labs   Lab Test 03/04/21  2240 03/04/21  1654 03/04/21  0942   TROPI <0.015 <0.015 <0.015     Most Recent Cholesterol Panel:  Recent Labs   Lab Test 03/02/20  0741   CHOL 123   LDL 58   HDL 25*   TRIG 202*     Most Recent 6 Bacteria Isolates From Any Culture (See EPIC Reports for Culture Details):  Recent Labs   Lab Test 03/11/21  1310 03/07/21  1100  03/07/21  1107 03/06/21  0907 03/06/21  0856 03/05/21  1847   CULT On day 4, isolated in broth only:  Staphylococcus aureus  not isolated or reported on routine culture  *  On day 1, isolated in broth only:  Staphylococcus epidermidis  * No growth No growth No growth No growth Heavy growth  Mixed aerobic and anaerobic rm  No predominant anaerobes  *  Heavy growth  Staphylococcus aureus  *  Heavy growth  Normal skin rm       Most Recent TSH, T4 and A1c Labs:  Recent Labs   Lab Test 03/04/21  1654 07/03/18  0906 07/03/18  0906   TSH  --   --  0.66   A1C 6.8*   < > 12.5*    < > = values in this interval not displayed.

## 2021-03-18 NOTE — PROGRESS NOTES
[facility]  Infectious Disease Progress Note          Assessment and Plan:   Assessment & Plan     Huseyin Pettit is a 61 year old male who was admitted on 3/4/2021.      Impression:  1. 61 y.o male with diabetes.   2. Neuropathy.   3. CAD  4. Admitted with left foot wound with spetic arthritis.   5. Previous admission last year for osteo s/p toe amputation that admission.   6. Blood cultures positive for MSSA.   7. S/P Left transmetatarsal amputation followed by Left foot revision transmetatarsal amputation with irrigation and debridement,  bone resection, delayed primary closure     Recommendations:   On Ancef after initially  being on zosyn Will need  4 weeks of IV antibiotics for bacteremia alone but given the concern for some residual osteo will increase the course to 6 weeks day 15/ 42 today.   No clear secondary sites, no artificail material    Orders for discharge antibiotics in the chart but has been dealing with confusion and delirium off and on         Interval History:   no new complaints and doing well; no cp, sob, n/v/d, or abd pain. Seems OK, T down Follow-up BC neg so far foot cx with MSSA                Medications:       aspirin  81 mg Oral Daily     bisoprolol  5 mg Oral Daily     busPIRone  10 mg Oral TID     ceFAZolin  2 g Intravenous Q8H     clomiPRAMINE  100 mg Oral At Bedtime     clopidogrel  75 mg Oral Daily     divalproex sodium delayed-release  1,000 mg Oral BID     esomeprazole  40 mg Oral QAM AC     FLUoxetine  40 mg Oral Daily     ibuprofen  600 mg Oral TID     insulin aspart   Subcutaneous QAM AC     insulin aspart   Subcutaneous Daily with lunch     insulin aspart   Subcutaneous Daily with supper     insulin aspart  1-7 Units Subcutaneous TID AC     insulin glargine  5 Units Subcutaneous At Bedtime     isosorbide Dinitrate  40 mg Oral BID     nicotine  1 patch Transdermal Daily     nicotine   Transdermal Q8H     polyethylene glycol  17 g Oral BID     pregabalin   150 mg Oral BID     QUEtiapine  400 mg Oral At Bedtime     rosuvastatin  20 mg Oral Daily     sodium chloride (PF)  10 mL Intracatheter Q8H     varenicline  0.5 mg Oral BID                  Physical Exam:   Blood pressure (!) 142/84, pulse 79, temperature 97.9  F (36.6  C), temperature source Oral, resp. rate 16, weight 81 kg (178 lb 9.2 oz), SpO2 94 %.  Wt Readings from Last 2 Encounters:   03/18/21 81 kg (178 lb 9.2 oz)   03/04/21 61.2 kg (135 lb)     Vital Signs with Ranges  Temp:  [97.2  F (36.2  C)-98.1  F (36.7  C)] 97.9  F (36.6  C)  Pulse:  [78-81] 79  Resp:  [16-18] 16  BP: (131-142)/(61-84) 142/84  SpO2:  [93 %-96 %] 94 %    Constitutional: Awake, alert, cooperative, no apparent distress   Lungs: Clear to auscultation bilaterally, no crackles or wheezing   Cardiovascular: Regular rate and rhythm, normal S1 and S2, and no murmur noted   Abdomen: Normal bowel sounds, soft, non-distended, non-tender   Skin: No rashes, no cyanosis, no edema   Other: Foot wrapped          Data:   All microbiology laboratory data reviewed.  Recent Labs   Lab Test 03/18/21  0615 03/17/21  0635 03/15/21  0556   WBC 7.0 8.9 10.4   HGB 7.1* 7.6* 7.3*   HCT 21.9* 23.7* 22.5*   MCV 89 88 88    287 237     Recent Labs   Lab Test 03/18/21  0615 03/17/21  0635 03/15/21  0556   CR 1.06 1.25 0.98     Recent Labs   Lab Test 03/04/21  0942   SED 91*     Recent Labs   Lab Test 03/11/21  1310 03/07/21  1109 03/07/21  1107 03/06/21  0907 03/06/21  0856 03/05/21  1847 03/05/21  1052 03/05/21  0947 03/04/21  1015   CULT On day 4, isolated in broth only:  Staphylococcus aureus  not isolated or reported on routine culture  *  On day 1, isolated in broth only:  Staphylococcus epidermidis  * No growth No growth No growth No growth Heavy growth  Mixed aerobic and anaerobic rm  No predominant anaerobes  *  Heavy growth  Staphylococcus aureus  *  Heavy growth  Normal skin rm   No growth No growth Cultured on the 1st day of  incubation:  Staphylococcus aureus  *  Critical Value/Significant Value, preliminary result only, called to and read back by  Ashtyn Chicas RN at 0537 3/5/21 hg    (Note)  POSITIVE for STAPHYLOCOCCUS AUREUS and NEGATIVE for the mecA gene  (not MRSA) by [employer] multiplex nucleic acid test. The mecA gene was  not detected. Final identification and antimicrobial susceptibility  testing will be verified by standard methods.    Specimen tested with [employer] multiplex, gram-positive blood culture  nucleic acid test for the following targets: Staph aureus, Staph  epidermidis, Staph lugdunensis, other Staph species, Enterococcus  faecalis, Enterococcus faecium, Streptococcus species, S. agalactiae,  S. anginosus grp., S. pneumoniae, S. pyogenes, Listeria sp., mecA  (methicillin resistance) and Lopez/B (vancomycin resistance).    Critical Value/Significant Value called to and read back by Sacha Del Rosario RN on 3.5.21 at 0830. bw

## 2021-03-18 NOTE — PROGRESS NOTES
[facility] Note:    PAS-RR    D: Per DHS regulation, [facility] completed and submitted PAS-RR to [place] [employer] Direct Connect via the Senior LinkAge Line.  PAS-RR confirmation # is : [id number]    P: Further questions may be directed to Senior LinkAge Line at #[phone], option #4 for PAS-RR staff.    ALEKS Hernandez, [facility]  [phone]  [facility] [place]

## 2021-03-18 NOTE — PROGRESS NOTES
Care Management Discharge Note    Discharge Date: 03/18/21  Expected Time of Departure: 7:15 pm    Discharge Disposition: Transitional Care    Discharge Services: None    Discharge DME: None    Discharge Transportation: ([facility] at 7:15 pm)    Private pay costs discussed: Not applicable    PAS Confirmation Code:    Patient/family educated on Medicare website which has current facility and service quality ratings: yes    Education Provided on the Discharge Plan:  yes  Persons Notified of Discharge Plans:patient and brother Baudilio  Patient/Family in Agreement with the Plan: yes    Handoff Referral Completed: Yes    Additional Information:  Patient offered a private room at [facility] or a semi-private at [facility].  Patient chose the [facility].  Patient notified of the quarantine x14 days at [facility] and no visitors.  Patient's brother notified.  He was given the address and phone number for the facility along with patient's room # and phone number. Baudilio aware of the no visitors.  He will bring clothes to the TCU for patient's use.  Orders and scripts have been faxed to [facility] Liaison and discharge time given to her.   Patient is aware he can smoke at the facility  Brother was hoping he wouldn't be allowed to smoke.  Writer explained the TCU's which do not allow smoking generally decline the patient as they are concerned the patient will want to smoke once they arrive.         Zoila Cast, SERENITYSW

## 2021-03-18 NOTE — PLAN OF CARE
DATE & TIME: 3/18/2021 7 am to 3 pm  Cognitive Concerns/ Orientation : Alert and Oriented x4, calm cooperative except for weight bearing status.    BEHAVIOR & AGGRESSION TOOL COLOR: green, pleasant with care.   CIWA SCORE: NA   ABNL VS/O2: VSS, on RA,  MOBILITY: Independent, NWB on LLE. Non compliant with NWB educated on need to not bear weight.    PAIN MANAGMENT: oxycodone 10 mg Q 4 hours PRN x2  DIET: mod cho  BOWEL/BLADDER: continent    ABNL LAB/BG: NA  DRAIN/DEVICES: SL PICC, SL.   TELEMETRY RHYTHM: NA  SKIN: Dressing changed on days, 2-3 + BLE edema, encourage to elevate BLE, ace wrap applied. Rashes visible on BLE  TESTS/PROCEDURES: NA  D/C DAY/GOALS/PLACE: Today at 7.15 pm  OTHER IMPORTANT INFO:

## 2021-03-18 NOTE — PLAN OF CARE
Patient discharging to [facility]. Discharge instructions reviewed with patient and teach back done. Patient able to teach back. Gave patient his discharge instruction papers. Discharge package which includes printed script for Oxycodone and CloniPRAMINE given to the transporter. Gave patient Oxycodone 10mg for left foot pain. Patient took all his belongings with him. [facility].

## 2021-03-18 NOTE — PLAN OF CARE
DATE & TIME: 3/18/2021    Cognitive Concerns/ Orientation : Alert and Oriented x4, calm cooperative except for weight bearing status.    BEHAVIOR & AGGRESSION TOOL COLOR: green, pleasant with care.   CIWA SCORE: NA   ABNL VS/O2: VSS, on RA, lungs clear.   MOBILITY: SBA, NWB on LLE. Non compliant with NWB --pt said he had not been putting much wt on the left foot.  He appears to put a significant amount of weight on foot, educated on need to not bear weight.    PAIN MANAGMENT: oxycodone 10 mg Q 4 hours PRN x1  DIET: mod cho  BOWEL/BLADDER: continent. Pt c/o having loose stools x3 during shift. C/o abd cramps.  Told to show writer when he had the next BM. Pt afebrile, WBC normal. Monitor.    ABNL LAB/BG: NA  DRAIN/DEVICES: SL PICC, SL.   TELEMETRY RHYTHM: NA  SKIN: Dressing changed on days, 2-3 + BLE edema, encourage to elevate BLE, ace wrap applied. Petechiae/rash on BLE noted again, MD aware. Pt denied itchiness. Monitor. Platelet count normal.   TESTS/PROCEDURES: NA  D/C DAY/GOALS/PLACE: possible in am if pt continue to be lucid and not requiring sitter.   OTHER IMPORTANT INFO: had been accepted into two TCU,  following.

## 2021-03-19 ENCOUNTER — TELEPHONE (OUTPATIENT)
Dept: FAMILY MEDICINE | Facility: CLINIC | Age: 61
End: 2021-03-19

## 2021-03-19 DIAGNOSIS — M86.9 TOE OSTEOMYELITIS, RIGHT (H): ICD-10-CM

## 2021-03-19 RX ORDER — PREGABALIN 150 MG/1
CAPSULE ORAL
Qty: 60 CAPSULE | Refills: 0 | Status: SHIPPED | OUTPATIENT
Start: 2021-03-19 | End: 2021-04-29

## 2021-03-19 RX ORDER — PREGABALIN 150 MG/1
CAPSULE ORAL
Qty: 60 CAPSULE | Refills: 0 | Status: SHIPPED | OUTPATIENT
Start: 2021-03-19 | End: 2021-03-19

## 2021-03-19 NOTE — TELEPHONE ENCOUNTER
Brief update:    Discharged to [facility] w/ continuation of PTA lyrica from [facility].    No printed scrip sent, needs paper prescription    Opened new encounter for this. (was unable to perform this through recent hospital encounter as >2h since discharge)    Scrip signed and to be forwarded to TCU    Carlitos Tidwell MD  12:06 AM

## 2021-03-19 NOTE — PLAN OF CARE
Occupational Therapy Discharge Summary    Reason for therapy discharge:    Discharged to transitional care facility.    Progress towards therapy goal(s). See goals on Care Plan in [facility] electronic health record for goal details.  Goals not met.  Barriers to achieving goals:   discharge from facility.    Therapy recommendation(s):    Continued therapy is recommended.  Rationale/Recommendations:  Skilled OT in TCU setting to address safety and independence in I/ADLs.

## 2021-03-19 NOTE — PLAN OF CARE
Physical Therapy Discharge Summary    Reason for therapy discharge:    Discharged to transitional care facility.    Progress towards therapy goal(s). See goals on Care Plan in [facility] electronic health record for goal details.  Goals not met.  Barriers to achieving goals:   discharge from facility.    Therapy recommendation(s):    Continued therapy is recommended.  Rationale/Recommendations:  TCU to address mobility deficits due to NWB on L LE.

## 2021-03-22 ENCOUNTER — PATIENT OUTREACH (OUTPATIENT)
Dept: CARE COORDINATION | Facility: CLINIC | Age: 61
End: 2021-03-22

## 2021-03-22 NOTE — TELEPHONE ENCOUNTER
Pt is in a skilled nursing facility               Facility Name: [facility]              Contact name and phone number/fax: [phone]  Dian Sultana RN

## 2021-03-22 NOTE — LETTER
[facility]   To: [facility]                  Please give to facility    From:Mary Ann Olmstead/ AVERY Care Coordinator   [facility]   P: [phone]  [email or web address]    Patient Name:  Huseyin Pettit YOB: 1960   Admit date: 3/18/21      *Information Needed:  Please contact me when the patient will discharge (or if they will move to long term care)- include the discharge date, disposition, and main diagnosis   - If the patient is discharged with home care services, please provide the name of the agency    Also- Please inform me if a care conference is being held.   Phone, Fax or Email with information                              Thank you, Mary Ann Olmstead

## 2021-03-22 NOTE — PROGRESS NOTES
Clinic Care Coordination Contact  Care Coordination Transition Communication    Referral Source: IP Report    Clinical Data: Patient was hospitalized at [facility] from 3/4/21 to 3/18/21 with diagnosis of   Septic arthritis Left 1st MTP -- MSSA       Neuropathy       Narcotic Tolerance .     Transition to Facility:              [facility]              Contact name and phone number/fax: [phone]    Plan: RN/SW Care Coordinator will await notification from facility staff informing RN/SW Care Coordinator of patient's discharge plans/needs. RN/SW Care Coordinator will review chart and outreach to facility staff every 4 weeks and as needed.     AVERY Manriquez Care Coordination Team  [phone]

## 2021-04-01 ENCOUNTER — ANCILLARY PROCEDURE (OUTPATIENT)
Dept: GENERAL RADIOLOGY | Facility: CLINIC | Age: 61
End: 2021-04-01
Attending: PODIATRIST
Payer: COMMERCIAL

## 2021-04-01 ENCOUNTER — OFFICE VISIT (OUTPATIENT)
Dept: PODIATRY | Facility: CLINIC | Age: 61
End: 2021-04-01
Payer: COMMERCIAL

## 2021-04-01 VITALS — BODY MASS INDEX: 26.37 KG/M2 | HEIGHT: 69 IN | SYSTOLIC BLOOD PRESSURE: 136 MMHG | DIASTOLIC BLOOD PRESSURE: 78 MMHG

## 2021-04-01 DIAGNOSIS — Z09 SURGERY FOLLOW-UP EXAMINATION: Primary | ICD-10-CM

## 2021-04-01 PROCEDURE — 99024 POSTOP FOLLOW-UP VISIT: CPT | Performed by: PODIATRIST

## 2021-04-01 PROCEDURE — 73630 X-RAY EXAM OF FOOT: CPT | Mod: LT | Performed by: RADIOLOGY

## 2021-04-01 RX ORDER — ONDANSETRON 4 MG/1
TABLET, FILM COATED ORAL EVERY 8 HOURS PRN
COMMUNITY
End: 2021-04-29

## 2021-04-01 NOTE — PATIENT INSTRUCTIONS
Thank you for choosing [facility] Podiatry / Foot & Ankle Surgery!    DR. ORTIZ'S CLINIC LOCATIONS     [facility] SURGERY: [phone]   [address] [place] [address] APPOINTMENTS: [phone]   [place] [address] BILLING QUESTIONS: [phone]  [phone] RADIOLOGY: [phone]       [place]    [id number] [place] [address]  [address]    [place] [address]    [phone]  [phone]      Follow up: in 2 weeks with Dr. Cameron Fontanez to follow up with Primary Care provider regarding elevated blood pressure. (if equal or greater than 140/90)

## 2021-04-01 NOTE — LETTER
4/1/2021         RE: Huseyin Pettit  [address] [facility] [address]        Dear Colleague,    Thank you for referring your patient, Huseyin Pettit, to the [facility] PODIATRY. Please see a copy of my visit note below.    S: Huseyin Pettit  presents post op.      POSTOPERATIVE DIAGNOSES:   1.  Diabetes mellitus with peripheral neuropathy and peripheral arterial disease.   2.  Left foot diabetic foot ulcers x 2 with underlying osteomyelitis and septic arthritis involving the first, second and third rays.      PROCEDURES:  3/5/21  Dr. Barnes  1.  Left transmetatarsal amputation.   2.  Elevation of an 11 x 4 cm dorsal soft tissue flap.     POSTOPERATIVE DIAGNOSES:   1.  Left foot osteomyelitis.   2.  Status post open transmetatarsal amputation.   3.  Diabetes mellitus with polyneuropathy.      PROCEDURE:  3/11/21 Dr. Arana  Revision of left transmetatarsal amputation with bone resection and delayed primary closure      O:   Vascular:  Pedal pulses are palpable.  Left foot edema.    Derm: The incision is well coapted.  Sutures are intact.  No significant jamila-incisional erythema.     Musculoskeletal: He is unable to dorsiflex his left ankle to neutral    X-Ray Findings: On the lateral view the distal metatarsals sharp, well-defined angulated cut.  On the AP and medial oblique the distal aspect of the second and third metatarsals are not flat bone cuts.  I have no comparison as the postoperative images were not at the same angulation.    ASSESSMENT:  Encounter Diagnosis   Name Primary?     Surgery follow-up examination Yes   Pathology came back stating chronic osteomyelitis at the level of amputation left third metatarsal bone.    PLAN:  1) sterile redress on the left foot was performed.  2) he is to continue strict nonweightbearing.  3) I reviewed the importance of ankle range of motion exercises notably dorsiflexion.  4) I explained, that my opinion, his incision is not ready for  suture removal.  I will defer this to his surgeon Dr. Pleitez.    I personally reviewed the x-ray images.  There is no good comparison to evaluate for changes at the distal third metatarsal.  We will evaluate via future x-rays.  I explained this to Huseyin.    I reminded him that the bone may look that an pathology was removed, that he is receiving long-term IV antibiotics, and that his foot appears to be healing and free of infection clinically.    He is eager to return to weightbearing.  I encouraged him to be patient and continue with the current plan.  Should he heal up without complication, this should be a good foot for ambulating.     Follow-up with Dr. Pleitez in 2 weeks.     Thaddeus Bynum DPM;        Again, thank you for allowing me to participate in the care of your patient.        Sincerely,        Thaddeus Bynum DPM

## 2021-04-01 NOTE — Clinical Note
Huseyin will follow up with you in 2 weeks. Might be ready for suture removal then. Remember that path was positive for osteo, 3rd met. Post op films of different angulation, so comparison cannot really be made.  He will need serial XR. Also, looks good clinically, getting IV abx.

## 2021-04-01 NOTE — PROGRESS NOTES
S: Huseyin Pettit  presents post op.      POSTOPERATIVE DIAGNOSES:   1.  Diabetes mellitus with peripheral neuropathy and peripheral arterial disease.   2.  Left foot diabetic foot ulcers x 2 with underlying osteomyelitis and septic arthritis involving the first, second and third rays.      PROCEDURES:  3/5/21  Dr. Barnes  1.  Left transmetatarsal amputation.   2.  Elevation of an 11 x 4 cm dorsal soft tissue flap.     POSTOPERATIVE DIAGNOSES:   1.  Left foot osteomyelitis.   2.  Status post open transmetatarsal amputation.   3.  Diabetes mellitus with polyneuropathy.      PROCEDURE:  3/11/21 Dr. Arana  Revision of left transmetatarsal amputation with bone resection and delayed primary closure      O:   Vascular:  Pedal pulses are palpable.  Left foot edema.    Derm: The incision is well coapted.  Sutures are intact.  No significant jamila-incisional erythema.     Musculoskeletal: He is unable to dorsiflex his left ankle to neutral    X-Ray Findings: On the lateral view the distal metatarsals sharp, well-defined angulated cut.  On the AP and medial oblique the distal aspect of the second and third metatarsals are not flat bone cuts.  I have no comparison as the postoperative images were not at the same angulation.    ASSESSMENT:  Encounter Diagnosis   Name Primary?     Surgery follow-up examination Yes   Pathology came back stating chronic osteomyelitis at the level of amputation left third metatarsal bone.    PLAN:  1) sterile redress on the left foot was performed.  2) he is to continue strict nonweightbearing.  3) I reviewed the importance of ankle range of motion exercises notably dorsiflexion.  4) I explained, that my opinion, his incision is not ready for suture removal.  I will defer this to his surgeon Dr. Pleitez.    I personally reviewed the x-ray images.  There is no good comparison to evaluate for changes at the distal third metatarsal.  We will evaluate via future x-rays.  I explained this to  Huseyin.    I reminded him that the bone may look that an pathology was removed, that he is receiving long-term IV antibiotics, and that his foot appears to be healing and free of infection clinically.    He is eager to return to weightbearing.  I encouraged him to be patient and continue with the current plan.  Should he heal up without complication, this should be a good foot for ambulating.     Follow-up with Dr. Pleitez in 2 weeks.     Thaddeus Bynum DPM;

## 2021-04-12 ENCOUNTER — TELEPHONE (OUTPATIENT)
Dept: PODIATRY | Facility: CLINIC | Age: 61
End: 2021-04-12

## 2021-04-12 NOTE — TELEPHONE ENCOUNTER
Received call from staff at [facility]. She wanted to update Dr. Barnes that patient is noncompliant with non weightbearing restrictions and uses his heel for weightbearing.   He has a follow up appointment on 4/15/21 with Dr. Barnes. Let her know that the provider will be informed.     Routing to Dr. Barnes as FYI for upcoming appointment on 4/14/21.     TREVON Mccann RN

## 2021-04-15 ENCOUNTER — OFFICE VISIT (OUTPATIENT)
Dept: PODIATRY | Facility: CLINIC | Age: 61
End: 2021-04-15
Payer: COMMERCIAL

## 2021-04-15 ENCOUNTER — TELEPHONE (OUTPATIENT)
Dept: FAMILY MEDICINE | Facility: CLINIC | Age: 61
End: 2021-04-15

## 2021-04-15 VITALS — SYSTOLIC BLOOD PRESSURE: 126 MMHG | HEIGHT: 69 IN | DIASTOLIC BLOOD PRESSURE: 76 MMHG | BODY MASS INDEX: 26.37 KG/M2

## 2021-04-15 DIAGNOSIS — F41.1 GAD (GENERALIZED ANXIETY DISORDER): ICD-10-CM

## 2021-04-15 DIAGNOSIS — T81.30XA WOUND DEHISCENCE: ICD-10-CM

## 2021-04-15 DIAGNOSIS — I25.10 CORONARY ARTERY DISEASE INVOLVING NATIVE CORONARY ARTERY OF NATIVE HEART WITHOUT ANGINA PECTORIS: ICD-10-CM

## 2021-04-15 DIAGNOSIS — E11.59 TYPE 2 DIABETES MELLITUS WITH OTHER CIRCULATORY COMPLICATION, WITH LONG-TERM CURRENT USE OF INSULIN (H): ICD-10-CM

## 2021-04-15 DIAGNOSIS — F33.2 SEVERE EPISODE OF RECURRENT MAJOR DEPRESSIVE DISORDER, WITHOUT PSYCHOTIC FEATURES (H): ICD-10-CM

## 2021-04-15 DIAGNOSIS — Z79.4 TYPE 2 DIABETES MELLITUS WITH OTHER CIRCULATORY COMPLICATION, WITH LONG-TERM CURRENT USE OF INSULIN (H): ICD-10-CM

## 2021-04-15 DIAGNOSIS — Z09 SURGERY FOLLOW-UP EXAMINATION: Primary | ICD-10-CM

## 2021-04-15 PROCEDURE — 99024 POSTOP FOLLOW-UP VISIT: CPT | Performed by: PODIATRIST

## 2021-04-15 PROCEDURE — 11042 DBRDMT SUBQ TIS 1ST 20SQCM/<: CPT | Mod: 58 | Performed by: PODIATRIST

## 2021-04-15 RX ORDER — CLOPIDOGREL BISULFATE 75 MG/1
75 TABLET ORAL DAILY
Qty: 90 TABLET | Refills: 3 | OUTPATIENT
Start: 2021-04-15

## 2021-04-15 RX ORDER — CLOMIPRAMINE HYDROCHLORIDE 50 MG/1
100 CAPSULE ORAL 2 TIMES DAILY
Qty: 20 CAPSULE | Refills: 0 | OUTPATIENT
Start: 2021-04-15

## 2021-04-15 RX ORDER — BUSPIRONE HYDROCHLORIDE 10 MG/1
10 TABLET ORAL 3 TIMES DAILY
Qty: 360 TABLET | Refills: 3 | OUTPATIENT
Start: 2021-04-15

## 2021-04-15 RX ORDER — QUETIAPINE FUMARATE 400 MG/1
400 TABLET, FILM COATED ORAL AT BEDTIME
Qty: 90 TABLET | Refills: 0 | Status: SHIPPED | OUTPATIENT
Start: 2021-04-15 | End: 2021-05-11 | Stop reason: DRUGHIGH

## 2021-04-15 RX ORDER — BISOPROLOL FUMARATE 5 MG/1
5 TABLET, FILM COATED ORAL DAILY
Qty: 90 TABLET | Refills: 0 | Status: SHIPPED | OUTPATIENT
Start: 2021-04-15 | End: 2021-04-29

## 2021-04-15 RX ORDER — DIVALPROEX SODIUM 500 MG/1
1000 TABLET, DELAYED RELEASE ORAL 2 TIMES DAILY
Qty: 180 TABLET | Refills: 3 | OUTPATIENT
Start: 2021-04-15

## 2021-04-15 NOTE — TELEPHONE ENCOUNTER
Can we look into this? Pt is still in rehab, why is he asking for meds refill?  Hunter Carlton MD  [facility]  [phone]

## 2021-04-15 NOTE — TELEPHONE ENCOUNTER
Received call from pt  He is at the [facility] and hopes to be discharged soon but has had some medication changes while in rehab and is concerned that he will not have enough medication when discharged    Call the [facility] and spoke to Ric [phone]  She stated she will talk with the pt about medications when he discharges  Advised Ric that his follow up with his PCP is 4/26/21    Thank you  Ge Brewster RN on 4/15/2021 at 11:50 AM

## 2021-04-15 NOTE — TELEPHONE ENCOUNTER
See other encounter from today    Conversation: medications  (Newest Message First)  Ge Brewster RN       4/15/21 11:51 AM  Note     Received call from pt  He is at the [facility] and hopes to be discharged soon but has had some medication changes while in rehab and is concerned that he will not have enough medication when discharged     Call the [facility] and spoke to Ric [phone]  She stated she will talk with the pt about medications when he discharges  Advised Ric that his follow up with his PCP is 4/26/21     Thank you  Ge Brewster RN on 4/15/2021 at 11:50 AM

## 2021-04-15 NOTE — TELEPHONE ENCOUNTER
Routing refill request to provider for review/approval because:  Drug not on the [facility] refill protocol - clomipramine  Labs out of range:  PHQ-9, CBC  Labs not current:  PHQ-9    PHQ 7/10/2018 4/23/2020   PHQ-9 Total Score 4 7   Q9: Thoughts of better off dead/self-harm past 2 weeks Several days Not at all   F/U: Thoughts of suicide or self-harm No -   F/U: Safety concerns No -     Patient is scheduled for visit w/ PCP 4/26/21    Vaughn OSBORN RN

## 2021-04-15 NOTE — TELEPHONE ENCOUNTER
Prescription bisoprolol and quetiapine approved per [facility] Refill Protocol. 90 day given.   Vaughn OSBORN RN

## 2021-04-15 NOTE — PATIENT INSTRUCTIONS
Thank you for choosing [facility] Podiatry / Foot & Ankle Surgery!    DR PALACIO'S CLINIC LOCATIONS  [address] [facility] [address]   [place] [address] [place] [address]   [phone]       SET UP SURGERY: [phone]    APPOINTMENTS: [phone]    BILLING QUESTIONS: [phone]    FAX NUMBER: [phone]        Follow up: 1.5 weeks    Wear boot while walking/weight bearing

## 2021-04-15 NOTE — PROGRESS NOTES
"Foot & Ankle Surgery  April 15, 2021    S:  Patient in today sp TMA with flap elevation by myself on 3/5/21, followed by previsoin debridement and closure 3/11/21 by Dr Arana.  Pain levels zero.  States it feels good. He wants to leave the TCU.  Going to [place] in near future to visit his mother, who is ill    /76   Ht 1.753 m (5' 9\")   BMI 26.37 kg/m        ROS - positive for CC.  Patient denies current nausea, vomiting, chills, fevers, belly pain, calf pain, chest pain or SOB.  Complete remainder of ROS is otherwise neg.    PE - all sutures removed.  Slight superficial gapping centrally but otherwise this is healing well.  Skin shows no trophic, color or temperature changes otherwise.  No calf redness, swelling or pain noted otherwise.      A/P - 61 year old yo patient approx 4+ weeks sp above procedure  -all sutures removed  -Excisional debridement was performed, full-thickness, sharply debriding the wound down to and including exposed sub-cutaneous tissue/fat, excising nonviable tissue to the above dimensions with a tissue nipper  -daily cares - wash/dry, abx ointment/bandaid to central superficial gapped area  -no indication for new/extended PO abx  -heel WB in walking boot, dispensed  -handicap parking pass     Regarding the CAM walker, the following proper-usage instructions were discussed and dispensed:  1.  Do not sleep with the CAM boot on; 2.  Do not wear during long-distance travel, ie long car rides, plane trips(they were instructed not to drive with it if the involved foot is required to operate a vehicle); 3.  The patient was encouraged to remove the boot throughout the day when off their feet;  4.  They are to have the boot on when ambulating, regardless of WB status      Follow up  -  10 days or sooner with acute issues      Sergey Barnes DPM FACFAS FACFAOM  Podiatric Foot & Ankle Surgeon  [facility]  [phone]      "

## 2021-04-22 ENCOUNTER — HOSPITAL ENCOUNTER (EMERGENCY)
Facility: CLINIC | Age: 61
Discharge: HOME | End: 2021-04-22
Attending: EMERGENCY MEDICINE | Admitting: EMERGENCY MEDICINE
Payer: COMMERCIAL

## 2021-04-22 ENCOUNTER — APPOINTMENT (OUTPATIENT)
Dept: CT IMAGING | Facility: CLINIC | Age: 61
End: 2021-04-22
Attending: EMERGENCY MEDICINE
Payer: COMMERCIAL

## 2021-04-22 VITALS
BODY MASS INDEX: 26.88 KG/M2 | DIASTOLIC BLOOD PRESSURE: 81 MMHG | RESPIRATION RATE: 18 BRPM | TEMPERATURE: 97.7 F | SYSTOLIC BLOOD PRESSURE: 140 MMHG | OXYGEN SATURATION: 100 % | HEART RATE: 89 BPM | WEIGHT: 182 LBS

## 2021-04-22 DIAGNOSIS — R07.81 PLEURITIC CHEST PAIN: ICD-10-CM

## 2021-04-22 DIAGNOSIS — S20.211A CONTUSION OF RIGHT CHEST WALL, INITIAL ENCOUNTER: ICD-10-CM

## 2021-04-22 LAB
ALBUMIN SERPL-MCNC: 2.9 G/DL (ref 3.4–5)
ALP SERPL-CCNC: 77 U/L (ref 40–150)
ALT SERPL W P-5'-P-CCNC: 13 U/L (ref 0–70)
ANION GAP SERPL CALCULATED.3IONS-SCNC: 6 MMOL/L (ref 3–14)
AST SERPL W P-5'-P-CCNC: 9 U/L (ref 0–45)
BASOPHILS # BLD AUTO: 0 10E9/L (ref 0–0.2)
BASOPHILS NFR BLD AUTO: 0.2 %
BILIRUB SERPL-MCNC: 0.3 MG/DL (ref 0.2–1.3)
BUN SERPL-MCNC: 23 MG/DL (ref 7–30)
CALCIUM SERPL-MCNC: 8.4 MG/DL (ref 8.5–10.1)
CHLORIDE SERPL-SCNC: 103 MMOL/L (ref 94–109)
CO2 SERPL-SCNC: 27 MMOL/L (ref 20–32)
CREAT SERPL-MCNC: 1.37 MG/DL (ref 0.66–1.25)
DIFFERENTIAL METHOD BLD: ABNORMAL
EOSINOPHIL # BLD AUTO: 0.2 10E9/L (ref 0–0.7)
EOSINOPHIL NFR BLD AUTO: 2.9 %
ERYTHROCYTE [DISTWIDTH] IN BLOOD BY AUTOMATED COUNT: 15.9 % (ref 10–15)
FLUAV RNA RESP QL NAA+PROBE: NEGATIVE
FLUBV RNA RESP QL NAA+PROBE: NEGATIVE
GFR SERPL CREATININE-BSD FRML MDRD: 56 ML/MIN/{1.73_M2}
GLUCOSE SERPL-MCNC: 135 MG/DL (ref 70–99)
HCT VFR BLD AUTO: 31.6 % (ref 40–53)
HGB BLD-MCNC: 9.8 G/DL (ref 13.3–17.7)
IMM GRANULOCYTES # BLD: 0 10E9/L (ref 0–0.4)
IMM GRANULOCYTES NFR BLD: 0.5 %
INTERPRETATION ECG - MUSE: NORMAL
LABORATORY COMMENT REPORT: NORMAL
LYMPHOCYTES # BLD AUTO: 3.4 10E9/L (ref 0.8–5.3)
LYMPHOCYTES NFR BLD AUTO: 41.7 %
MCH RBC QN AUTO: 30.9 PG (ref 26.5–33)
MCHC RBC AUTO-ENTMCNC: 31 G/DL (ref 31.5–36.5)
MCV RBC AUTO: 100 FL (ref 78–100)
MONOCYTES # BLD AUTO: 0.7 10E9/L (ref 0–1.3)
MONOCYTES NFR BLD AUTO: 8 %
NEUTROPHILS # BLD AUTO: 3.9 10E9/L (ref 1.6–8.3)
NEUTROPHILS NFR BLD AUTO: 46.7 %
NRBC # BLD AUTO: 0 10*3/UL
NRBC BLD AUTO-RTO: 0 /100
PLATELET # BLD AUTO: 154 10E9/L (ref 150–450)
POTASSIUM SERPL-SCNC: 3.9 MMOL/L (ref 3.4–5.3)
PROT SERPL-MCNC: 7.8 G/DL (ref 6.8–8.8)
RBC # BLD AUTO: 3.17 10E12/L (ref 4.4–5.9)
RSV RNA SPEC QL NAA+PROBE: NORMAL
SARS-COV-2 RNA RESP QL NAA+PROBE: NEGATIVE
SODIUM SERPL-SCNC: 136 MMOL/L (ref 133–144)
SPECIMEN SOURCE: NORMAL
TROPONIN I SERPL-MCNC: <0.015 UG/L (ref 0–0.04)
TSH SERPL DL<=0.005 MIU/L-ACNC: 1.98 MU/L (ref 0.4–4)
WBC # BLD AUTO: 8.3 10E9/L (ref 4–11)

## 2021-04-22 PROCEDURE — 96361 HYDRATE IV INFUSION ADD-ON: CPT

## 2021-04-22 PROCEDURE — 84443 ASSAY THYROID STIM HORMONE: CPT | Performed by: EMERGENCY MEDICINE

## 2021-04-22 PROCEDURE — 258N000003 HC RX IP 258 OP 636: Performed by: EMERGENCY MEDICINE

## 2021-04-22 PROCEDURE — 250N000011 HC RX IP 250 OP 636: Performed by: EMERGENCY MEDICINE

## 2021-04-22 PROCEDURE — 87636 SARSCOV2 & INF A&B AMP PRB: CPT | Performed by: EMERGENCY MEDICINE

## 2021-04-22 PROCEDURE — 96375 TX/PRO/DX INJ NEW DRUG ADDON: CPT

## 2021-04-22 PROCEDURE — 250N000009 HC RX 250: Performed by: EMERGENCY MEDICINE

## 2021-04-22 PROCEDURE — 84484 ASSAY OF TROPONIN QUANT: CPT | Performed by: EMERGENCY MEDICINE

## 2021-04-22 PROCEDURE — C9803 HOPD COVID-19 SPEC COLLECT: HCPCS

## 2021-04-22 PROCEDURE — 80053 COMPREHEN METABOLIC PANEL: CPT | Performed by: EMERGENCY MEDICINE

## 2021-04-22 PROCEDURE — 99285 EMERGENCY DEPT VISIT HI MDM: CPT | Mod: 25

## 2021-04-22 PROCEDURE — 93005 ELECTROCARDIOGRAM TRACING: CPT

## 2021-04-22 PROCEDURE — 85025 COMPLETE CBC W/AUTO DIFF WBC: CPT | Performed by: EMERGENCY MEDICINE

## 2021-04-22 PROCEDURE — 71275 CT ANGIOGRAPHY CHEST: CPT

## 2021-04-22 PROCEDURE — 96374 THER/PROPH/DIAG INJ IV PUSH: CPT | Mod: 59

## 2021-04-22 RX ORDER — IOPAMIDOL 755 MG/ML
500 INJECTION, SOLUTION INTRAVASCULAR ONCE
Status: COMPLETED | OUTPATIENT
Start: 2021-04-22 | End: 2021-04-22

## 2021-04-22 RX ORDER — HYDROMORPHONE HYDROCHLORIDE 1 MG/ML
0.5 INJECTION, SOLUTION INTRAMUSCULAR; INTRAVENOUS; SUBCUTANEOUS
Status: DISCONTINUED | OUTPATIENT
Start: 2021-04-22 | End: 2021-04-22 | Stop reason: HOSPADM

## 2021-04-22 RX ORDER — HYDROCODONE BITARTRATE AND ACETAMINOPHEN 5; 325 MG/1; MG/1
1 TABLET ORAL EVERY 6 HOURS PRN
Qty: 8 TABLET | Refills: 0 | Status: SHIPPED | OUTPATIENT
Start: 2021-04-22 | End: 2021-04-25

## 2021-04-22 RX ORDER — KETOROLAC TROMETHAMINE 15 MG/ML
15 INJECTION, SOLUTION INTRAMUSCULAR; INTRAVENOUS ONCE
Status: COMPLETED | OUTPATIENT
Start: 2021-04-22 | End: 2021-04-22

## 2021-04-22 RX ADMIN — KETOROLAC TROMETHAMINE 15 MG: 15 INJECTION, SOLUTION INTRAMUSCULAR; INTRAVENOUS at 10:57

## 2021-04-22 RX ADMIN — SODIUM CHLORIDE 83 ML: 9 INJECTION, SOLUTION INTRAVENOUS at 12:16

## 2021-04-22 RX ADMIN — IOPAMIDOL 63 ML: 755 INJECTION, SOLUTION INTRAVENOUS at 12:16

## 2021-04-22 RX ADMIN — SODIUM CHLORIDE 1000 ML: 9 INJECTION, SOLUTION INTRAVENOUS at 10:50

## 2021-04-22 RX ADMIN — HYDROMORPHONE HYDROCHLORIDE 0.5 MG: 1 INJECTION, SOLUTION INTRAMUSCULAR; INTRAVENOUS; SUBCUTANEOUS at 11:36

## 2021-04-22 ASSESSMENT — ENCOUNTER SYMPTOMS
FEVER: 0
SHORTNESS OF BREATH: 1
HEADACHES: 0
VOMITING: 0
SORE THROAT: 1
COUGH: 1
BACK PAIN: 1

## 2021-04-22 NOTE — ED TRIAGE NOTES
Pt arrives to ED he reports pain in his right rib/lung he reports he had surgery for pneumonia a couple years ago and he is concerned he has been having pain with deep breath for past week, 2 days ago he also feel when getting out of the car and pain has worsened since then. ABC's intact. VSS, pt is tachycardic 110-115.

## 2021-04-22 NOTE — ED PROVIDER NOTES
History   Chief Complaint:  Shortness of Breath     The history is provided by the patient.      Huseyin Pettit is a 61 year old male with history of CAD, tobacco use disorder, Chronic Kidney Disease, type 2 diabetes mellitus, hypertension, and hyperlipidemia who presents with shortness of breath. He reports two days he had a several subsequent falls while attempting to get out of his car and landed on his right chest on the grass. He denies head injury or any other injury with the fall. He reports a pleuritic chest pain worse with coughing and deep breathing. The pain is reproducible with palpation. He took Tylenol this morning for the pain. He has some right sided upper back pain. No emesis or fevers. He has had no sore throat. He notes a chronic cough from coughing. He received his [place] [employer] [place] COVID vaccine about 3 weeks ago. He had a self reported lung surgery for empyema and infection.     Review of Systems   Constitutional: Negative for fever.   HENT: Positive for sore throat.    Respiratory: Positive for cough (chronic) and shortness of breath.    Cardiovascular: Positive for chest pain.   Gastrointestinal: Negative for vomiting.   Musculoskeletal: Positive for back pain.   Neurological: Negative for syncope and headaches.   All other systems reviewed and are negative.    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     Physical Exam     Patient Vitals for the past 24 hrs:   BP Temp Temp src Pulse Resp SpO2 Weight   04/22/21 1300 -- -- -- -- -- 100 % --   04/22/21 1240 -- -- -- -- -- 98 % --   04/22/21 1210 -- -- -- 89 -- -- --   04/22/21 1200 -- -- -- 90 -- 98 % --   04/22/21 1145 (!) 140/81 -- -- 90 -- 99 % --   04/22/21 1130 -- -- -- 90 -- 100 % --   04/22/21 1115 -- -- -- 92 -- -- --   04/22/21 1100 126/77 -- -- 98 -- 100 % --   04/22/21 1045 -- -- -- 96 -- 99 % --   04/22/21 0947 130/83 97.7  F (36.5  C) Temporal 111 18 100 % 82.6 kg (182 lb)     Physical Exam  General: Alert, appears well-developed and well-nourished. Cooperative.     In mild distress  HEENT:  Head:  Atraumatic  Ears:  External ears are normal  Mouth/Throat:  Oropharynx is without erythema or exudate and mucous membranes are moist.   Eyes:   Conjunctivae normal and EOM are normal. No scleral icterus.  CV:  Tachycardic rate, regular rhythm, normal heart sounds and radial pulses are 2+ and symmetric.  No murmur.  Resp:  Breath sounds are clear bilaterally    Non-labored, no retractions or accessory muscle use  GI:  Abdomen is soft, no distension, no tenderness. No rebound or guarding.  No CVA tenderness bilaterally  MS:  Normal range of motion. No edema.    Normal strength in all 4 extremities.     Back atraumatic.    No midline cervical, thoracic, or lumbar tenderness    Right chest wall tenderness to palpation along the mid axillary line, mid ribs.  No crepitus.  No overlying skin color changes.    Skin:  Warm and dry.  No rash or lesions noted.  Neuro:  Alert. Normal strength.  GCS: 15  Psych:  Normal mood and affect.    Emergency Department Course   ECG  ECG taken at 0959, ECG read at 1105  Sinus tachycardia   No significant change as compared to prior, dated 3/4/21.  Rate 102 bpm. WY interval 154 ms. QRS duration 74 ms. QT/QTc 372/484 ms. P-R-T axes 40 32 25.     Imaging:  CT Chest Pulmonary Embolism w Contrast  IMPRESSION:  1.  No  evidence of pulmonary embolism.  2.  Moderate coronary calcifications.  3.  Stable pulmonary nodule.  4.  Mild mucous plugging at the lung bases has improved compared to  prior.  5.  Mildly enlarged and prominent mediastinal lymph nodes have  increased in size compared to the prior study, a nonspecific finding,  possibly reactive to an infectious process.  Reading per radiology     Laboratory:   CBC: WBC 8.3, HGB 9.8(L),    CMP: glucose 135(H), Creatinine 1.37(H), GFR estimate 56(L), calcium 8.4(L), albumin 2.9(L) o/w WNL   Troponin (Collected 1040): <0.015   TSH: 1.98    Symptomatic Influenza A/B & SARS-CoV2 (COVID19) Virus PCR Multiplex: all negative      Emergency Department Course:    Reviewed:  I reviewed nursing notes, vitals and past medical history    Assessments:  1034 I obtained history and examined the patient as noted above.   1257 I rechecked the patient and explained findings.     Interventions:  1050 NS 1000 mL IV  1057 Toradol 15 mg IV  1136 Dilaudid 0.5 mg IV    Disposition:  The patient was discharged to home.     Impression & Plan   CMS Diagnoses: None  Medical Decision Making:  Patient is a 61-year-old male with a complex past medical history who presents with right-sided chest wall discomfort.  Concern for potential pleurisy versus pulmonary embolism versus malignancy versus recurrent empyema versus acute rib fractures versus chest wall contusion.  Patient was sent for CT imaging of the chest, and reassuringly no evidence of pulmonary embolism, rib fractures, hemothorax, pneumothorax, pleural effusions, or pneumonia.  Mild mucous plugging at the lung bases improved compared to prior imaging.  There were some mildly enlarged mediastinal lymph nodes which were communicated at bedside which is a nonspecific finding.  These could be reactive versus related to an infectious process.  Close outpatient follow-up encouraged with his primary care provider.  Patient will be discharged with a  limited amount of pain medication for suspected chest wall contusion and tenderness.  I did look the patient up in the [place] provider database, and he has received multiple prescriptions for opiate pain medications.  Patient was also bartering with me for increased number of Norco tablets upon discharge, and I communicated that he would be receiving 8 tablets.  He understands that no refills of pain medications will be provided in regards to this chest wall discomfort diagnosis.  He needs to be followed up very closely with his primary care provider in regards to today's ED visit.  After strict return precautions discussed and all questions answered, discharged home.    Diagnosis:    ICD-10-CM    1. Contusion of right chest wall, initial encounter  S20.211A    2. Pleuritic chest pain  R07.81        Discharge Medications:  Discharge Medication List as of 4/22/2021  1:01 PM      START taking these medications    Details   HYDROcodone-acetaminophen (NORCO) 5-325 MG tablet Take 1 tablet by mouth every 6 hours as needed for severe pain, Disp-8 tablet, R-0, Local Print           Scribe Disclosure:  I, Pilar Gilliam, am serving as a scribe at 10:25 AM on 4/22/2021 to document services personally performed by Rodo Tripp MD based on my observations and the provider's statements to me.            Rodo Tripp MD  04/22/21 5772

## 2021-04-23 ENCOUNTER — PATIENT OUTREACH (OUTPATIENT)
Dept: NURSING | Facility: CLINIC | Age: 61
End: 2021-04-23
Payer: COMMERCIAL

## 2021-04-23 NOTE — PROGRESS NOTES
Clinic Care Coordination Contact  [facility]/Voicemail    Referral Source: IP Report       Clinical Data: Care Coordinator Outreach  Outreach attempted x 1. Unable to talk at time of call.  Plan: Care Coordinator will send care coordination introduction letter with care coordinator contact information and explanation of care coordination services via mail. Care Coordinator will try to reach patient again in 1-2 business days.      AVERY Manriquez   Care Coordination Team  [phone]

## 2021-04-26 ENCOUNTER — HOSPITAL ENCOUNTER (EMERGENCY)
Facility: CLINIC | Age: 61
Discharge: HOME | End: 2021-04-26
Attending: EMERGENCY MEDICINE | Admitting: EMERGENCY MEDICINE
Payer: COMMERCIAL

## 2021-04-26 ENCOUNTER — APPOINTMENT (OUTPATIENT)
Dept: GENERAL RADIOLOGY | Facility: CLINIC | Age: 61
End: 2021-04-26
Attending: EMERGENCY MEDICINE
Payer: COMMERCIAL

## 2021-04-26 ENCOUNTER — APPOINTMENT (OUTPATIENT)
Dept: CT IMAGING | Facility: CLINIC | Age: 61
End: 2021-04-26
Attending: EMERGENCY MEDICINE
Payer: COMMERCIAL

## 2021-04-26 ENCOUNTER — OFFICE VISIT (OUTPATIENT)
Dept: FAMILY MEDICINE | Facility: CLINIC | Age: 61
End: 2021-04-26
Payer: COMMERCIAL

## 2021-04-26 VITALS
SYSTOLIC BLOOD PRESSURE: 62 MMHG | HEART RATE: 149 BPM | DIASTOLIC BLOOD PRESSURE: 48 MMHG | TEMPERATURE: 97.3 F | OXYGEN SATURATION: 100 %

## 2021-04-26 VITALS
DIASTOLIC BLOOD PRESSURE: 82 MMHG | SYSTOLIC BLOOD PRESSURE: 137 MMHG | HEART RATE: 108 BPM | RESPIRATION RATE: 16 BRPM | OXYGEN SATURATION: 98 % | TEMPERATURE: 98.2 F

## 2021-04-26 DIAGNOSIS — Z89.421 S/P AMPUTATION OF LESSER TOE, RIGHT (H): ICD-10-CM

## 2021-04-26 DIAGNOSIS — I95.9 HYPOTENSION, UNSPECIFIED HYPOTENSION TYPE: Primary | ICD-10-CM

## 2021-04-26 DIAGNOSIS — R07.89 RIGHT-SIDED CHEST WALL PAIN: ICD-10-CM

## 2021-04-26 DIAGNOSIS — I73.9 PERIPHERAL VASCULAR DISEASE, UNSPECIFIED (H): ICD-10-CM

## 2021-04-26 DIAGNOSIS — R07.89 ATYPICAL CHEST PAIN: ICD-10-CM

## 2021-04-26 LAB
ALBUMIN SERPL-MCNC: 2.7 G/DL (ref 3.4–5)
ALBUMIN UR-MCNC: 300 MG/DL
ALP SERPL-CCNC: 74 U/L (ref 40–150)
ALT SERPL W P-5'-P-CCNC: 16 U/L (ref 0–70)
ANION GAP SERPL CALCULATED.3IONS-SCNC: 9 MMOL/L (ref 3–14)
APPEARANCE UR: ABNORMAL
AST SERPL W P-5'-P-CCNC: 13 U/L (ref 0–45)
BASOPHILS # BLD AUTO: 0 10E9/L (ref 0–0.2)
BASOPHILS NFR BLD AUTO: 0.4 %
BILIRUB DIRECT SERPL-MCNC: 0.1 MG/DL (ref 0–0.2)
BILIRUB SERPL-MCNC: 0.3 MG/DL (ref 0.2–1.3)
BILIRUB UR QL STRIP: NEGATIVE
BUN SERPL-MCNC: 29 MG/DL (ref 7–30)
CALCIUM SERPL-MCNC: 8.5 MG/DL (ref 8.5–10.1)
CHLORIDE SERPL-SCNC: 103 MMOL/L (ref 94–109)
CO2 SERPL-SCNC: 23 MMOL/L (ref 20–32)
COLOR UR AUTO: ABNORMAL
CREAT SERPL-MCNC: 1.5 MG/DL (ref 0.66–1.25)
DIFFERENTIAL METHOD BLD: ABNORMAL
EOSINOPHIL # BLD AUTO: 0.1 10E9/L (ref 0–0.7)
EOSINOPHIL NFR BLD AUTO: 0.6 %
ERYTHROCYTE [DISTWIDTH] IN BLOOD BY AUTOMATED COUNT: 15 % (ref 10–15)
GFR SERPL CREATININE-BSD FRML MDRD: 50 ML/MIN/{1.73_M2}
GLUCOSE SERPL-MCNC: 239 MG/DL (ref 70–99)
GLUCOSE UR STRIP-MCNC: NEGATIVE MG/DL
HCT VFR BLD AUTO: 31.4 % (ref 40–53)
HGB BLD-MCNC: 10.1 G/DL (ref 13.3–17.7)
HGB UR QL STRIP: ABNORMAL
HYALINE CASTS #/AREA URNS LPF: 28 /LPF (ref 0–2)
IMM GRANULOCYTES # BLD: 0.1 10E9/L (ref 0–0.4)
IMM GRANULOCYTES NFR BLD: 0.9 %
KETONES UR STRIP-MCNC: 20 MG/DL
LEUKOCYTE ESTERASE UR QL STRIP: NEGATIVE
LIPASE SERPL-CCNC: 45 U/L (ref 73–393)
LYMPHOCYTES # BLD AUTO: 2.4 10E9/L (ref 0.8–5.3)
LYMPHOCYTES NFR BLD AUTO: 29.4 %
MCH RBC QN AUTO: 30.9 PG (ref 26.5–33)
MCHC RBC AUTO-ENTMCNC: 32.2 G/DL (ref 31.5–36.5)
MCV RBC AUTO: 96 FL (ref 78–100)
MONOCYTES # BLD AUTO: 0.6 10E9/L (ref 0–1.3)
MONOCYTES NFR BLD AUTO: 7.6 %
NEUTROPHILS # BLD AUTO: 5 10E9/L (ref 1.6–8.3)
NEUTROPHILS NFR BLD AUTO: 61.1 %
NITRATE UR QL: NEGATIVE
NRBC # BLD AUTO: 0 10*3/UL
NRBC BLD AUTO-RTO: 0 /100
PH UR STRIP: 6 PH (ref 5–7)
PLATELET # BLD AUTO: 192 10E9/L (ref 150–450)
POTASSIUM SERPL-SCNC: 4.2 MMOL/L (ref 3.4–5.3)
PROT SERPL-MCNC: 7.4 G/DL (ref 6.8–8.8)
RBC # BLD AUTO: 3.27 10E12/L (ref 4.4–5.9)
RBC #/AREA URNS AUTO: 43 /HPF (ref 0–2)
SODIUM SERPL-SCNC: 135 MMOL/L (ref 133–144)
SOURCE: ABNORMAL
SP GR UR STRIP: 1.02 (ref 1–1.03)
SQUAMOUS #/AREA URNS AUTO: <1 /HPF (ref 0–1)
TROPONIN I SERPL-MCNC: <0.015 UG/L (ref 0–0.04)
UROBILINOGEN UR STRIP-MCNC: 2 MG/DL (ref 0–2)
WBC # BLD AUTO: 8.2 10E9/L (ref 4–11)
WBC #/AREA URNS AUTO: 8 /HPF (ref 0–5)

## 2021-04-26 PROCEDURE — 81001 URINALYSIS AUTO W/SCOPE: CPT | Performed by: EMERGENCY MEDICINE

## 2021-04-26 PROCEDURE — 71045 X-RAY EXAM CHEST 1 VIEW: CPT

## 2021-04-26 PROCEDURE — 74177 CT ABD & PELVIS W/CONTRAST: CPT

## 2021-04-26 PROCEDURE — 96374 THER/PROPH/DIAG INJ IV PUSH: CPT | Mod: 59

## 2021-04-26 PROCEDURE — 250N000011 HC RX IP 250 OP 636: Performed by: EMERGENCY MEDICINE

## 2021-04-26 PROCEDURE — 83690 ASSAY OF LIPASE: CPT | Performed by: EMERGENCY MEDICINE

## 2021-04-26 PROCEDURE — 84484 ASSAY OF TROPONIN QUANT: CPT | Performed by: EMERGENCY MEDICINE

## 2021-04-26 PROCEDURE — 93005 ELECTROCARDIOGRAM TRACING: CPT

## 2021-04-26 PROCEDURE — 99285 EMERGENCY DEPT VISIT HI MDM: CPT | Mod: 25

## 2021-04-26 PROCEDURE — 96361 HYDRATE IV INFUSION ADD-ON: CPT

## 2021-04-26 PROCEDURE — 258N000003 HC RX IP 258 OP 636: Performed by: EMERGENCY MEDICINE

## 2021-04-26 PROCEDURE — 85025 COMPLETE CBC W/AUTO DIFF WBC: CPT | Performed by: EMERGENCY MEDICINE

## 2021-04-26 PROCEDURE — 250N000013 HC RX MED GY IP 250 OP 250 PS 637: Performed by: EMERGENCY MEDICINE

## 2021-04-26 PROCEDURE — 80076 HEPATIC FUNCTION PANEL: CPT | Performed by: EMERGENCY MEDICINE

## 2021-04-26 PROCEDURE — 80048 BASIC METABOLIC PNL TOTAL CA: CPT | Performed by: EMERGENCY MEDICINE

## 2021-04-26 PROCEDURE — 99214 OFFICE O/P EST MOD 30 MIN: CPT | Performed by: FAMILY MEDICINE

## 2021-04-26 PROCEDURE — 250N000009 HC RX 250: Performed by: EMERGENCY MEDICINE

## 2021-04-26 RX ORDER — SODIUM CHLORIDE 9 MG/ML
1000 INJECTION, SOLUTION INTRAVENOUS CONTINUOUS
Status: DISCONTINUED | OUTPATIENT
Start: 2021-04-26 | End: 2021-04-26 | Stop reason: HOSPADM

## 2021-04-26 RX ORDER — LIDOCAINE 4 G/G
1 PATCH TOPICAL EVERY 24 HOURS
Qty: 30 PATCH | Refills: 0 | Status: SHIPPED | OUTPATIENT
Start: 2021-04-26 | End: 2023-07-28

## 2021-04-26 RX ORDER — HYDROCODONE BITARTRATE AND ACETAMINOPHEN 5; 325 MG/1; MG/1
2 TABLET ORAL ONCE
Status: COMPLETED | OUTPATIENT
Start: 2021-04-26 | End: 2021-04-26

## 2021-04-26 RX ORDER — NAPROXEN 500 MG/1
250 TABLET ORAL 2 TIMES DAILY PRN
Qty: 24 TABLET | Refills: 0 | Status: SHIPPED | OUTPATIENT
Start: 2021-04-26 | End: 2021-04-29

## 2021-04-26 RX ORDER — KETOROLAC TROMETHAMINE 15 MG/ML
15 INJECTION, SOLUTION INTRAMUSCULAR; INTRAVENOUS ONCE
Status: COMPLETED | OUTPATIENT
Start: 2021-04-26 | End: 2021-04-26

## 2021-04-26 RX ORDER — IOPAMIDOL 755 MG/ML
500 INJECTION, SOLUTION INTRAVASCULAR ONCE
Status: COMPLETED | OUTPATIENT
Start: 2021-04-26 | End: 2021-04-26

## 2021-04-26 RX ORDER — LIDOCAINE 4 G/G
1 PATCH TOPICAL ONCE
Status: DISCONTINUED | OUTPATIENT
Start: 2021-04-26 | End: 2021-04-26 | Stop reason: HOSPADM

## 2021-04-26 RX ADMIN — KETOROLAC TROMETHAMINE 15 MG: 15 INJECTION, SOLUTION INTRAMUSCULAR; INTRAVENOUS at 11:04

## 2021-04-26 RX ADMIN — IOPAMIDOL 92 ML: 755 INJECTION, SOLUTION INTRAVENOUS at 12:06

## 2021-04-26 RX ADMIN — SODIUM CHLORIDE 1000 ML: 9 INJECTION, SOLUTION INTRAVENOUS at 11:06

## 2021-04-26 RX ADMIN — LIDOCAINE 1 PATCH: 560 PATCH PERCUTANEOUS; TOPICAL; TRANSDERMAL at 11:19

## 2021-04-26 RX ADMIN — SODIUM CHLORIDE 58 ML: 9 INJECTION, SOLUTION INTRAVENOUS at 12:06

## 2021-04-26 RX ADMIN — HYDROCODONE BITARTRATE AND ACETAMINOPHEN 2 TABLET: 5; 325 TABLET ORAL at 11:59

## 2021-04-26 ASSESSMENT — ENCOUNTER SYMPTOMS
DIFFICULTY URINATING: 0
ABDOMINAL PAIN: 0
HEMATURIA: 0
CHILLS: 0
FREQUENCY: 0
SHORTNESS OF BREATH: 1
COUGH: 0
FEVER: 0

## 2021-04-26 NOTE — ED TRIAGE NOTES
Pt arrives via EMS, EMS reports that pt was at the clinic this morning for ongoing right chest wall pain, pt reports that he has been having this pain for 2 weeks, pt reports a fall x1 wk ago. Pt reports having a negative CT scan of chest was negative. PT VSS and ABC's intact

## 2021-04-26 NOTE — PROGRESS NOTES
Assessment & Plan     Hypotension, unspecified hypotension type  Mostly dehydration, pt is dizzy and fell down few times, will send to ER for further evaluation and management.   An ambulance was called due to significant hypotension.  Meanwhile, will adjust his medications and will order refills for all of them during the upcoming visit.                Tobacco Cessation:   reports that he has been smoking. He has a 12.50 pack-year smoking history. He has never used smokeless tobacco.  Were not discussed today.        No follow-ups on file.    Hunter Carlton MD  [facility] JOVANNA Fontanez is a 61 year old who presents for the following health issues     HPI     ED/UC Followup:    Facility:  [facility]  Date of visit: 4/22/21  Reason for visit: shortness of breath  Current Status: pt is not doing well, fell several times, dizzy, light headed.     Pt has been having dizziness and he fell about 3 times in the last 2 days, pt admitted that he hasn't been feeling well, sleeping all the time, he admitted that it is due to feeling depressed, because of his pain.  Pt has not been eating or drinking in the last 2 days, and he didn't want to go to ER but instead wanted to come to the clinic.      Review of Systems   CONSTITUTIONAL:dizzy, fatigue.  CV: chest pain, on the Rt side, chronic.       Objective    BP (!) 62/48 (BP Location: Right arm, Patient Position: Sitting, Cuff Size: Adult Regular)   Pulse 149   Temp 97.3  F (36.3  C) (Oral)   SpO2 100%   There is no height or weight on file to calculate BMI.  Physical Exam   GENERAL: pale and frail  RESP: lungs clear to auscultation - no rales, rhonchi or wheezes  CV: tachycardia, no murmur, click or rub and no peripheral edema  MS: wearing cam walker on the Left foot.

## 2021-04-26 NOTE — ED PROVIDER NOTES
History   Chief Complaint  Chest Pain    HPI  Huseyin Pettit is a 61 year old male with a history of CAD, chronic pain syndrome, tobacco use disorder, CKD, type 2 diabetes mellitus, hypertension, and hyperlipidemia who presents via EMS for evaluation of right sided chest pain. The patient reports that he has been experiencing this pain for several weeks now. He did have a fall last week however he notes that this pain was present prior to the fall. He comes in today for help with pain control. The pain is a sharp constant sensation and has been getting more severe over the past few weeks. Today is the worst it has been although constant even on his visit 4/22. He also endorses some shortness of breath and pain with taking a deep breath. He is concerned over pneumonia and is persistently requesting an MRI for this. He denies any fevers, chills, changes in his cough, abdominal pain or urinary changes.     CT Chest Pulmonary Embolism w Contrast on 4/22/2021:  1.  No evidence of pulmonary embolism.  2.  Moderate coronary calcifications.  3.  Stable pulmonary nodule.  4.  Mild mucous plugging at the lung bases has improved compared to prior.  5.  Mildly enlarged and prominent mediastinal lymph nodes have increased in size compared to the prior study, a nonspecific finding, possibly reactive to an infectious process.    Review of Systems   Constitutional: Negative for chills and fever.   Respiratory: Positive for shortness of breath. Negative for cough.    Cardiovascular: Positive for chest pain.   Gastrointestinal: Negative for abdominal pain.   Genitourinary: Negative for difficulty urinating, frequency, hematuria and urgency.   All other systems reviewed and are negative.    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  He is accompanied by his brother.      Physical Exam     Patient Vitals for the past 24 hrs:   BP Temp Temp src Pulse Resp SpO2   04/26/21 1351 -- -- -- -- 16 98 %   04/26/21 1245 137/82 -- -- -- -- --   04/26/21 1230 122/74 -- -- 108 -- 99 %   04/26/21 1145 124/84 -- -- 123 10 100 %   04/26/21 1011 96/65 98.2  F (36.8  C) Oral 129 16 100 %     Physical Exam  General: Alert, no acute distress; well appearing  Neuro:  PERRL.  EOMI.  No focal deficits  HEENT:  Moist mucous membranes.  Conjunctiva normal. Fused  CV:  Tachycardic, regular rhythm, no m/r/g, skin warm and well perfused  Pulm:  CTAB, no wheezes/ronchi/rales.  No acute distress, breathing comfortably  GI:  Soft, mild RUQ tenderness, nondistended.  No rebound or guarding.  Normal bowel sounds  MSK:  Moving all extremities.  No focal areas of edema, erythema; reproducible anterior/right lower chest wall tenderness, no crepitus.  No chest wall or abdominal wall bruising.  Skin:  WWP, no rashes, no lower extremity edema, skin color normal, no diaphoresis  Psych:  Well-appearing, normal affect, regular speech    Emergency Department Course   ECG:   ECG taken at 1012, ECG read at 1015  Sinus tachycardia. Nonspecific T wave abnormality. Prolonged QT. Abnormal ECG.   No significant change as compared to prior, dated 4/22/21.   Rate 131 bpm. GA interval 134 ms. QRS duration 74 ms. QT/QTc 384/567 ms. P-R-T axes 47 47 52.    Imaging:  CT Abdomen/Pelvis with IV contrast only TRAUMA/AAA:   1.  No evidence of acute intra-abdominal organ injury or laceration. No ascites or free  intraperitoneal air.   2.  Colonic constipation predominantly involving the sigmoid colon and rectum with rectal fecal impaction. No bowel obstruction.   3.  Mild hepatosplenomegaly. Prior cholecystectomy.   4.  Bilateral low-attenuation renal lesions most likely cysts, but many of which are too small to characterize by CT. No specific follow-up suggested. No hydronephrosis or urinary tract calculi.   5.  Fat-containing left inguinal hernia.   6.  Bibasilar lung scarring and atelectasis. As per radiology.    XR Chest Portable 1 View:   Previously noted bilateral infiltrates have predominately cleared. Minimal residual ground glass opacities remain. Lungs are mildly hypoaerated but otherwise clear. Heart is normal in size. No pneumothorax or significant pleural effusions.  as per radiology.     Laboratory:  CBC: WBC: 8.2, HGB: 10.1 (L), PLT: 192  BMP: Glucose 239 (H), Creatinine 1.50 (H), GFR estimate 50 (L), o/w WNL  Hepatic panel: Albumin 2.7 (L), o/w WNL  Lipase: 45 (L)  1022 Troponin I: <0.015    UA with Microscopic: ketones 20 (A), blood moderate (A), protein albumin 300 (A), WBC 8 (H), RBC 43 (H), hyaline casts 28 (H), o/w WNL    Emergency Department Course:  Reviewed:  I reviewed nursing notes, vitals and past medical history    Assessments:  1016 I physically examined the patient as documented above.    1345 I rechecked the patient.     Interventions:  1104 Toradol 15 mg IV  1106 NS 1L IV  1119 Lidocare patch transdermal  1159 Norco 2 tablets PO    Disposition:  The patient was discharged to home.     Impression & Plan   Medical Decision Making:  Huseyin Pettit is a 61 year old male with history of CAD, chronic pain syndrome, CKD, DM2 presents to the ER for evaluation of continued right-sided chest pain.  Please see above for details of HPI and exam.  Of note, patient was seen for similar symptoms 4/22 with reassuring work-up including EKG, labs, CT PE study.  Patient relates that he did have pain starting  prior to a fall where he landed on his right side.  Review of the x-rays at that time did not show any rib fractures.  Given his fall on the right side, I also considered possible intra-abdominal/solid organ injury and thus CT abdomen/pelvis was obtained which fortunately negative for this.  I do not feel that he requires repeat CT chest imaging given reassuring CT 4/22 as I doubt PE or acute aortic dissection.  Chest x-ray shows no pneumonia, pneumothorax, effusion.  EKG shows sinus tachycardia without any acute ischemic changes and troponin is within normal limits despite days of ongoing symptoms.  The rest of his laboratory studies are reassuring.  He did have reproducible nature of this chest pain on exam raising suspicion for ongoing chest wall contusion versus costochondritis.  I recommend anti-inflammatories and lidocaine patch.  No evidence of skin cellulitis or zoster.  Patient requested stronger pain medication in the ER and he was given a single dose here but understands that we will not be discharging him home with opioids.  Given his overall reassuring work-up today and 4/22, I feel he safe to discharge home.  Discussed the signs symptoms that should prompt his return to the ER.  All questions were answered prior to discharge.    Diagnosis:    ICD-10-CM    1. Atypical chest pain  R07.89    2. Right-sided chest wall pain  R07.89      Discharge Medications:  Discharge Medication List as of 4/26/2021  1:45 PM      START taking these medications    Details   Lidocaine (LIDOCARE) 4 % Patch Place 1 patch onto the skin every 24 hours To prevent lidocaine toxicity, patient should be patch free for 12 hrs daily.Disp-30 patch, R-0Local Print      naproxen (NAPROSYN) 500 MG tablet Take 0.5 tablets (250 mg) by mouth 2 times daily as needed for moderate pain, Disp-24 tablet, R-0, Local Print           Scribe Disclosure:  I, Juan Harper, am serving as a scribe at 10:11 AM on 4/26/2021 to document services personally  performed by Aleksandar Aden MD based on my observations and the provider's statements to me.      Aleksandar Aden MD  04/26/21 5333

## 2021-04-27 ENCOUNTER — OFFICE VISIT (OUTPATIENT)
Dept: PODIATRY | Facility: CLINIC | Age: 61
End: 2021-04-27
Payer: COMMERCIAL

## 2021-04-27 VITALS
HEIGHT: 69 IN | WEIGHT: 182 LBS | DIASTOLIC BLOOD PRESSURE: 62 MMHG | BODY MASS INDEX: 26.96 KG/M2 | SYSTOLIC BLOOD PRESSURE: 102 MMHG

## 2021-04-27 DIAGNOSIS — Z79.4 TYPE 2 DIABETES MELLITUS WITH OTHER CIRCULATORY COMPLICATION, WITH LONG-TERM CURRENT USE OF INSULIN (H): ICD-10-CM

## 2021-04-27 DIAGNOSIS — E11.59 TYPE 2 DIABETES MELLITUS WITH OTHER CIRCULATORY COMPLICATION, WITH LONG-TERM CURRENT USE OF INSULIN (H): ICD-10-CM

## 2021-04-27 DIAGNOSIS — T81.30XA WOUND DEHISCENCE: ICD-10-CM

## 2021-04-27 DIAGNOSIS — Z98.890 S/P FOOT SURGERY, LEFT: Primary | ICD-10-CM

## 2021-04-27 LAB — INTERPRETATION ECG - MUSE: NORMAL

## 2021-04-27 PROCEDURE — 99024 POSTOP FOLLOW-UP VISIT: CPT | Performed by: PODIATRIST

## 2021-04-27 RX ORDER — HYDROCODONE BITARTRATE AND ACETAMINOPHEN 5; 325 MG/1; MG/1
TABLET ORAL
Qty: 6 TABLET | Refills: 0 | Status: SHIPPED | OUTPATIENT
Start: 2021-04-27 | End: 2021-04-29

## 2021-04-27 ASSESSMENT — MIFFLIN-ST. JEOR: SCORE: 1625.93

## 2021-04-27 NOTE — PROGRESS NOTES
"Foot & Ankle Surgery  April 27, 2021    S:  Patient in today sp transmetatarsal amputation with bone resection and delayed primary closure by myself on 3/11/2021.  Pain levels intermittent.  He has the walking cast boot.  He has been doing daily dressing changes    /62   Ht 1.753 m (5' 9\")   Wt 82.6 kg (182 lb)   BMI 26.88 kg/m        ROS - positive for CC.  Patient denies current nausea, vomiting, chills, fevers, belly pain, calf pain, chest pain or SOB.  Complete remainder of ROS is otherwise neg.    PE -other than a small central area superficially gapped with very slight serous drainage and no signs of infection, incision is otherwise healed.  Skin shows no trophic, color or temperature changes otherwise.  No calf redness, swelling or pain noted otherwise.    A/P - 61 year old yo patient approx 6+ weeks sp above procedure  -Continue daily ointment/bandage to the small central open area until healed.  No indication for p.o. antibiotics  -Weightbearing as tolerated in the walking cast boot  -Orthotic lab referral for custom orthotics and shoes including a left orthotic arch fill  -Prescription for 6 [place] at patient request.  No further narcotics anticipated    Follow up  -no scheduled follow-up needed.  He asked if I would assess the orthotics when he gets them to make sure they fit his foot appropriately.  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

## 2021-04-27 NOTE — PATIENT INSTRUCTIONS
Thank you for choosing [facility] [place] [facility]!    DR PALACIO[facility]S CLINIC LOCATIONS  [facility]   [address]   [place] [address] [place], [place] [id number] [phone]       SET UP SURGERY: [phone]    APPOINTMENTS: [phone]    BILLING QUESTIONS: [phone]    FAX NUMBER: [phone]          [place] [facility] LOCATIONS  [facility]  [address]  [place] [address]  Phone: [phone]  Fax: [phone] [facility]  [address]  [place] [address]  Phone: [phone]   Fax: [phone]   [facility]  [address]  [place] [address]  Phone: [phone]  Fax: [phone] [facility]  [address]  [place] [address]  Phone: [phone]   Fax: [phone]   [facility]   [address]  [place] [address]  Phone: [phone]  Fax: [phone] * Please call any location listed to make an appointment for a casting/fitting. Your referral was sent to their central office and they will all have the order on file.

## 2021-04-29 ENCOUNTER — OFFICE VISIT (OUTPATIENT)
Dept: FAMILY MEDICINE | Facility: CLINIC | Age: 61
End: 2021-04-29
Payer: COMMERCIAL

## 2021-04-29 VITALS
SYSTOLIC BLOOD PRESSURE: 148 MMHG | OXYGEN SATURATION: 100 % | RESPIRATION RATE: 20 BRPM | HEIGHT: 68 IN | WEIGHT: 168 LBS | HEART RATE: 119 BPM | BODY MASS INDEX: 25.46 KG/M2 | TEMPERATURE: 98.2 F | DIASTOLIC BLOOD PRESSURE: 88 MMHG

## 2021-04-29 DIAGNOSIS — E11.59 TYPE 2 DIABETES MELLITUS WITH OTHER CIRCULATORY COMPLICATION, WITH LONG-TERM CURRENT USE OF INSULIN (H): ICD-10-CM

## 2021-04-29 DIAGNOSIS — Z13.220 SCREENING FOR HYPERLIPIDEMIA: ICD-10-CM

## 2021-04-29 DIAGNOSIS — I25.10 CORONARY ARTERY DISEASE INVOLVING NATIVE CORONARY ARTERY OF NATIVE HEART WITHOUT ANGINA PECTORIS: ICD-10-CM

## 2021-04-29 DIAGNOSIS — M86.9 TOE OSTEOMYELITIS, RIGHT (H): ICD-10-CM

## 2021-04-29 DIAGNOSIS — K21.00 GASTROESOPHAGEAL REFLUX DISEASE WITH ESOPHAGITIS WITHOUT HEMORRHAGE: Primary | ICD-10-CM

## 2021-04-29 DIAGNOSIS — Z12.11 SCREEN FOR COLON CANCER: ICD-10-CM

## 2021-04-29 DIAGNOSIS — R06.02 SOB (SHORTNESS OF BREATH): ICD-10-CM

## 2021-04-29 DIAGNOSIS — R07.82 INTERCOSTAL PAIN: ICD-10-CM

## 2021-04-29 DIAGNOSIS — Z79.4 TYPE 2 DIABETES MELLITUS WITH OTHER CIRCULATORY COMPLICATION, WITH LONG-TERM CURRENT USE OF INSULIN (H): ICD-10-CM

## 2021-04-29 DIAGNOSIS — F33.2 SEVERE EPISODE OF RECURRENT MAJOR DEPRESSIVE DISORDER, WITHOUT PSYCHOTIC FEATURES (H): ICD-10-CM

## 2021-04-29 DIAGNOSIS — F41.1 GAD (GENERALIZED ANXIETY DISORDER): ICD-10-CM

## 2021-04-29 DIAGNOSIS — I10 ESSENTIAL HYPERTENSION: ICD-10-CM

## 2021-04-29 PROCEDURE — 99214 OFFICE O/P EST MOD 30 MIN: CPT | Performed by: FAMILY MEDICINE

## 2021-04-29 PROCEDURE — 99207 PR FOOT EXAM NO CHARGE: CPT | Mod: 25 | Performed by: FAMILY MEDICINE

## 2021-04-29 RX ORDER — AMLODIPINE AND VALSARTAN 10; 160 MG/1; MG/1
1 TABLET ORAL DAILY
Qty: 90 TABLET | Refills: 1 | Status: SHIPPED | OUTPATIENT
Start: 2021-04-29 | End: 2022-11-22

## 2021-04-29 RX ORDER — ISOSORBIDE DINITRATE 40 MG/1
40 TABLET ORAL 2 TIMES DAILY
Qty: 180 TABLET | Refills: 1 | Status: SHIPPED | OUTPATIENT
Start: 2021-04-29 | End: 2021-05-06

## 2021-04-29 RX ORDER — DIVALPROEX SODIUM 500 MG/1
1000 TABLET, DELAYED RELEASE ORAL 2 TIMES DAILY
Qty: 180 TABLET | Refills: 3 | Status: SHIPPED | OUTPATIENT
Start: 2021-04-29 | End: 2022-11-22

## 2021-04-29 RX ORDER — PREGABALIN 150 MG/1
CAPSULE ORAL
Qty: 60 CAPSULE | Refills: 0 | Status: SHIPPED | OUTPATIENT
Start: 2021-04-29 | End: 2022-11-22

## 2021-04-29 RX ORDER — ESOMEPRAZOLE MAGNESIUM 40 MG/1
40 CAPSULE, DELAYED RELEASE ORAL
Qty: 90 CAPSULE | Refills: 3 | Status: SHIPPED | OUTPATIENT
Start: 2021-04-29

## 2021-04-29 RX ORDER — ALBUTEROL SULFATE 90 UG/1
2 AEROSOL, METERED RESPIRATORY (INHALATION) EVERY 6 HOURS
Qty: 18 G | Refills: 3 | Status: SHIPPED | OUTPATIENT
Start: 2021-04-29 | End: 2023-07-28

## 2021-04-29 RX ORDER — BUSPIRONE HYDROCHLORIDE 10 MG/1
10 TABLET ORAL 4 TIMES DAILY
Qty: 360 TABLET | Refills: 1 | Status: SHIPPED | OUTPATIENT
Start: 2021-04-29 | End: 2022-11-22

## 2021-04-29 RX ORDER — ONDANSETRON 4 MG/1
4 TABLET, FILM COATED ORAL EVERY 8 HOURS PRN
Qty: 90 TABLET | Refills: 3 | Status: SHIPPED | OUTPATIENT
Start: 2021-04-29 | End: 2022-11-22

## 2021-04-29 RX ORDER — LANCETS
EACH MISCELLANEOUS
Qty: 100 EACH | Refills: 6 | Status: SHIPPED | OUTPATIENT
Start: 2021-04-29 | End: 2022-11-22

## 2021-04-29 RX ORDER — ROSUVASTATIN CALCIUM 20 MG/1
20 TABLET, COATED ORAL DAILY
Qty: 90 TABLET | Refills: 3 | Status: SHIPPED | OUTPATIENT
Start: 2021-04-29 | End: 2022-11-22

## 2021-04-29 RX ORDER — BISOPROLOL FUMARATE 5 MG/1
5 TABLET, FILM COATED ORAL DAILY
Qty: 90 TABLET | Refills: 3 | Status: SHIPPED | OUTPATIENT
Start: 2021-04-29 | End: 2022-11-22

## 2021-04-29 RX ORDER — SERTRALINE HYDROCHLORIDE 100 MG/1
100 TABLET, FILM COATED ORAL DAILY
Qty: 180 TABLET | Refills: 3 | Status: SHIPPED | OUTPATIENT
Start: 2021-04-29 | End: 2022-11-22

## 2021-04-29 RX ORDER — GLIPIZIDE 2.5 MG/1
2.5 TABLET, EXTENDED RELEASE ORAL DAILY
Qty: 90 TABLET | Refills: 3 | Status: SHIPPED | OUTPATIENT
Start: 2021-04-29 | End: 2021-05-11 | Stop reason: DRUGHIGH

## 2021-04-29 RX ORDER — CLOMIPRAMINE HYDROCHLORIDE 50 MG/1
100 CAPSULE ORAL 2 TIMES DAILY
Qty: 180 CAPSULE | Refills: 1 | Status: SHIPPED | OUTPATIENT
Start: 2021-04-29 | End: 2022-11-22

## 2021-04-29 RX ORDER — CLOPIDOGREL BISULFATE 75 MG/1
75 TABLET ORAL DAILY
Qty: 90 TABLET | Refills: 3 | Status: SHIPPED | OUTPATIENT
Start: 2021-04-29 | End: 2022-11-22

## 2021-04-29 RX ORDER — CYCLOBENZAPRINE HCL 5 MG
5 TABLET ORAL
Qty: 30 TABLET | Refills: 0 | Status: SHIPPED | OUTPATIENT
Start: 2021-04-29 | End: 2023-07-28

## 2021-04-29 RX ORDER — GLUCOSAMINE HCL/CHONDROITIN SU 500-400 MG
CAPSULE ORAL
Qty: 100 EACH | Refills: 3 | Status: SHIPPED | OUTPATIENT
Start: 2021-04-29 | End: 2022-11-22

## 2021-04-29 RX ORDER — GLIPIZIDE 2.5 MG/1
2.5 TABLET, EXTENDED RELEASE ORAL DAILY
Qty: 90 TABLET | Refills: 3 | Status: SHIPPED | OUTPATIENT
Start: 2021-04-29 | End: 2021-04-29

## 2021-04-29 ASSESSMENT — MIFFLIN-ST. JEOR: SCORE: 1546.54

## 2021-04-29 NOTE — PROGRESS NOTES
Assessment & Plan     Coronary artery disease involving native coronary artery of native heart without angina pectoris  Stable at this time. Continue on ASA and plavix along with statin, B blocker (restart on Bisoprolol as pt has stopped it for the last few days), along with isordil.  - clopidogrel (PLAVIX) 75 MG tablet; Take 1 tablet (75 mg) by mouth daily  - rosuvastatin (CRESTOR) 20 MG tablet; Take 1 tablet (20 mg) by mouth daily  - bisoprolol (ZEBETA) 5 MG tablet; Take 1 tablet (5 mg) by mouth daily  - isosorbide Dinitrate (ISORDIL) 40 MG TABS; Take 1 tablet (40 mg) by mouth 2 times daily    Type 2 diabetes mellitus with other circulatory complication, with long-term current use of insulin (H)  Controlled without insulin at this time, pt to check Blood sugar in the next few days, if the blood sugar is controlled, then there is no need to restart on insulin injection.  - metFORMIN (GLUCOPHAGE) 1000 MG tablet; Take 1 tablet (1,000 mg) by mouth 2 times daily (with meals)  - Lipid panel reflex to direct LDL Fasting  - Albumin Random Urine Quantitative with Creat Ratio  - OPTOMETRY REFERRAL; Future  - FOOT EXAM  - glipiZIDE (GLUCOTROL XL) 2.5 MG 24 hr tablet; Take 1 tablet (2.5 mg) by mouth daily    Chronic pain:  The pain is mainly in the Rt chest at this time, continue on   - pregabalin (LYRICA) 150 MG capsule; TAKE ONE CAPSULE BY MOUTH TWICE A DAY    Severe episode of recurrent major depressive disorder, without psychotic features (H)  Pt has been taking these medications and tolerating them for a long time, although there is a risk for interaction between zoloft and anafranil, pt has been not exhibiting any side effects.   - divalproex sodium delayed-release (DEPAKOTE) 500 MG DR tablet; Take 2 tablets (1,000 mg) by mouth 2 times daily  - clomiPRAMINE (ANAFRANIL) 50 MG capsule; Take 2 capsules (100 mg) by mouth 2 times daily  - sertraline (ZOLOFT) 100 MG tablet; Take 1 tablet (100 mg) by mouth daily    MILES  "(generalized anxiety disorder)  As above. Pt is insisting on taking buspar 4 times daily as he has been doing so, and feels it makes him more calm.   - sertraline (ZOLOFT) 100 MG tablet; Take 1 tablet (100 mg) by mouth daily  - busPIRone (BUSPAR) 10 MG tablet; Take 1 tablet (10 mg) by mouth 4 times daily    Screen for colon cancer      Screening for hyperlipidemia      Gastroesophageal reflux disease with esophagitis without hemorrhage  Continue on   - esomeprazole (NEXIUM) 40 MG DR capsule; Take 1 capsule (40 mg) by mouth every morning (before breakfast) Take 30-60 minutes before eating.  - ondansetron (ZOFRAN) 4 MG tablet; Take 1 tablet (4 mg) by mouth every 8 hours as needed for nausea    Essential hypertension  Controlled, continue on   - amLODIPine-valsartan (EXFORGE)  MG tablet; Take 1 tablet by mouth daily    Intercostal pain  Unsure of etiology, mostly costochondritis, continue on lyrica, along with tylenol, stop using NSAIDs, due to hx of CAD, also may add  - cyclobenzaprine (FLEXERIL) 5 MG tablet; Take 1 tablet (5 mg) by mouth nightly as needed for muscle spasms    SOB (shortness of breath)    - albuterol (PROAIR HFA/PROVENTIL HFA/VENTOLIN HFA) 108 (90 Base) MCG/ACT inhaler; Inhale 2 puffs into the lungs every 6 hours             Tobacco Cessation:   reports that he has been smoking. He has a 12.50 pack-year smoking history. He has never used smokeless tobacco.  Tobacco Cessation Action Plan: Information offered: Patient not interested at this time    BMI:   Estimated body mass index is 25.54 kg/m  as calculated from the following:    Height as of this encounter: 1.727 m (5' 8\").    Weight as of this encounter: 76.2 kg (168 lb).           Follow up in 1 week.    Hunter Carlton MD  [facility] JOVANNA Fontanez is a 61 year old who presents for the following health issues     HPI     ED/UC Followup:    Facility:  [facility]  Date of visit: 4/26/21  Reason for visit: chest " pain  Current Status: improved     Still complaining of pain in the Rt chest area, and it is worse at night that he cannot sleep well at night.   He is doing better in general, his blood pressure has recovered since stopping his lasix.    Diabetes Follow-up      How often are you checking your blood sugar? Not at all at this time. Pt would like a machine to check his blood sugar.    What concerns do you have today about your diabetes? None, pt has been using insulin in the past but he hasn't used it since he was in Rehab, and his sugar is under control.     Do you have any of these symptoms? (Select all that apply)  Numbness in feet    Have you had a diabetic eye exam in the last 12 months? No        BP Readings from Last 2 Encounters:   04/29/21 (!) 148/88   04/27/21 102/62     Hemoglobin A1C (%)   Date Value   03/04/2021 6.8 (H)   03/01/2020 7.2 (H)     LDL Cholesterol Calculated (mg/dL)   Date Value   03/02/2020 58   07/03/2018 82         Hypertension Follow-up      Do you check your blood pressure regularly outside of the clinic? No     Are you following a low salt diet? Yes    Are your blood pressures ever more than 140 on the top number (systolic) OR more   than 90 on the bottom number (diastolic), for example 140/90? n    CAD Disease Follow-up  Denies any left sided chest pain, his Echo in march 2021 was within normal.    How often do you take nitroglycerin? Never    Do you take an aspirin every day? Yes    Depression and anxiety Followup    How are you doing with your depression since your last visit? Stable, pt feels ok in general. He does followo up with psychiatry in [place] and has been on his medications for a long time, he takes high dose of zoloft that was put on while he was in rehab, and he would like to continue on it.  He has been tolerating it well, along with seroquel, and clomipramine.     Are you having other symptoms that might be associated with depression? Yes:  anxiety that has been stable  "too.    Have you had a significant life event?  Health Concerns     Are you feeling anxious or having panic attacks?   No    Do you have any concerns with your use of alcohol or other drugs? No.    Social History     Tobacco Use     Smoking status: Current Every Day Smoker     Packs/day: 0.50     Years: 25.00     Pack years: 12.50     Smokeless tobacco: Never Used     Tobacco comment: trying to quit   Substance Use Topics     Alcohol use: No     Drug use: No     PHQ 7/10/2018 4/23/2020   PHQ-9 Total Score 4 7   Q9: Thoughts of better off dead/self-harm past 2 weeks Several days Not at all   F/U: Thoughts of suicide or self-harm No -   F/U: Safety concerns No -     MILES-7 SCORE 4/23/2020   Total Score 13         Suicide Assessment Five-step Evaluation and Treatment (SAFE-T)      How many servings of fruits and vegetables do you eat daily?  2-3    On average, how many sweetened beverages do you drink each day (Examples: soda, juice, sweet tea, etc.  Do NOT count diet or artificially sweetened beverages)?   1    How many days per week do you exercise enough to make your heart beat faster? 3 or less    How many minutes a day do you exercise enough to make your heart beat faster? 9 or less    How many days per week do you miss taking your medication? 0      Review of Systems   CONSTITUTIONAL: NEGATIVE for fever, chills, change in weight  RESP:still having shortness of breath when walking.   CV: NEGATIVE for chest pain, palpitations or peripheral edema      Objective    BP (!) 148/88 (BP Location: Right arm, Patient Position: Chair, Cuff Size: Adult Large)   Pulse 119   Temp 98.2  F (36.8  C) (Oral)   Resp 20   Ht 1.727 m (5' 8\")   Wt 76.2 kg (168 lb)   SpO2 100%   BMI 25.54 kg/m    Body mass index is 25.54 kg/m .  Physical Exam   GENERAL: healthy, alert and no distress  NECK: no adenopathy, no asymmetry, masses, or scars and thyroid normal to palpation  RESP: lungs clear to auscultation - no rales, rhonchi or " wheezes  CV: regular rate and rhythm, normal S1 S2, no S3 or S4, no murmur, click or rub, no peripheral edema and peripheral pulses strong  ABDOMEN: soft, nontender, no hepatosplenomegaly, no masses and bowel sounds normal  MS: no edema.   Diabetic foot exam: amputation of the Lt distal foot.

## 2021-04-30 ENCOUNTER — TELEPHONE (OUTPATIENT)
Dept: FAMILY MEDICINE | Facility: CLINIC | Age: 61
End: 2021-04-30

## 2021-04-30 NOTE — TELEPHONE ENCOUNTER
Toño Fontanez is on a lot of psych meds, that usually prescribed to him by his psychiatrist back in [place], [place].  I really think he is way over medicated, his diagnosis is major depression, with MILES (that I know of)  I wonder if you are willing to do an evaluation and trying to cut down on the medicine he is taking.  Hunter Carlton MD  [facility]  [phone]

## 2021-04-30 NOTE — TELEPHONE ENCOUNTER
Patient needs PA on: isosorbide 40mg   Insurance is: [employer] [facility]   ID number is: [id number]  [id number]  PCN [id number]   Help desk phone: [phone]      Please let us know if PA granted/denied or change to different medication.  Thanks,    Chris Carrasquillo  Pharmacy Technician  [facility]  [phone]

## 2021-05-03 NOTE — TELEPHONE ENCOUNTER
Called to pt. He states he will look into alternatives but for now he will pay craft.    Dian RODRIGUEZ RN, BSN, PAL (Patient Advocate Liaison)  [facility]   [phone]

## 2021-05-03 NOTE — TELEPHONE ENCOUNTER
Spoke to Alejandrina Barron who states her office will call to pt.  She wants to spend an hour with the pt at least and is unable to do during pt's office visit on Thursday.    Dian RODRIGUEZ RN, BSN, PAL (Patient Advocate Liaison)  [facility]   [phone]

## 2021-05-03 NOTE — TELEPHONE ENCOUNTER
Hello:  Can we please call Huseyin, and inform him that I would like him to meet with our pharmacist, either in person and over the phone, just to go through his blood test and see if there is anything needs to be changed, given the multiple medications that he is on.  If he is agreeable, can we please book him an appointment with Alejandrina.

## 2021-05-03 NOTE — CONFIDENTIAL NOTE
Yes I definitely can meet with him and help with this. I've entered the MTM referral so that a coordinator can call him to set up an appt.    Alejandrina Barron, PharmD  Medication Therapy Management Provider, [facility]  Pager: [phone]

## 2021-05-06 ENCOUNTER — OFFICE VISIT (OUTPATIENT)
Dept: FAMILY MEDICINE | Facility: CLINIC | Age: 61
End: 2021-05-06
Payer: COMMERCIAL

## 2021-05-06 ENCOUNTER — TELEPHONE (OUTPATIENT)
Dept: FAMILY MEDICINE | Facility: CLINIC | Age: 61
End: 2021-05-06

## 2021-05-06 VITALS
DIASTOLIC BLOOD PRESSURE: 82 MMHG | HEIGHT: 67 IN | TEMPERATURE: 97.8 F | WEIGHT: 181 LBS | BODY MASS INDEX: 28.41 KG/M2 | SYSTOLIC BLOOD PRESSURE: 138 MMHG | OXYGEN SATURATION: 100 % | HEART RATE: 95 BPM | RESPIRATION RATE: 20 BRPM

## 2021-05-06 DIAGNOSIS — Z12.11 SCREEN FOR COLON CANCER: Primary | ICD-10-CM

## 2021-05-06 DIAGNOSIS — E11.59 TYPE 2 DIABETES MELLITUS WITH OTHER CIRCULATORY COMPLICATION, WITH LONG-TERM CURRENT USE OF INSULIN (H): ICD-10-CM

## 2021-05-06 DIAGNOSIS — I25.10 CORONARY ARTERY DISEASE INVOLVING NATIVE CORONARY ARTERY OF NATIVE HEART WITHOUT ANGINA PECTORIS: ICD-10-CM

## 2021-05-06 DIAGNOSIS — Z79.4 TYPE 2 DIABETES MELLITUS WITH OTHER CIRCULATORY COMPLICATION, WITH LONG-TERM CURRENT USE OF INSULIN (H): ICD-10-CM

## 2021-05-06 PROCEDURE — 99214 OFFICE O/P EST MOD 30 MIN: CPT | Performed by: FAMILY MEDICINE

## 2021-05-06 RX ORDER — FLURBIPROFEN SODIUM 0.3 MG/ML
SOLUTION/ DROPS OPHTHALMIC
Qty: 100 EACH | Refills: 3 | Status: SHIPPED | OUTPATIENT
Start: 2021-05-06 | End: 2021-05-11

## 2021-05-06 RX ORDER — ISOSORBIDE DINITRATE 20 MG/1
40 TABLET ORAL 2 TIMES DAILY
Qty: 360 TABLET | Refills: 1 | Status: SHIPPED | OUTPATIENT
Start: 2021-05-06 | End: 2022-11-22

## 2021-05-06 ASSESSMENT — MIFFLIN-ST. JEOR: SCORE: 1589.64

## 2021-05-06 ASSESSMENT — ANXIETY QUESTIONNAIRES
IF YOU CHECKED OFF ANY PROBLEMS ON THIS QUESTIONNAIRE, HOW DIFFICULT HAVE THESE PROBLEMS MADE IT FOR YOU TO DO YOUR WORK, TAKE CARE OF THINGS AT HOME, OR GET ALONG WITH OTHER PEOPLE: NOT DIFFICULT AT ALL
3. WORRYING TOO MUCH ABOUT DIFFERENT THINGS: NEARLY EVERY DAY
2. NOT BEING ABLE TO STOP OR CONTROL WORRYING: NEARLY EVERY DAY
6. BECOMING EASILY ANNOYED OR IRRITABLE: SEVERAL DAYS
7. FEELING AFRAID AS IF SOMETHING AWFUL MIGHT HAPPEN: NOT AT ALL
5. BEING SO RESTLESS THAT IT IS HARD TO SIT STILL: NOT AT ALL
1. FEELING NERVOUS, ANXIOUS, OR ON EDGE: NEARLY EVERY DAY
GAD7 TOTAL SCORE: 11

## 2021-05-06 ASSESSMENT — PATIENT HEALTH QUESTIONNAIRE - PHQ9
SUM OF ALL RESPONSES TO PHQ QUESTIONS 1-9: 9
5. POOR APPETITE OR OVEREATING: SEVERAL DAYS

## 2021-05-06 NOTE — PROGRESS NOTES
Assessment & Plan     Screen for colon cancer      DM 2 w PVD on Insuline, Hgb  A1C 6.8 on 3/4/21  At this time, will start on low dose lantus 10 units daily, pt to call in 1 week if Blood sugar reading is still elevated.   - insulin glargine (LANTUS SOLOSTAR) 100 UNIT/ML pen; Inject 10 Units Subcutaneous At Bedtime  - insulin pen needle (ULTICARE MINI) 31G X 6 MM miscellaneous; Use 1 pen needles daily or as directed.                 Follow up in 1 month.  Hunter Carlton MD  [facility] JOVANNA Fontanez is a 61 year old who presents for the following health issues     HPI     Diabetes Follow-up    How often are you checking your blood sugar? A few times a week  What time of day are you checking your blood sugars (select all that apply)?  Before meals  Have you had any blood sugars above 200?  Yes, pt has been getting higher level of blood sugar lately, ranging between 200 to 250 after meals, and down to 180 before meals.  Have you had any blood sugars below 70?  No    What symptoms do you notice when your blood sugar is low?  None    What concerns do you have today about your diabetes? Blood sugar is often over 200     Do you have any of these symptoms? (Select all that apply)  Numbness in feet    Have you had a diabetic eye exam in the last 12 months? No        BP Readings from Last 2 Encounters:   05/06/21 (!) 142/82   04/29/21 (!) 148/88     Hemoglobin A1C (%)   Date Value   03/04/2021 6.8 (H)   03/01/2020 7.2 (H)     LDL Cholesterol Calculated (mg/dL)   Date Value   03/02/2020 58   07/03/2018 82           How many servings of fruits and vegetables do you eat daily?  0-1    On average, how many sweetened beverages do you drink each day (Examples: soda, juice, sweet tea, etc.  Do NOT count diet or artificially sweetened beverages)?   1    How many days per week do you exercise enough to make your heart beat faster? 3 or less    How many minutes a day do you exercise enough to make  "your heart beat faster? 9 or less    How many days per week do you miss taking your medication? 0        Review of Systems   CONSTITUTIONAL: NEGATIVE for fever, chills, change in weight    CV: NEGATIVE for chest pain, palpitations or peripheral edema      Objective    BP (!) 142/82 (BP Location: Right arm, Patient Position: Chair, Cuff Size: Adult Large)   Pulse 95   Temp 97.8  F (36.6  C) (Oral)   Resp 20   Ht 1.702 m (5' 7\")   Wt 82.1 kg (181 lb)   SpO2 100%   BMI 28.35 kg/m    Body mass index is 28.35 kg/m .  Physical Exam   GENERAL: healthy, alert and no distress  RESP: lungs clear to auscultation - no rales, rhonchi or wheezes  CV: regular rate and rhythm, normal S1 S2, no S3 or S4, no murmur, click or rub, no peripheral edema and peripheral pulses strong                "

## 2021-05-06 NOTE — TELEPHONE ENCOUNTER
Spoke with pharmacy, let's try isosorbig dinitrate 20 mg, 2 tabs twice daily.  Hunter Carlton MD  [facility]  [phone]

## 2021-05-06 NOTE — TELEPHONE ENCOUNTER
Can we call the pharmacy, they did not cover isosorbid dintrate, can we check and see what is covered?

## 2021-05-06 NOTE — TELEPHONE ENCOUNTER
New prescription for the Dilatrate-SR 40mg only comes in brand and is not covered by patient's insurance.  Does the provider want to change the medication or work on a prior authorization?    We can send information for a prior auth. If provider wants to move forward with that.    Thanks,  Judith Meng Marcell  [facility]  [phone]

## 2021-05-06 NOTE — TELEPHONE ENCOUNTER
From the [facility] website    Results  BRAND NAME  generic name Therapeutic Class  Sub-Class Dose/Strength Status Notes & Restrictions  isosorbide dinitrate oral tablet extended release 40 mg  Cardiovascular Drugs  Nitrates And Nitrites tablet extended release 40 mg Tier 2       Tier 2  Non-preferred generics 0% coinsurance after deductible    Dian RODRIGUEZ RN, BSN, PAL (Patient Advocate Liaison)  [facility]   [phone]

## 2021-05-06 NOTE — TELEPHONE ENCOUNTER
"See earlier formulary encounter, pt will pay cash, called our pharmacy to discuss, informs:    ~cannot pay cash FOR DOLORES isosorbide dinitrate due to type of insurance  ~appears pt has 2 active prescriptions on file, isosorbide dinitrate (hospital started) and isosorbide mononitrate (Imdur) 30 mg, [facility] has sent both prescriptions last year  ~pharmacy has been dispensing generic Imdur 30  mg as this is what insurance covers   ~isosorbide dinitrate only comes in BRAND name and will need PA  ~pt has been taking generic Imdur past year    ROUTED TO [facility], PLEASE ADVISE IF YOU WANT TO HAVE PT CONTINUE GENERIC IMDUR? IF SO WILL NEED NEW RX SENT FOR THIS, PLEASE CONFIRM      \"Called to pt. He states he will look into alternatives but for now he will pay craft.     Dian RODRIGUEZ RN, BSN, PAL (Patient Advocate Liaison)  [facility]   [phone]\"    Shena Acevedo RN, BSN  Message handled by CLINIC NURSE.    "

## 2021-05-07 ASSESSMENT — ANXIETY QUESTIONNAIRES: GAD7 TOTAL SCORE: 11

## 2021-05-10 ENCOUNTER — OFFICE VISIT (OUTPATIENT)
Dept: PODIATRY | Facility: CLINIC | Age: 61
End: 2021-05-10
Payer: COMMERCIAL

## 2021-05-10 VITALS
SYSTOLIC BLOOD PRESSURE: 130 MMHG | DIASTOLIC BLOOD PRESSURE: 80 MMHG | BODY MASS INDEX: 26.68 KG/M2 | HEIGHT: 67 IN | WEIGHT: 170 LBS

## 2021-05-10 DIAGNOSIS — E11.59 TYPE 2 DIABETES MELLITUS WITH OTHER CIRCULATORY COMPLICATION, WITH LONG-TERM CURRENT USE OF INSULIN (H): Primary | ICD-10-CM

## 2021-05-10 DIAGNOSIS — E11.42 DIABETIC POLYNEUROPATHY ASSOCIATED WITH TYPE 2 DIABETES MELLITUS (H): ICD-10-CM

## 2021-05-10 DIAGNOSIS — Z79.4 TYPE 2 DIABETES MELLITUS WITH OTHER CIRCULATORY COMPLICATION, WITH LONG-TERM CURRENT USE OF INSULIN (H): Primary | ICD-10-CM

## 2021-05-10 PROCEDURE — 99024 POSTOP FOLLOW-UP VISIT: CPT | Performed by: PODIATRIST

## 2021-05-10 RX ORDER — PREGABALIN 150 MG/1
150 CAPSULE ORAL DAILY
Qty: 60 CAPSULE | Refills: 1 | Status: SHIPPED | OUTPATIENT
Start: 2021-05-10 | End: 2021-05-11

## 2021-05-10 ASSESSMENT — MIFFLIN-ST. JEOR: SCORE: 1539.74

## 2021-05-10 NOTE — PROGRESS NOTES
"Foot & Ankle Surgery  May 10, 2021    S:  Patient in today sp left transmetatarsal amputation for diabetic infection on 3/11/2021.  Pain levels improving.  He is weightbearing in a short Aircast boot, states he is waiting for his shoes to be shipped over from [employer].  He asks why his foot is numb.  He asks for \"Lyrica 150 please\"    /80   Ht 1.702 m (5' 7\")   Wt 77.1 kg (170 lb)   BMI 26.63 kg/m        ROS - positive for CC.  Patient denies current nausea, vomiting, chills, fevers, belly pain, calf pain, chest pain or SOB.  Complete remainder of ROS is otherwise neg.    PE -incisions fully healed without any openings.  Mild swelling, within normal limits for the stage postoperative.  No pathological findings noted.  Skin shows no trophic, color or temperature changes otherwise.  No calf redness, swelling or pain noted otherwise.      A/P - 61 year old yo patient approx 8+ weeks sp above procedure  -Continue weightbearing as tolerated in Aircast walking boot until he gets his diabetic shoes and orthotics; point, transition to shoes and activities as tolerated  -Advise twice daily monitoring of feet  -Prescription for Lyrica 150 mg daily.  Discussed with him.  Paper prescription dispensed.  He states he will follow up with his doctor in [place] for future prescriptions    Follow up  -  Prn or sooner with acute issues        Sergey Barnes DPM FACFAS FACFAOM  Podiatric Foot & Ankle Surgeon  [facility]  [phone]    Disclaimer: This note consists of symbols derived from keyboarding, dictation and/or voice recognition software. As a result, there may be errors in the script that have gone undetected. Please consider this when interpreting information found in this chart.      "

## 2021-05-10 NOTE — LETTER
"    5/10/2021         RE: Huseyin Pettit  [address] [place] [address]        Dear Colleague,    Thank you for referring your patient, Huseyin Pettit, to the [facility]. Please see a copy of my visit note below.    Foot & Ankle Surgery  May 10, 2021    S:  Patient in today sp left transmetatarsal amputation for diabetic infection on 3/11/2021.  Pain levels improving.  He is weightbearing in a short Aircast boot, states he is waiting for his shoes to be shipped over from [employer].  He asks why his foot is numb.  He asks for \"Lyrica 150 please\"    /80   Ht 1.702 m (5' 7\")   Wt 77.1 kg (170 lb)   BMI 26.63 kg/m        ROS - positive for CC.  Patient denies current nausea, vomiting, chills, fevers, belly pain, calf pain, chest pain or SOB.  Complete remainder of ROS is otherwise neg.    PE -incisions fully healed without any openings.  Mild swelling, within normal limits for the stage postoperative.  No pathological findings noted.  Skin shows no trophic, color or temperature changes otherwise.  No calf redness, swelling or pain noted otherwise.      A/P - 61 year old yo patient approx 8+ weeks sp above procedure  -Continue weightbearing as tolerated in Aircast walking boot until he gets his diabetic shoes and orthotics; point, transition to shoes and activities as tolerated  -Advise twice daily monitoring of feet  -Prescription for Lyrica 150 mg daily.  Discussed with him.  Paper prescription dispensed.  He states he will follow up with his doctor in [place] for future prescriptions    Follow up  -  Prn or sooner with acute issues        Sergey Barnes DPM FACFAS FACFAOM  Podiatric Foot & Ankle Surgeon  [facility]  [phone]    Disclaimer: This note consists of symbols derived from keyboarding, dictation and/or voice recognition software. As a result, there may be errors in the script that have gone undetected. Please consider this when " interpreting information found in this chart.          Again, thank you for allowing me to participate in the care of your patient.        Sincerely,        Sergey Barnes DPM, CAROL

## 2021-05-10 NOTE — PROGRESS NOTES
Medication Therapy Management (MTM) Encounter    ASSESSMENT:                            Medication Adherence/Access: No issues identified    Type 2 Diabetes: Patient is meeting A1c goal of < 7%. Self monitoring of blood glucose is at goal of fasting  mg/dL. To minimize injections, will increase glipizide dose to be able to discontinue insulin.    Hypertension/CAD: stable, continue to monitor as last office blood pressure was borderline high.     Hyperlipidemia: stable    Smoking cessation: Patient continues to use tobacco. Patient is not ready to quit using tobacco.  We discussed: risks of smoking and benefits of quitting.    Depression/Anxiety/OCD/Insomnia: He is on multiple medications at high doses, concerned for adverse effects and drug interactions. Quetiapine may be contributing to weight gain and worsened A1c control so recommend continuing to decrease quetiapine dose. At current quetiapine doses, effective for antipsychotic symptoms rather than just insomnia or depression. In the future, may consider decreasing clomipramine dose d/t interaction with sertraline increasing exposure leading to increased risk of serotonin syndrome and QT prolongation.     GERD: stable  Pain: stable  Nausea: Recommend only using ondansetron as needed d/t QT prolongation risk.  Supplements: stable    PLAN:                            1. Only use ondansetron 4mg as needed, not scheduled daily.  2. Decrease quetiapine to 300mg bedtime for 7 days then decrease to 200mg bedtime.  3. Increase glipizide XL to 5mg daily and stop Lantus.     Follow-up: Return in about 1 month (around 6/11/2021) for Medication Therapy Management Pharmacist.      SUBJECTIVE/OBJECTIVE:                          Huseyin Pettit is a 61 year old male called for an initial visit. He was referred to me from Dr. Carlton.      Reason for visit: Medication review, polypharmacy of mental health medications.    Allergies/ADRs: None  Tobacco: He reports that he has  been smoking. He has a 12.50 pack-year smoking history. He has never used smokeless tobacco.Tobacco Cessation Action Plan:   Information offered: Patient not interested at this time  Alcohol: not currently using  Caffeine: 2 cups/day of coffee  Activity: minimal, not since leg amputation  Past Medical History: Reviewed in chart      Medication Adherence/Access: no issues reported    Type 2 Diabetes:  Currently taking Lantus 10 units daily, metformin 1000mg twice daily, glipizide XL 2.5mg daily. Patient is not experiencing side effects.  Blood sugar monitoring: weekly. Ranges (patient reported): Fasting- <100  Symptoms of low blood sugar? none  Symptoms of high blood sugar? polyuria  Eye exam: due  Foot exam: up to date  Diet/Exercise: Minimal exercise b/c foot amputation. Did not discuss diet.  Aspirin: Taking 81mg daily and denies side effects  Statin: Yes: rosuvastatin   ACEi/ARB: Yes: valsartan.   Urine Albumin:   Lab Results   Component Value Date    UMALCR 41.24 (H) 03/10/2020      Lab Results   Component Value Date    A1C 6.8 03/04/2021    A1C 7.2 03/01/2020    A1C 12.5 07/03/2018       Hypertension/CAD: Current medications include clopidogrel 75mg daily, isosorbide dinitrate 40mg twice daily, amlodipine-valsartan 10-160mg daily, and bisoprolol 5mg daily.  Patient does self-monitor blood pressure. Home BP monitoring in range of 130's systolic over 80's diastolic.  Patient reports no current medication side effects.  Estimated Creatinine Clearance: 57.1 mL/min (A) (based on SCr of 1.5 mg/dL (H)).  BP Readings from Last 3 Encounters:   05/10/21 130/80   05/06/21 138/82   04/29/21 (!) 148/88     Creatinine   Date Value Ref Range Status   04/26/2021 1.50 (H) 0.66 - 1.25 mg/dL Final       Hyperlipidemia: Current therapy includes rosuvastatin 20mg daily.  Patient reports no significant myalgias or other side effects..  Recent Labs   Lab Test 03/02/20  0741 07/03/18  0906   CHOL 123 146   HDL 25* 35*   LDL 58 82    TRIG 202* 145       Smoking Cessation: Currently smoking about 10 cigs/day. Been able to quit in the past. Currently using albuterol MDI as needed (uses twice daily b/c smoking). Feels he is short of breath from smoking, no asthma. Interested in quitting but he will need time to contemplate this.     Depression/Anxiety/OCD/Insomnia:  Current medications include: divalproex DR 1000mg twice daily, clomipramine 100mg twice daily (originally prescribed in [place]), sertraline 100mg daily (works better now than Prozac), quetiapine 400mg bedtime (for insomnia, decreased from 600mg per PCP), and buspirone 10 mg four times daily. Pt reports that depression symptoms are unchanged and stable. Wakes up okay, no grogginess. Feels buspirone and sertraline very helpful for his anxiety and he would like to remain on these.   Past med trials:   Fluoxetine (ineffective)  PHQ-9 SCORE 7/10/2018 4/23/2020 5/6/2021   PHQ-9 Total Score 4 7 9     MILES-7 SCORE 4/23/2020 5/6/2021   Total Score 13 11       GERD: Current medications include: Nexium (esomeprazole) 40mg once daily. Pt reports no current symptoms.  Patient feels that current regimen is effective.    Pain: Patient taking pregabalin 150mg twice daily, lidocaine 4% patch daily as needed (not needing), cyclobenzaprine 5mg bedtime as needed (occasionally). No issues reported.      Nausea: Patient taking ondansetron 4mg as needed (takes one daily). Reports he's had bad nausea in the past but none now, still taking every day however.     Supplements: Currently taking milk of magnesia as needed (not needing) and B complex daily. No reported issues at this time.    Today's Vitals: There were no vitals taken for this visit.  ----------------  Post Discharge Medication Reconciliation Status: discharge medications reconciled and changed, per note/orders.    I spent 30 minutes with this patient today. All changes were made via collaborative practice agreement with Hunter Carlton. A copy of the  visit note was provided to the patient's primary care provider.    The patient declined a summary of these recommendations.     Alejandrina Barron, PharmD  Medication Therapy Management Provider, [facility]  Pager: [phone]    Telemedicine Visit Details   Type of service:  Telephone visit  Start Time: 3:00 PM  End Time: 3:30 PM  Originating Location (patient location): Home  Distant Location (provider location):  [facility]        Medication Therapy Recommendations  Insomnia, unspecified type    Current Medication: QUEtiapine (SEROQUEL) 200 MG tablet   Rationale: Dose too high - Dosage too high - Safety   Recommendation: Decrease Dose   Status: Accepted per CPA         Nausea    Current Medication: ondansetron (ZOFRAN) 4 MG tablet   Rationale: Frequency inappropriate - Dosage too high - Safety   Recommendation: Decrease Frequency   Status: Patient Agreed - Adherence/Education         Type 2 diabetes mellitus with other circulatory complication, with long-term current use of insulin (H)    Current Medication: glipiZIDE (GLUCOTROL XL) 5 MG 24 hr tablet   Rationale: Dose too low - Dosage too low - Effectiveness   Recommendation: Increase Dose - insulin glargine 100 UNIT/ML pen - Increase glipizide XL to 5mg daily and stop Lantus.   Status: Accepted per CPA

## 2021-05-10 NOTE — PATIENT INSTRUCTIONS
Recommendations from today's MTM visit:                                                    MTM (medication therapy management) is a service provided by a clinical pharmacist designed to help you get the most of out of your medicines.   Today we reviewed what your medicines are for, how to know if they are working, that your medicines are safe and how to make your medicine regimen as easy as possible.      1. Only use ondansetron 4mg as needed, not scheduled daily.  2. Decrease quetiapine to 300mg bedtime for 7 days then decrease to 200mg bedtime.  3. Increase glipizide XL to 5mg daily and stop Lantus.     Follow-up: Return in about 1 month (around 6/11/2021) for Medication Therapy Management Pharmacist.    It was great to speak with you today.  I value your experience and would be very thankful for your time with providing feedback on our clinic survey. You may receive a survey via email or text message in the next few days.     To schedule another MTM appointment, please call the clinic directly or you may call the MTM scheduling line at [phone] or toll-free at [phone].     My Clinical Pharmacist's contact information:                                                      Please feel free to contact me with any questions or concerns you have.      Alejandrina Barron, PharmD  Medication Therapy Management Provider, [facility]

## 2021-05-11 ENCOUNTER — VIRTUAL VISIT (OUTPATIENT)
Dept: PHARMACY | Facility: CLINIC | Age: 61
End: 2021-05-11
Attending: FAMILY MEDICINE
Payer: COMMERCIAL

## 2021-05-11 DIAGNOSIS — F42.9 OBSESSIVE-COMPULSIVE DISORDER, UNSPECIFIED TYPE: ICD-10-CM

## 2021-05-11 DIAGNOSIS — Z79.4 TYPE 2 DIABETES MELLITUS WITH OTHER CIRCULATORY COMPLICATION, WITH LONG-TERM CURRENT USE OF INSULIN (H): Primary | ICD-10-CM

## 2021-05-11 DIAGNOSIS — K21.9 GASTROESOPHAGEAL REFLUX DISEASE, UNSPECIFIED WHETHER ESOPHAGITIS PRESENT: ICD-10-CM

## 2021-05-11 DIAGNOSIS — F41.1 GAD (GENERALIZED ANXIETY DISORDER): ICD-10-CM

## 2021-05-11 DIAGNOSIS — E78.5 HYPERLIPIDEMIA LDL GOAL <100: ICD-10-CM

## 2021-05-11 DIAGNOSIS — G89.4 CHRONIC PAIN SYNDROME: ICD-10-CM

## 2021-05-11 DIAGNOSIS — Z89.421 S/P AMPUTATION OF LESSER TOE, RIGHT (H): ICD-10-CM

## 2021-05-11 DIAGNOSIS — E11.59 TYPE 2 DIABETES MELLITUS WITH OTHER CIRCULATORY COMPLICATION, WITH LONG-TERM CURRENT USE OF INSULIN (H): Primary | ICD-10-CM

## 2021-05-11 DIAGNOSIS — I25.10 CORONARY ARTERY DISEASE INVOLVING NATIVE CORONARY ARTERY OF NATIVE HEART WITHOUT ANGINA PECTORIS: ICD-10-CM

## 2021-05-11 DIAGNOSIS — Z72.0 TOBACCO ABUSE: ICD-10-CM

## 2021-05-11 DIAGNOSIS — Z78.9 TAKES DIETARY SUPPLEMENTS: ICD-10-CM

## 2021-05-11 DIAGNOSIS — G47.00 INSOMNIA, UNSPECIFIED TYPE: ICD-10-CM

## 2021-05-11 DIAGNOSIS — R11.0 NAUSEA: ICD-10-CM

## 2021-05-11 DIAGNOSIS — F33.2 SEVERE EPISODE OF RECURRENT MAJOR DEPRESSIVE DISORDER, WITHOUT PSYCHOTIC FEATURES (H): ICD-10-CM

## 2021-05-11 DIAGNOSIS — I10 BENIGN ESSENTIAL HYPERTENSION: ICD-10-CM

## 2021-05-11 PROCEDURE — 99605 MTMS BY PHARM NP 15 MIN: CPT | Performed by: PHARMACIST

## 2021-05-11 PROCEDURE — 99607 MTMS BY PHARM ADDL 15 MIN: CPT | Performed by: PHARMACIST

## 2021-05-11 RX ORDER — QUETIAPINE FUMARATE 300 MG/1
300 TABLET, FILM COATED ORAL AT BEDTIME
Qty: 7 TABLET | Refills: 0 | Status: SHIPPED | OUTPATIENT
Start: 2021-05-11 | End: 2021-05-11 | Stop reason: DRUGHIGH

## 2021-05-11 RX ORDER — GLIPIZIDE 5 MG/1
5 TABLET, FILM COATED, EXTENDED RELEASE ORAL DAILY
Qty: 90 TABLET | Refills: 1 | Status: SHIPPED | OUTPATIENT
Start: 2021-05-11 | End: 2022-11-22

## 2021-05-11 RX ORDER — QUETIAPINE FUMARATE 200 MG/1
200 TABLET, FILM COATED ORAL AT BEDTIME
Qty: 90 TABLET | Refills: 0 | Status: SHIPPED | OUTPATIENT
Start: 2021-05-11 | End: 2022-11-22

## 2021-05-11 NOTE — Clinical Note
YI - decreasing his Seroquel dose further and next month will consider decreasing clomipramine dose d/t multiple drug interactions. I wonder if he's diagnosed with OCD in addition to depression/anxiety?

## 2021-05-14 ENCOUNTER — TELEPHONE (OUTPATIENT)
Dept: FAMILY MEDICINE | Facility: CLINIC | Age: 61
End: 2021-05-14

## 2021-05-14 DIAGNOSIS — K64.4 EXTERNAL HEMORRHOIDS: Primary | ICD-10-CM

## 2021-05-14 NOTE — TELEPHONE ENCOUNTER
Routed to AA, see pt request, wants suppository rx for hemorrhoids, advise if appointment needed    Shena Acevedo RN, BSN  Message handled by CLINIC NURSE.

## 2021-05-14 NOTE — TELEPHONE ENCOUNTER
Patient called and is requesting suppositories for his hemoroids.    If approved, please send a new prescriptions.    Thanks,  Judith Meng Marcell  [facility]  [phone]

## 2021-05-16 RX ORDER — HYDROCORTISONE ACETATE 25 MG/1
25 SUPPOSITORY RECTAL 2 TIMES DAILY
Qty: 30 SUPPOSITORY | Refills: 1 | Status: SHIPPED | OUTPATIENT
Start: 2021-05-16 | End: 2023-07-28

## 2021-05-16 NOTE — TELEPHONE ENCOUNTER
Sure, we can do suppositories .  Hunter Carlton MD  [facility]  [phone]

## 2021-05-24 ENCOUNTER — OFFICE VISIT (OUTPATIENT)
Dept: PODIATRY | Facility: CLINIC | Age: 61
End: 2021-05-24
Payer: COMMERCIAL

## 2021-05-24 VITALS
BODY MASS INDEX: 26.68 KG/M2 | DIASTOLIC BLOOD PRESSURE: 84 MMHG | SYSTOLIC BLOOD PRESSURE: 142 MMHG | HEIGHT: 67 IN | WEIGHT: 170 LBS

## 2021-05-24 DIAGNOSIS — E11.59 TYPE 2 DIABETES MELLITUS WITH OTHER CIRCULATORY COMPLICATION, WITH LONG-TERM CURRENT USE OF INSULIN (H): Primary | ICD-10-CM

## 2021-05-24 DIAGNOSIS — Z79.4 TYPE 2 DIABETES MELLITUS WITH OTHER CIRCULATORY COMPLICATION, WITH LONG-TERM CURRENT USE OF INSULIN (H): Primary | ICD-10-CM

## 2021-05-24 DIAGNOSIS — Z98.890 S/P FOOT SURGERY, LEFT: ICD-10-CM

## 2021-05-24 PROCEDURE — 99024 POSTOP FOLLOW-UP VISIT: CPT | Performed by: PODIATRIST

## 2021-05-24 ASSESSMENT — MIFFLIN-ST. JEOR: SCORE: 1539.74

## 2021-05-24 NOTE — PROGRESS NOTES
"Foot & Ankle Surgery   May 24, 2021    S:  Pt is seen today for evaluation of left foot status post transmetatarsal amputation for diabetic infection on 3/11/2021.  He continues to wear the walking cast boot.  He states his orthotics were delivered to the wrong address.  I saw him 2 weeks ago and advised a as needed follow-up as the wound was healed but he insisted on coming in today.  He has bandages along the incision and requests oxycodone.    Vitals:    05/24/21 1331   BP: (!) 142/84   Weight: 77.1 kg (170 lb)   Height: 1.702 m (5' 7\")   '      ROS - Pos for CC.  Patient denies current nausea, vomiting, chills, fevers, belly pain, calf pain, chest pain or SOB.  Complete remainder of ROS it otherwise neg.      PE:  Gen:   No apparent distress  Eye:    Visual scanning without deficit  Ear:    Response to auditory stimuli wnl  Lung:    Non-labored breathing on RA noted  Abd:    NTND per patient report  Lymph:    Neg for pitting/non-pitting edema BLE  Vasc:    Pulses palpable, CFT minimally delayed  Neuro:    Light touch sensation diminished distally  Derm:    The incision is 100% healed/epithelialized  MSK:    Left lower extremity transmetatarsal amputation  Calf:    Neg for redness, swelling or tenderness    Assessment:  61 year old male with well-healed surgical site left foot in setting of diabetes mellitus with peripheral neuropathy 10 weeks status post transmetatarsal amputation      Plan:  Discussed etiologies, anatomy and options  1.  Well-healed surgical site left foot in setting of diabetes mellitus with peripheral neuropathy 10 weeks status post transmetatarsal amputation  -He has bandages along the incision.  Told him this is no longer necessary as the wound is 100% epithelialized  -His orthotics were ordered 04/27/2021.  It sounds like they were delivered to the wrong address but he should be getting them soon.  Continue weightbearing as tolerated in the short Aircast  -Regarding the oxycodone, I stated " in no uncertain terms that I will not be prescribing him any further pain medication as his surgical site is healed, and narcotics are a poor means of addressing chronic/neuropathy pain.  I offered pain management referral, he did not seem interested in this    Follow up: As needed or sooner with acute issues           Sergey Barnes DPM FACFAS FACFAOM  Podiatric Foot & Ankle Surgeon  [facility]  [phone]    Disclaimer: This note consists of symbols derived from keyboarding, dictation and/or voice recognition software. As a result, there may be errors in the script that have gone undetected. Please consider this when interpreting information found in this chart.

## 2021-10-19 ENCOUNTER — TELEPHONE (OUTPATIENT)
Dept: FAMILY MEDICINE | Facility: CLINIC | Age: 61
End: 2021-10-19

## 2021-10-19 NOTE — CONFIDENTIAL NOTE
Please call patient to schedule appt - due for PCP or MTM for labs and follow-up. May schedule covisit if desired as well.    Alejandrina Barron, PharmD  [facility]  Pager: [phone]

## 2021-10-19 NOTE — LETTER
[facility]  [address] [place], [address]  [phone]        October 27, 2021    Huseyin Pettit                                                                                                                                                       [address] [facility] [address]            Dear Huseyin,      We have left you several messages to return call to the clinic.  You are due to a follow-up appointment with either Dr. Carlton or Alejandrina Barron, PharmD.  Please call the clinic to schedule that appointment.  Please let us know if you need any further assistance.      Healthy regards,    Your [facility] Care Team

## 2021-10-19 NOTE — TELEPHONE ENCOUNTER
Called patient LM with family member to tell patient to call the clinic back .  If patient calls back please help schedule appt.    Aimee De La Torre

## 2021-10-19 NOTE — TELEPHONE ENCOUNTER
Please call patient to schedule appt - due for PCP or MTM for labs and follow-up. May schedule covisit if desired as well.     Alejandrina Barron, PharmD  Med[facility] Therapy [facility]  Pager: [phone]

## 2021-11-20 DIAGNOSIS — Z79.4 TYPE 2 DIABETES MELLITUS WITH OTHER CIRCULATORY COMPLICATION, WITH LONG-TERM CURRENT USE OF INSULIN (H): ICD-10-CM

## 2021-11-20 DIAGNOSIS — E11.59 TYPE 2 DIABETES MELLITUS WITH OTHER CIRCULATORY COMPLICATION, WITH LONG-TERM CURRENT USE OF INSULIN (H): ICD-10-CM

## 2021-11-22 RX ORDER — GLIPIZIDE 5 MG/1
5 TABLET, FILM COATED, EXTENDED RELEASE ORAL DAILY
Qty: 90 TABLET | Refills: 1 | OUTPATIENT
Start: 2021-11-22

## 2021-11-22 NOTE — TELEPHONE ENCOUNTER
Routing refill request to provider for review/approval because:   Sulfonylurea Agents Failed 11/20/2021 07:23 AM   Protocol Details  Patient has documented A1c within the specified period of time.    Patient has a recent creatinine (normal) within the past 12 mos.    Recent (6 mo) or future (30 days) visit within the authorizing provider's specialty        Lab Results   Component Value Date    A1C 6.8 03/04/2021    A1C 7.2 03/01/2020    A1C 12.5 07/03/2018     Creatinine   Date Value Ref Range Status   04/26/2021 1.50 (H) 0.66 - 1.25 mg/dL Final     Amelia Judd RN

## 2022-10-04 PROBLEM — E11.65 TYPE 2 DIABETES MELLITUS WITH HYPERGLYCEMIA (H): Status: RESOLVED | Noted: 2020-02-29 | Resolved: 2020-03-10

## 2022-11-13 ENCOUNTER — NURSE TRIAGE (OUTPATIENT)
Dept: NURSING | Facility: CLINIC | Age: 62
End: 2022-11-13

## 2022-11-13 NOTE — TELEPHONE ENCOUNTER
"Triage Call:     Pt calling to report that he is having chest pain for the past week    Blood pressure has been up and down  Now: 140/95  Chest pain is \"heavy\" right now  Hx of heart attack 1 year ago per his report    Disposition:  Call 911 now. Pt reports that he is going to get a ride to the ED rather than call 911. Writer advised patient to call 911 per the protocol.     Varsha Dyer RN  [facility] Nurse Advisor 10:35 AM 11/13/2022        Reason for Disposition    [1] Chest pain lasts > 5 minutes AND [2] age > 30 AND [3] one or more cardiac risk factors (e.g., diabetes, high blood pressure, high cholesterol, smoker, or strong family history of heart disease)    [1] Chest pain lasts > 5 minutes AND [2] described as crushing, pressure-like, or heavy    [1] Chest pain lasts > 5 minutes AND [2] history of heart disease (i.e., angina, heart attack, heart failure, bypass surgery, takes nitroglycerin)    [1] Chest pain lasts > 5 minutes AND [2] age > 44    Additional Information    Negative: SEVERE difficulty breathing (e.g., struggling for each breath, speaks in single words)    Negative: Difficult to awaken or acting confused (e.g., disoriented, slurred speech)    Negative: Shock suspected (e.g., cold/pale/clammy skin, too weak to stand, low BP, rapid pulse)    Negative: Passed out (i.e., lost consciousness, collapsed and was not responding)    Protocols used: CHEST PAIN-A-AH      "

## 2022-11-17 ENCOUNTER — HOSPITAL ENCOUNTER (EMERGENCY)
Facility: CLINIC | Age: 62
Discharge: HOME | End: 2022-11-17
Attending: EMERGENCY MEDICINE | Admitting: EMERGENCY MEDICINE
Payer: COMMERCIAL

## 2022-11-17 ENCOUNTER — APPOINTMENT (OUTPATIENT)
Dept: GENERAL RADIOLOGY | Facility: CLINIC | Age: 62
End: 2022-11-17
Attending: EMERGENCY MEDICINE
Payer: COMMERCIAL

## 2022-11-17 VITALS
TEMPERATURE: 97.8 F | RESPIRATION RATE: 16 BRPM | HEART RATE: 90 BPM | OXYGEN SATURATION: 95 % | DIASTOLIC BLOOD PRESSURE: 84 MMHG | SYSTOLIC BLOOD PRESSURE: 139 MMHG

## 2022-11-17 DIAGNOSIS — I25.10 CORONARY ARTERY DISEASE INVOLVING NATIVE CORONARY ARTERY OF NATIVE HEART WITHOUT ANGINA PECTORIS: ICD-10-CM

## 2022-11-17 DIAGNOSIS — R07.9 ACUTE CHEST PAIN: ICD-10-CM

## 2022-11-17 DIAGNOSIS — Z13.220 SCREENING FOR HYPERLIPIDEMIA: ICD-10-CM

## 2022-11-17 LAB
ANION GAP SERPL CALCULATED.3IONS-SCNC: 13 MMOL/L (ref 7–15)
BASOPHILS # BLD AUTO: 0 10E3/UL (ref 0–0.2)
BASOPHILS NFR BLD AUTO: 0 %
BUN SERPL-MCNC: 28.6 MG/DL (ref 8–23)
CALCIUM SERPL-MCNC: 9.1 MG/DL (ref 8.8–10.2)
CHLORIDE SERPL-SCNC: 102 MMOL/L (ref 98–107)
CREAT SERPL-MCNC: 0.99 MG/DL (ref 0.67–1.17)
D DIMER PPP FEU-MCNC: <0.27 UG/ML FEU (ref 0–0.5)
DEPRECATED HCO3 PLAS-SCNC: 24 MMOL/L (ref 22–29)
EOSINOPHIL # BLD AUTO: 0.1 10E3/UL (ref 0–0.7)
EOSINOPHIL NFR BLD AUTO: 1 %
ERYTHROCYTE [DISTWIDTH] IN BLOOD BY AUTOMATED COUNT: 16.4 % (ref 10–15)
GFR SERPL CREATININE-BSD FRML MDRD: 87 ML/MIN/1.73M2
GLUCOSE SERPL-MCNC: 151 MG/DL (ref 70–99)
HCT VFR BLD AUTO: 41 % (ref 40–53)
HGB BLD-MCNC: 13.1 G/DL (ref 13.3–17.7)
HOLD SPECIMEN: NORMAL
IMM GRANULOCYTES # BLD: 0.1 10E3/UL
IMM GRANULOCYTES NFR BLD: 1 %
LYMPHOCYTES # BLD AUTO: 3.3 10E3/UL (ref 0.8–5.3)
LYMPHOCYTES NFR BLD AUTO: 30 %
MCH RBC QN AUTO: 29.4 PG (ref 26.5–33)
MCHC RBC AUTO-ENTMCNC: 32 G/DL (ref 31.5–36.5)
MCV RBC AUTO: 92 FL (ref 78–100)
MONOCYTES # BLD AUTO: 0.9 10E3/UL (ref 0–1.3)
MONOCYTES NFR BLD AUTO: 8 %
NEUTROPHILS # BLD AUTO: 6.6 10E3/UL (ref 1.6–8.3)
NEUTROPHILS NFR BLD AUTO: 60 %
NRBC # BLD AUTO: 0 10E3/UL
NRBC BLD AUTO-RTO: 0 /100
PLATELET # BLD AUTO: 296 10E3/UL (ref 150–450)
POTASSIUM SERPL-SCNC: 4.6 MMOL/L (ref 3.4–5.3)
RBC # BLD AUTO: 4.46 10E6/UL (ref 4.4–5.9)
SODIUM SERPL-SCNC: 139 MMOL/L (ref 136–145)
TROPONIN T SERPL HS-MCNC: 14 NG/L
TROPONIN T SERPL HS-MCNC: 16 NG/L
WBC # BLD AUTO: 11 10E3/UL (ref 4–11)

## 2022-11-17 PROCEDURE — 99285 EMERGENCY DEPT VISIT HI MDM: CPT | Mod: 25

## 2022-11-17 PROCEDURE — 84484 ASSAY OF TROPONIN QUANT: CPT | Performed by: EMERGENCY MEDICINE

## 2022-11-17 PROCEDURE — 93005 ELECTROCARDIOGRAM TRACING: CPT

## 2022-11-17 PROCEDURE — 36415 COLL VENOUS BLD VENIPUNCTURE: CPT | Performed by: EMERGENCY MEDICINE

## 2022-11-17 PROCEDURE — 96376 TX/PRO/DX INJ SAME DRUG ADON: CPT

## 2022-11-17 PROCEDURE — 250N000011 HC RX IP 250 OP 636: Performed by: EMERGENCY MEDICINE

## 2022-11-17 PROCEDURE — 83036 HEMOGLOBIN GLYCOSYLATED A1C: CPT

## 2022-11-17 PROCEDURE — 71046 X-RAY EXAM CHEST 2 VIEWS: CPT

## 2022-11-17 PROCEDURE — 250N000013 HC RX MED GY IP 250 OP 250 PS 637: Performed by: EMERGENCY MEDICINE

## 2022-11-17 PROCEDURE — 96374 THER/PROPH/DIAG INJ IV PUSH: CPT

## 2022-11-17 PROCEDURE — 80048 BASIC METABOLIC PNL TOTAL CA: CPT | Performed by: EMERGENCY MEDICINE

## 2022-11-17 PROCEDURE — 250N000009 HC RX 250: Performed by: EMERGENCY MEDICINE

## 2022-11-17 PROCEDURE — 80061 LIPID PANEL: CPT

## 2022-11-17 PROCEDURE — 85025 COMPLETE CBC W/AUTO DIFF WBC: CPT | Performed by: EMERGENCY MEDICINE

## 2022-11-17 PROCEDURE — 96375 TX/PRO/DX INJ NEW DRUG ADDON: CPT

## 2022-11-17 PROCEDURE — 85004 AUTOMATED DIFF WBC COUNT: CPT | Performed by: EMERGENCY MEDICINE

## 2022-11-17 PROCEDURE — 85379 FIBRIN DEGRADATION QUANT: CPT | Performed by: EMERGENCY MEDICINE

## 2022-11-17 RX ORDER — HYDROMORPHONE HYDROCHLORIDE 1 MG/ML
0.3 INJECTION, SOLUTION INTRAMUSCULAR; INTRAVENOUS; SUBCUTANEOUS EVERY 30 MIN PRN
Status: DISCONTINUED | OUTPATIENT
Start: 2022-11-17 | End: 2022-11-17 | Stop reason: HOSPADM

## 2022-11-17 RX ORDER — ONDANSETRON 2 MG/ML
4 INJECTION INTRAMUSCULAR; INTRAVENOUS ONCE
Status: COMPLETED | OUTPATIENT
Start: 2022-11-17 | End: 2022-11-17

## 2022-11-17 RX ADMIN — HYDROMORPHONE HYDROCHLORIDE 0.3 MG: 1 INJECTION, SOLUTION INTRAMUSCULAR; INTRAVENOUS; SUBCUTANEOUS at 02:20

## 2022-11-17 RX ADMIN — ALUMINUM HYDROXIDE, MAGNESIUM HYDROXIDE, AND DIMETHICONE 30 ML: 200; 20; 200 SUSPENSION ORAL at 01:49

## 2022-11-17 RX ADMIN — ONDANSETRON 4 MG: 2 INJECTION INTRAMUSCULAR; INTRAVENOUS at 01:48

## 2022-11-17 RX ADMIN — HYDROMORPHONE HYDROCHLORIDE 0.3 MG: 1 INJECTION, SOLUTION INTRAMUSCULAR; INTRAVENOUS; SUBCUTANEOUS at 03:25

## 2022-11-17 ASSESSMENT — ENCOUNTER SYMPTOMS
SORE THROAT: 0
CONSTIPATION: 1
COUGH: 1
FEVER: 0
NUMBNESS: 1

## 2022-11-17 ASSESSMENT — ACTIVITIES OF DAILY LIVING (ADL)
ADLS_ACUITY_SCORE: 33
ADLS_ACUITY_SCORE: 35

## 2022-11-17 NOTE — ED TRIAGE NOTES
Pt presents with substernal chest pain that started this AM. Since this morning pain has only worsened. He endorses SOB and occasional radiation into left shoulder. Pt has hx of 3 stents.      Triage Assessment     Row Name 11/17/22 0018       Triage Assessment (Adult)    Airway WDL WDL       Respiratory WDL    Respiratory WDL WDL       Skin Circulation/Temperature WDL    Skin Circulation/Temperature WDL WDL       Cardiac WDL    Cardiac WDL WDL       Peripheral/Neurovascular WDL    Peripheral Neurovascular WDL WDL       Cognitive/Neuro/Behavioral WDL    Cognitive/Neuro/Behavioral WDL WDL

## 2022-11-17 NOTE — ED PROVIDER NOTES
History   Chief Complaint:  Chest Pain       The history is provided by the patient.      Huseyin Pettit is a 62 year old male with history of hyperlipidemia, hypertension, DM2, CKD, and peripheral vascular disease  who presents with chest pain. The patient reports that he woke up this morning with a sharp central chest pain which radiates to his left shoulder and is causing numbness in his left hand. He states that he took aspirin which provided temporary relief. He also adds that he took nausea and heart burn medication this morning. He reports that he was in the ED several years ago for a similar symptoms at which time the doctor wanted to do a heart catheterization but he refused. He notes that he now has 3 stents. He now states that he wants to stay overnight in the ED and have a heart catheterization done. . He had all 5 toes amputated on his left foot 2 years ago and uses a boot to ambulate.     Review of Systems   Constitutional: Negative for fever.   HENT: Negative for sore throat.    Respiratory: Positive for cough.    Cardiovascular: Positive for chest pain.   Gastrointestinal: Positive for constipation.   Neurological: Positive for numbness.   All other systems reviewed and are negative.      Allergies:  No Known Allergies    Medications:  Albuterol inhaler  Amlodipine- valsartan   Aspirin 81mg   Bisoprolol   Buspirone    Clomipramine  Clopidogrel   Cyclobenzaprine   Depakote   Esomeprazole   Glipizide   Hydrocortisone   Isosorbide dinitrate  Lidocaine patch   Magnesium hydroxide  Metformin   Ondansetron   Pregabalin   Quetiapine   Rosuvastatin   Sertraline     Past Medical History:     Hyperlipidemia  Hypertension   Depression   Coronary artery disease   DM 2  Closed fracture of multiple ribs of left side  Tobacco abuse  Anxiety   Adenomatous polyp of colon   CKD stage 2   Chronic pain syndrome  Osteomyelitis of left foot  Septic arthritis   Anemia   Peripheral vascular disease     Past Surgical  History:    Amputation of right great toe  Cholecystectomy   Coronary angiogram   I&D foot      Social History:  Presents via private vehicle   Presents alone  PCP: No primary care provider on file.     Physical Exam     Patient Vitals for the past 24 hrs:   BP Temp Temp src Pulse Resp SpO2   11/17/22 0315 139/84 -- -- 90 -- 95 %   11/17/22 0300 (!) 141/83 -- -- 89 -- 95 %   11/17/22 0230 126/81 -- -- 89 -- 95 %   11/17/22 0215 134/85 -- -- 93 -- 95 %   11/17/22 0145 136/89 -- -- 95 -- 97 %   11/17/22 0130 (!) 143/93 -- -- 96 -- 97 %   11/17/22 0115 (!) 141/84 -- -- 97 -- 98 %   11/17/22 0020 (!) 159/89 97.8  F (36.6  C) Temporal 101 16 100 %       Physical Exam  Gen: well appearing, in no acute distress  HENT:  mmm, no rhinorrhea  Eyes: periorbital tissues and sclera normal   Neck: supple, no abnormal swelling  Lungs:  CTAB,  no resp distress  CV: rrr, no m/r/g, ppi  Abd: soft, nontender, nondistended, no rebound/masses/guarding/hsm  Ext: no peripheral edema  Skin: warm, dry, well perfused, no rashes/bruising/lesions on exposed skin  Neuro: alert, MAEE, no gross motor or sensory deficits, gait stable  Psych: Normal mood, normal affect      Emergency Department Course   ECG  ECG taken at 0007, ECG read at 0009  Normal sinus rhythm  Normal ECG   Rate 91 bpm. NE interval 164 ms. QRS duration 86 ms. QT/QTc 380/467 ms. P-R-T axes 43 31 49.     Imaging:  XR Chest 2 Views   Final Result   IMPRESSION: Heart size is normal. Coronary artery stents overlie the left heart border. A few strands of atelectasis or scarring in the lingula. Right lung is clear. No pneumothorax or pleural effusion. Thoracic spine degenerative disc change.        Report per radiology    Laboratory:  Labs Ordered and Resulted from Time of ED Arrival to Time of ED Departure   BASIC METABOLIC PANEL - Abnormal       Result Value    Sodium 139      Potassium 4.6      Chloride 102      Carbon Dioxide (CO2) 24      Anion Gap 13      Urea Nitrogen 28.6 (*)      Creatinine 0.99      Calcium 9.1      Glucose 151 (*)     GFR Estimate 87     CBC WITH PLATELETS AND DIFFERENTIAL - Abnormal    WBC Count 11.0      RBC Count 4.46      Hemoglobin 13.1 (*)     Hematocrit 41.0      MCV 92      MCH 29.4      MCHC 32.0      RDW 16.4 (*)     Platelet Count 296      % Neutrophils 60      % Lymphocytes 30      % Monocytes 8      % Eosinophils 1      % Basophils 0      % Immature Granulocytes 1      NRBCs per 100 WBC 0      Absolute Neutrophils 6.6      Absolute Lymphocytes 3.3      Absolute Monocytes 0.9      Absolute Eosinophils 0.1      Absolute Basophils 0.0      Absolute Immature Granulocytes 0.1      Absolute NRBCs 0.0     TROPONIN T, HIGH SENSITIVITY - Normal    Troponin T, High Sensitivity 16     D DIMER QUANTITATIVE - Normal    D-Dimer Quantitative <0.27     TROPONIN T, HIGH SENSITIVITY - Normal    Troponin T, High Sensitivity 14        Emergency Department Course:     Reviewed:  I reviewed nursing notes, vitals and past medical history    Assessments:  0123 I obtained history and examined the patient as noted above.   0316 I rechecked the patient and explained findings.     Interventions:  0148 Ondansetron 4mg IV  0149 Xylocaine 30mL PO  0220 Hydromorphone 0.3mg IV  0325 Hydromorphone 0.3mg IV    Disposition:  The patient was discharged to home.     Impression & Plan   Medical Decision Makin-year-old gentleman here with chest pain constant since this morning nonexertional in nature.  Does have a history of 3 previous stents.  EKG shows no concerning signs of acute ischemia or red flags for malignant arrhythmia.  Initial and delta troponin were unremarkable which in conjunction with his atypical presentation and duration of symptoms makes an occlusive coronary process quite unlikely.  D-dimer negative and so chest radiograph done this is unremarkable as well.  At this point the etiology is unclear there is a GI component to this a musculoskeletal component given do not see  a cardiopulmonary emergency at this point I think he can be discharged home as he has been risk ratified low enough and return with any new or worsening symptoms.  He is comfortable with that plan and he understands what is known what is unknown what to watch out for and when to return here to the ED.    Diagnosis:    ICD-10-CM    1. Acute chest pain  R07.9           Scribe Disclosure:  I, Pilar Kyler, am serving as a scribe at 1:12 AM on 11/17/2022 to document services personally performed by Cosmo Reddy MD, based on my observations and the provider's statements to me.          Cosmo Reddy MD  11/17/22 0743

## 2022-11-18 LAB
ATRIAL RATE - MUSE: 91 BPM
DIASTOLIC BLOOD PRESSURE - MUSE: NORMAL MMHG
INTERPRETATION ECG - MUSE: NORMAL
P AXIS - MUSE: 43 DEGREES
PR INTERVAL - MUSE: 164 MS
QRS DURATION - MUSE: 86 MS
QT - MUSE: 380 MS
QTC - MUSE: 467 MS
R AXIS - MUSE: 31 DEGREES
SYSTOLIC BLOOD PRESSURE - MUSE: NORMAL MMHG
T AXIS - MUSE: 49 DEGREES
VENTRICULAR RATE- MUSE: 91 BPM

## 2022-11-21 ENCOUNTER — TELEPHONE (OUTPATIENT)
Dept: FAMILY MEDICINE | Facility: CLINIC | Age: 62
End: 2022-11-21

## 2022-11-21 ENCOUNTER — PATIENT OUTREACH (OUTPATIENT)
Dept: CARE COORDINATION | Facility: CLINIC | Age: 62
End: 2022-11-21

## 2022-11-21 DIAGNOSIS — I10 ESSENTIAL HYPERTENSION: ICD-10-CM

## 2022-11-22 ENCOUNTER — OFFICE VISIT (OUTPATIENT)
Dept: FAMILY MEDICINE | Facility: CLINIC | Age: 62
End: 2022-11-22
Payer: COMMERCIAL

## 2022-11-22 VITALS
BODY MASS INDEX: 26.13 KG/M2 | OXYGEN SATURATION: 98 % | SYSTOLIC BLOOD PRESSURE: 106 MMHG | TEMPERATURE: 98.3 F | DIASTOLIC BLOOD PRESSURE: 60 MMHG | HEIGHT: 67 IN | WEIGHT: 166.5 LBS | HEART RATE: 94 BPM

## 2022-11-22 DIAGNOSIS — E11.59 TYPE 2 DIABETES MELLITUS WITH OTHER CIRCULATORY COMPLICATION, WITH LONG-TERM CURRENT USE OF INSULIN (H): ICD-10-CM

## 2022-11-22 DIAGNOSIS — Z79.4 TYPE 2 DIABETES MELLITUS WITH OTHER CIRCULATORY COMPLICATION, WITH LONG-TERM CURRENT USE OF INSULIN (H): ICD-10-CM

## 2022-11-22 DIAGNOSIS — I25.10 CORONARY ARTERY DISEASE INVOLVING NATIVE CORONARY ARTERY OF NATIVE HEART WITHOUT ANGINA PECTORIS: ICD-10-CM

## 2022-11-22 DIAGNOSIS — F33.2 SEVERE EPISODE OF RECURRENT MAJOR DEPRESSIVE DISORDER, WITHOUT PSYCHOTIC FEATURES (H): ICD-10-CM

## 2022-11-22 DIAGNOSIS — I10 ESSENTIAL HYPERTENSION: Primary | ICD-10-CM

## 2022-11-22 DIAGNOSIS — M86.9 TOE OSTEOMYELITIS, RIGHT (H): ICD-10-CM

## 2022-11-22 DIAGNOSIS — Z13.220 SCREENING FOR HYPERLIPIDEMIA: ICD-10-CM

## 2022-11-22 DIAGNOSIS — F41.1 GAD (GENERALIZED ANXIETY DISORDER): ICD-10-CM

## 2022-11-22 DIAGNOSIS — N18.2 CKD (CHRONIC KIDNEY DISEASE) STAGE 2, GFR 60-89 ML/MIN: ICD-10-CM

## 2022-11-22 DIAGNOSIS — G47.00 INSOMNIA, UNSPECIFIED TYPE: ICD-10-CM

## 2022-11-22 DIAGNOSIS — K21.00 GASTROESOPHAGEAL REFLUX DISEASE WITH ESOPHAGITIS WITHOUT HEMORRHAGE: ICD-10-CM

## 2022-11-22 LAB
CHOLEST SERPL-MCNC: 110 MG/DL
HBA1C MFR BLD: 5.7 %
HDLC SERPL-MCNC: 38 MG/DL
LDLC SERPL CALC-MCNC: 46 MG/DL
NONHDLC SERPL-MCNC: 72 MG/DL
TRIGL SERPL-MCNC: 130 MG/DL

## 2022-11-22 PROCEDURE — 99214 OFFICE O/P EST MOD 30 MIN: CPT

## 2022-11-22 RX ORDER — BUSPIRONE HYDROCHLORIDE 10 MG/1
10 TABLET ORAL 4 TIMES DAILY
Qty: 360 TABLET | Refills: 2 | Status: SHIPPED | OUTPATIENT
Start: 2022-11-22

## 2022-11-22 RX ORDER — ISOSORBIDE DINITRATE 20 MG/1
40 TABLET ORAL 2 TIMES DAILY
Qty: 360 TABLET | Refills: 2 | Status: SHIPPED | OUTPATIENT
Start: 2022-11-22

## 2022-11-22 RX ORDER — SERTRALINE HYDROCHLORIDE 100 MG/1
100 TABLET, FILM COATED ORAL DAILY
Qty: 180 TABLET | Refills: 3 | Status: SHIPPED | OUTPATIENT
Start: 2022-11-22 | End: 2023-07-28 | Stop reason: ALTCHOICE

## 2022-11-22 RX ORDER — QUETIAPINE FUMARATE 200 MG/1
200 TABLET, FILM COATED ORAL AT BEDTIME
Qty: 90 TABLET | Refills: 2 | Status: SHIPPED | OUTPATIENT
Start: 2022-11-22 | End: 2023-08-02

## 2022-11-22 RX ORDER — PREGABALIN 150 MG/1
CAPSULE ORAL
Qty: 60 CAPSULE | Refills: 4 | Status: SHIPPED | OUTPATIENT
Start: 2022-11-22 | End: 2023-07-31

## 2022-11-22 RX ORDER — CLOMIPRAMINE HYDROCHLORIDE 50 MG/1
100 CAPSULE ORAL 2 TIMES DAILY
Qty: 180 CAPSULE | Refills: 4 | Status: SHIPPED | OUTPATIENT
Start: 2022-11-22 | End: 2022-11-22

## 2022-11-22 RX ORDER — ROSUVASTATIN CALCIUM 20 MG/1
20 TABLET, COATED ORAL DAILY
Qty: 90 TABLET | Refills: 3 | Status: SHIPPED | OUTPATIENT
Start: 2022-11-22

## 2022-11-22 RX ORDER — BISOPROLOL FUMARATE 5 MG/1
5 TABLET, FILM COATED ORAL DAILY
Qty: 90 TABLET | Refills: 3 | Status: SHIPPED | OUTPATIENT
Start: 2022-11-22

## 2022-11-22 RX ORDER — AMLODIPINE AND VALSARTAN 10; 160 MG/1; MG/1
1 TABLET ORAL DAILY
Qty: 90 TABLET | Refills: 2 | Status: SHIPPED | OUTPATIENT
Start: 2022-11-22

## 2022-11-22 RX ORDER — DIVALPROEX SODIUM 500 MG/1
1000 TABLET, DELAYED RELEASE ORAL 2 TIMES DAILY
Qty: 180 TABLET | Refills: 4 | Status: SHIPPED | OUTPATIENT
Start: 2022-11-22 | End: 2022-11-22

## 2022-11-22 RX ORDER — GLIPIZIDE 5 MG/1
5 TABLET, FILM COATED, EXTENDED RELEASE ORAL DAILY
Qty: 90 TABLET | Refills: 2 | Status: SHIPPED | OUTPATIENT
Start: 2022-11-22 | End: 2023-07-28

## 2022-11-22 RX ORDER — CLOPIDOGREL BISULFATE 75 MG/1
75 TABLET ORAL DAILY
Qty: 90 TABLET | Refills: 3 | Status: SHIPPED | OUTPATIENT
Start: 2022-11-22

## 2022-11-22 RX ORDER — ONDANSETRON 4 MG/1
4 TABLET, FILM COATED ORAL EVERY 8 HOURS PRN
Qty: 90 TABLET | Refills: 3 | Status: SHIPPED | OUTPATIENT
Start: 2022-11-22

## 2022-11-22 RX ORDER — GLUCOSAMINE HCL/CHONDROITIN SU 500-400 MG
CAPSULE ORAL
Qty: 100 EACH | Refills: 3 | Status: SHIPPED | OUTPATIENT
Start: 2022-11-22 | End: 2023-08-01

## 2022-11-22 RX ORDER — LANCETS
EACH MISCELLANEOUS
Qty: 100 EACH | Refills: 6 | Status: SHIPPED | OUTPATIENT
Start: 2022-11-22

## 2022-11-22 NOTE — TELEPHONE ENCOUNTER
Pt calls, last appointment 1.5 years, travels back and forth to [place], last set of medications from [place], leaving Friday for another 2 months and wants refills on all medications, appointment scheduled today   Shena Acevedo RN, BSN  [facility]

## 2022-11-22 NOTE — LETTER
November 25, 2022      Huseyin Pettit  [address] [facility] [address]        Dear ,    We are writing to inform you of your test results.    Hemoglobin A1C looks amazing! Keep up the good work! Lipid (cholesterol) panel looks great too! HDL is a bit low but exercise can help with this,  have a safe trip!     MC Paul CNP       Resulted Orders   Hemoglobin A1c   Result Value Ref Range    Hemoglobin A1C 5.7 (H) <5.7 %      Comment:      Normal <5.7%   Prediabetes 5.7-6.4%    Diabetes 6.5% or higher     Note: Adopted from ADA consensus guidelines.       If you have any questions or concerns, please call the clinic at the number listed above.       Sincerely,      MC Paul CNP

## 2022-11-22 NOTE — PROGRESS NOTES
Assessment & Plan     (I10) Essential hypertension  Comment: stable, given patient's negative cardiac workup in ER recently reassured with patient's stability. Refilled medication.  Plan: amLODIPine-valsartan (EXFORGE)  MG tablet    (Z13.220) Screening for hyperlipidemia  Comment: lipid levels added on to most recent lab draw from ED. Follow up with lab results.   Plan: Lipid panel reflex to direct LDL Non-fasting    (I25.10) Coronary artery disease involving native coronary artery of native heart without angina pectoris  Comment: stable, ED visit reassuring. Labs and medications refilled. Follow up on lab results.   Plan: Lipid panel reflex to direct LDL Non-fasting,         rosuvastatin (CRESTOR) 20 MG tablet, isosorbide        dinitrate (ISORDIL) 20 MG tablet, clopidogrel         (PLAVIX) 75 MG tablet, bisoprolol (ZEBETA) 5 MG        tablet, aspirin (ASA) 81 MG EC tablet    (N18.2) CKD (chronic kidney disease) stage 2, GFR 60-89 ml/min  Comment: stable, GFR 87 on recent BMP. Monitor.   Plan: see above.     (E11.59,  Z79.4) Type 2 diabetes mellitus with other circulatory complication, with long-term current use of insulin (H)  Comment: stable, last A1c improved to 6.8 in March. Added on A1C to most recent lab draw, will follow up with results when obtained. Supplies and medications refilled.   Plan: HEMOGLOBIN A1C, thin (NO BRAND SPECIFIED)         lancets, blood glucose calibration (NO BRAND         SPECIFIED) solution, blood glucose (NO BRAND         SPECIFIED) test strip, alcohol swab prep pads,         sitagliptin-metFORMIN (JANUMET)  MG         tablet    (F33.2) Severe episode of recurrent major depressive disorder, without psychotic features (H)  Comment: stable, medications refilled  Plan: fluoxetine (Prozac) 20 mg, QUEtiapine         (SEROQUEL) 200 MG tablet,     (F41.1) MILES (generalized anxiety disorder)  Comment: stable, meds refilled  Plan: fluoxetine (Prozac) 20 mg, busPIRone          "(BUSPAR) 10 MG tablet    (G47.00) Insomnia, unspecified type  Comment: stable, feels he gets restful sleep lately. meds refilled.   Plan: QUEtiapine (SEROQUEL) 200 MG tablet    (M86.9) Toe osteomyelitis, right (H)  Comment: stable, patient history indicates lyrica has much improved his neuropathic pain. lyrica refilled.   Plan: pregabalin (LYRICA) 150 MG capsule    (K21.00) Gastroesophageal reflux disease with esophagitis without hemorrhage  Comment: acid reflux is stable, zofran as needed for nausea refilled.   Plan: ondansetron (ZOFRAN) 4 MG tablet    (E11.59,  Z79.4) DM 2 w PVD on Insuline, Hgb  A1C 6.8 on 3/4/21  Comment: patient DM 2 regimen includes glipizide and janumet, not currently taking insulin. A1C today. Glipizide and Janumet refilled.   Plan: glipiZIDE (GLUCOTROL XL) 5 MG 24 hr tablet    30 min spent with visit/counseling, chart review, and documentation.       Nicotine/Tobacco Cessation:  He reports that he has been smoking cigarettes. He has a 12.50 pack-year smoking history. He has never used smokeless tobacco.  Nicotine/Tobacco Cessation Plan:       BMI:   Estimated body mass index is 25.73 kg/m  as calculated from the following:    Height as of this encounter: 1.713 m (5' 7.45\").    Weight as of this encounter: 75.5 kg (166 lb 8 oz).       No follow-ups on file.    MC Paul CNP  [facility] Fontanez is a 62 year old, presenting for the following health issues:  Recheck Medication, Diabetes, and Hypertension      HPI     Diabetes Follow-up    How often are you checking your blood sugar? A few times a week  What time of day are you checking your blood sugars (select all that apply)?  Before meals  Have you had any blood sugars above 200?  No  Have you had any blood sugars below 70?  No    What symptoms do you notice when your blood sugar is low?  None and Not applicable    What concerns do you have today about your diabetes? None     Do you have any of " "these symptoms? (Select all that apply)  Numbness in feet    Have you had a diabetic eye exam in the last 12 months?         Pt stated his last eye exam was in [place] 8-9 months ago.  -no problems  -started Janumet in [place], states that this has \"changed his life\"  -well controlled, needs refills going to [place] this friday to be with mother as she is passing away soon    BP Readings from Last 2 Encounters:   11/22/22 106/60   11/17/22 139/84     Hemoglobin A1C (%)   Date Value   03/04/2021 6.8 (H)   03/01/2020 7.2 (H)     LDL Cholesterol Calculated (mg/dL)   Date Value   03/02/2020 58   07/03/2018 82               Hypertension Follow-up      Do you check your blood pressure regularly outside of the clinic? Yes     Are you following a low salt diet? Yes    Are your blood pressures ever more than 140 on the top number (systolic) OR more   than 90 on the bottom number (diastolic), for example 140/90? No      How many servings of fruits and vegetables do you eat daily?  2-3    On average, how many sweetened beverages do you drink each day (Examples: soda, juice, sweet tea, etc.  Do NOT count diet or artificially sweetened beverages)?   0    How many days per week do you exercise enough to make your heart beat faster? 3 or less    How many minutes a day do you exercise enough to make your heart beat faster? 9 or less    How many days per week do you miss taking your medication? 0  -no issues, no side effects to medications.   -patient recent ED visit for chest pain, negative complete work up. Diagnosed with musculoskeletal pain. No red flag cardiac symptoms noted/complaining of since.     Medication Followup of : Multiple    Taking Medication as prescribed: yes    Side Effects:  None    Medication Helping Symptoms:  yes    Patient need medication refills for everything as he is traveling to [place] to be with mother as she is expecting to pass away soon. He is planning on being there for 6-7 months.     Review of " "Systems   Constitutional, cardiovascular, pulmonary, GI, , neuro, endocrine and psych systems are negative, except as otherwise noted.      Objective    /60 (BP Location: Right arm, Patient Position: Sitting, Cuff Size: Adult Large)   Pulse 94   Temp 98.3  F (36.8  C) (Oral)   Ht 1.713 m (5' 7.45\")   Wt 75.5 kg (166 lb 8 oz)   SpO2 98%   BMI 25.73 kg/m    Body mass index is 25.73 kg/m .  Physical Exam  Vitals and nursing note reviewed.   Constitutional:       General: He is not in acute distress.     Appearance: Normal appearance.   Cardiovascular:      Rate and Rhythm: Normal rate and regular rhythm.      Heart sounds: No murmur heard.    No friction rub. No gallop.   Pulmonary:      Effort: Pulmonary effort is normal. No respiratory distress.      Breath sounds: No wheezing, rhonchi or rales.   Abdominal:      General: Bowel sounds are normal. There is no distension.      Palpations: Abdomen is soft.      Tenderness: There is no abdominal tenderness. There is no guarding.   Musculoskeletal:      Cervical back: No tenderness.   Lymphadenopathy:      Cervical: No cervical adenopathy.   Skin:     General: Skin is warm and dry.      Capillary Refill: Capillary refill takes less than 2 seconds.   Neurological:      Mental Status: He is alert.   Psychiatric:         Mood and Affect: Mood normal.         Behavior: Behavior normal. Behavior is cooperative.         Thought Content: Thought content normal.         Judgment: Judgment normal.                 "

## 2023-01-10 NOTE — PROVIDER NOTIFICATION
Date of Service: 01/09/2023    PULMONARY PROGRESS NOTE    HISTORY OF PRESENT ILLNESS:    The patient was seen and examined and the chart was reviewed.  In summary, the patient is a very pleasant 44-year-old male.  The patient came to our clinic for a followup visit.  The patient had a polysomnogram, which showed obstructive sleep apnea syndrome.  He was titrated to a CPAP pressure support of 9 cm of water.  I had a long discussion with the patient about sleep apnea and its treatment and the patient is now willing to use the machine.  I have ordered a CPAP machine with auto-titrating with a pressure range between 8 and 15 cm of water followed by review of download data.  The patient was instructed to contact the home care company and follow up in our clinic on a regular basis.  The patient told me that he had a COVID infection in December and had other issues recently.  The patient has recovered from the COVID.  He denies any cough, sputum, no fever, no chills, no nausea, vomiting, headache or blurred vision.    MEDICATIONS:    List was reviewed, marked in Epic.  Partial list of patient's medications includes:  Xarelto.  Remeron.  Abilify.    ALLERGIES:    Reviewed and marked in Epic.    PHYSICAL EXAMINATION:    VITAL SIGNS:  At the time of my evaluation, the patient's blood pressure is 114/78 with a pulse of 95 beats per minute, respiratory rate is 16 breaths per minute, oxygen saturation is 97%.  BMI is 32.  LUNGS:  Breath sounds are diminished, but equal bilaterally.  No significant wheezing or crackles.  HEART:  Sounds S1, S2 normal with regular rate and rhythm.  No murmur, gallop or rub.  ABDOMEN:  Soft and nontender.  No guarding, rigidity, or rebound.  EXTREMITIES:  Show no edema, no muscle tenderness.  NECK:  Supple.  Trachea is central.  NEUROLOGIC:  The patient is fully awake, alert, oriented in time, place and person.    IMPRESSION:    1.  Obstructive sleep apnea syndrome.  2.  Snoring and  BP 81/45, 83/52 on L and R arms, post 2nd bolus.  Text page to MD.  Addendum:  1000 ml bolus ordered; patient has no symptoms.   hypersomnia.  3.  History of pulmonary embolism.  4.  Active smoker.    PLAN:    Plan of care is as follows:  1.  The patient must quit smoking.  2.  CPAP machine has been ordered, auto-titrating unit with a pressure range between 8 and 15 cm of water.  3.  Continue with Xarelto.  4.  The patient was instructed to quit smoking and use the CPAP machine and follow up in our clinic in approximately 12 weeks.  I have provided the patient the telephone number of the home care company and advised the patient to call me if there is any problem getting the machine.      Dictated By: Chino Boykin MD  Signing Provider: MD MIKE Lazo/mable ([id number])   DD: 01/09/2023 12:20:28 PM TD: 01/10/2023 9:59:49 AM

## 2023-07-28 ENCOUNTER — OFFICE VISIT (OUTPATIENT)
Dept: FAMILY MEDICINE | Facility: CLINIC | Age: 63
End: 2023-07-28
Payer: COMMERCIAL

## 2023-07-28 VITALS
WEIGHT: 155 LBS | HEIGHT: 69 IN | DIASTOLIC BLOOD PRESSURE: 57 MMHG | RESPIRATION RATE: 12 BRPM | OXYGEN SATURATION: 97 % | TEMPERATURE: 98.1 F | SYSTOLIC BLOOD PRESSURE: 93 MMHG | BODY MASS INDEX: 22.96 KG/M2 | HEART RATE: 81 BPM

## 2023-07-28 DIAGNOSIS — Z79.4 TYPE 2 DIABETES MELLITUS WITH OTHER CIRCULATORY COMPLICATION, WITH LONG-TERM CURRENT USE OF INSULIN (H): ICD-10-CM

## 2023-07-28 DIAGNOSIS — M54.50 CHRONIC LEFT-SIDED LOW BACK PAIN WITHOUT SCIATICA: Primary | ICD-10-CM

## 2023-07-28 DIAGNOSIS — N18.2 CKD (CHRONIC KIDNEY DISEASE) STAGE 2, GFR 60-89 ML/MIN: ICD-10-CM

## 2023-07-28 DIAGNOSIS — G89.29 CHRONIC LEFT-SIDED LOW BACK PAIN WITHOUT SCIATICA: Primary | ICD-10-CM

## 2023-07-28 DIAGNOSIS — E11.59 TYPE 2 DIABETES MELLITUS WITH OTHER CIRCULATORY COMPLICATION, WITH LONG-TERM CURRENT USE OF INSULIN (H): ICD-10-CM

## 2023-07-28 DIAGNOSIS — Z79.899 POLYPHARMACY: ICD-10-CM

## 2023-07-28 DIAGNOSIS — M86.172 OTHER ACUTE OSTEOMYELITIS OF LEFT FOOT (H): ICD-10-CM

## 2023-07-28 DIAGNOSIS — R10.9 FLANK PAIN: ICD-10-CM

## 2023-07-28 DIAGNOSIS — Z90.79 HISTORY OF TRANSURETHRAL RESECTION OF PROSTATE: ICD-10-CM

## 2023-07-28 DIAGNOSIS — Z98.890 HISTORY OF TRANSURETHRAL RESECTION OF PROSTATE: ICD-10-CM

## 2023-07-28 LAB
ALBUMIN UR-MCNC: NEGATIVE MG/DL
ANION GAP SERPL CALCULATED.3IONS-SCNC: 11 MMOL/L (ref 7–15)
APPEARANCE UR: CLEAR
BILIRUB UR QL STRIP: NEGATIVE
BUN SERPL-MCNC: 35.4 MG/DL (ref 8–23)
CALCIUM SERPL-MCNC: 8.6 MG/DL (ref 8.8–10.2)
CHLORIDE SERPL-SCNC: 106 MMOL/L (ref 98–107)
COLOR UR AUTO: YELLOW
CREAT SERPL-MCNC: 1.32 MG/DL (ref 0.67–1.17)
DEPRECATED HCO3 PLAS-SCNC: 23 MMOL/L (ref 22–29)
GFR SERPL CREATININE-BSD FRML MDRD: 61 ML/MIN/1.73M2
GLUCOSE SERPL-MCNC: 93 MG/DL (ref 70–99)
GLUCOSE UR STRIP-MCNC: NEGATIVE MG/DL
HGB UR QL STRIP: NEGATIVE
KETONES UR STRIP-MCNC: NEGATIVE MG/DL
LEUKOCYTE ESTERASE UR QL STRIP: NEGATIVE
NITRATE UR QL: NEGATIVE
PH UR STRIP: 5.5 [PH] (ref 5–7)
POTASSIUM SERPL-SCNC: 4.1 MMOL/L (ref 3.4–5.3)
SODIUM SERPL-SCNC: 140 MMOL/L (ref 136–145)
SP GR UR STRIP: 1.02 (ref 1–1.03)
UROBILINOGEN UR STRIP-ACNC: 0.2 E.U./DL

## 2023-07-28 PROCEDURE — 36415 COLL VENOUS BLD VENIPUNCTURE: CPT

## 2023-07-28 PROCEDURE — 81003 URINALYSIS AUTO W/O SCOPE: CPT

## 2023-07-28 PROCEDURE — 80048 BASIC METABOLIC PNL TOTAL CA: CPT

## 2023-07-28 PROCEDURE — 99214 OFFICE O/P EST MOD 30 MIN: CPT

## 2023-07-28 RX ORDER — GLIPIZIDE 5 MG/1
10 TABLET, FILM COATED, EXTENDED RELEASE ORAL DAILY
Qty: 90 TABLET | Refills: 2 | Status: SHIPPED | OUTPATIENT
Start: 2023-07-28 | End: 2023-08-03

## 2023-07-28 ASSESSMENT — ANXIETY QUESTIONNAIRES
1. FEELING NERVOUS, ANXIOUS, OR ON EDGE: NEARLY EVERY DAY
4. TROUBLE RELAXING: SEVERAL DAYS
IF YOU CHECKED OFF ANY PROBLEMS ON THIS QUESTIONNAIRE, HOW DIFFICULT HAVE THESE PROBLEMS MADE IT FOR YOU TO DO YOUR WORK, TAKE CARE OF THINGS AT HOME, OR GET ALONG WITH OTHER PEOPLE: NOT DIFFICULT AT ALL
2. NOT BEING ABLE TO STOP OR CONTROL WORRYING: SEVERAL DAYS
6. BECOMING EASILY ANNOYED OR IRRITABLE: SEVERAL DAYS
3. WORRYING TOO MUCH ABOUT DIFFERENT THINGS: SEVERAL DAYS
GAD7 TOTAL SCORE: 8
5. BEING SO RESTLESS THAT IT IS HARD TO SIT STILL: SEVERAL DAYS
GAD7 TOTAL SCORE: 8
7. FEELING AFRAID AS IF SOMETHING AWFUL MIGHT HAPPEN: NOT AT ALL

## 2023-07-28 ASSESSMENT — PATIENT HEALTH QUESTIONNAIRE - PHQ9: SUM OF ALL RESPONSES TO PHQ QUESTIONS 1-9: 5

## 2023-07-28 NOTE — PATIENT INSTRUCTIONS
Urology consult given TURP procedure in [place]    Will get you re established w/ MTM    Will set you up w/ PCP again     Labs and urinalysis for back/flank pain to help rule out any concerns for infection

## 2023-07-28 NOTE — PROGRESS NOTES
Assessment & Plan     (M54.50,  G89.29) Chronic left-sided low back pain without sciatica  (primary encounter diagnosis)  (R10.9) Flank pain  (M86.172) Other acute osteomyelitis of left foot (H)  Comment: patient taking Lyrica for pain currently, suspect patient's back pain may be muscular in nature however will check UA and BMP today. Negative for urinary symptoms currently. Will follow up on on labs when obtained. Will place physical therapy order for back pain however, suspect possible compensatory injury has occurred given amputation of left toe (d/t osteomyelitis) and patient has been wearing Aircast boot since surgery that occurred 3/2021 - Patient seeing podiatry next week to assess foot.   Patient may need follow up imaging potentially if not noting any improvement w/ PT or possible change of footwear. Patient fully understands and is agreeable with plan of care, at this point patient will follow up as needed unless acute concerns arise in the meantime.  Plan: Physical Therapy Referral        UA Macroscopic with reflex to Microscopic and         Culture - Lab Collect, Basic metabolic panel          (Ca, Cl, CO2, Creat, Gluc, K, Na, BUN)    (N18.2) CKD (chronic kidney disease) stage 2, GFR 60-89 ml/min  (Z79.899) Polypharmacy  Comment: BMP and UA today. MTM referral placed for polypharmacy and complicated PMH. Last saw MTM in 2021.    Plan: Med Therapy Management Referral    (E11.59,  Z79.4) Type 2 diabetes mellitus with other circulatory complication, with long-term current use of insulin (H)  (E11.59,  Z79.4) DM 2 w PVD on Insuline, Hgb  A1C 6.8 on 3/4/21  Comment: last Hgb A1C 5.7 on Janumet and Glipizide. Patient states BS at home typically 120's. Patient had Glipizide increased to 10 mg daily while he was in [place], unsure what patient A1C was at that time. Will have patient be scheduled for AWV/establish care w/ provider to have A1c drawn. MTM referral placed to help w/ management.   Plan: Med Therapy  Management Referral, glipiZIDE         (GLUCOTROL XL) 5 MG 24 hr tablet    (Z98.890,  Z90.79) History of transurethral resection of prostate  Comment: performed in [place] ~ 6 months ago. Needs to establish w/ urology for further management. Referral placed.   Plan: Adult Urology  Referral    40 minutes spent by me on the date of the encounter doing chart review, history and exam, documentation and further activities per the note       PCP  visit scheduled.     MC Paul CNP  [facility] JOVANNA Fontanez is a 63 year old, presenting for the following health issues:  Pain        7/28/2023     3:10 PM   Additional Questions   Roomed by Sukhwinder SWEENEY     Pain History:  When did you first notice your pain? Few months    Have you seen this provider for your pain in the past? No   Where in your body do your have pain? Left side of abdomen   Are you seeing anyone else for your pain? No  What makes your pain better? Sitting  What makes your pain worse? Walking  How has pain affected your ability to work? Not applicable    Pt brought in forms from surgery to go over and add to his medication list.         4/23/2020     8:48 AM 5/6/2021     8:06 AM 7/28/2023     3:25 PM   PHQ-9 SCORE   PHQ-9 Total Score 7 9 5                 4/23/2020     8:48 AM 5/6/2021     8:06 AM 7/28/2023     3:25 PM   PHQ-9 SCORE   PHQ-9 Total Score 7 9 5           4/23/2020     8:48 AM 5/6/2021     8:06 AM 7/28/2023     3:29 PM   MILES-7 SCORE   Total Score 13 11 8           7/28/2023     3:30 PM   PEG Score   PEG Total Score 6       Chronic Pain - Initial Assessment:    How would you describe your pain? Sharp, stabbing   Have you had any recent changes to the severity or character of your pain? No  Is there an underlying cause that has been identified? No  Has your ability to work or do daily activities changed recently because of your pain? No      Review of Systems   Constitutional, HEENT, cardiovascular,  "pulmonary, GI, , musculoskeletal, neuro, skin, endocrine and psych systems are negative, except as otherwise noted.      Objective    BP 93/57 (BP Location: Right arm, Patient Position: Sitting, Cuff Size: Adult Regular)   Pulse 81   Temp 98.1  F (36.7  C) (Oral)   Resp 12   Ht 1.753 m (5' 9\")   Wt 70.3 kg (155 lb)   SpO2 97%   BMI 22.89 kg/m    Body mass index is 22.89 kg/m .  Physical Exam  Vitals and nursing note reviewed.   Constitutional:       General: He is not in acute distress.     Appearance: Normal appearance. He is not ill-appearing.   Cardiovascular:      Rate and Rhythm: Normal rate and regular rhythm.      Heart sounds: No murmur heard.     No friction rub. No gallop.   Pulmonary:      Effort: Pulmonary effort is normal. No respiratory distress.      Breath sounds: No wheezing, rhonchi or rales.   Abdominal:      Tenderness: There is right CVA tenderness. There is no left CVA tenderness.   Musculoskeletal:      Thoracic back: Tenderness present. No spasms. Normal range of motion.      Lumbar back: Tenderness present. No spasms.   Skin:     General: Skin is warm and dry.   Neurological:      Mental Status: He is alert.   Psychiatric:         Mood and Affect: Mood normal.         Behavior: Behavior normal. Behavior is cooperative.         Thought Content: Thought content normal.         Judgment: Judgment normal.                  "

## 2023-07-31 ENCOUNTER — OFFICE VISIT (OUTPATIENT)
Dept: PODIATRY | Facility: CLINIC | Age: 63
End: 2023-07-31
Payer: COMMERCIAL

## 2023-07-31 ENCOUNTER — TELEPHONE (OUTPATIENT)
Dept: FAMILY MEDICINE | Facility: CLINIC | Age: 63
End: 2023-07-31

## 2023-07-31 VITALS — BODY MASS INDEX: 22.96 KG/M2 | HEIGHT: 69 IN | WEIGHT: 155 LBS

## 2023-07-31 DIAGNOSIS — Z89.432 S/P AMPUTATION OF FOOT, LEFT (H): Primary | ICD-10-CM

## 2023-07-31 PROCEDURE — 99213 OFFICE O/P EST LOW 20 MIN: CPT | Performed by: PODIATRIST

## 2023-07-31 NOTE — PROGRESS NOTES
"Foot & Ankle Surgery   July 31, 2023    S:  Pt is seen today for evaluation of left lower extremity.  He has 2 questions.  He is having severe left-sided low back pain and was told by a doctor in [place] that this may be because of his left foot amputation.  He also asks if he can get another short Aircast boot.  He is exclusively wearing the Aircast boot in the left lower extremity, states that it is the most comfortable thing for him..    Vitals:    07/31/23 1533   Weight: 70.3 kg (155 lb)   Height: 1.753 m (5' 9\")   '      ROS - Pos for CC.  Patient denies current nausea, vomiting, chills, fevers, belly pain, calf pain, chest pain or SOB.  Complete remainder of ROS it otherwise neg.      PE:  Gen:   No apparent distress  Eye:    Visual scanning without deficit  Ear:    Response to auditory stimuli wnl  Lung:    Non-labored breathing on RA noted  Abd:    NTND per patient report  Lymph:    Neg for pitting/non-pitting edema BLE  Vasc:    Pulses palpable, CFT minimally delayed  Neuro:    Light touch sensation diminished distally  Derm:    No open wounds left foot.  The amputation incision is well-healed.  MSK:    Previous left transmetatarsal amputation.  No acute deformity seen.  No pain is noted in the left lower extremity  Calf:    Neg for redness, swelling or tenderness    Assessment:  63 year old male with previous left transmetatarsal amputation for diabetic wound/infection with left-sided low back pain      Medical Decision Making/Plan:  Discussed etiologies, anatomy and options  1.  Previous left transmetatarsal amputation for diabetic wound/infection with left-sided back/flank pain  -While I can certainly connect any to issues biomechanically/anatomically, I think the foot is an unlikely cause of his left-sided back/flank pain  -The patient was previously given an orthotic lab referral for diabetic shoes and orthotics but states the walking boot on the left foot is the most comfortable option.  A new boot was " dispensed today  -Excellent blood sugar control!    Follow up: As needed or sooner with acute issues           Sergey Barnes DPM FACFAS FACFAOM  Podiatric Foot & Ankle Surgeon  [facility]  [phone]    Disclaimer: This note consists of symbols derived from keyboarding, dictation and/or voice recognition software. As a result, there may be errors in the script that have gone undetected. Please consider this when interpreting information found in this chart.

## 2023-07-31 NOTE — TELEPHONE ENCOUNTER
- LMTCB # 1    - Please relay the following message from [place]:    Please notify patient of results:    UA negative, kidney panel creatine a bit elevated. Increase water intake and can reevaluate at upcoming PCP visit in August.     MC Paul CNP    David Cha RN on 7/31/2023 at 10:47 AM

## 2023-08-01 ENCOUNTER — APPOINTMENT (OUTPATIENT)
Dept: GENERAL RADIOLOGY | Facility: CLINIC | Age: 63
End: 2023-08-01
Attending: EMERGENCY MEDICINE
Payer: COMMERCIAL

## 2023-08-01 ENCOUNTER — HOSPITAL ENCOUNTER (INPATIENT)
Facility: CLINIC | Age: 63
LOS: 1 days | Discharge: LEFT BEFORE DISCHARGE | End: 2023-08-02
Attending: EMERGENCY MEDICINE | Admitting: INTERNAL MEDICINE
Payer: COMMERCIAL

## 2023-08-01 ENCOUNTER — APPOINTMENT (OUTPATIENT)
Dept: CARDIOLOGY | Facility: CLINIC | Age: 63
End: 2023-08-01
Attending: PHYSICIAN ASSISTANT
Payer: COMMERCIAL

## 2023-08-01 DIAGNOSIS — R07.9 ACUTE CHEST PAIN: ICD-10-CM

## 2023-08-01 DIAGNOSIS — N18.2 CKD (CHRONIC KIDNEY DISEASE) STAGE 2, GFR 60-89 ML/MIN: ICD-10-CM

## 2023-08-01 DIAGNOSIS — E16.2 ACUTE METABOLIC ENCEPHALOPATHY DUE TO HYPOGLYCEMIA: ICD-10-CM

## 2023-08-01 DIAGNOSIS — G93.41 ACUTE METABOLIC ENCEPHALOPATHY DUE TO HYPOGLYCEMIA: ICD-10-CM

## 2023-08-01 LAB
ALBUMIN SERPL BCG-MCNC: 3.8 G/DL (ref 3.5–5.2)
ALP SERPL-CCNC: 61 U/L (ref 40–129)
ALT SERPL W P-5'-P-CCNC: 41 U/L (ref 0–70)
AMPHETAMINES UR QL SCN: ABNORMAL
ANION GAP SERPL CALCULATED.3IONS-SCNC: 17 MMOL/L (ref 7–15)
AST SERPL W P-5'-P-CCNC: 32 U/L (ref 0–45)
ATRIAL RATE - MUSE: 89 BPM
BARBITURATES UR QL SCN: ABNORMAL
BASOPHILS # BLD AUTO: 0 10E3/UL (ref 0–0.2)
BASOPHILS NFR BLD AUTO: 0 %
BENZODIAZ UR QL SCN: ABNORMAL
BILIRUB SERPL-MCNC: 0.3 MG/DL
BUN SERPL-MCNC: 27.6 MG/DL (ref 8–23)
BZE UR QL SCN: ABNORMAL
CALCIUM SERPL-MCNC: 9.4 MG/DL (ref 8.8–10.2)
CANNABINOIDS UR QL SCN: ABNORMAL
CHLORIDE SERPL-SCNC: 98 MMOL/L (ref 98–107)
CREAT SERPL-MCNC: 1.22 MG/DL (ref 0.67–1.17)
CRP SERPL-MCNC: 4.91 MG/L
D DIMER PPP FEU-MCNC: 0.38 UG/ML FEU (ref 0–0.5)
DEPRECATED HCO3 PLAS-SCNC: 22 MMOL/L (ref 22–29)
DIASTOLIC BLOOD PRESSURE - MUSE: NORMAL MMHG
EOSINOPHIL # BLD AUTO: 0.1 10E3/UL (ref 0–0.7)
EOSINOPHIL NFR BLD AUTO: 1 %
ERYTHROCYTE [DISTWIDTH] IN BLOOD BY AUTOMATED COUNT: 15.1 % (ref 10–15)
ERYTHROCYTE [DISTWIDTH] IN BLOOD BY AUTOMATED COUNT: 15.1 % (ref 10–15)
ERYTHROCYTE [SEDIMENTATION RATE] IN BLOOD BY WESTERGREN METHOD: 11 MM/HR (ref 0–20)
ETHANOL SERPL-MCNC: <0.01 G/DL
GFR SERPL CREATININE-BSD FRML MDRD: 67 ML/MIN/1.73M2
GLUCOSE BLDC GLUCOMTR-MCNC: 133 MG/DL (ref 70–99)
GLUCOSE BLDC GLUCOMTR-MCNC: 149 MG/DL (ref 70–99)
GLUCOSE BLDC GLUCOMTR-MCNC: 154 MG/DL (ref 70–99)
GLUCOSE BLDC GLUCOMTR-MCNC: 155 MG/DL (ref 70–99)
GLUCOSE BLDC GLUCOMTR-MCNC: 19 MG/DL (ref 70–99)
GLUCOSE BLDC GLUCOMTR-MCNC: 197 MG/DL (ref 70–99)
GLUCOSE BLDC GLUCOMTR-MCNC: 82 MG/DL (ref 70–99)
GLUCOSE BLDC GLUCOMTR-MCNC: 94 MG/DL (ref 70–99)
GLUCOSE SERPL-MCNC: 23 MG/DL (ref 70–99)
HBA1C MFR BLD: 5.1 %
HCT VFR BLD AUTO: 31.5 % (ref 40–53)
HCT VFR BLD AUTO: 32.3 % (ref 40–53)
HGB BLD-MCNC: 10.2 G/DL (ref 13.3–17.7)
HGB BLD-MCNC: 10.5 G/DL (ref 13.3–17.7)
HOLD SPECIMEN: NORMAL
IMM GRANULOCYTES # BLD: 0.1 10E3/UL
IMM GRANULOCYTES NFR BLD: 1 %
INTERPRETATION ECG - MUSE: NORMAL
LIPASE SERPL-CCNC: 14 U/L (ref 13–60)
LVEF ECHO: NORMAL
LYMPHOCYTES # BLD AUTO: 3.1 10E3/UL (ref 0.8–5.3)
LYMPHOCYTES NFR BLD AUTO: 26 %
MCH RBC QN AUTO: 30.4 PG (ref 26.5–33)
MCH RBC QN AUTO: 30.5 PG (ref 26.5–33)
MCHC RBC AUTO-ENTMCNC: 32.4 G/DL (ref 31.5–36.5)
MCHC RBC AUTO-ENTMCNC: 32.5 G/DL (ref 31.5–36.5)
MCV RBC AUTO: 94 FL (ref 78–100)
MCV RBC AUTO: 94 FL (ref 78–100)
MONOCYTES # BLD AUTO: 0.7 10E3/UL (ref 0–1.3)
MONOCYTES NFR BLD AUTO: 6 %
NEUTROPHILS # BLD AUTO: 7.7 10E3/UL (ref 1.6–8.3)
NEUTROPHILS NFR BLD AUTO: 66 %
NRBC # BLD AUTO: 0 10E3/UL
NRBC BLD AUTO-RTO: 0 /100
OPIATES UR QL SCN: ABNORMAL
P AXIS - MUSE: 41 DEGREES
PLATELET # BLD AUTO: 187 10E3/UL (ref 150–450)
PLATELET # BLD AUTO: 200 10E3/UL (ref 150–450)
POTASSIUM SERPL-SCNC: 4.3 MMOL/L (ref 3.4–5.3)
PR INTERVAL - MUSE: 180 MS
PROT SERPL-MCNC: 6.6 G/DL (ref 6.4–8.3)
QRS DURATION - MUSE: 74 MS
QT - MUSE: 378 MS
QTC - MUSE: 459 MS
R AXIS - MUSE: 41 DEGREES
RBC # BLD AUTO: 3.34 10E6/UL (ref 4.4–5.9)
RBC # BLD AUTO: 3.45 10E6/UL (ref 4.4–5.9)
SODIUM SERPL-SCNC: 137 MMOL/L (ref 136–145)
SYSTOLIC BLOOD PRESSURE - MUSE: NORMAL MMHG
T AXIS - MUSE: 25 DEGREES
TROPONIN T SERPL HS-MCNC: 30 NG/L
TROPONIN T SERPL HS-MCNC: 34 NG/L
TROPONIN T SERPL HS-MCNC: 40 NG/L
UFH PPP CHRO-ACNC: 0.11 IU/ML
VALPROATE SERPL-MCNC: 68.4 UG/ML
VENTRICULAR RATE- MUSE: 89 BPM
WBC # BLD AUTO: 11.8 10E3/UL (ref 4–11)
WBC # BLD AUTO: 9.5 10E3/UL (ref 4–11)

## 2023-08-01 PROCEDURE — 85520 HEPARIN ASSAY: CPT | Performed by: INTERNAL MEDICINE

## 2023-08-01 PROCEDURE — 85041 AUTOMATED RBC COUNT: CPT | Performed by: INTERNAL MEDICINE

## 2023-08-01 PROCEDURE — 83690 ASSAY OF LIPASE: CPT | Performed by: EMERGENCY MEDICINE

## 2023-08-01 PROCEDURE — 93010 ELECTROCARDIOGRAM REPORT: CPT | Performed by: INTERNAL MEDICINE

## 2023-08-01 PROCEDURE — 93306 TTE W/DOPPLER COMPLETE: CPT

## 2023-08-01 PROCEDURE — 250N000011 HC RX IP 250 OP 636: Performed by: PHYSICIAN ASSISTANT

## 2023-08-01 PROCEDURE — 85379 FIBRIN DEGRADATION QUANT: CPT | Performed by: EMERGENCY MEDICINE

## 2023-08-01 PROCEDURE — 99285 EMERGENCY DEPT VISIT HI MDM: CPT | Mod: 25

## 2023-08-01 PROCEDURE — 250N000013 HC RX MED GY IP 250 OP 250 PS 637: Performed by: PHYSICIAN ASSISTANT

## 2023-08-01 PROCEDURE — 96374 THER/PROPH/DIAG INJ IV PUSH: CPT

## 2023-08-01 PROCEDURE — 82570 ASSAY OF URINE CREATININE: CPT

## 2023-08-01 PROCEDURE — 99207 PR APP CREDIT; MD BILLING SHARED VISIT: CPT | Performed by: PHYSICIAN ASSISTANT

## 2023-08-01 PROCEDURE — 96376 TX/PRO/DX INJ SAME DRUG ADON: CPT

## 2023-08-01 PROCEDURE — 83036 HEMOGLOBIN GLYCOSYLATED A1C: CPT | Performed by: PHYSICIAN ASSISTANT

## 2023-08-01 PROCEDURE — 80307 DRUG TEST PRSMV CHEM ANLYZR: CPT | Performed by: PHYSICIAN ASSISTANT

## 2023-08-01 PROCEDURE — 84484 ASSAY OF TROPONIN QUANT: CPT | Performed by: PHYSICIAN ASSISTANT

## 2023-08-01 PROCEDURE — 250N000013 HC RX MED GY IP 250 OP 250 PS 637: Performed by: EMERGENCY MEDICINE

## 2023-08-01 PROCEDURE — 258N000001 HC RX 258: Performed by: EMERGENCY MEDICINE

## 2023-08-01 PROCEDURE — 85025 COMPLETE CBC W/AUTO DIFF WBC: CPT | Performed by: EMERGENCY MEDICINE

## 2023-08-01 PROCEDURE — 36415 COLL VENOUS BLD VENIPUNCTURE: CPT | Performed by: EMERGENCY MEDICINE

## 2023-08-01 PROCEDURE — 84484 ASSAY OF TROPONIN QUANT: CPT | Performed by: EMERGENCY MEDICINE

## 2023-08-01 PROCEDURE — 36415 COLL VENOUS BLD VENIPUNCTURE: CPT | Performed by: PHYSICIAN ASSISTANT

## 2023-08-01 PROCEDURE — 93306 TTE W/DOPPLER COMPLETE: CPT | Mod: 26 | Performed by: INTERNAL MEDICINE

## 2023-08-01 PROCEDURE — 86140 C-REACTIVE PROTEIN: CPT | Performed by: PHYSICIAN ASSISTANT

## 2023-08-01 PROCEDURE — 258N000001 HC RX 258

## 2023-08-01 PROCEDURE — 258N000003 HC RX IP 258 OP 636: Performed by: EMERGENCY MEDICINE

## 2023-08-01 PROCEDURE — 99223 1ST HOSP IP/OBS HIGH 75: CPT | Mod: 25 | Performed by: INTERNAL MEDICINE

## 2023-08-01 PROCEDURE — 80164 ASSAY DIPROPYLACETIC ACD TOT: CPT | Performed by: EMERGENCY MEDICINE

## 2023-08-01 PROCEDURE — 120N000001 HC R&B MED SURG/OB

## 2023-08-01 PROCEDURE — 85652 RBC SED RATE AUTOMATED: CPT | Performed by: PHYSICIAN ASSISTANT

## 2023-08-01 PROCEDURE — 93005 ELECTROCARDIOGRAM TRACING: CPT

## 2023-08-01 PROCEDURE — 82077 ASSAY SPEC XCP UR&BREATH IA: CPT | Performed by: EMERGENCY MEDICINE

## 2023-08-01 PROCEDURE — C9113 INJ PANTOPRAZOLE SODIUM, VIA: HCPCS | Mod: JZ | Performed by: PHYSICIAN ASSISTANT

## 2023-08-01 PROCEDURE — 258N000003 HC RX IP 258 OP 636: Performed by: INTERNAL MEDICINE

## 2023-08-01 PROCEDURE — 36415 COLL VENOUS BLD VENIPUNCTURE: CPT | Performed by: INTERNAL MEDICINE

## 2023-08-01 PROCEDURE — 80053 COMPREHEN METABOLIC PANEL: CPT | Performed by: EMERGENCY MEDICINE

## 2023-08-01 PROCEDURE — 99223 1ST HOSP IP/OBS HIGH 75: CPT | Mod: FS | Performed by: INTERNAL MEDICINE

## 2023-08-01 PROCEDURE — 82962 GLUCOSE BLOOD TEST: CPT

## 2023-08-01 PROCEDURE — 96375 TX/PRO/DX INJ NEW DRUG ADDON: CPT

## 2023-08-01 PROCEDURE — 250N000011 HC RX IP 250 OP 636: Performed by: EMERGENCY MEDICINE

## 2023-08-01 PROCEDURE — 71046 X-RAY EXAM CHEST 2 VIEWS: CPT

## 2023-08-01 RX ORDER — NALOXONE HYDROCHLORIDE 0.4 MG/ML
0.4 INJECTION, SOLUTION INTRAMUSCULAR; INTRAVENOUS; SUBCUTANEOUS
Status: DISCONTINUED | OUTPATIENT
Start: 2023-08-01 | End: 2023-08-01

## 2023-08-01 RX ORDER — SODIUM CHLORIDE 9 MG/ML
INJECTION, SOLUTION INTRAVENOUS CONTINUOUS
Status: DISCONTINUED | OUTPATIENT
Start: 2023-08-01 | End: 2023-08-02 | Stop reason: HOSPADM

## 2023-08-01 RX ORDER — NICOTINE POLACRILEX 4 MG
15-30 LOZENGE BUCCAL
Status: DISCONTINUED | OUTPATIENT
Start: 2023-08-01 | End: 2023-08-02 | Stop reason: HOSPADM

## 2023-08-01 RX ORDER — CLOPIDOGREL BISULFATE 75 MG/1
75 TABLET ORAL DAILY
Status: DISCONTINUED | OUTPATIENT
Start: 2023-08-01 | End: 2023-08-02 | Stop reason: HOSPADM

## 2023-08-01 RX ORDER — NITROGLYCERIN 0.4 MG/1
0.4 TABLET SUBLINGUAL EVERY 5 MIN PRN
Status: DISCONTINUED | OUTPATIENT
Start: 2023-08-01 | End: 2023-08-01

## 2023-08-01 RX ORDER — HEPARIN SODIUM 10000 [USP'U]/100ML
0-5000 INJECTION, SOLUTION INTRAVENOUS CONTINUOUS
Status: DISCONTINUED | OUTPATIENT
Start: 2023-08-01 | End: 2023-08-01

## 2023-08-01 RX ORDER — DEXTROSE MONOHYDRATE 25 G/50ML
INJECTION, SOLUTION INTRAVENOUS
Status: COMPLETED
Start: 2023-08-01 | End: 2023-08-01

## 2023-08-01 RX ORDER — NITROGLYCERIN 80 MG/1
1 PATCH TRANSDERMAL ONCE
Status: DISCONTINUED | OUTPATIENT
Start: 2023-08-01 | End: 2023-08-01

## 2023-08-01 RX ORDER — ONDANSETRON 4 MG/1
4 TABLET, ORALLY DISINTEGRATING ORAL EVERY 6 HOURS PRN
Status: DISCONTINUED | OUTPATIENT
Start: 2023-08-01 | End: 2023-08-02 | Stop reason: HOSPADM

## 2023-08-01 RX ORDER — ASPIRIN 81 MG/1
324 TABLET, CHEWABLE ORAL ONCE
Status: COMPLETED | OUTPATIENT
Start: 2023-08-01 | End: 2023-08-01

## 2023-08-01 RX ORDER — ROSUVASTATIN CALCIUM 20 MG/1
20 TABLET, COATED ORAL DAILY
Status: DISCONTINUED | OUTPATIENT
Start: 2023-08-02 | End: 2023-08-02 | Stop reason: HOSPADM

## 2023-08-01 RX ORDER — LIDOCAINE 40 MG/G
CREAM TOPICAL
Status: DISCONTINUED | OUTPATIENT
Start: 2023-08-01 | End: 2023-08-02 | Stop reason: HOSPADM

## 2023-08-01 RX ORDER — PREGABALIN 75 MG/1
150 CAPSULE ORAL 2 TIMES DAILY
Status: DISCONTINUED | OUTPATIENT
Start: 2023-08-01 | End: 2023-08-02 | Stop reason: HOSPADM

## 2023-08-01 RX ORDER — AMOXICILLIN 250 MG
1 CAPSULE ORAL 2 TIMES DAILY PRN
Status: DISCONTINUED | OUTPATIENT
Start: 2023-08-01 | End: 2023-08-02 | Stop reason: HOSPADM

## 2023-08-01 RX ORDER — ACETAMINOPHEN 325 MG/1
650 TABLET ORAL EVERY 6 HOURS PRN
Status: DISCONTINUED | OUTPATIENT
Start: 2023-08-01 | End: 2023-08-02 | Stop reason: HOSPADM

## 2023-08-01 RX ORDER — ASPIRIN 81 MG/1
81 TABLET ORAL DAILY
Status: DISCONTINUED | OUTPATIENT
Start: 2023-08-02 | End: 2023-08-02 | Stop reason: HOSPADM

## 2023-08-01 RX ORDER — CLOMIPRAMINE HYDROCHLORIDE 50 MG/1
100 CAPSULE ORAL 2 TIMES DAILY
Status: DISCONTINUED | OUTPATIENT
Start: 2023-08-01 | End: 2023-08-02 | Stop reason: HOSPADM

## 2023-08-01 RX ORDER — NALOXONE HYDROCHLORIDE 0.4 MG/ML
0.2 INJECTION, SOLUTION INTRAMUSCULAR; INTRAVENOUS; SUBCUTANEOUS
Status: DISCONTINUED | OUTPATIENT
Start: 2023-08-01 | End: 2023-08-01

## 2023-08-01 RX ORDER — MORPHINE SULFATE 2 MG/ML
2 INJECTION, SOLUTION INTRAMUSCULAR; INTRAVENOUS ONCE
Status: COMPLETED | OUTPATIENT
Start: 2023-08-01 | End: 2023-08-01

## 2023-08-01 RX ORDER — DEXTROSE MONOHYDRATE 25 G/50ML
50 INJECTION, SOLUTION INTRAVENOUS ONCE
Status: COMPLETED | OUTPATIENT
Start: 2023-08-01 | End: 2023-08-01

## 2023-08-01 RX ORDER — DIVALPROEX SODIUM 500 MG/1
1000 TABLET, DELAYED RELEASE ORAL 2 TIMES DAILY
Status: DISCONTINUED | OUTPATIENT
Start: 2023-08-01 | End: 2023-08-02 | Stop reason: HOSPADM

## 2023-08-01 RX ORDER — QUETIAPINE FUMARATE 100 MG/1
200 TABLET, FILM COATED ORAL AT BEDTIME
Status: DISCONTINUED | OUTPATIENT
Start: 2023-08-01 | End: 2023-08-01

## 2023-08-01 RX ORDER — MORPHINE SULFATE 2 MG/ML
2 INJECTION, SOLUTION INTRAMUSCULAR; INTRAVENOUS
Status: DISCONTINUED | OUTPATIENT
Start: 2023-08-01 | End: 2023-08-02

## 2023-08-01 RX ORDER — ACETAMINOPHEN 650 MG/1
650 SUPPOSITORY RECTAL EVERY 6 HOURS PRN
Status: DISCONTINUED | OUTPATIENT
Start: 2023-08-01 | End: 2023-08-02 | Stop reason: HOSPADM

## 2023-08-01 RX ORDER — ISOSORBIDE DINITRATE 20 MG/1
40 TABLET ORAL 2 TIMES DAILY
Status: DISCONTINUED | OUTPATIENT
Start: 2023-08-01 | End: 2023-08-02 | Stop reason: HOSPADM

## 2023-08-01 RX ORDER — DEXTROSE MONOHYDRATE 25 G/50ML
25-50 INJECTION, SOLUTION INTRAVENOUS
Status: DISCONTINUED | OUTPATIENT
Start: 2023-08-01 | End: 2023-08-02 | Stop reason: HOSPADM

## 2023-08-01 RX ORDER — BUSPIRONE HYDROCHLORIDE 10 MG/1
10 TABLET ORAL 2 TIMES DAILY
Status: DISCONTINUED | OUTPATIENT
Start: 2023-08-01 | End: 2023-08-02 | Stop reason: HOSPADM

## 2023-08-01 RX ORDER — AMOXICILLIN 250 MG
2 CAPSULE ORAL 2 TIMES DAILY PRN
Status: DISCONTINUED | OUTPATIENT
Start: 2023-08-01 | End: 2023-08-02 | Stop reason: HOSPADM

## 2023-08-01 RX ORDER — ONDANSETRON 2 MG/ML
4 INJECTION INTRAMUSCULAR; INTRAVENOUS EVERY 6 HOURS PRN
Status: DISCONTINUED | OUTPATIENT
Start: 2023-08-01 | End: 2023-08-02 | Stop reason: HOSPADM

## 2023-08-01 RX ORDER — HEPARIN SODIUM 10000 [USP'U]/100ML
0-5000 INJECTION, SOLUTION INTRAVENOUS CONTINUOUS
Status: DISCONTINUED | OUTPATIENT
Start: 2023-08-01 | End: 2023-08-02 | Stop reason: HOSPADM

## 2023-08-01 RX ORDER — ISOSORBIDE DINITRATE 10 MG/1
40 TABLET ORAL 2 TIMES DAILY
Status: DISCONTINUED | OUTPATIENT
Start: 2023-08-01 | End: 2023-08-01

## 2023-08-01 RX ORDER — BISOPROLOL FUMARATE 5 MG/1
5 TABLET, FILM COATED ORAL DAILY
Status: DISCONTINUED | OUTPATIENT
Start: 2023-08-01 | End: 2023-08-02 | Stop reason: HOSPADM

## 2023-08-01 RX ADMIN — FLUOXETINE 20 MG: 20 CAPSULE ORAL at 16:37

## 2023-08-01 RX ADMIN — ONDANSETRON 4 MG: 4 TABLET, ORALLY DISINTEGRATING ORAL at 17:02

## 2023-08-01 RX ADMIN — MORPHINE SULFATE 2 MG: 2 INJECTION, SOLUTION INTRAMUSCULAR; INTRAVENOUS at 21:39

## 2023-08-01 RX ADMIN — DEXTROSE MONOHYDRATE 50 ML: 25 INJECTION, SOLUTION INTRAVENOUS at 08:50

## 2023-08-01 RX ADMIN — MORPHINE SULFATE 2 MG: 2 INJECTION, SOLUTION INTRAMUSCULAR; INTRAVENOUS at 16:51

## 2023-08-01 RX ADMIN — SODIUM CHLORIDE 1000 ML: 9 INJECTION, SOLUTION INTRAVENOUS at 11:17

## 2023-08-01 RX ADMIN — CLOMIPRAMINE HYDROCHLORIDE 100 MG: 50 CAPSULE ORAL at 21:56

## 2023-08-01 RX ADMIN — MORPHINE SULFATE 2 MG: 2 INJECTION, SOLUTION INTRAMUSCULAR; INTRAVENOUS at 10:32

## 2023-08-01 RX ADMIN — SODIUM CHLORIDE 1000 ML: 9 INJECTION, SOLUTION INTRAVENOUS at 16:45

## 2023-08-01 RX ADMIN — CLOPIDOGREL BISULFATE 75 MG: 75 TABLET ORAL at 16:38

## 2023-08-01 RX ADMIN — SODIUM CHLORIDE: 9 INJECTION, SOLUTION INTRAVENOUS at 18:53

## 2023-08-01 RX ADMIN — PREGABALIN 150 MG: 75 CAPSULE ORAL at 21:34

## 2023-08-01 RX ADMIN — DIVALPROEX SODIUM 1000 MG: 500 TABLET, DELAYED RELEASE ORAL at 21:33

## 2023-08-01 RX ADMIN — Medication 1 MG: at 22:33

## 2023-08-01 RX ADMIN — HEPARIN SODIUM 850 UNITS/HR: 10000 INJECTION, SOLUTION INTRAVENOUS at 11:30

## 2023-08-01 RX ADMIN — BUSPIRONE HYDROCHLORIDE 10 MG: 10 TABLET ORAL at 21:33

## 2023-08-01 RX ADMIN — ASPIRIN 324 MG: 81 TABLET, CHEWABLE ORAL at 08:51

## 2023-08-01 RX ADMIN — PANTOPRAZOLE SODIUM 40 MG: 40 INJECTION, POWDER, FOR SOLUTION INTRAVENOUS at 16:39

## 2023-08-01 RX ADMIN — DEXTROSE MONOHYDRATE 50 ML: 25 INJECTION, SOLUTION INTRAVENOUS at 10:59

## 2023-08-01 RX ADMIN — MORPHINE SULFATE 2 MG: 2 INJECTION, SOLUTION INTRAMUSCULAR; INTRAVENOUS at 14:16

## 2023-08-01 ASSESSMENT — ACTIVITIES OF DAILY LIVING (ADL)
ADLS_ACUITY_SCORE: 35
ADLS_ACUITY_SCORE: 37
ADLS_ACUITY_SCORE: 37
ADLS_ACUITY_SCORE: 35
ADLS_ACUITY_SCORE: 37
ADLS_ACUITY_SCORE: 37

## 2023-08-01 NOTE — PHARMACY-ADMISSION MEDICATION HISTORY
Pharmacist Admission Medication History    Admission medication history is complete. The information provided in this note is only as accurate as the sources available at the time of the update.    Medication reconciliation/reorder completed by provider prior to medication history? No    Information Source(s): Patient and CareEverywhere/SureScripts via in-person  Spoke with patient in person and he did not need an . He seemed to know his medications fairly well although was quite upset with being admitted and would go off on tangents d/t this. Sometimes he would start talking in a different language but then would always be able to answer my questions in English.     Pertinent Information:     Buspirone: pt reports to me he takes twice daily, although is prescribed to take 4 times daily.   Fluoxetine: pt takes 20mg daily, although is prescribed 40mg daily.   Quetiapine: pt states he does not take anymore because he read/heard it can affect his blood sugar.   Glipizide: sounds like was increased to 10mg daily when patient was in [place]. Prior to this was on 5mg daily. Pt states he takes 5mg BID, unclear if he takes regardless of meals.       Changes made to PTA medication list:  Added: None  Deleted: None  Changed: see above    Medication Affordability:  Not including over the counter (OTC) medications, was there a time in the past 3 months when you did not take your medications as prescribed because of cost?: Unable to Assess    Allergies reviewed with patient and updates made in EHR: unable to assess    Medication History Completed By:   Cristina Thompson, PharmD, BCPS    Prior to Admission medications    Medication Sig Last Dose Taking? Auth Provider Long Term End Date   amLODIPine-valsartan (EXFORGE)  MG tablet Take 1 tablet by mouth daily 7/31/2023 at - Yes Ervin Arellano, APRN CNP Yes    aspirin (ASA) 81 MG EC tablet Take 1 tablet (81 mg) by mouth daily for 270 days 7/31/2023 Yes Ervin Arellano  APRN CNP  8/19/23   bisoprolol (ZEBETA) 5 MG tablet Take 1 tablet (5 mg) by mouth daily 7/31/2023 at - Yes Ervin Arellano APRN CNP Yes    busPIRone (BUSPAR) 10 MG tablet Take 1 tablet (10 mg) by mouth 4 times daily  Patient taking differently: Take 10 mg by mouth 2 times daily 7/31/2023 at - Yes Ervin Arellano APRN CNP Yes    clomiPRAMINE (ANAFRANIL) 50 MG capsule Take 2 capsules (100 mg) by mouth 2 times daily 7/31/2023 at - Yes Ervin Arellano APRN CNP Yes    clopidogrel (PLAVIX) 75 MG tablet Take 1 tablet (75 mg) by mouth daily 7/31/2023 at - Yes Ervin Arellano APRN CNP Yes    divalproex sodium delayed-release (DEPAKOTE) 500 MG DR tablet Take 2 tablets (1,000 mg) by mouth 2 times daily 7/31/2023 at - Yes Ervin Arellano APRN CNP Yes    FLUoxetine (PROZAC) 20 MG capsule Take 1 capsule (20 mg) by mouth 2 times daily  Patient taking differently: Take 20 mg by mouth daily 7/31/2023 at - Yes Ervin Arellano APRN CNP Yes    glipiZIDE (GLUCOTROL XL) 5 MG 24 hr tablet Take 2 tablets (10 mg) by mouth daily  Patient taking differently: Take 5 mg by mouth 2 times daily 7/31/2023 at - Yes Ervin Arellano APRN CNP Yes    isosorbide dinitrate (ISORDIL) 20 MG tablet Take 2 tablets (40 mg) by mouth 2 times daily 7/31/2023 at - Yes Ervin Arellano APRN CNP Yes    pregabalin (LYRICA) 150 MG capsule TAKE ONE CAPSULE BY MOUTH TWICE A DAY 7/31/2023 at - Yes Ervin Arellano APRN CNP Yes    rosuvastatin (CRESTOR) 20 MG tablet Take 1 tablet (20 mg) by mouth daily 7/31/2023 at - Yes Ervin Arellano APRN CNP Yes    sitagliptin-metFORMIN (JANUMET)  MG tablet Take 1 tablet by mouth 2 times daily (with meals) 7/31/2023 at - Yes Ervin Arellano APRN CNP Yes    blood glucose (NO BRAND SPECIFIED) test strip Use to test blood sugar 2 times daily or as directed. To accompany: Blood Glucose Monitor Brands: per insurance. DME at Ervin Stern APRN CNP     blood glucose calibration (NO BRAND SPECIFIED) solution To accompany: Blood Glucose Monitor Brands:  per insurance. DME at [facility] [place] [place]  Ervin Arellano APRN CNP     esomeprazole (NEXIUM) 40 MG DR capsule Take 1 capsule (40 mg) by mouth every morning (before breakfast) Take 30-60 minutes before eating.  Patient taking differently: Take 40 mg by mouth daily as needed (heartburn) prn at prn  Hunter Carlton MD     ondansetron (ZOFRAN) 4 MG tablet Take 1 tablet (4 mg) by mouth every 8 hours as needed for nausea prn at prn  Ervin Arellano APRN CNP     QUEtiapine (SEROQUEL) 200 MG tablet Take 1 tablet (200 mg) by mouth At Bedtime  Patient not taking: Reported on 8/1/2023 Not Taking  Ervin Arellano APRN CNP Yes    thin (NO BRAND SPECIFIED) lancets Use with lanceting device. To accompany: Blood Glucose Monitor Brands: per insurance. DME at [facility] [place] [place]  Ervin Arellano APRN CNP

## 2023-08-01 NOTE — CONSULTS
[facility]    Cardiology Consultation     Huseyin Pettit MRN#: [id number]   YOB: 1960 Age: 63 year old     Date of Admission:  8/1/2023    Consult Indication:  chest pain, elevated troponin    Assessment & Plan     # NSTEMI.  Troponin mildly elevated, flat in trajectory.  Symptoms are not classically characteristic of angina, however his functional capacity is severely limited and chest pain does seem to be improved with nitroglycerin.  He has known history of CAD status post PCI x3 in [place] in 2011, last coronary angiogram 3/2/2020 demonstrated mild generalized coronary atherosclerosis without significant obstruction, stented segments in the proximal LAD and mid circumflex were widely patent.  He continues to smoke about a pack of cigarettes per day.  # CKD  # DM2  # HTN  # HL    - Discussed options for further evaluation and management including conservative medical management with close follow up, non-invasive stress testing, or cardiac catheterization/coronary angiography +/- PCI.  Reviewed the risks and benefits of each option at length.   - Recommend ardiac catheterization/coronary angiography +/- PCI.   - Discussed the risks of cardiac catheterization/angiography +/- PCI that include but are not limited to the risk of stroke, heart attack, death, cardiac injury, emergent intervention such as stenting or bypass, contrast induced allergic reaction, renal dysfunction (including risks of temporary or permanent dialysis), and complications related to sedation and respiratory/pulmonary compromise, vascular complications (including bleeding and blood transfusion). Patient denies any major active bleeding issues and is willing to take and comply with dual antiplatelet therapy and understands the associated bleeding risks. Patient understands the overall risks of the procedure and wishes to proceed.  - TTE pending  - Agree with IV fluids, seems slightly dehydrated on exam  -  Smoking cessation encouraged  -Continue current cardiac regimen of aspirin, bisoprolol, clopidogrel, Isordil, rosuvastatin  - Agree with heparin gtt.  - N.p.o. at midnight    Please do not hesitate to page with any questions or concerns.     Ervin Parrish MD, [facility]  Cardiology  August 1, 2023    Voice recognition software utilized.   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  He denies any exertional chest pain/chest discomfort recently.  He was in his usual state of health until earlier this morning when he developed central chest discomfort, described as a squeezing/pressure sensation.  This has been intermittent over the course of the day.  Currently asymptomatic.  In the ED ECG demonstrated normal sinus rhythm without acute ischemic changes.  Troponin was mildly elevated at 40 and has been flat in trajectory.    Regarding his prior cardiac history, coincidentally I had seen him in 2020 when he presented with atypical chest discomfort.  Troponin was normal at that time.  Coronary angiogram demonstrated mild generalized atherosclerosis with no significant obstruction, stented segments in the proximal LAD and mid circumflex were widely patent with only minimal in-stent restenosis.    Patient lives with his brother, he does not work, he smokes about 1 pack of cigarettes per day.      Past Medical History   Past Medical History:   Diagnosis Date    CAD (coronary artery disease) 2/18/2015    S/p 3 stents in 2011 In [place]    Closed fracture of multiple ribs of left side with  routine healing 7/10/2018    Coronary artery disease     Coronary artery disease involving native coronary artery of native heart without angina pectoris 7/3/2018    S/p 3 stents put in [place] in 2011.    Depressive disorder     Diabetes (H)     Hypertension     Severe episode of recurrent major depressive disorder, without psychotic features (H) 7/3/2018    Type 2 diabetes mellitus with other circulatory complication, with long-term current use of insulin (H) 7/3/2018       Past Surgical History   Past Surgical History:   Procedure Laterality Date    AMPUTATE FOOT Left 3/5/2021    Procedure: TRANSMETATARSAL AMPUTATION LEFT FOOT;  Surgeon: Sergey Barnes DPM;  Location:  OR    AMPUTATE TOE(S) Right 3/3/2020    Procedure: AMPUTATION RIGHT GREAT TOE.;  Surgeon: Sergey Barnes DPM;  Location:  OR    CHOLECYSTECTOMY      COLONOSCOPY      CV CORONARY ANGIOGRAM N/A 3/2/2020    Procedure: Coronary Angiogram;  Surgeon: Thaddeus Sun MD;  Location:  HEART CARDIAC CATH LAB    GALLBLADDER SURGERY  2011    IRRIGATION AND DEBRIDEMENT FOOT, COMBINED Left 3/11/2021    Procedure: Left foot revision transmetatarsal amputation with irrigation and debridement,  bone resection, delayed primary closure;  Surgeon: David Arana DPM;  Location:  OR       Prior to Admission Medications   Prior to Admission Medications   Prescriptions Last Dose Informant Patient Reported? Taking?   FLUoxetine (PROZAC) 20 MG capsule 7/31/2023 at -  No Yes   Sig: Take 1 capsule (20 mg) by mouth 2 times daily   Patient taking differently: Take 20 mg by mouth daily   QUEtiapine (SEROQUEL) 200 MG tablet Not Taking  No No   Sig: Take 1 tablet (200 mg) by mouth At Bedtime   Patient not taking: Reported on 8/1/2023   amLODIPine-valsartan (EXFORGE)  MG tablet 7/31/2023 at -  No Yes   Sig: Take 1 tablet by mouth daily   aspirin (ASA) 81 MG EC tablet 7/31/2023  No Yes   Sig: Take 1 tablet (81 mg) by mouth daily for 270 days    bisoprolol (ZEBETA) 5 MG tablet 7/31/2023 at -  No Yes   Sig: Take 1 tablet (5 mg) by mouth daily   blood glucose (NO BRAND SPECIFIED) test strip DME at DME  No No   Sig: Use to test blood sugar 2 times daily or as directed. To accompany: Blood Glucose Monitor Brands: per insurance.   blood glucose calibration (NO BRAND SPECIFIED) solution DME at DME  No No   Sig: To accompany: Blood Glucose Monitor Brands: per insurance.   busPIRone (BUSPAR) 10 MG tablet 7/31/2023 at -  No Yes   Sig: Take 1 tablet (10 mg) by mouth 4 times daily   Patient taking differently: Take 10 mg by mouth 2 times daily   clomiPRAMINE (ANAFRANIL) 50 MG capsule 7/31/2023 at -  No Yes   Sig: Take 2 capsules (100 mg) by mouth 2 times daily   clopidogrel (PLAVIX) 75 MG tablet 7/31/2023 at -  No Yes   Sig: Take 1 tablet (75 mg) by mouth daily   divalproex sodium delayed-release (DEPAKOTE) 500 MG DR tablet 7/31/2023 at -  No Yes   Sig: Take 2 tablets (1,000 mg) by mouth 2 times daily   esomeprazole (NEXIUM) 40 MG DR capsule prn at prn  No No   Sig: Take 1 capsule (40 mg) by mouth every morning (before breakfast) Take 30-60 minutes before eating.   Patient taking differently: Take 40 mg by mouth daily as needed (heartburn)   glipiZIDE (GLUCOTROL XL) 5 MG 24 hr tablet 7/31/2023 at -  No Yes   Sig: Take 2 tablets (10 mg) by mouth daily   Patient taking differently: Take 5 mg by mouth 2 times daily   isosorbide dinitrate (ISORDIL) 20 MG tablet 7/31/2023 at -  No Yes   Sig: Take 2 tablets (40 mg) by mouth 2 times daily   ondansetron (ZOFRAN) 4 MG tablet prn at prn  No No   Sig: Take 1 tablet (4 mg) by mouth every 8 hours as needed for nausea   pregabalin (LYRICA) 150 MG capsule 7/31/2023 at -  No Yes   Sig: TAKE ONE CAPSULE BY MOUTH TWICE A DAY   rosuvastatin (CRESTOR) 20 MG tablet 7/31/2023 at -  No Yes   Sig: Take 1 tablet (20 mg) by mouth daily   sitagliptin-metFORMIN (JANUMET)  MG tablet 7/31/2023 at -  No Yes   Sig: Take 1 tablet by mouth 2  times daily (with meals)   thin (NO BRAND SPECIFIED) lancets DME at DME  No No   Sig: Use with lanceting device. To accompany: Blood Glucose Monitor Brands: per insurance.      Facility-Administered Medications: None     Current Facility-Administered Medications   Medication Dose Route Frequency    sodium chloride 0.9%  1,000 mL Intravenous Once    sodium chloride 0.9%  1,000 mL Intravenous Once    [START ON 2023] aspirin  81 mg Oral Daily    bisoprolol  5 mg Oral Daily    busPIRone  10 mg Oral BID    clomiPRAMINE  100 mg Oral BID    clopidogrel  75 mg Oral Daily    divalproex sodium delayed-release  1,000 mg Oral BID    FLUoxetine  20 mg Oral Daily    isosorbide dinitrate  40 mg Oral BID    pantoprazole  40 mg Intravenous Daily with breakfast    pregabalin  150 mg Oral BID    [START ON 2023] rosuvastatin  20 mg Oral Daily    sodium chloride (PF)  3 mL Intracatheter Q8H     Current Facility-Administered Medications   Medication Last Rate    heparin       Allergies   No Known Allergies    Social History    reports that he has been smoking cigarettes. He has a 25.00 pack-year smoking history. He has never used smokeless tobacco. He reports that he does not drink alcohol and does not use drugs.    Family History     No significant FH of early ASCVD    Review of Systems   A comprehensive review of system was performed and is negative other than that noted in the HPI or here.     Physical Exam   Vital Signs with Ranges  Temp:  [97.4  F (36.3  C)-97.8  F (36.6  C)] 97.8  F (36.6  C)  Pulse:  [] 84  Resp:  [11-24] 20  BP: ()/(24-94) 103/66  SpO2:  [95 %-100 %] 96 %  Wt Readings from Last 4 Encounters:   23 70.3 kg (155 lb)   23 70.3 kg (155 lb)   22 75.5 kg (166 lb 8 oz)   21 77.1 kg (170 lb)     No intake/output data recorded.    Vital signs were personally reviewed:  Temperatures:  Current - Temp: 97.8  F (36.6  C); Max - Temp  Av.6  F (36.4  C)  Min: 97.4  F (36.3  C)   Max: 97.8  F (36.6  C)  Respiration range: Resp  Av.6  Min: 11  Max: 24  Pulse range: Pulse  Av.6  Min: 79  Max: 101  Blood pressure range: Systolic (24hrs), Av , Min:90 , Max:159   ; Diastolic (24hrs), Av, Min:24, Max:94    Pulse oximetry range: SpO2  Av.4 %  Min: 95 %  Max: 100 %  No intake or output data in the 24 hours ending 23 1544  0 lbs 0 oz  There is no height or weight on file to calculate BMI.   There is no height or weight on file to calculate BSA.    Physical Exam:   General/Constitutional: appears stated age, in no apparent distress, appears to be well nourished  Head: normocephalic, atraumatic  Eyes - No scleral icterus  Neck: supple, trachea midline  Respiratory: clear to auscultation bilaterally, no wheezes, no rales, no increased work of breathing  Cardiovascular: JVP normal, regular rate, regular rhythm, normal S1 and S2, no S3, S4, no murmur appreciated, no lower extremity edema  Gastrointestinal: no guarding, non-rigid   Neurologic: awake, alert, moves all extremities      Laboratory tests personally reviewed:   CMP  Recent Labs   Lab 23  1342 23  1128 23  1048 23  0949 23  0845 23  0838 23  1617   NA  --   --   --   --   --  137 140   POTASSIUM  --   --   --   --   --  4.3 4.1   CHLORIDE  --   --   --   --   --  98 106   CO2  --   --   --   --   --  22 23   ANIONGAP  --   --   --   --   --  17* 11   * 197* 82 149*   < > 23* 93   BUN  --   --   --   --   --  27.6* 35.4*   CR  --   --   --   --   --  1.22* 1.32*   GFRESTIMATED  --   --   --   --   --  67 61   MYRON  --   --   --   --   --  9.4 8.6*   PROTTOTAL  --   --   --   --   --  6.6  --    ALBUMIN  --   --   --   --   --  3.8  --    BILITOTAL  --   --   --   --   --  0.3  --    ALKPHOS  --   --   --   --   --  61  --    AST  --   --   --   --   --  32  --    ALT  --   --   --   --   --  41  --     < > = values in this interval not displayed.     CBC  Recent Labs    Lab 08/01/23  1555 08/01/23  1106   WBC 9.5 11.8*   RBC 3.45* 3.34*   HGB 10.5* 10.2*   HCT 32.3* 31.5*   MCV 94 94   MCH 30.4 30.5   MCHC 32.5 32.4   RDW 15.1* 15.1*    200     INRNo lab results found in last 7 days.  Lab Results   Component Value Date    TROPI <0.015 04/26/2021    TROPI <0.015 04/22/2021    TROPI <0.015 03/04/2021     Recent Labs   Lab Test 11/17/22  0222 03/02/20  0741   CHOL 110 123   HDL 38* 25*   LDL 46 58   TRIG 130 202*     Lab Results   Component Value Date    A1C 5.7 11/17/2022    A1C 6.8 03/04/2021    A1C 7.2 03/01/2020    A1C 12.5 07/03/2018     TSH   Date Value Ref Range Status   04/22/2021 1.98 0.40 - 4.00 mU/L Final     Clinically Significant Risk Factors Present on Admission                # Drug Induced Platelet Defect: home medication list includes an antiplatelet medication   # Hypertension: Noted on problem list           CKD POA List: Stage 2 (GFR 60-89)      Chronic Fatigue and Other Debilities: Limitation of activities due to disability

## 2023-08-01 NOTE — ED TRIAGE NOTES
Mid sternal chest pain x 1 hour.  Hx of 3 stents.  Pt is very verbally escalated in triage and emotional.  VSS on RA. Accompanied by brother.  Hx diabetes.

## 2023-08-01 NOTE — LETTER
August 3, 2023      Huseyin Pettit  [address] [facility] [id number]        Dear ,    We are writing to inform you of your test results.    Microalbumin normal. Continue Exforge to continue helping w/ this.     Resulted Orders   Albumin Random Urine Quantitative with Creat Ratio   Result Value Ref Range    Creatinine Urine mg/dL 43.6 mg/dL      Comment:      The reference ranges have not been established in urine creatinine. The results should be integrated into the clinical context for interpretation.    Albumin Urine mg/L <12.0 mg/L      Comment:      The reference ranges have not been established in urine albumin. The results should be integrated into the clinical context for interpretation.    Albumin Urine mg/g Cr        Comment:      Unable to calculate, urine albumin and/or urine creatinine is outside detectable limits.  Microalbuminuria is defined as an albumin:creatinine ratio of 17 to 299 for males and 25 to 299 for females. A ratio of albumin:creatinine of 300 or higher is indicative of overt proteinuria.  Due to biologic variability, positive results should be confirmed by a second, first-morning random or 24-hour timed urine specimen. If there is discrepancy, a third specimen is recommended. When 2 out of 3 results are in the microalbuminuria range, this is evidence for incipient nephropathy and warrants increased efforts at glucose control, blood pressure control, and institution of therapy with an angiotensin-converting-enzyme (ACE) inhibitor (if the patient can tolerate it).         If you have any questions or concerns, please call the clinic at the number listed above.       Sincerely,      MC Paul CNP

## 2023-08-01 NOTE — ED NOTES
Patient frequently stating he wants to leave. Explained to him the risks of him leaving. He will stay at this time.

## 2023-08-01 NOTE — ED NOTES
RECEIVING UNIT ED HANDOFF REVIEW    Above ED Nurse Handoff Report was reviewed: Yes  Reviewed by: Nancy Leon RN on August 1, 2023 at 12:53 PM             [facility]  ED Nurse Handoff Report    ED Chief complaint: Chest Pain  . ED Diagnosis:   Final diagnoses:   Acute chest pain   Acute metabolic encephalopathy due to hypoglycemia       Allergies: No Known Allergies    Code Status: Prior    Activity level - Baseline/Home:  independent.  Activity Level - Current:   standby.   Lift room needed: No.   Bariatric: No   Needed: No   Isolation: No.   Infection: Not Applicable.     Respiratory status: Room air    Vital Signs (within 30 minutes):   Vitals:    08/01/23 1100 08/01/23 1115 08/01/23 1130 08/01/23 1145   BP: 103/67 104/64 90/57 108/53   Pulse: 85 80 81 80   Resp: 24 21 16    Temp:       TempSrc:       SpO2:           Cardiac Rhythm:  ,      Pain level:    Patient confused: No. However seems as if he may have cognitive deficit?  Patient Falls Risk: activity supervised.   Elimination Status:  Patient states he does not void often r/t prostate issues      Patient Report - Initial Complaint: Chest Pain.   Focused Assessment: Huseyin Pettit is a 63 year old male with coronary artery stents and a history of CAD, CKD stage 2, hypertension, hyperlipidemia, type 2 diabetes, and septic arthritis who presents to the emergency department for chest pain. The patient's brother and the patient state that since this morning, the patient began experiencing chest pain located at the center of his chest radiating to his back. His brother adds that the patient has been yelling and laughing in the emergency department room which is not usual for the patient. His brother adds that this episode is similar to an episode of the same symptoms approximately 6 months ago which lasted for one day. The patient denies dyspnea, shortness of breath, vomiting, nausea, or abdominal pain. Denies cough or fever.  Denies any medication allergies. Denies alcohol use or insulin injections. He adds that he is currently taking 81 mg aspirin for his coronary artery stents. Patient had a left foot amputation a year ago. Patient's blood sugar is 19 and he lives with brother and brother's wife.      Abnormal Results:   Labs Ordered and Resulted from Time of ED Arrival to Time of ED Departure   COMPREHENSIVE METABOLIC PANEL - Abnormal       Result Value    Sodium 137      Potassium 4.3      Chloride 98      Carbon Dioxide (CO2) 22      Anion Gap 17 (*)     Urea Nitrogen 27.6 (*)     Creatinine 1.22 (*)     Calcium 9.4      Glucose 23 (*)     Alkaline Phosphatase 61      AST 32      ALT 41      Protein Total 6.6      Albumin 3.8      Bilirubin Total 0.3      GFR Estimate 67     TROPONIN T, HIGH SENSITIVITY - Abnormal    Troponin T, High Sensitivity 40 (*)    CBC WITH PLATELETS AND DIFFERENTIAL - Abnormal    WBC Count 11.8 (*)     RBC Count 3.34 (*)     Hemoglobin 10.2 (*)     Hematocrit 31.5 (*)     MCV 94      MCH 30.5      MCHC 32.4      RDW 15.1 (*)     Platelet Count 200      % Neutrophils 66      % Lymphocytes 26      % Monocytes 6      % Eosinophils 1      % Basophils 0      % Immature Granulocytes 1      NRBCs per 100 WBC 0      Absolute Neutrophils 7.7      Absolute Lymphocytes 3.1      Absolute Monocytes 0.7      Absolute Eosinophils 0.1      Absolute Basophils 0.0      Absolute Immature Granulocytes 0.1      Absolute NRBCs 0.0     GLUCOSE BY METER - Abnormal    GLUCOSE BY METER POCT 19 (*)    GLUCOSE BY METER - Abnormal    GLUCOSE BY METER POCT 154 (*)    GLUCOSE BY METER - Abnormal    GLUCOSE BY METER POCT 149 (*)    TROPONIN T, HIGH SENSITIVITY - Abnormal    Troponin T, High Sensitivity 34 (*)    GLUCOSE BY METER - Abnormal    GLUCOSE BY METER POCT 197 (*)    VALPROIC ACID - Normal    Valproic acid 68.4     LIPASE - Normal    Lipase 14     ETHYL ALCOHOL LEVEL - Normal    Alcohol ethyl <0.01     D DIMER QUANTITATIVE - Normal     D-Dimer Quantitative 0.38     GLUCOSE BY METER - Normal    GLUCOSE BY METER POCT 82          Chest XR,  PA & LAT   Final Result   IMPRESSION: Low lung volumes. New mild right basilar airspace   opacities may reflect atelectasis or mild pneumonic infiltrates.   Stable left basilar scarring. No pleural effusion. Normal heart size.   Coronary artery stents. Spinal degenerative changes.      ROCK RAMOS MD            SYSTEM ID:  [id number]          Treatments provided: See MAR  Family Comments: lives with brother  OBS brochure/video discussed/provided to patient:  No  ED Medications:   Medications   0.9% sodium chloride BOLUS (1,000 mLs Intravenous $New Bag 8/1/23 1117)   heparin 25,000 units in 0.45% NaCl 250 mL ANTICOAGULANT infusion (850 Units/hr Intravenous Rate/Dose Verify 8/1/23 1142)   aspirin (ASA) chewable tablet 324 mg (324 mg Oral $Given 8/1/23 0851)   dextrose 50 % injection 50 mL (50 mLs Intravenous $Given 8/1/23 0850)   morphine (PF) injection 2 mg (2 mg Intravenous $Given 8/1/23 1032)   heparin loading dose for LOW INTENSITY TREATMENT * Give BEFORE starting heparin infusion (4,200 Units Intravenous $Given 8/1/23 1127)   dextrose 50 % injection 50 mL (50 mLs Intravenous $Given 8/1/23 1059)       Drips infusing:  Yes  For the majority of the shift this patient was Green.   Interventions performed were See MAR.    Sepsis treatment initiated: No    Cares/treatment/interventions/medications to be completed following ED care: NA    ED Nurse Name: Tracie Ramos RN  11:52 AM

## 2023-08-01 NOTE — ED PROVIDER NOTES
History     Chief Complaint:  Chest Pain     The history is provided by the patient and a relative.      Huseyin Pettit is a 63 year old male with coronary artery stents and a history of CAD, CKD stage 2, hypertension, hyperlipidemia, type 2 diabetes, and septic arthritis who presents to the emergency department for chest pain. The patient's brother and the patient state that since this morning, the patient began experiencing chest pain located at the center of his chest radiating to his back. His brother adds that the patient has been yelling and laughing in the emergency department room which is not normal for the patient, however his brother adds that this episode is similar to an episode of the same symptoms approximately 6 months ago which lasted for one day. The patient denies dyspnea, shortness of breath, vomiting, nausea, or abdominal pain. Denies cough or fever. Denies any medication allergies. Denies alcohol use or insulin injections. He adds that he is currently taking 81 mg aspirin for his coronary artery stents. Patient had a left foot amputation a year ago. Patient's blood sugar is 19 currently here in the emergency department and the patient reports he did not eat dinner last night because he did not feel hungry.  He does not use insulin but he does have diabetes mellitus and uses oral antihyperglycemic's. He lives with brother and brother's wife.    Independent Historian:   The patient and his brother provided the history as noted above.    Review of External Notes:   none    Medications:    Amlodipine-valsartan  Aspirin 81 mg  Bisoprolol  Buspirone  Clomipramine  Clopidogrel  Esomeprazole  Fluoxetine  Glipizide  Isosorbide dinitrate  Ondansetron  Pregabalin  Quetiapine  Rosuvastatin  Sitagliptin-metformin    Past Medical History:    CAD  Hypertension  Type 2 diabetes  Major depressive disorder  Hyperlipidemia  MILES  Colon polyp  CKD stage 2  Septic arthritis  Anemia  S/P amputation left  toe  Peripheral vascular disease  Coronary artery stents    Past Surgical History:  Foot amputation  Toe amputations  Cholecystectomy  CV coronary angiogram  Gallbladder surgery  Foot irrigation and debridement     Physical Exam   Patient Vitals for the past 24 hrs:   BP Temp Temp src Pulse Resp SpO2   08/01/23 1030 112/65 -- -- 101 -- --   08/01/23 1015 109/74 -- -- 92 15 --   08/01/23 1000 110/80 -- -- 87 (!) 8 --   08/01/23 0945 (!) 159/24 -- -- -- -- --   08/01/23 0930 111/70 -- -- 84 17 --   08/01/23 0830 109/78 -- -- 90 -- --   08/01/23 0815 109/68 97.4  F (36.3  C) Temporal 91 20 100 %      Physical Exam    Nursing note and vitals reviewed.  Constitutional:  Appears well-developed and well-nourished.   HENT:   Head:    Atraumatic.   Mouth/Throat:   Oropharynx is clear and moist. No oropharyngeal exudate.   Eyes:    Pupils are equal, round, and reactive to light.   Neck:    Normal range of motion. Neck supple.      No tracheal deviation present. No thyromegaly present.   Cardiovascular:  Normal rate, regular rhythm, no murmur   Pulmonary/Chest: Breath sounds are clear and equal without wheezes or crackles.  Abdominal:   Soft. Bowel sounds are normal. Exhibits no distension and      no mass. There is no tenderness.      There is no rebound and no guarding.   Musculoskeletal:  Exhibits no edema. Partial amputation of left foot. All toes missing. No open wounds or signs of infection of stump.  Lymphadenopathy:  No cervical adenopathy.   Psych:   Appears very anxious. Intermittently switches from speaking English to speaking Kiswahili.  Neurological:   Alert and oriented to person, place. GCS 15.  CN 2-12 intact.  and proximal upper extremity strength strong and equal.  Bilateral lower extremity strength strong and equal, including strong dorsiflexion and plantarflexion strength.  Sensation intact and equal to the face, arms and legs.  No facial droop or weakness. Normal speech.  Follows commands and answers  questions normally.    Skin:    Skin is warm and dry. No rash noted. No pallor.     Emergency Department Course   ECG  ECG taken at 0824, ECG read at 0825  Normal sinus rhythm   No previous ECG  Rate 89 bpm. ID interval 180 ms. QRS duration 74 ms. QT/QTc 378/459 ms. P-R-T axes 41 41 25.     Imaging:  Chest XR,  PA & LAT   Final Result   IMPRESSION: Low lung volumes. New mild right basilar airspace   opacities may reflect atelectasis or mild pneumonic infiltrates.   Stable left basilar scarring. No pleural effusion. Normal heart size.   Coronary artery stents. Spinal degenerative changes.      ROCK RAMOS MD            SYSTEM ID:  [id number]         Report per radiology    Laboratory:  Labs Ordered and Resulted from Time of ED Arrival to Time of ED Departure   COMPREHENSIVE METABOLIC PANEL - Abnormal       Result Value    Sodium 137      Potassium 4.3      Chloride 98      Carbon Dioxide (CO2) 22      Anion Gap 17 (*)     Urea Nitrogen 27.6 (*)     Creatinine 1.22 (*)     Calcium 9.4      Glucose 23 (*)     Alkaline Phosphatase 61      AST 32      ALT 41      Protein Total 6.6      Albumin 3.8      Bilirubin Total 0.3      GFR Estimate 67     TROPONIN T, HIGH SENSITIVITY - Abnormal    Troponin T, High Sensitivity 40 (*)    GLUCOSE BY METER - Abnormal    GLUCOSE BY METER POCT 19 (*)    GLUCOSE BY METER - Abnormal    GLUCOSE BY METER POCT 154 (*)    GLUCOSE BY METER - Abnormal    GLUCOSE BY METER POCT 149 (*)    VALPROIC ACID - Normal    Valproic acid 68.4     LIPASE - Normal    Lipase 14     ETHYL ALCOHOL LEVEL - Normal    Alcohol ethyl <0.01     CBC WITH PLATELETS AND DIFFERENTIAL   TROPONIN T, HIGH SENSITIVITY      Emergency Department Course & Assessments:    Interventions:  Medications   aspirin (ASA) chewable tablet 324 mg (324 mg Oral $Given 8/1/23 0851)   dextrose 50 % injection 50 mL (50 mLs Intravenous $Given 8/1/23 0850)   morphine (PF) injection 2 mg (2 mg Intravenous $Given 8/1/23 1032)      Assessments:  0825 I obtained history and examined the patient as noted above.   0845 I rechecked the patient.    Independent Interpretation (X-rays, CTs, rhythm strip):  I reviewed the patient's chest X-Ray and do not see any acute findings.    Consultations/Discussion of Management or Tests:  1020 I spoke with CHRIS Judd, regarding the patient.  1047 I spoke with Dr. Ervin Parrish, cardiology, regarding the patient.  1149 I spoke with CHRIS Judd, regarding the patient.    Social Determinants of Health affecting care:   Healthcare Access/Compliance    Disposition:  The patient was admitted to the hospital under the care of Dr. Boyd.    Impression & Plan      Medical Decision Making:  This is a patient arrived due to chest pain and abnormal behavior and was found to have found hypoglycemia with a glucose of 19 due to being on oral antihyperglycemic medications combined with not eating dinner last night or breakfast this morning.  His blood sugar was treated and his mental status improved.  I did not feel that there was any concern for stroke at this time since he has a non-focal neurologic examination and his mental status improved after his blood sugar improved.  I found this patient to have acute chest pain with an elevated troponin concerning for acute non-ST elevation myocardial infarction.  D-dimer is negative and I did not feel there was concern for pulmonary embolism or aortic dissection.  He is not hypertensive he not have any mediastinal widening.  He was given heparin bolus and drip per cardiac protocol after I consulted cardiology Dr. Parrish and admitted to the care of the hospitalist service.    Diagnosis:    ICD-10-CM    1. Acute chest pain  R07.9       2. Acute metabolic encephalopathy due to hypoglycemia  G93.41     E16.2          Scribe Disclosure:  Carlitos RIOS, am serving as a scribe at 9:44 AM on 8/1/2023 to document services personally performed by Gayle Contreras MD based on my  observations and the provider's statements to me.     8/1/2023   Gayle Contreras MD [place], Cheri Lee, MD  08/01/23 1545       Gayle Contreras MD  08/01/23 1548

## 2023-08-01 NOTE — PROGRESS NOTES
/66 (BP Location: Left arm, Patient Position: Sitting)   Pulse 79   Temp 97.6  F (36.4  C)   Resp 18   SpO2 95%       Pt admitted to the floor at 1327. Pt very unsteady. A2 W/GB. C/o chest pain 3/10. On tele. NSR. 103/66. MD paged. Orders for morphine 2 mg stat, stat trop, stat EKG, echo and cards consult. Heparin gtt infusing at 850 units. Sitter at bedside. Blood glucose 133. Pt is going back and forth with staying and leaving AMA. Pt refused skin assessment.      Stat Trop 30, EKG NSR, echo results pending.

## 2023-08-01 NOTE — H&P
[facility]    History and Physical - Hospitalist Service       Date of Admission:  8/1/2023    Assessment & Plan      Huseyin Pettit is a 63 year old male with history of CAD, DM II with neuropathy, hx of right foot amputation, hx left foot septic arthritis, osteomyelitis of left foot, CKD II and hx of TURP admitted on 8/1/2023 with complaint of chest discomfort and was noted to have low blood pressure.    In the ED she was afebrile, heart rate 91, pressure 109/68 and breathing comfortably on room air without hypoxia. Lab work remarkable for creatinine 1.22, normal BMP but elevated anion gap 17, normal LFTs, lipase 14 and high sensitivity troponin 40 with 2 hour repeat of 35, initial glucose of 40 but improved to 154 after D50. WBC 11.8, hgb 10.2 and platelet count 200. D dimer normal. ECG shows normal sinus rhythm without ischemia and CXR showed new mild right basilar pulmonary airspace opacities.  Cardiology was called who recommended heparin drip and admission was requested.  I was urgently asked to come evaluate patient as he is requesting to leave A.  He is agreeable to staying now but may change his mind again.    #NSTEMI  #Atypical chest discomfort  -Presents with substernal chest discomfort that came on acutely this morning without radiation, shortness of breath or diaphoresis.  He does say he is dizzy with standing and nursing reports that he is unsteady on his feet  -Pain has been constant since this morning with initial troponin of 40 and on recheck is 34  -EKG is nonischemic and cardiology was called from ED recommending heparin drip  -Patient continues to complain of pain of which we are using morphine to control  -Repeat EKG continues to be nonischemic and troponin continues to trend down  -Echocardiogram  -Monitor on telemetry  -Cardiology consultation  -Low suspicion of PE with low D-dimer and no tachycardia/hypoxia  -Add on ESR and CRP, pericarditis?  -Start Protonix IV in  case this is gastritis?  He does smoke a pack a day    #Hypoglycemia  -Patient is noted to be confused and altered with low blood sugar of 19   -He is not on insulin but does take glipizide and Janumet daily. He reports he hasn't eaten this AM  -Blood sugar has stabilized with just an amp of D50, plan for blood glucose checks 4 times daily      #Acute on chronic kidney disease  -Baseline creatinine 0.8-1  -Creatinine on admission 1.22 suggesting some dehydration especially with complaint of dizziness  -Will give a litre of IV fluids and recheck in AM    #Hx of CAD  #Hx of PVD  -Hx of PCI in 2011 to LAD  -Chest discomfort plan as above  -Continue PTA aspirin, Plavix and statin    #DM II  #Neuropathy  -Check A1c  -Continue pregabalin  -Holding glipizide and Janumet for now    #Hx of osteomyelitis of left foot  #Hx of right foot amputation    #Anxiety/depression  -Patient is intermittently agitated here and difficult to redirect which on review of chart is not unusual for him  -Continue PTA Depakote, fluoxetine  -Did not resume Seroquel as he reports not taking it    #HTN  #HLP  -Given low blood pressure we will hold amlodipine/valsartan  -Continue PTA bisoprolol and Imdur on parameters    #Tobacco use  -Smokes a pack per day but declines nicotine patch                   Diet:  Cardiac diet  DVT Prophylaxis: On Heparin drip  Rangel Catheter: Not present  Lines: None     Cardiac Monitoring: None  Code Status:  Full code    Clinically Significant Risk Factors Present on Admission                # Drug Induced Platelet Defect: home medication list includes an antiplatelet medication   # Hypertension: Noted on problem list               Disposition Plan      Expected Discharge Date: 08/03/2023                The patient's care was discussed with the Attending Physician, Dr. Boyd, Bedside Nurse, Patient, and ED Consultant(s).    Tammie Judd PA-C  Hospitalist Service  [facility]  Securely  message with Kerv[employer] (more info)  Text page via [facility] Paging/Directory     ______________________________________________________________________    Chief Complaint   Chest pain, hypoglycemia    History is obtained from the patient and the ED provider. Attempted to call family but no answer    History of Present Illness   History is limited as patient is a poor historian    Huseyin Pettit is a 63 year old male who lives with his family and was brought into the ED with complaint of chest discomfort.  He tells me he awoke with this chest discomfort in the center of his chest and it has been present since this morning.  It is not associated with shortness of breath but he does feel dizzy/lightheaded when walking.  He denies nausea and diaphoresis.  When he came to the ED he was noted to have a very low blood sugar but is adamant that he did not take any extra diabetic medicines.  He states he did not eat anything this morning and that is why his blood sugar is low.  He has not been ill recently and reports taking his medicines as prescribed.  He smokes about a pack of cigarettes a day but does not drink alcohol regularly.  After admission patient continued to complain of chest discomfort and was angry that he was not getting pain medicines.  He was requesting to leave AGAINST MEDICAL ADVICE in order to smoke.  I did have a long discussion with him about why he needs to be in the hospital and that it would be unsafe for him to leave.  He is agreeable to staying at this time.      Past Medical History    Past Medical History:   Diagnosis Date    CAD (coronary artery disease) 2/18/2015    S/p 3 stents in 2011 [place]    Closed fracture of multiple ribs of left side with routine healing 7/10/2018    Coronary artery disease     Coronary artery disease involving native coronary artery of native heart without angina pectoris 7/3/2018    S/p 3 stents put in [place] in 2011.    Depressive disorder     Diabetes (H)      Hypertension     Severe episode of recurrent major depressive disorder, without psychotic features (H) 7/3/2018    Type 2 diabetes mellitus with other circulatory complication, with long-term current use of insulin (H) 7/3/2018       Past Surgical History   Past Surgical History:   Procedure Laterality Date    AMPUTATE FOOT Left 3/5/2021    Procedure: TRANSMETATARSAL AMPUTATION LEFT FOOT;  Surgeon: Sergey Barnes DPM;  Location:  OR    AMPUTATE TOE(S) Right 3/3/2020    Procedure: AMPUTATION RIGHT GREAT TOE.;  Surgeon: Sergey Barnes DPM;  Location:  OR    CHOLECYSTECTOMY      COLONOSCOPY      CV CORONARY ANGIOGRAM N/A 3/2/2020    Procedure: Coronary Angiogram;  Surgeon: Thaddeus Sun MD;  Location:  HEART CARD[facility] CATH LAB    GALLBLADDER SURGERY  2011    IRRIGATION AND DEBRIDEMENT FOOT, COMBINED Left 3/11/2021    Procedure: Left foot revision transmetatarsal amputation with irrigation and debridement,  bone resection, delayed primary closure;  Surgeon: David Arana DPM;  Location:  OR       Prior to Admission Medications   Prior to Admission Medications   Prescriptions Last Dose Informant Patient Reported? Taking?   FLUoxetine (PROZAC) 20 MG capsule 7/31/2023 at -  No Yes   Sig: Take 1 capsule (20 mg) by mouth 2 times daily   Patient taking differently: Take 20 mg by mouth daily   QUEtiapine (SEROQUEL) 200 MG tablet Not Taking  No No   Sig: Take 1 tablet (200 mg) by mouth At Bedtime   Patient not taking: Reported on 8/1/2023   amLODIPine-valsartan (EXFORGE)  MG tablet 7/31/2023 at -  No Yes   Sig: Take 1 tablet by mouth daily   aspirin (ASA) 81 MG EC tablet 7/31/2023  No Yes   Sig: Take 1 tablet (81 mg) by mouth daily for 270 days   bisoprolol (ZEBETA) 5 MG tablet 7/31/2023 at -  No Yes   Sig: Take 1 tablet (5 mg) by mouth daily   blood glucose (NO BRAND SPECIFIED) test strip DME at DME  No No   Sig: Use to test blood sugar 2 times daily or as directed. To accompany: Blood  Glucose Monitor Brands: per insurance.   blood glucose calibration (NO BRAND SPECIFIED) solution DME at DME  No No   Sig: To accompany: Blood Glucose Monitor Brands: per insurance.   busPIRone (BUSPAR) 10 MG tablet 7/31/2023 at -  No Yes   Sig: Take 1 tablet (10 mg) by mouth 4 times daily   Patient taking differently: Take 10 mg by mouth 2 times daily   clomiPRAMINE (ANAFRANIL) 50 MG capsule 7/31/2023 at -  No Yes   Sig: Take 2 capsules (100 mg) by mouth 2 times daily   clopidogrel (PLAVIX) 75 MG tablet 7/31/2023 at -  No Yes   Sig: Take 1 tablet (75 mg) by mouth daily   divalproex sodium delayed-release (DEPAKOTE) 500 MG DR tablet 7/31/2023 at -  No Yes   Sig: Take 2 tablets (1,000 mg) by mouth 2 times daily   esomeprazole (NEXIUM) 40 MG DR capsule prn at prn  No No   Sig: Take 1 capsule (40 mg) by mouth every morning (before breakfast) Take 30-60 minutes before eating.   Patient taking differently: Take 40 mg by mouth daily as needed (heartburn)   glipiZIDE (GLUCOTROL XL) 5 MG 24 hr tablet 7/31/2023 at -  No Yes   Sig: Take 2 tablets (10 mg) by mouth daily   Patient taking differently: Take 5 mg by mouth 2 times daily   isosorbide dinitrate (ISORDIL) 20 MG tablet 7/31/2023 at -  No Yes   Sig: Take 2 tablets (40 mg) by mouth 2 times daily   ondansetron (ZOFRAN) 4 MG tablet prn at prn  No No   Sig: Take 1 tablet (4 mg) by mouth every 8 hours as needed for nausea   pregabalin (LYRICA) 150 MG capsule 7/31/2023 at -  No Yes   Sig: TAKE ONE CAPSULE BY MOUTH TWICE A DAY   rosuvastatin (CRESTOR) 20 MG tablet 7/31/2023 at -  No Yes   Sig: Take 1 tablet (20 mg) by mouth daily   sitagliptin-metFORMIN (JANUMET)  MG tablet 7/31/2023 at -  No Yes   Sig: Take 1 tablet by mouth 2 times daily (with meals)   thin (NO BRAND SPECIFIED) lancets DME at DME  No No   Sig: Use with lanceting device. To accompany: Blood Glucose Monitor Brands: per insurance.      Facility-Administered Medications: None          Physical Exam   Vital  Signs: Temp: 97.6  F (36.4  C) Temp src: Temporal BP: 103/66 Pulse: 79   Resp: 18 SpO2: 99 % O2 Device: None (Room air)    Weight: 0 lbs 0 oz    General Appearance: Alert and orientated, intermittently speaks Swedish  Respiratory: Clear to auscultation bilaterally  Cardiovascular: RRR without murmur  GI: Bowel sounds are present without tenderness  Skin: No rash or open sores      Medical Decision Making       65 MINUTES SPENT BY ME on the date of service doing chart review, history, exam, documentation & further activities per the note.      Data     I have personally reviewed the following data over the past 24 hrs:    11.8 (H)  \   10.2 (L)   / 200     137 98 27.6 (H) /  133 (H)   4.3 22 1.22 (H) \     ALT: 41 AST: 32 AP: 61 TBILI: 0.3   ALB: 3.8 TOT PROTEIN: 6.6 LIPASE: 14     Trop: 34 (H) BNP: N/A     INR:  N/A PTT:  N/A   D-dimer:  0.38 Fibrinogen:  N/A       Imaging results reviewed over the past 24 hrs:   Recent Results (from the past 24 hour(s))   Chest XR,  PA & LAT    Narrative    XR CHEST 2 VIEWS 8/1/2023 9:50 AM    HISTORY: chest pain    COMPARISON: X-ray 11/17/2022      Impression    IMPRESSION: Low lung volumes. New mild right basilar airspace  opacities may reflect atelectasis or mild pneumonic infiltrates.  Stable left basilar scarring. No pleural effusion. Normal heart size.  Coronary artery stents. Spinal degenerative changes.    ROCK RAMOS MD         SYSTEM ID:  [id number]

## 2023-08-01 NOTE — PLAN OF CARE
Goal Outcome Evaluation:    Plan of Care Reviewed With: patient    Overall Patient Progress: no change    A&Ox4. Pain in chest, gave IV morphine x1. A1 w/ gb and cane. LS: clear. Tele: SR. Sitter at bedisde. Urine drug screen, see results. Heparin running at 850 units/hr. Received NS bolus, then NS running at 75 mL/hr. Plan for angio tomorrow - NPO at midnight.

## 2023-08-02 ENCOUNTER — OFFICE VISIT (OUTPATIENT)
Dept: FAMILY MEDICINE | Facility: CLINIC | Age: 63
End: 2023-08-02
Payer: COMMERCIAL

## 2023-08-02 VITALS
BODY MASS INDEX: 22.96 KG/M2 | WEIGHT: 155 LBS | OXYGEN SATURATION: 100 % | HEART RATE: 63 BPM | RESPIRATION RATE: 15 BRPM | HEIGHT: 69 IN | SYSTOLIC BLOOD PRESSURE: 104 MMHG | DIASTOLIC BLOOD PRESSURE: 67 MMHG

## 2023-08-02 VITALS
TEMPERATURE: 97.9 F | RESPIRATION RATE: 18 BRPM | OXYGEN SATURATION: 99 % | HEART RATE: 77 BPM | DIASTOLIC BLOOD PRESSURE: 73 MMHG | SYSTOLIC BLOOD PRESSURE: 127 MMHG

## 2023-08-02 DIAGNOSIS — E11.59 TYPE 2 DIABETES MELLITUS WITH OTHER CIRCULATORY COMPLICATION, WITH LONG-TERM CURRENT USE OF INSULIN (H): ICD-10-CM

## 2023-08-02 DIAGNOSIS — Z79.4 TYPE 2 DIABETES MELLITUS WITH OTHER CIRCULATORY COMPLICATION, WITH LONG-TERM CURRENT USE OF INSULIN (H): ICD-10-CM

## 2023-08-02 DIAGNOSIS — G93.41 ACUTE METABOLIC ENCEPHALOPATHY DUE TO HYPOGLYCEMIA: ICD-10-CM

## 2023-08-02 DIAGNOSIS — Z89.421 S/P AMPUTATION OF LESSER TOE, RIGHT (H): ICD-10-CM

## 2023-08-02 DIAGNOSIS — R07.9 ACUTE CHEST PAIN: ICD-10-CM

## 2023-08-02 DIAGNOSIS — Z09 HOSPITAL DISCHARGE FOLLOW-UP: Primary | ICD-10-CM

## 2023-08-02 DIAGNOSIS — E16.2 ACUTE METABOLIC ENCEPHALOPATHY DUE TO HYPOGLYCEMIA: ICD-10-CM

## 2023-08-02 DIAGNOSIS — I10 BENIGN ESSENTIAL HYPERTENSION: ICD-10-CM

## 2023-08-02 DIAGNOSIS — S98.111A AMPUTATION OF RIGHT GREAT TOE (H): ICD-10-CM

## 2023-08-02 DIAGNOSIS — I73.9 PERIPHERAL VASCULAR DISEASE, UNSPECIFIED (H): ICD-10-CM

## 2023-08-02 DIAGNOSIS — N18.2 CKD (CHRONIC KIDNEY DISEASE) STAGE 2, GFR 60-89 ML/MIN: ICD-10-CM

## 2023-08-02 DIAGNOSIS — I25.10 CORONARY ARTERY DISEASE INVOLVING NATIVE CORONARY ARTERY OF NATIVE HEART WITHOUT ANGINA PECTORIS: ICD-10-CM

## 2023-08-02 LAB
ANION GAP SERPL CALCULATED.3IONS-SCNC: 6 MMOL/L (ref 7–15)
BUN SERPL-MCNC: 19.5 MG/DL (ref 8–23)
CALCIUM SERPL-MCNC: 8.7 MG/DL (ref 8.8–10.2)
CHLORIDE SERPL-SCNC: 106 MMOL/L (ref 98–107)
CREAT SERPL-MCNC: 0.83 MG/DL (ref 0.67–1.17)
CREAT UR-MCNC: 43.6 MG/DL
DEPRECATED HCO3 PLAS-SCNC: 27 MMOL/L (ref 22–29)
ERYTHROCYTE [DISTWIDTH] IN BLOOD BY AUTOMATED COUNT: 15.3 % (ref 10–15)
GFR SERPL CREATININE-BSD FRML MDRD: >90 ML/MIN/1.73M2
GLUCOSE BLDC GLUCOMTR-MCNC: 137 MG/DL (ref 70–99)
GLUCOSE SERPL-MCNC: 161 MG/DL (ref 70–99)
HCT VFR BLD AUTO: 33.4 % (ref 40–53)
HGB BLD-MCNC: 10.8 G/DL (ref 13.3–17.7)
MCH RBC QN AUTO: 30.6 PG (ref 26.5–33)
MCHC RBC AUTO-ENTMCNC: 32.3 G/DL (ref 31.5–36.5)
MCV RBC AUTO: 95 FL (ref 78–100)
MICROALBUMIN UR-MCNC: <12 MG/L
MICROALBUMIN/CREAT UR: NORMAL MG/G{CREAT}
PLATELET # BLD AUTO: 177 10E3/UL (ref 150–450)
POTASSIUM SERPL-SCNC: 4.4 MMOL/L (ref 3.4–5.3)
RBC # BLD AUTO: 3.53 10E6/UL (ref 4.4–5.9)
SODIUM SERPL-SCNC: 139 MMOL/L (ref 136–145)
TROPONIN T SERPL HS-MCNC: 29 NG/L
TROPONIN T SERPL HS-MCNC: 29 NG/L
UFH PPP CHRO-ACNC: 0.26 IU/ML
WBC # BLD AUTO: 7.6 10E3/UL (ref 4–11)

## 2023-08-02 PROCEDURE — 93010 ELECTROCARDIOGRAM REPORT: CPT | Performed by: INTERNAL MEDICINE

## 2023-08-02 PROCEDURE — 85027 COMPLETE CBC AUTOMATED: CPT | Performed by: PHYSICIAN ASSISTANT

## 2023-08-02 PROCEDURE — 84484 ASSAY OF TROPONIN QUANT: CPT | Performed by: INTERNAL MEDICINE

## 2023-08-02 PROCEDURE — 99495 TRANSJ CARE MGMT MOD F2F 14D: CPT

## 2023-08-02 PROCEDURE — 36415 COLL VENOUS BLD VENIPUNCTURE: CPT | Performed by: PHYSICIAN ASSISTANT

## 2023-08-02 PROCEDURE — 36415 COLL VENOUS BLD VENIPUNCTURE: CPT | Performed by: INTERNAL MEDICINE

## 2023-08-02 PROCEDURE — 258N000003 HC RX IP 258 OP 636: Performed by: INTERNAL MEDICINE

## 2023-08-02 PROCEDURE — 250N000011 HC RX IP 250 OP 636: Performed by: INTERNAL MEDICINE

## 2023-08-02 PROCEDURE — 85520 HEPARIN ASSAY: CPT | Performed by: INTERNAL MEDICINE

## 2023-08-02 PROCEDURE — 80048 BASIC METABOLIC PNL TOTAL CA: CPT | Performed by: PHYSICIAN ASSISTANT

## 2023-08-02 PROCEDURE — 250N000011 HC RX IP 250 OP 636: Performed by: PHYSICIAN ASSISTANT

## 2023-08-02 RX ORDER — NALOXONE HYDROCHLORIDE 0.4 MG/ML
0.2 INJECTION, SOLUTION INTRAMUSCULAR; INTRAVENOUS; SUBCUTANEOUS
Status: DISCONTINUED | OUTPATIENT
Start: 2023-08-02 | End: 2023-08-02 | Stop reason: HOSPADM

## 2023-08-02 RX ORDER — NALOXONE HYDROCHLORIDE 0.4 MG/ML
0.4 INJECTION, SOLUTION INTRAMUSCULAR; INTRAVENOUS; SUBCUTANEOUS
Status: DISCONTINUED | OUTPATIENT
Start: 2023-08-02 | End: 2023-08-02 | Stop reason: HOSPADM

## 2023-08-02 RX ORDER — MORPHINE SULFATE 2 MG/ML
2 INJECTION, SOLUTION INTRAMUSCULAR; INTRAVENOUS
Status: DISCONTINUED | OUTPATIENT
Start: 2023-08-02 | End: 2023-08-02 | Stop reason: HOSPADM

## 2023-08-02 RX ADMIN — MORPHINE SULFATE 2 MG: 2 INJECTION, SOLUTION INTRAMUSCULAR; INTRAVENOUS at 00:53

## 2023-08-02 RX ADMIN — MORPHINE SULFATE 2 MG: 2 INJECTION, SOLUTION INTRAMUSCULAR; INTRAVENOUS at 03:23

## 2023-08-02 RX ADMIN — SODIUM CHLORIDE: 9 INJECTION, SOLUTION INTRAVENOUS at 07:47

## 2023-08-02 RX ADMIN — MORPHINE SULFATE 2 MG: 2 INJECTION, SOLUTION INTRAMUSCULAR; INTRAVENOUS at 05:29

## 2023-08-02 ASSESSMENT — ACTIVITIES OF DAILY LIVING (ADL)
ADLS_ACUITY_SCORE: 37

## 2023-08-02 NOTE — DISCHARGE SUMMARY
Chief Complaint  new patient (seen years ago) and Elevated PSA        HPI  Dawn is a 66 y.o. male who referred for evaluation of his prostate after recent progression in his PSA to 4.19.  He has had them done previously and they have been normal for several years.  He states he saw me many years ago for possible UTI or difficulty voiding and was told he had a markedly enlarged prostate and started on Proscar to shrink it.  Since then he has had improved stream and voids without difficulty describing an AUA index today of only 7.  He states his family provider is Dr. Jarrett who is very good at taking patient's off medications they do not really need.  His PSA progression from 3-4 coincides with stopping the Proscar and is the probable explanation.  This does not suggest the need for biopsy unless confirmed with other information.  He is always had a benign prostate on digital rectal exam but has not been checked recently.  His voided urine today is clear.    There were no vitals filed for this visit.    Past Medical History  Past Medical History:   Diagnosis Date   • Hyperlipidemia    • Hypertension    • Parkinson disease (CMS/HCC)        Past Surgical History  Past Surgical History:   Procedure Laterality Date   • HERNIA REPAIR     • NASAL SEPTAL RECONSTRUCTION         Medications  has a current medication list which includes the following prescription(s): amlodipine, aspirin low dose, atorvastatin, doxycycline, fluticasone, lisinopril, naproxen, and trazodone.      Allergies  No Known Allergies    Social History  Social History     Socioeconomic History   • Marital status: Single     Spouse name: Not on file   • Number of children: Not on file   • Years of education: Not on file   • Highest education level: Not on file   Tobacco Use   • Smoking status: Never Smoker   • Smokeless tobacco: Never Used   Substance and Sexual Activity   • Alcohol use: No     Frequency: Never   • Drug use: No       Family History  He has  [facility]  Discharge Summary  Hospitalist    Date of Admission:  8/1/2023  Date of patient leaving AMA:  8/2/2023  8:10 AM  Provider:  Alexander Boyd DO, FHM    Discharge Diagnoses   Patient left against medical advice    Chest pain concerning for NSTEMI, patient had been planned for cardiac cath when left AMA  Metabolic encephalopathy transiently with severe hypoglycemia episode with DM2      Other medical issues:  Past Medical History:   Diagnosis Date    CAD (coronary artery disease) 2/18/2015    S/p 3 stents in 2011 [place]    Closed fracture of multiple ribs of left side with routine healing 7/10/2018    Coronary artery disease     Coronary artery disease involving native coronary artery of native heart without angina pectoris 7/3/2018    S/p 3 stents put in [place] in 2011.    Depressive disorder     Diabetes (H)     Hypertension     Severe episode of recurrent major depressive disorder, without psychotic features (H) 7/3/2018    Type 2 diabetes mellitus with other circulatory complication, with long-term current use of insulin (H) 7/3/2018       History of Present Illness   Huseyin Pettit is an 63 year old male who presented with chest pain and hypoglycemia.  Please see the admission history and physical for full details.    Hospital Course   Huseyin Pettit was admitted on 8/1/2023.  The following problems were addressed during his hospitalization:    Cardiac:  Patient presented with chest/findings suggestive of NSTEMI.  He was anticoagulated with ASA, plavix, and heparin drip.  His pain improved.  Cardiology saw him and recommended a cardiac cath.  He agreed to procedure.  The next AM just prior to the cath he insisted on leaving against medical advice as he didn't want to wait in the hospital for the procedure.  He had been told prior if he left he risked significant harm and death and he left anyway.      Other Issues:  Patient had severe hypoglycemia initially with some confusion.   This cleared when glucose corrected.  I note his A1C is quite low/tightly controlled.  Patient reports not checking glucose well at home.  Glipizide had been held in the hospital and glu stable off it.  He left AMA before I could see him but I was able to speak to him on phone after he left.  I advised he not take his glipizide and he see his PCP soon along with cardiology.      Significant Results and Procedures   See below    Pending Results     Unresulted Labs Ordered in the Past 30 Days of this Admission       No orders found for last 31 day(s).            Code Status   Full Code       Primary Care Physician   Physician No Ref-Primary    No New Discharge Meds:  Patient left AMA    Data   Recent Labs   Lab 08/02/23  0545 08/01/23  1555 08/01/23  1106   WBC 7.6 9.5 11.8*   HGB 10.8* 10.5* 10.2*   HCT 33.4* 32.3* 31.5*   MCV 95 94 94    187 200     Recent Labs   Lab 08/02/23  0545 08/02/23  0225 08/01/23  2148 08/01/23  0845 08/01/23  0838 07/28/23  1617     --   --   --  137 140   POTASSIUM 4.4  --   --   --  4.3 4.1   CHLORIDE 106  --   --   --  98 106   CO2 27  --   --   --  22 23   ANIONGAP 6*  --   --   --  17* 11   * 137* 155*   < > 23* 93   BUN 19.5  --   --   --  27.6* 35.4*   CR 0.83  --   --   --  1.22* 1.32*   GFRESTIMATED >90  --   --   --  67 61   MYRON 8.7*  --   --   --  9.4 8.6*   PROTTOTAL  --   --   --   --  6.6  --    ALBUMIN  --   --   --   --  3.8  --    BILITOTAL  --   --   --   --  0.3  --    ALKPHOS  --   --   --   --  61  --    AST  --   --   --   --  32  --    ALT  --   --   --   --  41  --     < > = values in this interval not displayed.     Results for orders placed or performed during the hospital encounter of 08/01/23   Chest XR,  PA & LAT    Narrative    XR CHEST 2 VIEWS 8/1/2023 9:50 AM    HISTORY: chest pain    COMPARISON: X-ray 11/17/2022      Impression    IMPRESSION: Low lung volumes. New mild right basilar airspace  opacities may reflect atelectasis or mild  no family history of bladder or kidney cancer  He has no family history of kidney stones      AUA Symptom Score:      Review of Systems  Review of Systems   Constitutional: Negative for activity change, appetite change, chills, fatigue, fever, unexpected weight gain and unexpected weight loss.   Respiratory: Negative for apnea, cough, chest tightness, shortness of breath, wheezing and stridor.    Cardiovascular: Negative for chest pain, palpitations and leg swelling.   Gastrointestinal: Negative for abdominal distention, abdominal pain, anal bleeding, blood in stool, constipation, diarrhea, nausea, rectal pain, vomiting, GERD and indigestion.   Genitourinary: Negative for decreased libido, decreased urine volume, difficulty urinating, discharge, dysuria, flank pain, frequency, genital sores, hematuria, nocturia, penile pain, erectile dysfunction, penile swelling, scrotal swelling, testicular pain, urgency and urinary incontinence.   Musculoskeletal: Negative for back pain and joint swelling.   Neurological: Negative for tremors, seizures, speech difficulty, weakness and numbness.   Psychiatric/Behavioral: Negative for agitation, decreased concentration, sleep disturbance, depressed mood and stress. The patient is not nervous/anxious.        Physical Exam  Physical Exam   Constitutional: He is oriented to person, place, and time. He appears well-developed and well-nourished.   HENT:   Head: Normocephalic and atraumatic.   Neck: Normal range of motion.   Pulmonary/Chest: Effort normal. No respiratory distress.   Abdominal: Soft. He exhibits no distension and no mass. There is no tenderness. No hernia.   Genitourinary: Rectum normal and prostate normal.   Musculoskeletal: Normal range of motion.   Lymphadenopathy:     He has no cervical adenopathy.   Neurological: He is alert and oriented to person, place, and time.   Skin: Skin is warm and dry.   Psychiatric: He has a normal mood and affect. His behavior is normal.    Vitals reviewed.      Labs Recent and today in the office:  Results for orders placed or performed during the hospital encounter of 03/06/20   Adult Transthoracic Echo Complete W/ Cont if Necessary Per Protocol   Result Value Ref Range    BSA 2.2 m^2    IVSd 1.1 cm    LVIDd 4.8 cm    LVIDs 3.1 cm    LVPWd 1.0 cm    IVS/LVPW 1.1     FS 34.5 %    EDV(Teich) 104.9 ml    ESV(Teich) 38.2 ml    EF(Teich) 63.6 %    EDV(cubed) 107.2 ml    ESV(cubed) 30.1 ml    EF(cubed) 71.9 %    LV mass(C)d 174.9 grams    LV mass(C)dI 79.4 grams/m^2    SV(Teich) 66.7 ml    SI(Teich) 30.3 ml/m^2    SV(cubed) 77.1 ml    SI(cubed) 35.0 ml/m^2    Ao root diam 3.6 cm    Ao root area 10.2 cm^2    LA dimension 3.6 cm    asc Aorta Diam 3.9 cm    LA/Ao 1.0     LVOT diam 2.5 cm    LVOT area 4.9 cm^2    LVOT area(traced) 4.9 cm^2    LAd major 4.5 cm    LVLd ap4 7.7 cm    EDV(MOD-sp4) 85.0 ml    LVLs ap4 6.2 cm    ESV(MOD-sp4) 31.0 ml    EF(MOD-sp4) 63.5 %    LVLd ap2 8.4 cm    EDV(MOD-sp2) 105.0 ml    LA volume 43.0 ml    SV(MOD-sp4) 54.0 ml    SI(MOD-sp4) 24.5 ml/m^2    Ao root area (BSA corrected) 1.6     LV Joe Vol (BSA corrected) 38.6 ml/m^2    LV Sys Vol (BSA corrected) 14.1 ml/m^2    LA Volume Index 19.5 ml/m^2    MV E max freda 62.2 cm/sec    MV A max freda 82.4 cm/sec    MV E/A 0.75     MV dec time 0.29 sec    Ao pk freda 144.0 cm/sec    Ao max PG 8.0 mmHg    Ao max PG (full) 3.6 mmHg    Ao V2 mean 94.7 cm/sec    Ao mean PG 4.0 mmHg    Ao mean PG (full) 2.0 mmHg    Ao V2 VTI 31.0 cm    SONY(I,A) 3.7 cm^2    SONY(I,D) 3.7 cm^2    SONY(V,A) 3.6 cm^2    SONY(V,D) 3.6 cm^2    LV V1 max PG 4.4 mmHg    LV V1 mean PG 2.0 mmHg    LV V1 max 105.0 cm/sec    LV V1 mean 67.2 cm/sec    LV V1 VTI 23.2 cm    SV(Ao) 315.5 ml    SI(Ao) 143.3 ml/m^2    SV(LVOT) 113.9 ml    SI(LVOT) 51.7 ml/m^2    PA acc slope 674.0 cm/sec^2    PA acc time 0.14 sec    PI end-d freda 83.6 cm/sec    TR max freda 189.0 cm/sec    TR max PG 14.0 mmHg    RVSP(TR) 17.0 mmHg    RAP systole 3.0  pneumonic infiltrates.  Stable left basilar scarring. No pleural effusion. Normal heart size.  Coronary artery stents. Spinal degenerative changes.    ROCK RAMOS MD         SYSTEM ID:  [id number]   Echocardiogram Complete     Value    LVEF  60-65%    Narrative    [id number]  [id number]  [id number]  [id number]^[email or web address]^JONATHAN^BEATRICE     [facility]  Echocardiography Laboratory  [address] [place] [address]     Name: MORALES SHERMAN  MRN: [id number]  : 1960  Study Date: 2023 02:38 PM  Age: 63 yrs  Gender: Male  Patient Location: [facility]  Reason For Study: Chest Pain  Ordering Physician: JONATHAN BRAUN  Referring Physician: Keyla Ref-Primary, Physician  Performed By: Mariola Lockhart RDCS     BSA: 1.9 m2  Height: 69 in  Weight: 155 lb  HR: 92  BP: 103/66 mmHg  ______________________________________________________________________________  Procedure  Complete Portable Echo Adult.  ______________________________________________________________________________  Interpretation Summary     The visual ejection fraction is 60-65%.  No regional wall motion abnormalities noted.  The study was technically difficult. Compared to prior study, there is no  significant change.  ______________________________________________________________________________  Left Ventricle  The left ventricle is normal in size. There is normal left ventricular wall  thickness. The visual ejection fraction is 60-65%. Left ventricular diastolic  function is normal. No regional wall motion abnormalities noted.     Right Ventricle  The right ventricle is normal in structure, function and size.     Atria  Normal left atrial size. Right atrial size is normal. There is no color  Doppler evidence of an atrial shunt.     Mitral Valve  There is mild mitral annular calcification. The mitral valve leaflets are  mildly thickened.     Tricuspid Valve  Normal tricuspid valve.     Aortic Valve  The aortic valve is  mmHg    PA pr(Accel) 16.0 mmHg    Lat E/e'  5.1     Med E/e' 7.1     Lat Peak E' Jorge 12.1 cm/sec    Med Peak E' Jorge 8.8 cm/sec     CV ECHO JONEL - BZI_BMI 27.8 kilograms/m^2     CV ECHO JONEL - BSA(HAYCOCK) 2.2 m^2     CV ECHO JONEL - BZI_METRIC_WEIGHT 95.7 kg     CV ECHO JONEL - BZI_METRIC_HEIGHT 185.4 cm    Avg E/e' ratio 5.95     RV Base 3.20 cm    RV Length 6.00 cm    EF(MOD-bp) 60 %    RVIDd 3.70 cm    Echo EF Estimated 65 %         Assessment & Plan  Elevated PSA: The patient's digital rectal exam is perfectly benign and I feel the PSA elevation is probably related to stopping the Proscar and having a normal progression.  I have recommended confirming the increase with a total to free ratio and a biopsy would be indicated if he is in a high risk category for cancer.  Otherwise I recommend close follow-up and he will return in 3 months.  Also recommend more of a plant-based heart healthy diet to reduce the risk of atherosclerosis as well as prostate cancer.  Answers for HPI/ROS submitted by the patient on 7/1/2020   What is the primary reason for your visit?: Other  Please describe your symptoms.: psa is  trending up  Have you had these symptoms before?: No  How long have you been having these symptoms?: 1-2 weeks  Please describe any probable cause for these symptoms. : enlarged prostate     trileaflet.     Pulmonic Valve  Normal pulmonic valve.     Vessels  Borderline aortic root dilatation. Normal size ascending aorta. The inferior  vena cava is normal.     Pericardium  There is no pericardial effusion.     Rhythm  Sinus rhythm was noted.  ______________________________________________________________________________  MMode/2D Measurements & Calculations  IVSd: 0.97 cm     LVIDd: 4.3 cm  LVIDs: 2.3 cm  LVPWd: 0.94 cm  IVC diam: 1.4 cm  FS: 47.2 %  LV mass(C)d: 136.3 grams  LV mass(C)dI: 73.5 grams/m2  Ao root diam: 3.8 cm  LA dimension: 3.5 cm  asc Aorta Diam: 3.6 cm  LA/Ao: 0.91  LVOT diam: 2.5 cm  LVOT area: 5.0 cm2  LA Volume (BP): 43.0 ml  LA Volume Index (BP): 23.2 ml/m2  RV Base: 3.2 cm  RWT: 0.43     TAPSE: 2.4 cm     Doppler Measurements & Calculations  MV E max reza: 75.7 cm/sec  MV A max reza: 112.0 cm/sec  MV E/A: 0.68  MV dec time: 0.15 sec  Ao V2 max: 111.9 cm/sec  Ao max P.0 mmHg  E/E' av.1  Lateral E/e': 5.6  Medial E/e': 8.6  RV S Reza: 19.7 cm/sec     ______________________________________________________________________________  Report approved by: Tenzin Bingham 2023 04:09 PM

## 2023-08-02 NOTE — PROVIDER NOTIFICATION
"Cross cover MD paged.     \"FYI- Patient continues to have midsternal chest pain relieved with Morphine but pain comes back Q2h when Morphine is due. Nitroglycerin tanked BPs earlier.\"    MD ordered EKG and Troponin levels to be drawn Q4h x2.   "

## 2023-08-02 NOTE — PROGRESS NOTES
Pt very upset this am about waiting for his procedure. States he is diabetic and needs to eat and needs a ciggarete. . This RN offered to check his bg and get him a nicotine patch.  Informed refusal. BG was wnl around 2 am. . States he has been having CP all night with some relief from morphine.  This RN offered to get him morphine but ,he also refused  this as he wanted his IV out. Risks of MI were explained to pt. Pt continued to get upset and refused further assistance. Pt brother came to pick him up. Iv removed. No bleeding noticed. Gauze and pressure were applied. Discharge to home with brother.  Dr. Saab and Dr. Parrish notified. AMA form was signed by pt and placed in pt chart.

## 2023-08-02 NOTE — PLAN OF CARE
Temp: 97.8  F (36.6  C) Temp src: Oral BP: 110/66 Pulse: 78   Resp: 18 SpO2: 98 % O2 Device: None (Room air)       Assumed care [id number]. A&Ox4. Ax1 with a gait belt and cane when OOB. NPO with the exception of medications. On RA. Refused full skin assessment. PIV in R arm is infusing NS 75mL/hr Y-sited with Heparin 1000 Units/hr. Given Melatonin per patient requests with no success with sleep overnight. Complaints of midsternal chest pain 5/10 relieved with Morphine. Morphine given Q2h as pain comes back. MD paged, see note. Soft BPs overnight. On telemetry- NSR. Sitter at bedside.

## 2023-08-02 NOTE — PROGRESS NOTES
Notified by RN this AM that patient decided he didn't want to stay for procedure and wanted to sign out AMA despite still having some chest pain overnight.  He had thought about singing out AMA last evening but decided to stay when I and his brother spoke to him at that time.  He would not wait for me to come see him to further talk this AM and signed AMA form by report and left.  He should follow-up outpatient for further cardiac eval.  Glucose stable off glipizide.  He is recommended to remain off this med though didn't stay for med rec discharge instructions.  Discharge Summary to follow.    ADDENDUM:  I was able to reach patient at home via phone.  I recommended he remain off glipizide and check his sugars and see his outpatient doctor for a discussion of DM treatments.  I think his PTA dose of glipizide was too much for him currently.  I also explained this to his brother who was also present on the phone with him.  He should also seek follow-up for his cardiac concerns as he is willing.

## 2023-08-02 NOTE — PATIENT INSTRUCTIONS
Urgent cardiology referral placed     Needs follow up visit scheduled for next week    Glucometer ordered    Wellness check later this week

## 2023-08-03 LAB
ATRIAL RATE - MUSE: 73 BPM
ATRIAL RATE - MUSE: 89 BPM
DIASTOLIC BLOOD PRESSURE - MUSE: NORMAL MMHG
DIASTOLIC BLOOD PRESSURE - MUSE: NORMAL MMHG
INTERPRETATION ECG - MUSE: NORMAL
INTERPRETATION ECG - MUSE: NORMAL
P AXIS - MUSE: 38 DEGREES
P AXIS - MUSE: 46 DEGREES
PR INTERVAL - MUSE: 174 MS
PR INTERVAL - MUSE: 178 MS
QRS DURATION - MUSE: 86 MS
QRS DURATION - MUSE: 86 MS
QT - MUSE: 378 MS
QT - MUSE: 402 MS
QTC - MUSE: 442 MS
QTC - MUSE: 459 MS
R AXIS - MUSE: 43 DEGREES
R AXIS - MUSE: 47 DEGREES
SYSTOLIC BLOOD PRESSURE - MUSE: NORMAL MMHG
SYSTOLIC BLOOD PRESSURE - MUSE: NORMAL MMHG
T AXIS - MUSE: 109 DEGREES
T AXIS - MUSE: 42 DEGREES
VENTRICULAR RATE- MUSE: 73 BPM
VENTRICULAR RATE- MUSE: 89 BPM

## 2024-04-14 NOTE — DISCHARGE INSTRUCTIONS
Discharge Instructions  Chest Pain    You have been seen today for chest pain or discomfort.  At this time, your provider has found no signs that your chest pain is due to a serious or life-threatening condition, (or you have declined more testing and/or admission to the hospital). However, sometimes there is a serious problem that does not show up right away. Your evaluation today may not be complete and you may need further testing and evaluation.     Generally, every Emergency Department visit should have a follow-up clinic visit with either a primary or a specialty clinic/provider. Please follow-up as instructed by your emergency provider today.  Return to the Emergency Department if:  Your chest pain changes, gets worse, starts to happen more often, or comes with less activity.  You are newly short of breath.  You get very weak or tired.  You pass out or faint.  You have any new symptoms, like fever, cough, numb legs, or you cough up blood.  You have anything else that worries you.    Until you follow-up with your regular provider, please do the following:  Take one aspirin daily unless you have an allergy or are told not to by your provider.  If a stress test appointment has been made, go to the appointment.  If you have questions, contact your regular provider.  Follow-up with your regular provider/clinic as directed; this is very important.    If you were given a prescription for medicine here today, be sure to read all of the information (including the package insert) that comes with your prescription.  This will include important information about the medicine, its side effects, and any warnings that you need to know about.  The pharmacist who fills the prescription can provide more information and answer questions you may have about the medicine.  If you have questions or concerns that the pharmacist cannot address, please call or return to the Emergency Department.       Remember that you can always come  back to the Emergency Department if you are not able to see your regular provider in the amount of time listed above, if you get any new symptoms, or if there is anything that worries you.  Opioid Medication Information    You have been given a prescription for an opioid (narcotic) pain medicine and/or have received a pain medicine while here in the Emergency Department. These medicines can make you drowsy or impaired. You must not drive, operate dangerous equipment, or engage in any other dangerous activities while taking these medications. If you drive while taking these medications, you could be arrested for driving under the influence (DUI). Do not drink any alcohol while you are taking these medications.     Opioid pain medications can cause addiction. If you have a history of chemical dependency of any type, you are at a higher risk of becoming addicted to pain medications.  Only take these prescribed medications to treat your pain when all other options have been tried. Take it for as short a time and as few doses as possible. Store your pain pills in a secure place, as they are frequently stolen and provide a dangerous opportunity for children or visitors in your house to start abusing these powerful medications. We will not replace any lost or stolen medicine.    If you do not finish your medication, it is a good idea to get rid of it but please do not flush it down the toilet. Please dispose of the remaining medication at a local pharmacy or law enforcement facility. The [place] [employer] has additional information on medication disposal: https://www.pca.[facility].[place]/living-green/managing-unwanted-medications.      Many prescription pain medications contain Tylenol  (acetaminophen), including Vicodin , Tylenol #3 , Norco , Lortab , and Percocet .  You should not take any extra pills of Tylenol  if you are using these prescription medications or you can get very sick.  Do not ever take more  than 3000 mg of acetaminophen in any 24 hour period.    All opioids tend to cause constipation. Drink plenty of water and eat foods that have a lot of fiber, such as fruits, vegetables, prune juice, apple juice and high fiber cereal.  Take a laxative if you don t move your bowels at least every other day. Miralax , Milk of Magnesia, Colace , or Senna  can be used to keep you regular.       042

## 2024-09-13 NOTE — PROGRESS NOTES
Assessment & Plan     (Z09) Hospital discharge follow-up (primary encounter diagnosis)  (R07.9) Acute chest pain    (I25.10) Coronary artery disease involving native coronary artery of native heart without angina pectoris  Comment: concerned about patient's decision to leave AMA today as he as a concerning history of CAD and risk factors where angiogram would have been helpful to evaluate myocardial ischemia given elevated troponins.  Placed urgent referral to Cardiology to follow up to discuss scheduling patient for angiogram that was supposed to take place while hospitalized prior to leaving AMA. Currently on ASA, Plavix, and Statin therapy. Discussed cardiac risk factors and history and red flag cardiac s/sx and when to re present to the ER. Patient will need a close follow up, will schedule him w/ a provider today for a proper hospital follow up and possible establish care. Patient fully understands and is agreeable with plan of care, at this point patient will follow up as needed unless acute concerns arise in the meantime.   Plan: Adult Cardiology Eval  Referral,         PRIMARY CARE FOLLOW-UP SCHEDULING    (G93.41,  E16.2) Acute metabolic encephalopathy due to hypoglycemia  (E11.59,  Z79.4) Type 2 diabetes mellitus with other circulatory complication, with long-term current use of insulin (H)  Comment: During visit patient at times was hard to follow, it seemed he may have been slurring his words a bit. Patient was given Morphine while in the hospital, however Morphine systemic effects should be resolving. Patient BS prior to discharge on BMP was in 160's. Patient does not check BS at home, I will provide him w/ a glucometer and instructed him that he needs to check BS at least daily (2-3 times/daily preferred) given his low blood sugars in the hospital. I have discontinued his glipizide today. Discussed low blood sugar symptoms and patient is to re present to ER if noting these are occurring given  his recent hypoglycemia w/ confusion. Patient will need a close follow up, will schedule him w/ a provider for a proper hospital follow up and possible establish care. In previous visit I have also placed referral to [facility] to help w/ medication management. Patient fully understands and is agreeable with plan of care, at this point patient will follow up as needed unless acute concerns arise in the meantime.  Plan: PRIMARY CARE FOLLOW-UP SCHEDULING, blood         glucose monitoring (NO BRAND SPECIFIED) meter         device kit    (N18.2) CKD (chronic kidney disease) stage 2, GFR 60-89 ml/min  Comment: added microalbumin to labs, will follow up on results when obtained.   Plan: Albumin Random Urine Quantitative with Creat         Ratio, PRIMARY CARE FOLLOW-UP SCHEDULING    (I10) Benign essential hypertension  Comment: stable no changes. Cardiology referral placed as above.   Plan: continue to monitor.     (S98.111A) Amputation of right great toe (H)  (Z89.421) S/P amputation of lesser toe, right (H)  (I73.9) Peripheral vascular disease, unspecified (H)  Comment: being followed by podiatry for amputations. Recently had visit.     45 minutes spent by me on the date of the encounter doing chart review, history and exam, documentation and further activities per the note       Follow up Visit scheduled.    MC Paul CNP  [facility]ane[facility] is a 63 year old, presenting for the following health issues:  No chief complaint on file.      8/2/2023     4:47 PM   Additional Questions   Roomed by Ana Beth       [facility]         4/23/2020     8:48 AM 5/6/2021     8:06 AM 7/28/2023     3:25 PM   PHQ-9 SCORE   PHQ-9 Total Score 7 9 5           4/23/2020     8:48 AM 5/6/2021     8:06 AM 7/28/2023     3:29 PM   MILES-7 SCORE   Total Score 13 11 8       C  PDMP Review         Value Time User    State PDMP site checked  Yes 7/31/2023  8:43 AM Ervin Arellano APRN CNP          Last CSA Agreement:  "  CSA -- Patient Level:    CSA: None found at the patient level.         Hospital Follow-up Visit:    Hospital/Nursing Home/IP Rehab Facility: [facility]  Date of Admission: 8/1/2023  Date of Discharge: 8/2/2023 via AMA  Reason(s) for Admission: Acute Chest Pain w/ r/o myocardial ischemia, Hypoglycemia w/ encephalopathy, epigastric pain    Was your hospitalization related to COVID-19? No   Problems taking medications regularly:  None  Medication changes since discharge: None  Problems adhering to non-medication therapy:  continues to smoke 1 ppd.    Summary of hospitalization:  [facility] discharge summary reviewed  Diagnostic Tests/Treatments reviewed.  Follow up needed: patient will need Cardiology follow up  Other Healthcare Providers Involved in Patient s Care:         Specialist appointment - recently seen Podiatry, Physical Therapy, MTM, and    Update since discharge: stable.  However patient did not have angiogram completed prior to patient leaving AMA       Plan of care communicated with patient              Patient presented to hospital yesterday for c/o chest pain and epigastric pain. Patient labs indicated elevated troponin, It was also noted that he had severe hypoglycemia in which one of the labs indicated a BS in the 20s in which patient was confused a bit as well. cardiology consulted and decision made to admit patient for angio gram and BS monitoring (patient has pertinent hx of PCI's in past).  Patient was placed on heparin drip and given ASA and Plavix. During hospitalization patient became upset d/t the fact having to stay NPO for procedure and felt that his diabetes \"would kill\" him if he stayed and was upset he was not able to eat. Patient was given Morphine during his stay which helped w/ the discomfort but unfortunately patient left AMA from hospital today as a result prior to getting angiogram completed. They were able to perform an Echo on him which " showed EF of 60-65% w/ no abnormal wall motions or valve concerns. Patient has pertinent hx of 3 PCI's placed in [place] in 2011, he continues to smoke 1 ppd, bilateral foot amputations d/t PVD, CKD, depression and anxiety. He currently is not having chest pain nor does have any lightheadedness/dizziness, palpitations, epigastric discomfort, back discomfort, peripheral edema, visual disturbances.     Review of Systems   Constitutional, HEENT, cardiovascular, pulmonary, gi and gu systems are negative, except as otherwise noted.      Objective    There were no vitals taken for this visit.  There is no height or weight on file to calculate BMI.  Physical Exam  Vitals and nursing note reviewed.   Constitutional:       General: He is not in acute distress.     Appearance: Normal appearance. He is not ill-appearing.   Cardiovascular:      Rate and Rhythm: Normal rate and regular rhythm.      Heart sounds: No murmur heard.     No friction rub. No gallop.   Pulmonary:      Effort: Pulmonary effort is normal. No respiratory distress.      Breath sounds: No wheezing, rhonchi or rales.   Abdominal:      General: Bowel sounds are normal. There is no distension.      Palpations: Abdomen is soft.      Tenderness: There is no abdominal tenderness. There is no guarding.   Skin:     General: Skin is warm and dry.   Neurological:      Mental Status: He is alert.   Psychiatric:         Mood and Affect: Mood is anxious.         Speech: Speech is slurred.         Behavior: Behavior normal. Behavior is cooperative.         Thought Content: Thought content normal.         Judgment: Judgment normal.                   done

## (undated) DEVICE — CAST PADDING 4" STERILE 9044S

## (undated) DEVICE — ESU PENCIL W/HOLSTER E2350H

## (undated) DEVICE — SU PROLENE 4-0 FS-2 18' 8683G

## (undated) DEVICE — BLADE SAW SAGITTAL 25.5X9.5X.4MM FINE LINVATEC 5023-138

## (undated) DEVICE — GLOVE PROTEXIS POWDER FREE 7.5 ORTHOPEDIC 2D73ET75

## (undated) DEVICE — SU PROLENE 3-0 PS-2 18" 8687H

## (undated) DEVICE — DRAPE STERI TOWEL LG 1010

## (undated) DEVICE — NDL 19GA 1.5"

## (undated) DEVICE — PACK EXTREMITY SOP15EXFSD

## (undated) DEVICE — SU ETHILON 4-0 FS-2 18" 662H

## (undated) DEVICE — DRAPE POUCH IRR 1016

## (undated) DEVICE — DRSG KERLIX FLUFFS X5

## (undated) DEVICE — SOL WATER IRRIG 1000ML BOTTLE 2F7114

## (undated) DEVICE — SU VICRYL 4-0 PS-2 18" UND J496H

## (undated) DEVICE — LINEN TOWEL PACK X5 5464

## (undated) DEVICE — GLOVE PROTEXIS BLUE W/NEU-THERA 7.5  2D73EB75

## (undated) DEVICE — PREP SKIN SCRUB TRAY 4461A

## (undated) DEVICE — DRSG KERLIX 4 1/2"X4YDS ROLL 6715

## (undated) DEVICE — SU VICRYL 3-0 SH 27" J316H

## (undated) DEVICE — MANIFOLD KIT ANGIO AUTOMATED 014613

## (undated) DEVICE — NDL 25GA 1.5" 305127

## (undated) DEVICE — PREP CHLORAPREP 26ML TINTED ORANGE  260815

## (undated) DEVICE — TOTE ANGIO CORP PC15AT SAN32CC83O

## (undated) DEVICE — Device

## (undated) DEVICE — SU ETHILON 4-0 PS-2 18" 1667G

## (undated) DEVICE — SYR 10ML FINGER CONTROL W/O NDL 309695

## (undated) DEVICE — GLOVE PROTEGRITY MICRO 7.5 LATEX

## (undated) DEVICE — SPONGE LAP 18X18" X8435

## (undated) DEVICE — DRAIN PENROSE 0.25"X18" LATEX FREE GR201

## (undated) DEVICE — IMM LIMB ELEVATOR DC40-0203

## (undated) DEVICE — BLADE KNIFE SURG 15 371115

## (undated) DEVICE — SU ETHILON 3-0 FS-1 18" 669H

## (undated) DEVICE — BLADE SAW OSCILLATING STRYK MED 9.0X25X0.38MM 2296-003-111

## (undated) DEVICE — DRSG ADAPTIC 3X8" 6113

## (undated) DEVICE — MANIFOLD NEPTUNE 4 PORT 700-20

## (undated) DEVICE — ESU GROUND PAD UNIVERSAL W/O CORD

## (undated) DEVICE — INTRO GLIDESHEATH SLENDER 6FR 10X45CM 60-1060

## (undated) DEVICE — BNDG ROLLER GAUZE CONFORM 2"X4YD 41-52

## (undated) DEVICE — BNDG ELASTIC 4"X5YDS UNSTERILE 6611-40

## (undated) DEVICE — BNDG KLING 2" 2231

## (undated) DEVICE — CATH JACKY 5FR 3.5 CURVE 40-5023

## (undated) DEVICE — SLEEVE TR BAND RADIAL COMPRESSION DEVICE 24CM TRB24-REG

## (undated) DEVICE — SOL NACL 0.9% IRRIG 1000ML BOTTLE 2F7124

## (undated) DEVICE — SPONGE SURGIFOAM 100 1974

## (undated) DEVICE — ADH FLOSEAL W/HUMAN THROMBIN 5ML

## (undated) DEVICE — KIT HAND CONTROL ANGIOTOUCH ACIST 65CM AT-P65

## (undated) DEVICE — DEFIB PRO-PADZ LVP LQD GEL ADULT 8900-2105-01

## (undated) DEVICE — DRAPE MINI C-ARM 4003

## (undated) DEVICE — CAST PADDING 6" STERILE 9046S

## (undated) DEVICE — SU PROLENE 3-0 SHDA 36" 8522H

## (undated) RX ORDER — ONDANSETRON 2 MG/ML
INJECTION INTRAMUSCULAR; INTRAVENOUS
Status: DISPENSED
Start: 2021-03-11

## (undated) RX ORDER — NITROGLYCERIN 5 MG/ML
VIAL (ML) INTRAVENOUS
Status: DISPENSED
Start: 2020-03-02

## (undated) RX ORDER — VERAPAMIL HYDROCHLORIDE 2.5 MG/ML
INJECTION, SOLUTION INTRAVENOUS
Status: DISPENSED
Start: 2020-03-02

## (undated) RX ORDER — HYDROMORPHONE HYDROCHLORIDE 1 MG/ML
INJECTION, SOLUTION INTRAMUSCULAR; INTRAVENOUS; SUBCUTANEOUS
Status: DISPENSED
Start: 2021-03-11

## (undated) RX ORDER — ONDANSETRON 2 MG/ML
INJECTION INTRAMUSCULAR; INTRAVENOUS
Status: DISPENSED
Start: 2021-03-05

## (undated) RX ORDER — FENTANYL CITRATE 50 UG/ML
INJECTION, SOLUTION INTRAMUSCULAR; INTRAVENOUS
Status: DISPENSED
Start: 2020-03-02

## (undated) RX ORDER — PROPOFOL 10 MG/ML
INJECTION, EMULSION INTRAVENOUS
Status: DISPENSED
Start: 2020-03-03

## (undated) RX ORDER — FENTANYL CITRATE 50 UG/ML
INJECTION, SOLUTION INTRAMUSCULAR; INTRAVENOUS
Status: DISPENSED
Start: 2021-03-05

## (undated) RX ORDER — LIDOCAINE HYDROCHLORIDE 10 MG/ML
INJECTION, SOLUTION EPIDURAL; INFILTRATION; INTRACAUDAL; PERINEURAL
Status: DISPENSED
Start: 2020-03-02

## (undated) RX ORDER — LIDOCAINE HYDROCHLORIDE 20 MG/ML
INJECTION, SOLUTION EPIDURAL; INFILTRATION; INTRACAUDAL; PERINEURAL
Status: DISPENSED
Start: 2021-03-05

## (undated) RX ORDER — HEPARIN SODIUM 200 [USP'U]/100ML
INJECTION, SOLUTION INTRAVENOUS
Status: DISPENSED
Start: 2020-03-02

## (undated) RX ORDER — ONDANSETRON 2 MG/ML
INJECTION INTRAMUSCULAR; INTRAVENOUS
Status: DISPENSED
Start: 2020-03-03

## (undated) RX ORDER — PROPOFOL 10 MG/ML
INJECTION, EMULSION INTRAVENOUS
Status: DISPENSED
Start: 2021-03-11

## (undated) RX ORDER — FENTANYL CITRATE 50 UG/ML
INJECTION, SOLUTION INTRAMUSCULAR; INTRAVENOUS
Status: DISPENSED
Start: 2020-03-03

## (undated) RX ORDER — BUPIVACAINE HYDROCHLORIDE 5 MG/ML
INJECTION, SOLUTION EPIDURAL; INTRACAUDAL
Status: DISPENSED
Start: 2020-03-03

## (undated) RX ORDER — CEFAZOLIN SODIUM 1 G/3ML
INJECTION, POWDER, FOR SOLUTION INTRAMUSCULAR; INTRAVENOUS
Status: DISPENSED
Start: 2021-03-11

## (undated) RX ORDER — PROPOFOL 10 MG/ML
INJECTION, EMULSION INTRAVENOUS
Status: DISPENSED
Start: 2021-03-05

## (undated) RX ORDER — PIPERACILLIN SODIUM, TAZOBACTAM SODIUM 3; .375 G/15ML; G/15ML
INJECTION, POWDER, LYOPHILIZED, FOR SOLUTION INTRAVENOUS
Status: DISPENSED
Start: 2021-03-05

## (undated) RX ORDER — HEPARIN SODIUM 1000 [USP'U]/ML
INJECTION, SOLUTION INTRAVENOUS; SUBCUTANEOUS
Status: DISPENSED
Start: 2020-03-02

## (undated) RX ORDER — HEPARIN SODIUM 1000 [USP'U]/ML
INJECTION, SOLUTION INTRAVENOUS; SUBCUTANEOUS
Status: DISPENSED
Start: 2020-03-03